# Patient Record
Sex: FEMALE | Race: BLACK OR AFRICAN AMERICAN | NOT HISPANIC OR LATINO | Employment: OTHER | URBAN - METROPOLITAN AREA
[De-identification: names, ages, dates, MRNs, and addresses within clinical notes are randomized per-mention and may not be internally consistent; named-entity substitution may affect disease eponyms.]

---

## 2021-05-26 ENCOUNTER — RECORDS - HEALTHEAST (OUTPATIENT)
Dept: ADMINISTRATIVE | Facility: CLINIC | Age: 76
End: 2021-05-26

## 2021-05-28 ENCOUNTER — RECORDS - HEALTHEAST (OUTPATIENT)
Dept: ADMINISTRATIVE | Facility: CLINIC | Age: 76
End: 2021-05-28

## 2021-07-25 ENCOUNTER — MEDICAL CORRESPONDENCE (OUTPATIENT)
Dept: HEALTH INFORMATION MANAGEMENT | Facility: CLINIC | Age: 76
End: 2021-07-25

## 2021-08-09 ENCOUNTER — LAB REQUISITION (OUTPATIENT)
Dept: LAB | Facility: CLINIC | Age: 76
End: 2021-08-09
Payer: COMMERCIAL

## 2021-08-09 DIAGNOSIS — E55.9 VITAMIN D DEFICIENCY, UNSPECIFIED: ICD-10-CM

## 2021-08-09 DIAGNOSIS — I10 ESSENTIAL (PRIMARY) HYPERTENSION: ICD-10-CM

## 2021-08-09 DIAGNOSIS — D64.9 ANEMIA, UNSPECIFIED: ICD-10-CM

## 2021-08-09 DIAGNOSIS — E03.9 HYPOTHYROIDISM, UNSPECIFIED: ICD-10-CM

## 2021-08-09 DIAGNOSIS — T81.30XS DISRUPTION OF WOUND, UNSPECIFIED, SEQUELA: ICD-10-CM

## 2021-08-09 LAB
ANION GAP SERPL CALCULATED.3IONS-SCNC: 9 MMOL/L (ref 5–18)
BUN SERPL-MCNC: 12 MG/DL (ref 8–28)
CALCIUM SERPL-MCNC: 9.2 MG/DL (ref 8.5–10.5)
CHLORIDE BLD-SCNC: 110 MMOL/L (ref 98–107)
CO2 SERPL-SCNC: 23 MMOL/L (ref 22–31)
CREAT SERPL-MCNC: 0.75 MG/DL (ref 0.6–1.1)
ERYTHROCYTE [DISTWIDTH] IN BLOOD BY AUTOMATED COUNT: 14.6 % (ref 10–15)
GFR SERPL CREATININE-BSD FRML MDRD: 77 ML/MIN/1.73M2
GLUCOSE BLD-MCNC: 83 MG/DL (ref 70–125)
HCT VFR BLD AUTO: 33.1 % (ref 35–47)
HGB BLD-MCNC: 10.1 G/DL (ref 11.7–15.7)
MCH RBC QN AUTO: 26 PG (ref 26.5–33)
MCHC RBC AUTO-ENTMCNC: 30.5 G/DL (ref 31.5–36.5)
MCV RBC AUTO: 85 FL (ref 78–100)
PLATELET # BLD AUTO: 392 10E3/UL (ref 150–450)
POTASSIUM BLD-SCNC: 4.5 MMOL/L (ref 3.5–5)
RBC # BLD AUTO: 3.89 10E6/UL (ref 3.8–5.2)
SODIUM SERPL-SCNC: 142 MMOL/L (ref 136–145)
TSH SERPL DL<=0.005 MIU/L-ACNC: 1.07 UIU/ML (ref 0.3–5)
WBC # BLD AUTO: 7 10E3/UL (ref 4–11)

## 2021-08-09 PROCEDURE — 84630 ASSAY OF ZINC: CPT | Mod: ORL | Performed by: FAMILY MEDICINE

## 2021-08-09 PROCEDURE — 36415 COLL VENOUS BLD VENIPUNCTURE: CPT | Mod: ORL | Performed by: FAMILY MEDICINE

## 2021-08-09 PROCEDURE — 84443 ASSAY THYROID STIM HORMONE: CPT | Mod: ORL | Performed by: FAMILY MEDICINE

## 2021-08-09 PROCEDURE — 80048 BASIC METABOLIC PNL TOTAL CA: CPT | Mod: ORL | Performed by: FAMILY MEDICINE

## 2021-08-09 PROCEDURE — 85027 COMPLETE CBC AUTOMATED: CPT | Mod: ORL | Performed by: FAMILY MEDICINE

## 2021-08-09 PROCEDURE — 82306 VITAMIN D 25 HYDROXY: CPT | Mod: ORL | Performed by: FAMILY MEDICINE

## 2021-08-10 LAB — DEPRECATED CALCIDIOL+CALCIFEROL SERPL-MC: 41 UG/L (ref 30–80)

## 2021-08-12 LAB — ZINC SERPL-MCNC: 83.2 UG/DL

## 2021-09-16 ENCOUNTER — LAB REQUISITION (OUTPATIENT)
Dept: LAB | Facility: CLINIC | Age: 76
End: 2021-09-16
Payer: COMMERCIAL

## 2021-09-16 DIAGNOSIS — I10 ESSENTIAL (PRIMARY) HYPERTENSION: ICD-10-CM

## 2021-09-16 DIAGNOSIS — E08.641 DIABETES MELLITUS DUE TO UNDERLYING CONDITION WITH HYPOGLYCEMIA WITH COMA (H): ICD-10-CM

## 2021-09-17 LAB
ANION GAP SERPL CALCULATED.3IONS-SCNC: 9 MMOL/L (ref 5–18)
BUN SERPL-MCNC: 12 MG/DL (ref 8–28)
CALCIUM SERPL-MCNC: 9.3 MG/DL (ref 8.5–10.5)
CHLORIDE BLD-SCNC: 112 MMOL/L (ref 98–107)
CO2 SERPL-SCNC: 23 MMOL/L (ref 22–31)
CREAT SERPL-MCNC: 0.69 MG/DL (ref 0.6–1.1)
GFR SERPL CREATININE-BSD FRML MDRD: 84 ML/MIN/1.73M2
GLUCOSE BLD-MCNC: 36 MG/DL (ref 70–125)
HBA1C MFR BLD: 5.7 %
POTASSIUM BLD-SCNC: 3.7 MMOL/L (ref 3.5–5)
SODIUM SERPL-SCNC: 144 MMOL/L (ref 136–145)

## 2021-09-17 PROCEDURE — 83036 HEMOGLOBIN GLYCOSYLATED A1C: CPT | Mod: ORL | Performed by: NURSE PRACTITIONER

## 2021-09-17 PROCEDURE — 80048 BASIC METABOLIC PNL TOTAL CA: CPT | Mod: ORL | Performed by: NURSE PRACTITIONER

## 2021-09-17 PROCEDURE — P9603 ONE-WAY ALLOW PRORATED MILES: HCPCS | Mod: ORL | Performed by: NURSE PRACTITIONER

## 2021-09-17 PROCEDURE — 36415 COLL VENOUS BLD VENIPUNCTURE: CPT | Mod: ORL | Performed by: NURSE PRACTITIONER

## 2021-10-18 ENCOUNTER — LAB REQUISITION (OUTPATIENT)
Dept: LAB | Facility: CLINIC | Age: 76
End: 2021-10-18
Payer: COMMERCIAL

## 2021-10-18 DIAGNOSIS — I10 ESSENTIAL (PRIMARY) HYPERTENSION: ICD-10-CM

## 2021-10-19 LAB
ANION GAP SERPL CALCULATED.3IONS-SCNC: 8 MMOL/L (ref 5–18)
BUN SERPL-MCNC: 16 MG/DL (ref 8–28)
CALCIUM SERPL-MCNC: 9.5 MG/DL (ref 8.5–10.5)
CHLORIDE BLD-SCNC: 112 MMOL/L (ref 98–107)
CO2 SERPL-SCNC: 23 MMOL/L (ref 22–31)
CREAT SERPL-MCNC: 0.74 MG/DL (ref 0.6–1.1)
GFR SERPL CREATININE-BSD FRML MDRD: 78 ML/MIN/1.73M2
GLUCOSE BLD-MCNC: 76 MG/DL (ref 70–125)
POTASSIUM BLD-SCNC: 4.1 MMOL/L (ref 3.5–5)
SODIUM SERPL-SCNC: 143 MMOL/L (ref 136–145)

## 2021-10-19 PROCEDURE — 36415 COLL VENOUS BLD VENIPUNCTURE: CPT | Mod: ORL | Performed by: NURSE PRACTITIONER

## 2021-10-19 PROCEDURE — 80048 BASIC METABOLIC PNL TOTAL CA: CPT | Mod: ORL | Performed by: NURSE PRACTITIONER

## 2021-12-02 ENCOUNTER — LAB REQUISITION (OUTPATIENT)
Dept: LAB | Facility: CLINIC | Age: 76
End: 2021-12-02
Payer: COMMERCIAL

## 2021-12-02 DIAGNOSIS — E11.9 TYPE 2 DIABETES MELLITUS WITHOUT COMPLICATIONS (H): ICD-10-CM

## 2021-12-02 DIAGNOSIS — E78.5 HYPERLIPIDEMIA, UNSPECIFIED: ICD-10-CM

## 2021-12-02 DIAGNOSIS — D64.9 ANEMIA, UNSPECIFIED: ICD-10-CM

## 2021-12-03 LAB
CHOLEST SERPL-MCNC: 112 MG/DL
ERYTHROCYTE [DISTWIDTH] IN BLOOD BY AUTOMATED COUNT: 15.1 % (ref 10–15)
FASTING STATUS PATIENT QL REPORTED: YES
HBA1C MFR BLD: 5.8 %
HCT VFR BLD AUTO: 36.6 % (ref 35–47)
HDLC SERPL-MCNC: 29 MG/DL
HGB BLD-MCNC: 10.9 G/DL (ref 11.7–15.7)
LDLC SERPL CALC-MCNC: 62 MG/DL
MCH RBC QN AUTO: 23.8 PG (ref 26.5–33)
MCHC RBC AUTO-ENTMCNC: 29.8 G/DL (ref 31.5–36.5)
MCV RBC AUTO: 80 FL (ref 78–100)
PLATELET # BLD AUTO: 268 10E3/UL (ref 150–450)
RBC # BLD AUTO: 4.58 10E6/UL (ref 3.8–5.2)
TRIGL SERPL-MCNC: 103 MG/DL
WBC # BLD AUTO: 3.7 10E3/UL (ref 4–11)

## 2021-12-03 PROCEDURE — 80061 LIPID PANEL: CPT | Mod: ORL | Performed by: NURSE PRACTITIONER

## 2021-12-03 PROCEDURE — 85027 COMPLETE CBC AUTOMATED: CPT | Mod: ORL | Performed by: NURSE PRACTITIONER

## 2021-12-03 PROCEDURE — 83036 HEMOGLOBIN GLYCOSYLATED A1C: CPT | Mod: ORL | Performed by: NURSE PRACTITIONER

## 2021-12-03 PROCEDURE — P9604 ONE-WAY ALLOW PRORATED TRIP: HCPCS | Mod: ORL | Performed by: NURSE PRACTITIONER

## 2021-12-03 PROCEDURE — 36415 COLL VENOUS BLD VENIPUNCTURE: CPT | Mod: ORL | Performed by: NURSE PRACTITIONER

## 2022-01-01 PROCEDURE — 96372 THER/PROPH/DIAG INJ SC/IM: CPT

## 2023-01-01 ENCOUNTER — APPOINTMENT (OUTPATIENT)
Dept: CT IMAGING | Facility: CLINIC | Age: 78
DRG: 593 | End: 2023-01-01
Attending: HOSPITALIST
Payer: COMMERCIAL

## 2023-01-01 ENCOUNTER — LAB REQUISITION (OUTPATIENT)
Dept: LAB | Facility: CLINIC | Age: 78
End: 2023-01-01
Payer: COMMERCIAL

## 2023-01-01 ENCOUNTER — APPOINTMENT (OUTPATIENT)
Dept: GENERAL RADIOLOGY | Facility: CLINIC | Age: 78
DRG: 593 | End: 2023-01-01
Attending: HOSPITALIST
Payer: COMMERCIAL

## 2023-01-01 ENCOUNTER — TELEPHONE (OUTPATIENT)
Dept: GERIATRICS | Facility: CLINIC | Age: 78
End: 2023-01-01
Payer: OTHER MISCELLANEOUS

## 2023-01-01 ENCOUNTER — NURSING HOME VISIT (OUTPATIENT)
Dept: GERIATRICS | Facility: CLINIC | Age: 78
End: 2023-01-01
Payer: MEDICARE

## 2023-01-01 ENCOUNTER — MEDICAL CORRESPONDENCE (OUTPATIENT)
Dept: HEALTH INFORMATION MANAGEMENT | Facility: CLINIC | Age: 78
End: 2023-01-01

## 2023-01-01 ENCOUNTER — HOSPITAL ENCOUNTER (INPATIENT)
Facility: CLINIC | Age: 78
LOS: 60 days | Discharge: NURSING, REHAB OR LONG-TERM CARE | DRG: 593 | End: 2023-03-08
Attending: EMERGENCY MEDICINE | Admitting: HOSPITALIST
Payer: COMMERCIAL

## 2023-01-01 ENCOUNTER — APPOINTMENT (OUTPATIENT)
Dept: ULTRASOUND IMAGING | Facility: CLINIC | Age: 78
DRG: 593 | End: 2023-01-01
Attending: PODIATRIST
Payer: COMMERCIAL

## 2023-01-01 ENCOUNTER — DOCUMENTATION ONLY (OUTPATIENT)
Dept: OTHER | Facility: CLINIC | Age: 78
End: 2023-01-01
Payer: OTHER MISCELLANEOUS

## 2023-01-01 VITALS
HEIGHT: 64 IN | OXYGEN SATURATION: 96 % | TEMPERATURE: 97.9 F | HEART RATE: 78 BPM | BODY MASS INDEX: 29.02 KG/M2 | WEIGHT: 170 LBS | SYSTOLIC BLOOD PRESSURE: 131 MMHG | DIASTOLIC BLOOD PRESSURE: 74 MMHG | RESPIRATION RATE: 18 BRPM

## 2023-01-01 VITALS
WEIGHT: 170 LBS | HEIGHT: 64 IN | OXYGEN SATURATION: 95 % | TEMPERATURE: 97.6 F | BODY MASS INDEX: 29.02 KG/M2 | RESPIRATION RATE: 18 BRPM | HEART RATE: 72 BPM | DIASTOLIC BLOOD PRESSURE: 72 MMHG | SYSTOLIC BLOOD PRESSURE: 126 MMHG

## 2023-01-01 VITALS
BODY MASS INDEX: 29.02 KG/M2 | WEIGHT: 170 LBS | SYSTOLIC BLOOD PRESSURE: 110 MMHG | RESPIRATION RATE: 18 BRPM | OXYGEN SATURATION: 98 % | HEIGHT: 64 IN | DIASTOLIC BLOOD PRESSURE: 47 MMHG | HEART RATE: 75 BPM | TEMPERATURE: 97.9 F

## 2023-01-01 VITALS
HEIGHT: 64 IN | TEMPERATURE: 97.3 F | WEIGHT: 170 LBS | BODY MASS INDEX: 29.02 KG/M2 | OXYGEN SATURATION: 98 % | HEART RATE: 84 BPM | DIASTOLIC BLOOD PRESSURE: 72 MMHG | SYSTOLIC BLOOD PRESSURE: 139 MMHG | RESPIRATION RATE: 18 BRPM

## 2023-01-01 VITALS
HEART RATE: 78 BPM | DIASTOLIC BLOOD PRESSURE: 74 MMHG | RESPIRATION RATE: 18 BRPM | SYSTOLIC BLOOD PRESSURE: 131 MMHG | HEIGHT: 64 IN | TEMPERATURE: 97.9 F | OXYGEN SATURATION: 96 % | BODY MASS INDEX: 29.02 KG/M2 | WEIGHT: 170 LBS

## 2023-01-01 VITALS
SYSTOLIC BLOOD PRESSURE: 123 MMHG | TEMPERATURE: 97.3 F | HEART RATE: 81 BPM | HEIGHT: 64 IN | RESPIRATION RATE: 18 BRPM | OXYGEN SATURATION: 96 % | BODY MASS INDEX: 29.02 KG/M2 | WEIGHT: 170 LBS | DIASTOLIC BLOOD PRESSURE: 58 MMHG

## 2023-01-01 DIAGNOSIS — Z51.5 HOSPICE CARE PATIENT: ICD-10-CM

## 2023-01-01 DIAGNOSIS — E11.69 TYPE 2 DIABETES MELLITUS WITH OTHER SPECIFIED COMPLICATION, WITH LONG-TERM CURRENT USE OF INSULIN (H): Primary | ICD-10-CM

## 2023-01-01 DIAGNOSIS — F03.90 DEMENTIA, UNSPECIFIED DEMENTIA SEVERITY, UNSPECIFIED DEMENTIA TYPE, UNSPECIFIED WHETHER BEHAVIORAL, PSYCHOTIC, OR MOOD DISTURBANCE OR ANXIETY (H): ICD-10-CM

## 2023-01-01 DIAGNOSIS — L89.154 DECUBITUS ULCER OF SACRAL REGION, STAGE 4 (H): Primary | ICD-10-CM

## 2023-01-01 DIAGNOSIS — I25.10 ATHEROSCLEROSIS OF NATIVE CORONARY ARTERY OF NATIVE HEART WITHOUT ANGINA PECTORIS: ICD-10-CM

## 2023-01-01 DIAGNOSIS — L89.159 PRESSURE INJURY OF SKIN OF SACRAL REGION, UNSPECIFIED INJURY STAGE: ICD-10-CM

## 2023-01-01 DIAGNOSIS — M62.838 SPASM OF MUSCLE: ICD-10-CM

## 2023-01-01 DIAGNOSIS — F41.8 DEPRESSION WITH ANXIETY: ICD-10-CM

## 2023-01-01 DIAGNOSIS — K21.9 GASTROESOPHAGEAL REFLUX DISEASE, UNSPECIFIED WHETHER ESOPHAGITIS PRESENT: ICD-10-CM

## 2023-01-01 DIAGNOSIS — F32.A ANXIETY AND DEPRESSION: ICD-10-CM

## 2023-01-01 DIAGNOSIS — I48.91 ATRIAL FIBRILLATION, UNSPECIFIED TYPE (H): ICD-10-CM

## 2023-01-01 DIAGNOSIS — L89.154 DECUBITUS ULCER OF SACRAL REGION, STAGE 4 (H): ICD-10-CM

## 2023-01-01 DIAGNOSIS — E78.5 HYPERLIPIDEMIA, UNSPECIFIED HYPERLIPIDEMIA TYPE: ICD-10-CM

## 2023-01-01 DIAGNOSIS — Z79.4 TYPE 2 DIABETES MELLITUS WITH OTHER SPECIFIED COMPLICATION, WITH LONG-TERM CURRENT USE OF INSULIN (H): Primary | ICD-10-CM

## 2023-01-01 DIAGNOSIS — Z97.8 FOLEY CATHETER IN PLACE: ICD-10-CM

## 2023-01-01 DIAGNOSIS — G89.29 OTHER CHRONIC PAIN: ICD-10-CM

## 2023-01-01 DIAGNOSIS — J44.9 CHRONIC OBSTRUCTIVE PULMONARY DISEASE, UNSPECIFIED COPD TYPE (H): ICD-10-CM

## 2023-01-01 DIAGNOSIS — I10 ESSENTIAL HYPERTENSION, BENIGN: ICD-10-CM

## 2023-01-01 DIAGNOSIS — E03.9 HYPOTHYROIDISM, UNSPECIFIED TYPE: ICD-10-CM

## 2023-01-01 DIAGNOSIS — I25.10 ASCVD (ARTERIOSCLEROTIC CARDIOVASCULAR DISEASE): ICD-10-CM

## 2023-01-01 DIAGNOSIS — Z79.4 TYPE 2 DIABETES MELLITUS WITH OTHER SPECIFIED COMPLICATION, WITH LONG-TERM CURRENT USE OF INSULIN (H): ICD-10-CM

## 2023-01-01 DIAGNOSIS — E66.01 MORBID OBESITY (H): ICD-10-CM

## 2023-01-01 DIAGNOSIS — N18.31 STAGE 3A CHRONIC KIDNEY DISEASE (H): ICD-10-CM

## 2023-01-01 DIAGNOSIS — R53.1 GENERALIZED WEAKNESS: ICD-10-CM

## 2023-01-01 DIAGNOSIS — G24.01 TARDIVE DYSKINESIA: ICD-10-CM

## 2023-01-01 DIAGNOSIS — R52 PAIN: Primary | ICD-10-CM

## 2023-01-01 DIAGNOSIS — R73.9 HYPERGLYCEMIA: ICD-10-CM

## 2023-01-01 DIAGNOSIS — F25.9 SCHIZOAFFECTIVE DISORDER, UNSPECIFIED TYPE (H): ICD-10-CM

## 2023-01-01 DIAGNOSIS — Z86.718 PERSONAL HISTORY OF DVT (DEEP VEIN THROMBOSIS): ICD-10-CM

## 2023-01-01 DIAGNOSIS — K59.00 CONSTIPATION, UNSPECIFIED CONSTIPATION TYPE: ICD-10-CM

## 2023-01-01 DIAGNOSIS — M19.90 ARTHRITIS: ICD-10-CM

## 2023-01-01 DIAGNOSIS — G40.909 SEIZURE DISORDER (H): ICD-10-CM

## 2023-01-01 DIAGNOSIS — E11.69 TYPE 2 DIABETES MELLITUS WITH OTHER SPECIFIED COMPLICATION, WITH LONG-TERM CURRENT USE OF INSULIN (H): ICD-10-CM

## 2023-01-01 DIAGNOSIS — I50.32 CHRONIC DIASTOLIC HEART FAILURE (H): ICD-10-CM

## 2023-01-01 DIAGNOSIS — R62.7 FAILURE TO THRIVE IN ADULT: ICD-10-CM

## 2023-01-01 DIAGNOSIS — G62.9 NEUROPATHY: ICD-10-CM

## 2023-01-01 DIAGNOSIS — E03.9 HYPOTHYROIDISM, ADULT: ICD-10-CM

## 2023-01-01 DIAGNOSIS — L89.154 STAGE IV PRESSURE ULCER OF SACRAL REGION (H): ICD-10-CM

## 2023-01-01 DIAGNOSIS — R32 URINARY INCONTINENCE, UNSPECIFIED TYPE: ICD-10-CM

## 2023-01-01 DIAGNOSIS — F41.9 ANXIETY AND DEPRESSION: ICD-10-CM

## 2023-01-01 DIAGNOSIS — E11.42 DIABETIC POLYNEUROPATHY ASSOCIATED WITH TYPE 2 DIABETES MELLITUS (H): ICD-10-CM

## 2023-01-01 DIAGNOSIS — I10 ESSENTIAL HYPERTENSION: ICD-10-CM

## 2023-01-01 DIAGNOSIS — N32.81 OAB (OVERACTIVE BLADDER): ICD-10-CM

## 2023-01-01 DIAGNOSIS — I50.32 CHRONIC HEART FAILURE WITH PRESERVED EJECTION FRACTION (HFPEF) (H): ICD-10-CM

## 2023-01-01 DIAGNOSIS — Z11.1 ENCOUNTER FOR SCREENING FOR RESPIRATORY TUBERCULOSIS: ICD-10-CM

## 2023-01-01 LAB
ALBUMIN SERPL BCG-MCNC: 3.1 G/DL (ref 3.5–5.2)
ALBUMIN SERPL-MCNC: 2.4 G/DL (ref 3.4–5)
ALBUMIN SERPL-MCNC: 2.5 G/DL (ref 3.4–5)
ALBUMIN UR-MCNC: 10 MG/DL
ALP SERPL-CCNC: 100 U/L (ref 40–150)
ALP SERPL-CCNC: 101 U/L (ref 40–150)
ALP SERPL-CCNC: 93 U/L (ref 35–104)
ALT SERPL W P-5'-P-CCNC: 20 U/L (ref 10–35)
ALT SERPL W P-5'-P-CCNC: 9 U/L (ref 0–50)
ALT SERPL W P-5'-P-CCNC: 9 U/L (ref 0–50)
ANION GAP SERPL CALCULATED.3IONS-SCNC: 10 MMOL/L (ref 3–14)
ANION GAP SERPL CALCULATED.3IONS-SCNC: 11 MMOL/L (ref 7–15)
ANION GAP SERPL CALCULATED.3IONS-SCNC: 12 MMOL/L (ref 7–15)
ANION GAP SERPL CALCULATED.3IONS-SCNC: 4 MMOL/L (ref 3–14)
ANION GAP SERPL CALCULATED.3IONS-SCNC: 5 MMOL/L (ref 3–14)
ANION GAP SERPL CALCULATED.3IONS-SCNC: 6 MMOL/L (ref 3–14)
ANION GAP SERPL CALCULATED.3IONS-SCNC: 8 MMOL/L (ref 3–14)
ANION GAP SERPL CALCULATED.3IONS-SCNC: 8 MMOL/L (ref 3–14)
ANION GAP SERPL CALCULATED.3IONS-SCNC: 8 MMOL/L (ref 7–15)
APPEARANCE UR: CLEAR
AST SERPL W P-5'-P-CCNC: 12 U/L (ref 0–45)
AST SERPL W P-5'-P-CCNC: 20 U/L (ref 10–35)
AST SERPL W P-5'-P-CCNC: 7 U/L (ref 0–45)
ATRIAL RATE - MUSE: 90 BPM
ATRIAL RATE - MUSE: 91 BPM
ATRIAL RATE - MUSE: 98 BPM
BACTERIA #/AREA URNS HPF: ABNORMAL /HPF
BACTERIA BLD CULT: NO GROWTH
BACTERIA BLD CULT: NO GROWTH
BASOPHILS # BLD AUTO: 0 10E3/UL (ref 0–0.2)
BASOPHILS # BLD AUTO: 0.1 10E3/UL (ref 0–0.2)
BASOPHILS NFR BLD AUTO: 0 %
BASOPHILS NFR BLD AUTO: 1 %
BILIRUB DIRECT SERPL-MCNC: <0.2 MG/DL (ref 0–0.3)
BILIRUB SERPL-MCNC: 0.2 MG/DL
BILIRUB SERPL-MCNC: 0.5 MG/DL (ref 0.2–1.3)
BILIRUB SERPL-MCNC: 0.7 MG/DL (ref 0.2–1.3)
BILIRUB UR QL STRIP: NEGATIVE
BUN SERPL-MCNC: 12 MG/DL (ref 7–30)
BUN SERPL-MCNC: 12 MG/DL (ref 7–30)
BUN SERPL-MCNC: 18 MG/DL (ref 7–30)
BUN SERPL-MCNC: 18 MG/DL (ref 7–30)
BUN SERPL-MCNC: 19 MG/DL (ref 7–30)
BUN SERPL-MCNC: 19.3 MG/DL (ref 8–23)
BUN SERPL-MCNC: 21 MG/DL (ref 7–30)
BUN SERPL-MCNC: 22.7 MG/DL (ref 8–23)
BUN SERPL-MCNC: 24.5 MG/DL (ref 8–23)
CALCIUM SERPL-MCNC: 8.2 MG/DL (ref 8.5–10.1)
CALCIUM SERPL-MCNC: 8.5 MG/DL (ref 8.5–10.1)
CALCIUM SERPL-MCNC: 8.9 MG/DL (ref 8.5–10.1)
CALCIUM SERPL-MCNC: 8.9 MG/DL (ref 8.5–10.1)
CALCIUM SERPL-MCNC: 9 MG/DL (ref 8.5–10.1)
CALCIUM SERPL-MCNC: 9 MG/DL (ref 8.8–10.2)
CALCIUM SERPL-MCNC: 9 MG/DL (ref 8.8–10.2)
CALCIUM SERPL-MCNC: 9.4 MG/DL (ref 8.5–10.1)
CALCIUM SERPL-MCNC: 9.4 MG/DL (ref 8.8–10.2)
CHLORIDE BLD-SCNC: 108 MMOL/L (ref 94–109)
CHLORIDE BLD-SCNC: 108 MMOL/L (ref 94–109)
CHLORIDE BLD-SCNC: 110 MMOL/L (ref 94–109)
CHLORIDE BLD-SCNC: 112 MMOL/L (ref 94–109)
CHLORIDE BLD-SCNC: 112 MMOL/L (ref 94–109)
CHLORIDE BLD-SCNC: 113 MMOL/L (ref 94–109)
CHLORIDE SERPL-SCNC: 107 MMOL/L (ref 98–107)
CHLORIDE SERPL-SCNC: 108 MMOL/L (ref 98–107)
CHLORIDE SERPL-SCNC: 110 MMOL/L (ref 98–107)
CO2 SERPL-SCNC: 22 MMOL/L (ref 20–32)
CO2 SERPL-SCNC: 22 MMOL/L (ref 20–32)
CO2 SERPL-SCNC: 23 MMOL/L (ref 20–32)
CO2 SERPL-SCNC: 24 MMOL/L (ref 20–32)
COLOR UR AUTO: YELLOW
CREAT SERPL-MCNC: 0.59 MG/DL (ref 0.51–0.95)
CREAT SERPL-MCNC: 0.61 MG/DL (ref 0.51–0.95)
CREAT SERPL-MCNC: 0.62 MG/DL (ref 0.51–0.95)
CREAT SERPL-MCNC: 0.63 MG/DL (ref 0.52–1.04)
CREAT SERPL-MCNC: 0.65 MG/DL (ref 0.51–0.95)
CREAT SERPL-MCNC: 0.65 MG/DL (ref 0.51–0.95)
CREAT SERPL-MCNC: 0.67 MG/DL (ref 0.52–1.04)
CREAT SERPL-MCNC: 0.69 MG/DL (ref 0.52–1.04)
CREAT SERPL-MCNC: 0.69 MG/DL (ref 0.52–1.04)
CREAT SERPL-MCNC: 0.7 MG/DL (ref 0.51–0.95)
CREAT SERPL-MCNC: 0.71 MG/DL (ref 0.51–0.95)
CREAT SERPL-MCNC: 0.71 MG/DL (ref 0.52–1.04)
CREAT SERPL-MCNC: 0.74 MG/DL (ref 0.51–0.95)
CREAT SERPL-MCNC: 0.81 MG/DL (ref 0.52–1.04)
CREAT SERPL-MCNC: 0.93 MG/DL (ref 0.52–1.04)
CRP SERPL-MCNC: 9.8 MG/L (ref 0–8)
CRP SERPL-MCNC: <3 MG/L
DEPRECATED HCO3 PLAS-SCNC: 22 MMOL/L (ref 22–29)
DEPRECATED HCO3 PLAS-SCNC: 22 MMOL/L (ref 22–29)
DEPRECATED HCO3 PLAS-SCNC: 23 MMOL/L (ref 22–29)
DIASTOLIC BLOOD PRESSURE - MUSE: NORMAL MMHG
EOSINOPHIL # BLD AUTO: 0 10E3/UL (ref 0–0.7)
EOSINOPHIL # BLD AUTO: 0.1 10E3/UL (ref 0–0.7)
EOSINOPHIL # BLD AUTO: 0.2 10E3/UL (ref 0–0.7)
EOSINOPHIL # BLD AUTO: 0.3 10E3/UL (ref 0–0.7)
EOSINOPHIL # BLD AUTO: 0.3 10E3/UL (ref 0–0.7)
EOSINOPHIL NFR BLD AUTO: 0 %
EOSINOPHIL NFR BLD AUTO: 1 %
EOSINOPHIL NFR BLD AUTO: 3 %
EOSINOPHIL NFR BLD AUTO: 4 %
EOSINOPHIL NFR BLD AUTO: 5 %
ERYTHROCYTE [DISTWIDTH] IN BLOOD BY AUTOMATED COUNT: 14.2 % (ref 10–15)
ERYTHROCYTE [DISTWIDTH] IN BLOOD BY AUTOMATED COUNT: 14.6 % (ref 10–15)
ERYTHROCYTE [DISTWIDTH] IN BLOOD BY AUTOMATED COUNT: 14.7 % (ref 10–15)
ERYTHROCYTE [DISTWIDTH] IN BLOOD BY AUTOMATED COUNT: 14.8 % (ref 10–15)
ERYTHROCYTE [DISTWIDTH] IN BLOOD BY AUTOMATED COUNT: 14.8 % (ref 10–15)
ERYTHROCYTE [DISTWIDTH] IN BLOOD BY AUTOMATED COUNT: 15 % (ref 10–15)
ERYTHROCYTE [DISTWIDTH] IN BLOOD BY AUTOMATED COUNT: 15.3 % (ref 10–15)
ERYTHROCYTE [DISTWIDTH] IN BLOOD BY AUTOMATED COUNT: 15.5 % (ref 10–15)
ERYTHROCYTE [DISTWIDTH] IN BLOOD BY AUTOMATED COUNT: 15.9 % (ref 10–15)
FERRITIN SERPL-MCNC: 63 NG/ML (ref 8–252)
GAMMA INTERFERON BACKGROUND BLD IA-ACNC: 0.05 IU/ML
GFR SERPL CREATININE-BSD FRML MDRD: 62 ML/MIN/1.73M2
GFR SERPL CREATININE-BSD FRML MDRD: 73 ML/MIN/1.73M2
GFR SERPL CREATININE-BSD FRML MDRD: 82 ML/MIN/1.73M2
GFR SERPL CREATININE-BSD FRML MDRD: 86 ML/MIN/1.73M2
GFR SERPL CREATININE-BSD FRML MDRD: 86 ML/MIN/1.73M2
GFR SERPL CREATININE-BSD FRML MDRD: 87 ML/MIN/1.73M2
GFR SERPL CREATININE-BSD FRML MDRD: 88 ML/MIN/1.73M2
GFR SERPL CREATININE-BSD FRML MDRD: 89 ML/MIN/1.73M2
GFR SERPL CREATININE-BSD FRML MDRD: 89 ML/MIN/1.73M2
GFR SERPL CREATININE-BSD FRML MDRD: 90 ML/MIN/1.73M2
GFR SERPL CREATININE-BSD FRML MDRD: >90 ML/MIN/1.73M2
GLUCOSE BLD-MCNC: 120 MG/DL (ref 70–99)
GLUCOSE BLD-MCNC: 153 MG/DL (ref 70–99)
GLUCOSE BLD-MCNC: 164 MG/DL (ref 70–99)
GLUCOSE BLD-MCNC: 178 MG/DL (ref 70–99)
GLUCOSE BLD-MCNC: 195 MG/DL (ref 70–99)
GLUCOSE BLD-MCNC: 243 MG/DL (ref 70–99)
GLUCOSE BLD-MCNC: 305 MG/DL (ref 70–99)
GLUCOSE BLDC GLUCOMTR-MCNC: 100 MG/DL (ref 70–99)
GLUCOSE BLDC GLUCOMTR-MCNC: 100 MG/DL (ref 70–99)
GLUCOSE BLDC GLUCOMTR-MCNC: 101 MG/DL (ref 70–99)
GLUCOSE BLDC GLUCOMTR-MCNC: 102 MG/DL (ref 70–99)
GLUCOSE BLDC GLUCOMTR-MCNC: 103 MG/DL (ref 70–99)
GLUCOSE BLDC GLUCOMTR-MCNC: 106 MG/DL (ref 70–99)
GLUCOSE BLDC GLUCOMTR-MCNC: 107 MG/DL (ref 70–99)
GLUCOSE BLDC GLUCOMTR-MCNC: 109 MG/DL (ref 70–99)
GLUCOSE BLDC GLUCOMTR-MCNC: 109 MG/DL (ref 70–99)
GLUCOSE BLDC GLUCOMTR-MCNC: 111 MG/DL (ref 70–99)
GLUCOSE BLDC GLUCOMTR-MCNC: 112 MG/DL (ref 70–99)
GLUCOSE BLDC GLUCOMTR-MCNC: 113 MG/DL (ref 70–99)
GLUCOSE BLDC GLUCOMTR-MCNC: 114 MG/DL (ref 70–99)
GLUCOSE BLDC GLUCOMTR-MCNC: 115 MG/DL (ref 70–99)
GLUCOSE BLDC GLUCOMTR-MCNC: 116 MG/DL (ref 70–99)
GLUCOSE BLDC GLUCOMTR-MCNC: 118 MG/DL (ref 70–99)
GLUCOSE BLDC GLUCOMTR-MCNC: 119 MG/DL (ref 70–99)
GLUCOSE BLDC GLUCOMTR-MCNC: 120 MG/DL (ref 70–99)
GLUCOSE BLDC GLUCOMTR-MCNC: 121 MG/DL (ref 70–99)
GLUCOSE BLDC GLUCOMTR-MCNC: 122 MG/DL (ref 70–99)
GLUCOSE BLDC GLUCOMTR-MCNC: 123 MG/DL (ref 70–99)
GLUCOSE BLDC GLUCOMTR-MCNC: 124 MG/DL (ref 70–99)
GLUCOSE BLDC GLUCOMTR-MCNC: 125 MG/DL (ref 70–99)
GLUCOSE BLDC GLUCOMTR-MCNC: 125 MG/DL (ref 70–99)
GLUCOSE BLDC GLUCOMTR-MCNC: 126 MG/DL (ref 70–99)
GLUCOSE BLDC GLUCOMTR-MCNC: 127 MG/DL (ref 70–99)
GLUCOSE BLDC GLUCOMTR-MCNC: 128 MG/DL (ref 70–99)
GLUCOSE BLDC GLUCOMTR-MCNC: 129 MG/DL (ref 70–99)
GLUCOSE BLDC GLUCOMTR-MCNC: 130 MG/DL (ref 70–99)
GLUCOSE BLDC GLUCOMTR-MCNC: 131 MG/DL (ref 70–99)
GLUCOSE BLDC GLUCOMTR-MCNC: 132 MG/DL (ref 70–99)
GLUCOSE BLDC GLUCOMTR-MCNC: 134 MG/DL (ref 70–99)
GLUCOSE BLDC GLUCOMTR-MCNC: 134 MG/DL (ref 70–99)
GLUCOSE BLDC GLUCOMTR-MCNC: 135 MG/DL (ref 70–99)
GLUCOSE BLDC GLUCOMTR-MCNC: 136 MG/DL (ref 70–99)
GLUCOSE BLDC GLUCOMTR-MCNC: 137 MG/DL (ref 70–99)
GLUCOSE BLDC GLUCOMTR-MCNC: 138 MG/DL (ref 70–99)
GLUCOSE BLDC GLUCOMTR-MCNC: 139 MG/DL (ref 70–99)
GLUCOSE BLDC GLUCOMTR-MCNC: 140 MG/DL (ref 70–99)
GLUCOSE BLDC GLUCOMTR-MCNC: 141 MG/DL (ref 70–99)
GLUCOSE BLDC GLUCOMTR-MCNC: 142 MG/DL (ref 70–99)
GLUCOSE BLDC GLUCOMTR-MCNC: 143 MG/DL (ref 70–99)
GLUCOSE BLDC GLUCOMTR-MCNC: 143 MG/DL (ref 70–99)
GLUCOSE BLDC GLUCOMTR-MCNC: 144 MG/DL (ref 70–99)
GLUCOSE BLDC GLUCOMTR-MCNC: 145 MG/DL (ref 70–99)
GLUCOSE BLDC GLUCOMTR-MCNC: 145 MG/DL (ref 70–99)
GLUCOSE BLDC GLUCOMTR-MCNC: 146 MG/DL (ref 70–99)
GLUCOSE BLDC GLUCOMTR-MCNC: 146 MG/DL (ref 70–99)
GLUCOSE BLDC GLUCOMTR-MCNC: 147 MG/DL (ref 70–99)
GLUCOSE BLDC GLUCOMTR-MCNC: 148 MG/DL (ref 70–99)
GLUCOSE BLDC GLUCOMTR-MCNC: 149 MG/DL (ref 70–99)
GLUCOSE BLDC GLUCOMTR-MCNC: 150 MG/DL (ref 70–99)
GLUCOSE BLDC GLUCOMTR-MCNC: 151 MG/DL (ref 70–99)
GLUCOSE BLDC GLUCOMTR-MCNC: 152 MG/DL (ref 70–99)
GLUCOSE BLDC GLUCOMTR-MCNC: 153 MG/DL (ref 70–99)
GLUCOSE BLDC GLUCOMTR-MCNC: 154 MG/DL (ref 70–99)
GLUCOSE BLDC GLUCOMTR-MCNC: 154 MG/DL (ref 70–99)
GLUCOSE BLDC GLUCOMTR-MCNC: 155 MG/DL (ref 70–99)
GLUCOSE BLDC GLUCOMTR-MCNC: 156 MG/DL (ref 70–99)
GLUCOSE BLDC GLUCOMTR-MCNC: 158 MG/DL (ref 70–99)
GLUCOSE BLDC GLUCOMTR-MCNC: 159 MG/DL (ref 70–99)
GLUCOSE BLDC GLUCOMTR-MCNC: 160 MG/DL (ref 70–99)
GLUCOSE BLDC GLUCOMTR-MCNC: 160 MG/DL (ref 70–99)
GLUCOSE BLDC GLUCOMTR-MCNC: 161 MG/DL (ref 70–99)
GLUCOSE BLDC GLUCOMTR-MCNC: 163 MG/DL (ref 70–99)
GLUCOSE BLDC GLUCOMTR-MCNC: 164 MG/DL (ref 70–99)
GLUCOSE BLDC GLUCOMTR-MCNC: 165 MG/DL (ref 70–99)
GLUCOSE BLDC GLUCOMTR-MCNC: 165 MG/DL (ref 70–99)
GLUCOSE BLDC GLUCOMTR-MCNC: 166 MG/DL (ref 70–99)
GLUCOSE BLDC GLUCOMTR-MCNC: 167 MG/DL (ref 70–99)
GLUCOSE BLDC GLUCOMTR-MCNC: 167 MG/DL (ref 70–99)
GLUCOSE BLDC GLUCOMTR-MCNC: 168 MG/DL (ref 70–99)
GLUCOSE BLDC GLUCOMTR-MCNC: 168 MG/DL (ref 70–99)
GLUCOSE BLDC GLUCOMTR-MCNC: 169 MG/DL (ref 70–99)
GLUCOSE BLDC GLUCOMTR-MCNC: 171 MG/DL (ref 70–99)
GLUCOSE BLDC GLUCOMTR-MCNC: 172 MG/DL (ref 70–99)
GLUCOSE BLDC GLUCOMTR-MCNC: 173 MG/DL (ref 70–99)
GLUCOSE BLDC GLUCOMTR-MCNC: 173 MG/DL (ref 70–99)
GLUCOSE BLDC GLUCOMTR-MCNC: 174 MG/DL (ref 70–99)
GLUCOSE BLDC GLUCOMTR-MCNC: 174 MG/DL (ref 70–99)
GLUCOSE BLDC GLUCOMTR-MCNC: 175 MG/DL (ref 70–99)
GLUCOSE BLDC GLUCOMTR-MCNC: 175 MG/DL (ref 70–99)
GLUCOSE BLDC GLUCOMTR-MCNC: 176 MG/DL (ref 70–99)
GLUCOSE BLDC GLUCOMTR-MCNC: 177 MG/DL (ref 70–99)
GLUCOSE BLDC GLUCOMTR-MCNC: 179 MG/DL (ref 70–99)
GLUCOSE BLDC GLUCOMTR-MCNC: 179 MG/DL (ref 70–99)
GLUCOSE BLDC GLUCOMTR-MCNC: 180 MG/DL (ref 70–99)
GLUCOSE BLDC GLUCOMTR-MCNC: 181 MG/DL (ref 70–99)
GLUCOSE BLDC GLUCOMTR-MCNC: 183 MG/DL (ref 70–99)
GLUCOSE BLDC GLUCOMTR-MCNC: 184 MG/DL (ref 70–99)
GLUCOSE BLDC GLUCOMTR-MCNC: 184 MG/DL (ref 70–99)
GLUCOSE BLDC GLUCOMTR-MCNC: 185 MG/DL (ref 70–99)
GLUCOSE BLDC GLUCOMTR-MCNC: 186 MG/DL (ref 70–99)
GLUCOSE BLDC GLUCOMTR-MCNC: 187 MG/DL (ref 70–99)
GLUCOSE BLDC GLUCOMTR-MCNC: 187 MG/DL (ref 70–99)
GLUCOSE BLDC GLUCOMTR-MCNC: 188 MG/DL (ref 70–99)
GLUCOSE BLDC GLUCOMTR-MCNC: 188 MG/DL (ref 70–99)
GLUCOSE BLDC GLUCOMTR-MCNC: 190 MG/DL (ref 70–99)
GLUCOSE BLDC GLUCOMTR-MCNC: 191 MG/DL (ref 70–99)
GLUCOSE BLDC GLUCOMTR-MCNC: 191 MG/DL (ref 70–99)
GLUCOSE BLDC GLUCOMTR-MCNC: 192 MG/DL (ref 70–99)
GLUCOSE BLDC GLUCOMTR-MCNC: 192 MG/DL (ref 70–99)
GLUCOSE BLDC GLUCOMTR-MCNC: 193 MG/DL (ref 70–99)
GLUCOSE BLDC GLUCOMTR-MCNC: 194 MG/DL (ref 70–99)
GLUCOSE BLDC GLUCOMTR-MCNC: 194 MG/DL (ref 70–99)
GLUCOSE BLDC GLUCOMTR-MCNC: 195 MG/DL (ref 70–99)
GLUCOSE BLDC GLUCOMTR-MCNC: 196 MG/DL (ref 70–99)
GLUCOSE BLDC GLUCOMTR-MCNC: 197 MG/DL (ref 70–99)
GLUCOSE BLDC GLUCOMTR-MCNC: 201 MG/DL (ref 70–99)
GLUCOSE BLDC GLUCOMTR-MCNC: 206 MG/DL (ref 70–99)
GLUCOSE BLDC GLUCOMTR-MCNC: 206 MG/DL (ref 70–99)
GLUCOSE BLDC GLUCOMTR-MCNC: 207 MG/DL (ref 70–99)
GLUCOSE BLDC GLUCOMTR-MCNC: 208 MG/DL (ref 70–99)
GLUCOSE BLDC GLUCOMTR-MCNC: 209 MG/DL (ref 70–99)
GLUCOSE BLDC GLUCOMTR-MCNC: 211 MG/DL (ref 70–99)
GLUCOSE BLDC GLUCOMTR-MCNC: 212 MG/DL (ref 70–99)
GLUCOSE BLDC GLUCOMTR-MCNC: 213 MG/DL (ref 70–99)
GLUCOSE BLDC GLUCOMTR-MCNC: 214 MG/DL (ref 70–99)
GLUCOSE BLDC GLUCOMTR-MCNC: 216 MG/DL (ref 70–99)
GLUCOSE BLDC GLUCOMTR-MCNC: 217 MG/DL (ref 70–99)
GLUCOSE BLDC GLUCOMTR-MCNC: 222 MG/DL (ref 70–99)
GLUCOSE BLDC GLUCOMTR-MCNC: 222 MG/DL (ref 70–99)
GLUCOSE BLDC GLUCOMTR-MCNC: 224 MG/DL (ref 70–99)
GLUCOSE BLDC GLUCOMTR-MCNC: 233 MG/DL (ref 70–99)
GLUCOSE BLDC GLUCOMTR-MCNC: 236 MG/DL (ref 70–99)
GLUCOSE BLDC GLUCOMTR-MCNC: 242 MG/DL (ref 70–99)
GLUCOSE BLDC GLUCOMTR-MCNC: 242 MG/DL (ref 70–99)
GLUCOSE BLDC GLUCOMTR-MCNC: 245 MG/DL (ref 70–99)
GLUCOSE BLDC GLUCOMTR-MCNC: 251 MG/DL (ref 70–99)
GLUCOSE BLDC GLUCOMTR-MCNC: 261 MG/DL (ref 70–99)
GLUCOSE BLDC GLUCOMTR-MCNC: 262 MG/DL (ref 70–99)
GLUCOSE BLDC GLUCOMTR-MCNC: 263 MG/DL (ref 70–99)
GLUCOSE BLDC GLUCOMTR-MCNC: 263 MG/DL (ref 70–99)
GLUCOSE BLDC GLUCOMTR-MCNC: 269 MG/DL (ref 70–99)
GLUCOSE BLDC GLUCOMTR-MCNC: 290 MG/DL (ref 70–99)
GLUCOSE BLDC GLUCOMTR-MCNC: 302 MG/DL (ref 70–99)
GLUCOSE BLDC GLUCOMTR-MCNC: 322 MG/DL (ref 70–99)
GLUCOSE BLDC GLUCOMTR-MCNC: 331 MG/DL (ref 70–99)
GLUCOSE BLDC GLUCOMTR-MCNC: 67 MG/DL (ref 70–99)
GLUCOSE BLDC GLUCOMTR-MCNC: 67 MG/DL (ref 70–99)
GLUCOSE BLDC GLUCOMTR-MCNC: 68 MG/DL (ref 70–99)
GLUCOSE BLDC GLUCOMTR-MCNC: 70 MG/DL (ref 70–99)
GLUCOSE BLDC GLUCOMTR-MCNC: 72 MG/DL (ref 70–99)
GLUCOSE BLDC GLUCOMTR-MCNC: 73 MG/DL (ref 70–99)
GLUCOSE BLDC GLUCOMTR-MCNC: 74 MG/DL (ref 70–99)
GLUCOSE BLDC GLUCOMTR-MCNC: 76 MG/DL (ref 70–99)
GLUCOSE BLDC GLUCOMTR-MCNC: 76 MG/DL (ref 70–99)
GLUCOSE BLDC GLUCOMTR-MCNC: 77 MG/DL (ref 70–99)
GLUCOSE BLDC GLUCOMTR-MCNC: 77 MG/DL (ref 70–99)
GLUCOSE BLDC GLUCOMTR-MCNC: 80 MG/DL (ref 70–99)
GLUCOSE BLDC GLUCOMTR-MCNC: 81 MG/DL (ref 70–99)
GLUCOSE BLDC GLUCOMTR-MCNC: 82 MG/DL (ref 70–99)
GLUCOSE BLDC GLUCOMTR-MCNC: 82 MG/DL (ref 70–99)
GLUCOSE BLDC GLUCOMTR-MCNC: 83 MG/DL (ref 70–99)
GLUCOSE BLDC GLUCOMTR-MCNC: 83 MG/DL (ref 70–99)
GLUCOSE BLDC GLUCOMTR-MCNC: 86 MG/DL (ref 70–99)
GLUCOSE BLDC GLUCOMTR-MCNC: 86 MG/DL (ref 70–99)
GLUCOSE BLDC GLUCOMTR-MCNC: 89 MG/DL (ref 70–99)
GLUCOSE BLDC GLUCOMTR-MCNC: 89 MG/DL (ref 70–99)
GLUCOSE BLDC GLUCOMTR-MCNC: 90 MG/DL (ref 70–99)
GLUCOSE BLDC GLUCOMTR-MCNC: 91 MG/DL (ref 70–99)
GLUCOSE BLDC GLUCOMTR-MCNC: 91 MG/DL (ref 70–99)
GLUCOSE BLDC GLUCOMTR-MCNC: 92 MG/DL (ref 70–99)
GLUCOSE BLDC GLUCOMTR-MCNC: 93 MG/DL (ref 70–99)
GLUCOSE BLDC GLUCOMTR-MCNC: 94 MG/DL (ref 70–99)
GLUCOSE BLDC GLUCOMTR-MCNC: 95 MG/DL (ref 70–99)
GLUCOSE BLDC GLUCOMTR-MCNC: 96 MG/DL (ref 70–99)
GLUCOSE BLDC GLUCOMTR-MCNC: 96 MG/DL (ref 70–99)
GLUCOSE BLDC GLUCOMTR-MCNC: 97 MG/DL (ref 70–99)
GLUCOSE BLDC GLUCOMTR-MCNC: 98 MG/DL (ref 70–99)
GLUCOSE BLDC GLUCOMTR-MCNC: 99 MG/DL (ref 70–99)
GLUCOSE SERPL-MCNC: 116 MG/DL (ref 70–99)
GLUCOSE SERPL-MCNC: 140 MG/DL (ref 70–99)
GLUCOSE SERPL-MCNC: 74 MG/DL (ref 70–99)
GLUCOSE UR STRIP-MCNC: 70 MG/DL
HBA1C MFR BLD: 10 % (ref 0–5.6)
HCO3 BLDV-SCNC: 22 MMOL/L (ref 21–28)
HCT VFR BLD AUTO: 29 % (ref 35–47)
HCT VFR BLD AUTO: 30.1 % (ref 35–47)
HCT VFR BLD AUTO: 33.2 % (ref 35–47)
HCT VFR BLD AUTO: 35.2 % (ref 35–47)
HCT VFR BLD AUTO: 35.3 % (ref 35–47)
HCT VFR BLD AUTO: 35.6 % (ref 35–47)
HCT VFR BLD AUTO: 36.9 % (ref 35–47)
HCT VFR BLD AUTO: 38.2 % (ref 35–47)
HCT VFR BLD AUTO: 38.6 % (ref 35–47)
HGB BLD-MCNC: 10.2 G/DL (ref 11.7–15.7)
HGB BLD-MCNC: 10.6 G/DL (ref 11.7–15.7)
HGB BLD-MCNC: 10.7 G/DL (ref 11.7–15.7)
HGB BLD-MCNC: 10.7 G/DL (ref 11.7–15.7)
HGB BLD-MCNC: 11.1 G/DL (ref 11.7–15.7)
HGB BLD-MCNC: 11.2 G/DL (ref 11.7–15.7)
HGB BLD-MCNC: 11.8 G/DL (ref 11.7–15.7)
HGB BLD-MCNC: 11.9 G/DL (ref 11.7–15.7)
HGB BLD-MCNC: 8.9 G/DL (ref 11.7–15.7)
HGB BLD-MCNC: 9.2 G/DL (ref 11.7–15.7)
HGB UR QL STRIP: ABNORMAL
HOLD SPECIMEN: NORMAL
HOLD SPECIMEN: NORMAL
IMM GRANULOCYTES # BLD: 0 10E3/UL
IMM GRANULOCYTES # BLD: 0.1 10E3/UL
IMM GRANULOCYTES NFR BLD: 0 %
IMM GRANULOCYTES NFR BLD: 1 %
INTERPRETATION ECG - MUSE: NORMAL
IRON SATN MFR SERPL: 32 % (ref 15–46)
IRON SERPL-MCNC: 41 UG/DL (ref 35–180)
KETONES UR STRIP-MCNC: NEGATIVE MG/DL
LACTATE BLD-SCNC: 1.8 MMOL/L
LACTATE SERPL-SCNC: 1.5 MMOL/L (ref 0.7–2)
LACTATE SERPL-SCNC: 2.3 MMOL/L (ref 0.7–2)
LEUKOCYTE ESTERASE UR QL STRIP: ABNORMAL
LYMPHOCYTES # BLD AUTO: 1 10E3/UL (ref 0.8–5.3)
LYMPHOCYTES # BLD AUTO: 1.1 10E3/UL (ref 0.8–5.3)
LYMPHOCYTES # BLD AUTO: 1.7 10E3/UL (ref 0.8–5.3)
LYMPHOCYTES NFR BLD AUTO: 10 %
LYMPHOCYTES NFR BLD AUTO: 16 %
LYMPHOCYTES NFR BLD AUTO: 25 %
LYMPHOCYTES NFR BLD AUTO: 28 %
LYMPHOCYTES NFR BLD AUTO: 28 %
M TB IFN-G BLD-IMP: NEGATIVE
M TB IFN-G CD4+ BCKGRND COR BLD-ACNC: 5.59 IU/ML
MCH RBC QN AUTO: 25.3 PG (ref 26.5–33)
MCH RBC QN AUTO: 25.4 PG (ref 26.5–33)
MCH RBC QN AUTO: 25.8 PG (ref 26.5–33)
MCH RBC QN AUTO: 25.9 PG (ref 26.5–33)
MCH RBC QN AUTO: 26.2 PG (ref 26.5–33)
MCH RBC QN AUTO: 26.2 PG (ref 26.5–33)
MCH RBC QN AUTO: 26.3 PG (ref 26.5–33)
MCH RBC QN AUTO: 26.4 PG (ref 26.5–33)
MCH RBC QN AUTO: 26.4 PG (ref 26.5–33)
MCHC RBC AUTO-ENTMCNC: 30.1 G/DL (ref 31.5–36.5)
MCHC RBC AUTO-ENTMCNC: 30.3 G/DL (ref 31.5–36.5)
MCHC RBC AUTO-ENTMCNC: 30.4 G/DL (ref 31.5–36.5)
MCHC RBC AUTO-ENTMCNC: 30.6 G/DL (ref 31.5–36.5)
MCHC RBC AUTO-ENTMCNC: 30.7 G/DL (ref 31.5–36.5)
MCHC RBC AUTO-ENTMCNC: 30.7 G/DL (ref 31.5–36.5)
MCHC RBC AUTO-ENTMCNC: 30.8 G/DL (ref 31.5–36.5)
MCHC RBC AUTO-ENTMCNC: 30.9 G/DL (ref 31.5–36.5)
MCHC RBC AUTO-ENTMCNC: 31.5 G/DL (ref 31.5–36.5)
MCV RBC AUTO: 82 FL (ref 78–100)
MCV RBC AUTO: 83 FL (ref 78–100)
MCV RBC AUTO: 84 FL (ref 78–100)
MCV RBC AUTO: 84 FL (ref 78–100)
MCV RBC AUTO: 85 FL (ref 78–100)
MCV RBC AUTO: 85 FL (ref 78–100)
MCV RBC AUTO: 86 FL (ref 78–100)
MCV RBC AUTO: 86 FL (ref 78–100)
MCV RBC AUTO: 87 FL (ref 78–100)
MITOGEN IGNF BCKGRD COR BLD-ACNC: 0.01 IU/ML
MITOGEN IGNF BCKGRD COR BLD-ACNC: 0.02 IU/ML
MONOCYTES # BLD AUTO: 0.1 10E3/UL (ref 0–1.3)
MONOCYTES # BLD AUTO: 0.4 10E3/UL (ref 0–1.3)
MONOCYTES # BLD AUTO: 0.5 10E3/UL (ref 0–1.3)
MONOCYTES # BLD AUTO: 0.5 10E3/UL (ref 0–1.3)
MONOCYTES # BLD AUTO: 0.6 10E3/UL (ref 0–1.3)
MONOCYTES NFR BLD AUTO: 1 %
MONOCYTES NFR BLD AUTO: 6 %
MONOCYTES NFR BLD AUTO: 7 %
MONOCYTES NFR BLD AUTO: 9 %
MONOCYTES NFR BLD AUTO: 9 %
MUCOUS THREADS #/AREA URNS LPF: PRESENT /LPF
NEUTROPHILS # BLD AUTO: 3.5 10E3/UL (ref 1.6–8.3)
NEUTROPHILS # BLD AUTO: 3.5 10E3/UL (ref 1.6–8.3)
NEUTROPHILS # BLD AUTO: 4.1 10E3/UL (ref 1.6–8.3)
NEUTROPHILS # BLD AUTO: 5.1 10E3/UL (ref 1.6–8.3)
NEUTROPHILS # BLD AUTO: 8.2 10E3/UL (ref 1.6–8.3)
NEUTROPHILS NFR BLD AUTO: 57 %
NEUTROPHILS NFR BLD AUTO: 58 %
NEUTROPHILS NFR BLD AUTO: 61 %
NEUTROPHILS NFR BLD AUTO: 76 %
NEUTROPHILS NFR BLD AUTO: 88 %
NITRATE UR QL: NEGATIVE
NRBC # BLD AUTO: 0 10E3/UL
NRBC BLD AUTO-RTO: 0 /100
P AXIS - MUSE: 59 DEGREES
P AXIS - MUSE: 68 DEGREES
P AXIS - MUSE: 86 DEGREES
PCO2 BLDV: 38 MM HG (ref 40–50)
PH BLDV: 7.36 [PH] (ref 7.32–7.43)
PH UR STRIP: 5.5 [PH] (ref 5–7)
PLATELET # BLD AUTO: 197 10E3/UL (ref 150–450)
PLATELET # BLD AUTO: 224 10E3/UL (ref 150–450)
PLATELET # BLD AUTO: 244 10E3/UL (ref 150–450)
PLATELET # BLD AUTO: 252 10E3/UL (ref 150–450)
PLATELET # BLD AUTO: 263 10E3/UL (ref 150–450)
PLATELET # BLD AUTO: 285 10E3/UL (ref 150–450)
PLATELET # BLD AUTO: 295 10E3/UL (ref 150–450)
PLATELET # BLD AUTO: 302 10E3/UL (ref 150–450)
PLATELET # BLD AUTO: 325 10E3/UL (ref 150–450)
PO2 BLDV: 26 MM HG (ref 25–47)
POTASSIUM BLD-SCNC: 3.3 MMOL/L (ref 3.4–5.3)
POTASSIUM BLD-SCNC: 3.6 MMOL/L (ref 3.4–5.3)
POTASSIUM BLD-SCNC: 3.6 MMOL/L (ref 3.4–5.3)
POTASSIUM BLD-SCNC: 3.7 MMOL/L (ref 3.4–5.3)
POTASSIUM BLD-SCNC: 3.8 MMOL/L (ref 3.4–5.3)
POTASSIUM BLD-SCNC: 3.9 MMOL/L (ref 3.4–5.3)
POTASSIUM BLD-SCNC: 4.1 MMOL/L (ref 3.4–5.3)
POTASSIUM BLD-SCNC: 4.5 MMOL/L (ref 3.4–5.3)
POTASSIUM BLD-SCNC: 4.5 MMOL/L (ref 3.4–5.3)
POTASSIUM SERPL-SCNC: 3.6 MMOL/L (ref 3.4–5.3)
POTASSIUM SERPL-SCNC: 3.8 MMOL/L (ref 3.4–5.3)
POTASSIUM SERPL-SCNC: 3.9 MMOL/L (ref 3.4–5.3)
POTASSIUM SERPL-SCNC: 4 MMOL/L (ref 3.4–5.3)
POTASSIUM SERPL-SCNC: 4.1 MMOL/L (ref 3.4–5.3)
POTASSIUM SERPL-SCNC: 4.2 MMOL/L (ref 3.4–5.3)
PR INTERVAL - MUSE: 142 MS
PR INTERVAL - MUSE: 152 MS
PR INTERVAL - MUSE: 154 MS
PROCALCITONIN SERPL-MCNC: 0.59 NG/ML
PROT SERPL-MCNC: 6 G/DL (ref 6.8–8.8)
PROT SERPL-MCNC: 6.2 G/DL (ref 6.8–8.8)
PROT SERPL-MCNC: 6.3 G/DL (ref 6.4–8.3)
QRS DURATION - MUSE: 78 MS
QRS DURATION - MUSE: 86 MS
QRS DURATION - MUSE: 86 MS
QT - MUSE: 350 MS
QT - MUSE: 360 MS
QT - MUSE: 372 MS
QTC - MUSE: 442 MS
QTC - MUSE: 446 MS
QTC - MUSE: 455 MS
QUANTIFERON MITOGEN: 5.64 IU/ML
QUANTIFERON NIL TUBE: 0.05 IU/ML
QUANTIFERON TB1 TUBE: 0.06 IU/ML
QUANTIFERON TB2 TUBE: 0.07
R AXIS - MUSE: 63 DEGREES
R AXIS - MUSE: 65 DEGREES
R AXIS - MUSE: 68 DEGREES
RBC # BLD AUTO: 3.38 10E6/UL (ref 3.8–5.2)
RBC # BLD AUTO: 3.49 10E6/UL (ref 3.8–5.2)
RBC # BLD AUTO: 3.89 10E6/UL (ref 3.8–5.2)
RBC # BLD AUTO: 4.06 10E6/UL (ref 3.8–5.2)
RBC # BLD AUTO: 4.14 10E6/UL (ref 3.8–5.2)
RBC # BLD AUTO: 4.28 10E6/UL (ref 3.8–5.2)
RBC # BLD AUTO: 4.38 10E6/UL (ref 3.8–5.2)
RBC # BLD AUTO: 4.59 10E6/UL (ref 3.8–5.2)
RBC # BLD AUTO: 4.65 10E6/UL (ref 3.8–5.2)
RBC URINE: 12 /HPF
SAO2 % BLDV: 45 % (ref 94–100)
SODIUM SERPL-SCNC: 139 MMOL/L (ref 133–144)
SODIUM SERPL-SCNC: 139 MMOL/L (ref 133–144)
SODIUM SERPL-SCNC: 140 MMOL/L (ref 133–144)
SODIUM SERPL-SCNC: 140 MMOL/L (ref 133–144)
SODIUM SERPL-SCNC: 140 MMOL/L (ref 136–145)
SODIUM SERPL-SCNC: 141 MMOL/L (ref 133–144)
SODIUM SERPL-SCNC: 141 MMOL/L (ref 136–145)
SODIUM SERPL-SCNC: 142 MMOL/L (ref 133–144)
SODIUM SERPL-SCNC: 142 MMOL/L (ref 136–145)
SP GR UR STRIP: 1.02 (ref 1–1.03)
SQUAMOUS EPITHELIAL: 2 /HPF
SYSTOLIC BLOOD PRESSURE - MUSE: NORMAL MMHG
T AXIS - MUSE: 103 DEGREES
T AXIS - MUSE: 66 DEGREES
T AXIS - MUSE: 99 DEGREES
TIBC SERPL-MCNC: 130 UG/DL (ref 240–430)
TROPONIN I SERPL HS-MCNC: 11 NG/L
TROPONIN I SERPL HS-MCNC: 13 NG/L
TROPONIN I SERPL HS-MCNC: 14 NG/L
TROPONIN T SERPL HS-MCNC: 94 NG/L
UROBILINOGEN UR STRIP-MCNC: NORMAL MG/DL
VENTRICULAR RATE- MUSE: 90 BPM
VENTRICULAR RATE- MUSE: 91 BPM
VENTRICULAR RATE- MUSE: 98 BPM
WBC # BLD AUTO: 4.8 10E3/UL (ref 4–11)
WBC # BLD AUTO: 5.9 10E3/UL (ref 4–11)
WBC # BLD AUTO: 6 10E3/UL (ref 4–11)
WBC # BLD AUTO: 6.1 10E3/UL (ref 4–11)
WBC # BLD AUTO: 6.6 10E3/UL (ref 4–11)
WBC # BLD AUTO: 6.7 10E3/UL (ref 4–11)
WBC # BLD AUTO: 6.7 10E3/UL (ref 4–11)
WBC # BLD AUTO: 6.8 10E3/UL (ref 4–11)
WBC # BLD AUTO: 9.3 10E3/UL (ref 4–11)
WBC URINE: 3 /HPF
YEAST #/AREA URNS HPF: ABNORMAL /HPF

## 2023-01-01 PROCEDURE — 250N000013 HC RX MED GY IP 250 OP 250 PS 637: Performed by: INTERNAL MEDICINE

## 2023-01-01 PROCEDURE — 250N000013 HC RX MED GY IP 250 OP 250 PS 637: Performed by: HOSPITALIST

## 2023-01-01 PROCEDURE — 99231 SBSQ HOSP IP/OBS SF/LOW 25: CPT | Performed by: INTERNAL MEDICINE

## 2023-01-01 PROCEDURE — G0463 HOSPITAL OUTPT CLINIC VISIT: HCPCS

## 2023-01-01 PROCEDURE — 84132 ASSAY OF SERUM POTASSIUM: CPT | Performed by: HOSPITALIST

## 2023-01-01 PROCEDURE — 36415 COLL VENOUS BLD VENIPUNCTURE: CPT | Performed by: HOSPITALIST

## 2023-01-01 PROCEDURE — 36415 COLL VENOUS BLD VENIPUNCTURE: CPT | Performed by: INTERNAL MEDICINE

## 2023-01-01 PROCEDURE — 250N000012 HC RX MED GY IP 250 OP 636 PS 637: Performed by: HOSPITALIST

## 2023-01-01 PROCEDURE — 120N000001 HC R&B MED SURG/OB

## 2023-01-01 PROCEDURE — 84132 ASSAY OF SERUM POTASSIUM: CPT | Performed by: INTERNAL MEDICINE

## 2023-01-01 PROCEDURE — 99232 SBSQ HOSP IP/OBS MODERATE 35: CPT | Performed by: HOSPITALIST

## 2023-01-01 PROCEDURE — 93005 ELECTROCARDIOGRAM TRACING: CPT

## 2023-01-01 PROCEDURE — 36415 COLL VENOUS BLD VENIPUNCTURE: CPT | Performed by: EMERGENCY MEDICINE

## 2023-01-01 PROCEDURE — 86140 C-REACTIVE PROTEIN: CPT | Performed by: HOSPITALIST

## 2023-01-01 PROCEDURE — 84484 ASSAY OF TROPONIN QUANT: CPT | Performed by: HOSPITALIST

## 2023-01-01 PROCEDURE — 93010 ELECTROCARDIOGRAM REPORT: CPT | Performed by: INTERNAL MEDICINE

## 2023-01-01 PROCEDURE — 84155 ASSAY OF PROTEIN SERUM: CPT | Performed by: HOSPITALIST

## 2023-01-01 PROCEDURE — 51701 INSERT BLADDER CATHETER: CPT

## 2023-01-01 PROCEDURE — 250N000011 HC RX IP 250 OP 636: Performed by: HOSPITALIST

## 2023-01-01 PROCEDURE — 99232 SBSQ HOSP IP/OBS MODERATE 35: CPT | Performed by: INTERNAL MEDICINE

## 2023-01-01 PROCEDURE — 80048 BASIC METABOLIC PNL TOTAL CA: CPT | Performed by: INTERNAL MEDICINE

## 2023-01-01 PROCEDURE — 258N000003 HC RX IP 258 OP 636: Performed by: HOSPITALIST

## 2023-01-01 PROCEDURE — 999N000040 HC STATISTIC CONSULT NO CHARGE VASC ACCESS

## 2023-01-01 PROCEDURE — 51798 US URINE CAPACITY MEASURE: CPT

## 2023-01-01 PROCEDURE — 99231 SBSQ HOSP IP/OBS SF/LOW 25: CPT | Performed by: HOSPITALIST

## 2023-01-01 PROCEDURE — 99222 1ST HOSP IP/OBS MODERATE 55: CPT | Performed by: PODIATRIST

## 2023-01-01 PROCEDURE — 96361 HYDRATE IV INFUSION ADD-ON: CPT

## 2023-01-01 PROCEDURE — 82962 GLUCOSE BLOOD TEST: CPT

## 2023-01-01 PROCEDURE — P9604 ONE-WAY ALLOW PRORATED TRIP: HCPCS | Mod: ORL | Performed by: NURSE PRACTITIONER

## 2023-01-01 PROCEDURE — 36415 COLL VENOUS BLD VENIPUNCTURE: CPT | Mod: ORL | Performed by: NURSE PRACTITIONER

## 2023-01-01 PROCEDURE — 250N000011 HC RX IP 250 OP 636: Performed by: INTERNAL MEDICINE

## 2023-01-01 PROCEDURE — 85025 COMPLETE CBC W/AUTO DIFF WBC: CPT | Performed by: HOSPITALIST

## 2023-01-01 PROCEDURE — 82565 ASSAY OF CREATININE: CPT | Performed by: HOSPITALIST

## 2023-01-01 PROCEDURE — 250N000012 HC RX MED GY IP 250 OP 636 PS 637: Performed by: INTERNAL MEDICINE

## 2023-01-01 PROCEDURE — 250N000011 HC RX IP 250 OP 636: Performed by: PHYSICIAN ASSISTANT

## 2023-01-01 PROCEDURE — 99309 SBSQ NF CARE MODERATE MDM 30: CPT | Mod: GV | Performed by: INTERNAL MEDICINE

## 2023-01-01 PROCEDURE — 99309 SBSQ NF CARE MODERATE MDM 30: CPT | Mod: GV | Performed by: NURSE PRACTITIONER

## 2023-01-01 PROCEDURE — 85025 COMPLETE CBC W/AUTO DIFF WBC: CPT | Performed by: EMERGENCY MEDICINE

## 2023-01-01 PROCEDURE — 80053 COMPREHEN METABOLIC PANEL: CPT | Performed by: INTERNAL MEDICINE

## 2023-01-01 PROCEDURE — 82947 ASSAY GLUCOSE BLOOD QUANT: CPT | Performed by: INTERNAL MEDICINE

## 2023-01-01 PROCEDURE — 99285 EMERGENCY DEPT VISIT HI MDM: CPT | Mod: 25

## 2023-01-01 PROCEDURE — 74174 CTA ABD&PLVS W/CONTRAST: CPT

## 2023-01-01 PROCEDURE — 250N000013 HC RX MED GY IP 250 OP 250 PS 637: Performed by: PHYSICIAN ASSISTANT

## 2023-01-01 PROCEDURE — 258N000003 HC RX IP 258 OP 636: Performed by: INTERNAL MEDICINE

## 2023-01-01 PROCEDURE — 36569 INSJ PICC 5 YR+ W/O IMAGING: CPT

## 2023-01-01 PROCEDURE — 96374 THER/PROPH/DIAG INJ IV PUSH: CPT

## 2023-01-01 PROCEDURE — 82565 ASSAY OF CREATININE: CPT | Performed by: INTERNAL MEDICINE

## 2023-01-01 PROCEDURE — 85025 COMPLETE CBC W/AUTO DIFF WBC: CPT | Performed by: INTERNAL MEDICINE

## 2023-01-01 PROCEDURE — 96372 THER/PROPH/DIAG INJ SC/IM: CPT

## 2023-01-01 PROCEDURE — 85027 COMPLETE CBC AUTOMATED: CPT | Performed by: INTERNAL MEDICINE

## 2023-01-01 PROCEDURE — 99239 HOSP IP/OBS DSCHRG MGMT >30: CPT | Performed by: HOSPITALIST

## 2023-01-01 PROCEDURE — 80053 COMPREHEN METABOLIC PANEL: CPT | Performed by: HOSPITALIST

## 2023-01-01 PROCEDURE — 84484 ASSAY OF TROPONIN QUANT: CPT | Performed by: INTERNAL MEDICINE

## 2023-01-01 PROCEDURE — 99418 PROLNG IP/OBS E/M EA 15 MIN: CPT | Mod: GV | Performed by: NURSE PRACTITIONER

## 2023-01-01 PROCEDURE — 83036 HEMOGLOBIN GLYCOSYLATED A1C: CPT | Performed by: HOSPITALIST

## 2023-01-01 PROCEDURE — 99291 CRITICAL CARE FIRST HOUR: CPT

## 2023-01-01 PROCEDURE — 82248 BILIRUBIN DIRECT: CPT | Performed by: HOSPITALIST

## 2023-01-01 PROCEDURE — 80048 BASIC METABOLIC PNL TOTAL CA: CPT | Performed by: EMERGENCY MEDICINE

## 2023-01-01 PROCEDURE — 272N000433 HC KIT CATH IV 18 OR 20G CM, POWERGLIDE W MAX BARRIER

## 2023-01-01 PROCEDURE — 99233 SBSQ HOSP IP/OBS HIGH 50: CPT | Performed by: INTERNAL MEDICINE

## 2023-01-01 PROCEDURE — 83550 IRON BINDING TEST: CPT | Performed by: HOSPITALIST

## 2023-01-01 PROCEDURE — 82803 BLOOD GASES ANY COMBINATION: CPT

## 2023-01-01 PROCEDURE — 73701 CT LOWER EXTREMITY W/DYE: CPT | Mod: RT

## 2023-01-01 PROCEDURE — 99233 SBSQ HOSP IP/OBS HIGH 50: CPT | Performed by: HOSPITALIST

## 2023-01-01 PROCEDURE — 99291 CRITICAL CARE FIRST HOUR: CPT | Performed by: NURSE PRACTITIONER

## 2023-01-01 PROCEDURE — 83605 ASSAY OF LACTIC ACID: CPT | Performed by: INTERNAL MEDICINE

## 2023-01-01 PROCEDURE — 80048 BASIC METABOLIC PNL TOTAL CA: CPT | Performed by: HOSPITALIST

## 2023-01-01 PROCEDURE — 250N000009 HC RX 250: Performed by: HOSPITALIST

## 2023-01-01 PROCEDURE — 99306 1ST NF CARE HIGH MDM 50: CPT | Mod: GV | Performed by: INTERNAL MEDICINE

## 2023-01-01 PROCEDURE — 99223 1ST HOSP IP/OBS HIGH 75: CPT | Mod: AI | Performed by: HOSPITALIST

## 2023-01-01 PROCEDURE — 73560 X-RAY EXAM OF KNEE 1 OR 2: CPT | Mod: LT

## 2023-01-01 PROCEDURE — 93922 UPR/L XTREMITY ART 2 LEVELS: CPT

## 2023-01-01 PROCEDURE — 86481 TB AG RESPONSE T-CELL SUSP: CPT | Mod: ORL | Performed by: NURSE PRACTITIONER

## 2023-01-01 PROCEDURE — G0378 HOSPITAL OBSERVATION PER HR: HCPCS

## 2023-01-01 PROCEDURE — 73650 X-RAY EXAM OF HEEL: CPT | Mod: LT

## 2023-01-01 PROCEDURE — 81001 URINALYSIS AUTO W/SCOPE: CPT | Performed by: HOSPITALIST

## 2023-01-01 PROCEDURE — 99310 SBSQ NF CARE HIGH MDM 45: CPT | Mod: GV | Performed by: NURSE PRACTITIONER

## 2023-01-01 PROCEDURE — 84145 PROCALCITONIN (PCT): CPT | Performed by: HOSPITALIST

## 2023-01-01 PROCEDURE — 71046 X-RAY EXAM CHEST 2 VIEWS: CPT

## 2023-01-01 PROCEDURE — 96372 THER/PROPH/DIAG INJ SC/IM: CPT | Performed by: HOSPITALIST

## 2023-01-01 PROCEDURE — 82728 ASSAY OF FERRITIN: CPT | Performed by: HOSPITALIST

## 2023-01-01 PROCEDURE — 85018 HEMOGLOBIN: CPT | Performed by: INTERNAL MEDICINE

## 2023-01-01 PROCEDURE — 82040 ASSAY OF SERUM ALBUMIN: CPT | Performed by: INTERNAL MEDICINE

## 2023-01-01 PROCEDURE — 87040 BLOOD CULTURE FOR BACTERIA: CPT | Performed by: EMERGENCY MEDICINE

## 2023-01-01 RX ORDER — PIPERACILLIN SODIUM, TAZOBACTAM SODIUM 3; .375 G/15ML; G/15ML
3.38 INJECTION, POWDER, LYOPHILIZED, FOR SOLUTION INTRAVENOUS EVERY 6 HOURS
Status: DISCONTINUED | OUTPATIENT
Start: 2023-01-01 | End: 2023-01-01

## 2023-01-01 RX ORDER — TAMSULOSIN HYDROCHLORIDE 0.4 MG/1
0.4 CAPSULE ORAL AT BEDTIME
Status: DISCONTINUED | OUTPATIENT
Start: 2023-01-01 | End: 2023-01-01 | Stop reason: HOSPADM

## 2023-01-01 RX ORDER — SENNOSIDES A AND B 8.6 MG/1
1 TABLET, FILM COATED ORAL EVERY OTHER DAY
Status: DISCONTINUED | OUTPATIENT
Start: 2023-01-01 | End: 2023-01-01

## 2023-01-01 RX ORDER — ACETAMINOPHEN 325 MG/1
975 TABLET ORAL 3 TIMES DAILY
Status: DISCONTINUED | OUTPATIENT
Start: 2023-01-01 | End: 2023-01-01

## 2023-01-01 RX ORDER — LISINOPRIL 40 MG/1
20 TABLET ORAL DAILY
Qty: 30 TABLET | Refills: 0 | Status: SHIPPED | OUTPATIENT
Start: 2023-01-01

## 2023-01-01 RX ORDER — PROCHLORPERAZINE MALEATE 10 MG
10 TABLET ORAL EVERY 6 HOURS PRN
COMMUNITY
End: 2023-01-01

## 2023-01-01 RX ORDER — OXYCODONE HYDROCHLORIDE 5 MG/1
5 TABLET ORAL EVERY 6 HOURS PRN
Qty: 16 TABLET | Refills: 0 | Status: SHIPPED | OUTPATIENT
Start: 2023-01-01 | End: 2023-01-01

## 2023-01-01 RX ORDER — POTASSIUM CHLORIDE 1.5 G/1.58G
20 POWDER, FOR SOLUTION ORAL ONCE
Status: COMPLETED | OUTPATIENT
Start: 2023-01-01 | End: 2023-01-01

## 2023-01-01 RX ORDER — LISINOPRIL 10 MG/1
10 TABLET ORAL DAILY
Status: DISCONTINUED | OUTPATIENT
Start: 2023-01-01 | End: 2023-01-01

## 2023-01-01 RX ORDER — POTASSIUM CHLORIDE 1500 MG/1
20 TABLET, EXTENDED RELEASE ORAL ONCE
Status: COMPLETED | OUTPATIENT
Start: 2023-01-01 | End: 2023-01-01

## 2023-01-01 RX ORDER — AMOXICILLIN 250 MG
1 CAPSULE ORAL 2 TIMES DAILY PRN
Status: DISCONTINUED | OUTPATIENT
Start: 2023-01-01 | End: 2023-01-01 | Stop reason: HOSPADM

## 2023-01-01 RX ORDER — NICOTINE POLACRILEX 4 MG
15-30 LOZENGE BUCCAL
Status: DISCONTINUED | OUTPATIENT
Start: 2023-01-01 | End: 2023-01-01 | Stop reason: HOSPADM

## 2023-01-01 RX ORDER — DIPHENHYDRAMINE HCL 25 MG
25 CAPSULE ORAL ONCE
Status: COMPLETED | OUTPATIENT
Start: 2023-01-01 | End: 2023-01-01

## 2023-01-01 RX ORDER — VENLAFAXINE HYDROCHLORIDE 150 MG/1
150 CAPSULE, EXTENDED RELEASE ORAL DAILY
Status: DISCONTINUED | OUTPATIENT
Start: 2023-01-01 | End: 2023-01-01 | Stop reason: HOSPADM

## 2023-01-01 RX ORDER — GABAPENTIN 300 MG/1
600 CAPSULE ORAL 3 TIMES DAILY
Status: DISCONTINUED | OUTPATIENT
Start: 2023-01-01 | End: 2023-01-01 | Stop reason: HOSPADM

## 2023-01-01 RX ORDER — NALOXONE HYDROCHLORIDE 0.4 MG/ML
0.4 INJECTION, SOLUTION INTRAMUSCULAR; INTRAVENOUS; SUBCUTANEOUS
Status: DISCONTINUED | OUTPATIENT
Start: 2023-01-01 | End: 2023-01-01 | Stop reason: HOSPADM

## 2023-01-01 RX ORDER — TOPIRAMATE 100 MG/1
100 TABLET, FILM COATED ORAL DAILY
Status: DISCONTINUED | OUTPATIENT
Start: 2023-01-01 | End: 2023-01-01 | Stop reason: HOSPADM

## 2023-01-01 RX ORDER — TIZANIDINE 2 MG/1
1 TABLET ORAL 2 TIMES DAILY PRN
Status: DISCONTINUED | OUTPATIENT
Start: 2023-01-01 | End: 2023-01-01 | Stop reason: HOSPADM

## 2023-01-01 RX ORDER — OXYCODONE HYDROCHLORIDE 5 MG/1
5 TABLET ORAL 2 TIMES DAILY PRN
Status: DISCONTINUED | OUTPATIENT
Start: 2023-01-01 | End: 2023-01-01

## 2023-01-01 RX ORDER — OXYCODONE HYDROCHLORIDE 5 MG/1
5 TABLET ORAL EVERY 4 HOURS PRN
Status: DISCONTINUED | OUTPATIENT
Start: 2023-01-01 | End: 2023-01-01

## 2023-01-01 RX ORDER — OXYCODONE HYDROCHLORIDE 5 MG/1
5 TABLET ORAL EVERY 6 HOURS PRN
Status: DISCONTINUED | OUTPATIENT
Start: 2023-01-01 | End: 2023-01-01

## 2023-01-01 RX ORDER — ACETAMINOPHEN 325 MG/1
650 TABLET ORAL EVERY 6 HOURS PRN
Status: DISCONTINUED | OUTPATIENT
Start: 2023-01-01 | End: 2023-01-01 | Stop reason: HOSPADM

## 2023-01-01 RX ORDER — ALBUTEROL SULFATE 90 UG/1
1-2 AEROSOL, METERED RESPIRATORY (INHALATION) EVERY 6 HOURS PRN
COMMUNITY

## 2023-01-01 RX ORDER — LISINOPRIL 10 MG/1
40 TABLET ORAL DAILY
Status: DISCONTINUED | OUTPATIENT
Start: 2023-01-01 | End: 2023-01-01

## 2023-01-01 RX ORDER — ALBUTEROL SULFATE 90 UG/1
1-2 AEROSOL, METERED RESPIRATORY (INHALATION) EVERY 6 HOURS PRN
Status: DISCONTINUED | OUTPATIENT
Start: 2023-01-01 | End: 2023-01-01 | Stop reason: HOSPADM

## 2023-01-01 RX ORDER — OXYCODONE HYDROCHLORIDE 5 MG/1
5 TABLET ORAL EVERY 8 HOURS PRN
Status: DISCONTINUED | OUTPATIENT
Start: 2023-01-01 | End: 2023-01-01

## 2023-01-01 RX ORDER — MULTIVITAMIN,THERAPEUTIC
1 TABLET ORAL DAILY
Status: DISCONTINUED | OUTPATIENT
Start: 2023-01-01 | End: 2023-01-01 | Stop reason: HOSPADM

## 2023-01-01 RX ORDER — ONDANSETRON 2 MG/ML
4 INJECTION INTRAMUSCULAR; INTRAVENOUS EVERY 6 HOURS PRN
Status: DISCONTINUED | OUTPATIENT
Start: 2023-01-01 | End: 2023-01-01 | Stop reason: HOSPADM

## 2023-01-01 RX ORDER — INSULIN ASPART 100 [IU]/ML
14-16 INJECTION, SOLUTION INTRAVENOUS; SUBCUTANEOUS
Status: ON HOLD | COMMUNITY
End: 2023-01-01

## 2023-01-01 RX ORDER — GABAPENTIN 300 MG/1
2 CAPSULE ORAL 3 TIMES DAILY
COMMUNITY
Start: 2023-01-01

## 2023-01-01 RX ORDER — TIZANIDINE 2 MG/1
2 TABLET ORAL 2 TIMES DAILY PRN
Status: DISCONTINUED | OUTPATIENT
Start: 2023-01-01 | End: 2023-01-01

## 2023-01-01 RX ORDER — ATORVASTATIN CALCIUM 40 MG/1
40 TABLET, FILM COATED ORAL DAILY
Status: DISCONTINUED | OUTPATIENT
Start: 2023-01-01 | End: 2023-01-01 | Stop reason: HOSPADM

## 2023-01-01 RX ORDER — LISINOPRIL 10 MG/1
10 TABLET ORAL ONCE
Status: COMPLETED | OUTPATIENT
Start: 2023-01-01 | End: 2023-01-01

## 2023-01-01 RX ORDER — TOPIRAMATE 25 MG/1
100 TABLET, FILM COATED ORAL ONCE
Status: DISCONTINUED | OUTPATIENT
Start: 2023-01-01 | End: 2023-01-01

## 2023-01-01 RX ORDER — LIDOCAINE HYDROCHLORIDE 20 MG/ML
5 SOLUTION OROPHARYNGEAL
Status: DISCONTINUED | OUTPATIENT
Start: 2023-01-01 | End: 2023-01-01 | Stop reason: HOSPADM

## 2023-01-01 RX ORDER — METFORMIN HCL 500 MG
500 TABLET, EXTENDED RELEASE 24 HR ORAL
Status: DISCONTINUED | OUTPATIENT
Start: 2023-01-01 | End: 2023-01-01 | Stop reason: HOSPADM

## 2023-01-01 RX ORDER — DEXTROSE MONOHYDRATE 25 G/50ML
25-50 INJECTION, SOLUTION INTRAVENOUS
Status: DISCONTINUED | OUTPATIENT
Start: 2023-01-01 | End: 2023-01-01 | Stop reason: HOSPADM

## 2023-01-01 RX ORDER — MIRABEGRON 25 MG/1
25 TABLET, FILM COATED, EXTENDED RELEASE ORAL AT BEDTIME
COMMUNITY
Start: 2022-01-01 | End: 2023-01-01

## 2023-01-01 RX ORDER — MORPHINE SULFATE 30 MG/1
TABLET ORAL
Refills: 0 | COMMUNITY
Start: 2023-01-01

## 2023-01-01 RX ORDER — NALOXONE HYDROCHLORIDE 0.4 MG/ML
0.2 INJECTION, SOLUTION INTRAMUSCULAR; INTRAVENOUS; SUBCUTANEOUS
Status: DISCONTINUED | OUTPATIENT
Start: 2023-01-01 | End: 2023-01-01 | Stop reason: HOSPADM

## 2023-01-01 RX ORDER — VENLAFAXINE HYDROCHLORIDE 150 MG/1
150 CAPSULE, EXTENDED RELEASE ORAL DAILY
COMMUNITY

## 2023-01-01 RX ORDER — ONDANSETRON 4 MG/1
4 TABLET, ORALLY DISINTEGRATING ORAL EVERY 6 HOURS PRN
Status: DISCONTINUED | OUTPATIENT
Start: 2023-01-01 | End: 2023-01-01 | Stop reason: HOSPADM

## 2023-01-01 RX ORDER — KETOROLAC TROMETHAMINE 15 MG/ML
15 INJECTION, SOLUTION INTRAMUSCULAR; INTRAVENOUS ONCE
Status: COMPLETED | OUTPATIENT
Start: 2023-01-01 | End: 2023-01-01

## 2023-01-01 RX ORDER — TIZANIDINE 2 MG/1
1 TABLET ORAL 2 TIMES DAILY PRN
Qty: 30 TABLET | Refills: 0 | Status: SHIPPED | OUTPATIENT
Start: 2023-01-01

## 2023-01-01 RX ORDER — LISINOPRIL 20 MG/1
20 TABLET ORAL DAILY
Status: DISCONTINUED | OUTPATIENT
Start: 2023-01-01 | End: 2023-01-01 | Stop reason: HOSPADM

## 2023-01-01 RX ORDER — POTASSIUM CHLORIDE 1.5 G/1.58G
40 POWDER, FOR SOLUTION ORAL ONCE
Status: COMPLETED | OUTPATIENT
Start: 2023-01-01 | End: 2023-01-01

## 2023-01-01 RX ORDER — SALIVA STIMULANT COMB. NO.3
2 SPRAY, NON-AEROSOL (ML) MUCOUS MEMBRANE 4 TIMES DAILY
Status: DISCONTINUED | OUTPATIENT
Start: 2023-01-01 | End: 2023-01-01

## 2023-01-01 RX ORDER — TIZANIDINE 2 MG/1
2 TABLET ORAL 2 TIMES DAILY PRN
Status: ON HOLD | COMMUNITY
End: 2023-01-01

## 2023-01-01 RX ORDER — MORPHINE SULFATE 30 MG/1
5 TABLET ORAL EVERY 6 HOURS
COMMUNITY
End: 2023-01-01

## 2023-01-01 RX ORDER — AMOXICILLIN 250 MG
2 CAPSULE ORAL 2 TIMES DAILY PRN
Status: DISCONTINUED | OUTPATIENT
Start: 2023-01-01 | End: 2023-01-01 | Stop reason: HOSPADM

## 2023-01-01 RX ORDER — LANOLIN ALCOHOL/MO/W.PET/CERES
3 CREAM (GRAM) TOPICAL
Status: DISCONTINUED | OUTPATIENT
Start: 2023-01-01 | End: 2023-01-01 | Stop reason: HOSPADM

## 2023-01-01 RX ORDER — IOPAMIDOL 755 MG/ML
95 INJECTION, SOLUTION INTRAVASCULAR ONCE
Status: COMPLETED | OUTPATIENT
Start: 2023-01-01 | End: 2023-01-01

## 2023-01-01 RX ORDER — PROCHLORPERAZINE MALEATE 5 MG
5 TABLET ORAL EVERY 6 HOURS PRN
Status: DISCONTINUED | OUTPATIENT
Start: 2023-01-01 | End: 2023-01-01 | Stop reason: HOSPADM

## 2023-01-01 RX ORDER — ACETAMINOPHEN 325 MG/1
650 TABLET ORAL EVERY 6 HOURS PRN
COMMUNITY
Start: 2023-01-01

## 2023-01-01 RX ORDER — METHYLPREDNISOLONE 16 MG/1
32 TABLET ORAL
Status: COMPLETED | OUTPATIENT
Start: 2023-01-01 | End: 2023-01-01

## 2023-01-01 RX ORDER — MORPHINE SULFATE 30 MG/1
TABLET ORAL
Refills: 0 | COMMUNITY
Start: 2023-01-01 | End: 2023-01-01

## 2023-01-01 RX ORDER — ATORVASTATIN CALCIUM 40 MG/1
40 TABLET, FILM COATED ORAL AT BEDTIME
COMMUNITY
Start: 2022-01-01

## 2023-01-01 RX ORDER — LORAZEPAM 0.5 MG/1
0.5 TABLET ORAL EVERY 4 HOURS PRN
COMMUNITY
End: 2023-01-01

## 2023-01-01 RX ORDER — LEVOTHYROXINE SODIUM 175 UG/1
175 TABLET ORAL DAILY
COMMUNITY
Start: 2023-01-01

## 2023-01-01 RX ORDER — CARBOXYMETHYLCELLULOSE SODIUM 5 MG/ML
1 SOLUTION/ DROPS OPHTHALMIC 4 TIMES DAILY
Status: DISCONTINUED | OUTPATIENT
Start: 2023-01-01 | End: 2023-01-01 | Stop reason: HOSPADM

## 2023-01-01 RX ORDER — LIDOCAINE 40 MG/G
CREAM TOPICAL
Status: DISCONTINUED | OUTPATIENT
Start: 2023-01-01 | End: 2023-01-01 | Stop reason: HOSPADM

## 2023-01-01 RX ORDER — METFORMIN HCL 500 MG
500 TABLET, EXTENDED RELEASE 24 HR ORAL
Qty: 30 TABLET | Refills: 0 | Status: SHIPPED | OUTPATIENT
Start: 2023-01-01

## 2023-01-01 RX ORDER — TOPIRAMATE 100 MG/1
100 TABLET, FILM COATED ORAL DAILY
COMMUNITY

## 2023-01-01 RX ORDER — TAMSULOSIN HYDROCHLORIDE 0.4 MG/1
0.4 CAPSULE ORAL AT BEDTIME
COMMUNITY
End: 2023-01-01

## 2023-01-01 RX ORDER — OXYCODONE HYDROCHLORIDE 5 MG/1
5 TABLET ORAL EVERY 4 HOURS PRN
Qty: 16 TABLET | Refills: 0 | Status: SHIPPED | OUTPATIENT
Start: 2023-01-01 | End: 2023-01-01

## 2023-01-01 RX ORDER — PROCHLORPERAZINE 25 MG
12.5 SUPPOSITORY, RECTAL RECTAL EVERY 12 HOURS PRN
Status: DISCONTINUED | OUTPATIENT
Start: 2023-01-01 | End: 2023-01-01 | Stop reason: HOSPADM

## 2023-01-01 RX ORDER — ACETAMINOPHEN 500 MG
500 TABLET ORAL 3 TIMES DAILY
Status: ON HOLD | COMMUNITY
End: 2023-01-01

## 2023-01-01 RX ORDER — LISINOPRIL 40 MG/1
40 TABLET ORAL DAILY
Status: ON HOLD | COMMUNITY
Start: 2022-01-01 | End: 2023-01-01

## 2023-01-01 RX ORDER — ACETAMINOPHEN 325 MG/1
975 TABLET ORAL EVERY 8 HOURS PRN
Status: DISCONTINUED | OUTPATIENT
Start: 2023-01-01 | End: 2023-01-01

## 2023-01-01 RX ORDER — SENNOSIDES 8.6 MG
8.6 TABLET ORAL EVERY OTHER DAY
Status: DISCONTINUED | OUTPATIENT
Start: 2023-01-01 | End: 2023-01-01 | Stop reason: HOSPADM

## 2023-01-01 RX ORDER — CARBOXYMETHYLCELLULOSE SODIUM 5 MG/ML
1 SOLUTION/ DROPS OPHTHALMIC 4 TIMES DAILY PRN
COMMUNITY
Start: 2021-11-17

## 2023-01-01 RX ORDER — MIRABEGRON 25 MG/1
25 TABLET, FILM COATED, EXTENDED RELEASE ORAL AT BEDTIME
Status: DISCONTINUED | OUTPATIENT
Start: 2023-01-01 | End: 2023-01-01 | Stop reason: HOSPADM

## 2023-01-01 RX ORDER — SENNOSIDES 8.6 MG
8.6 TABLET ORAL EVERY OTHER DAY
Status: DISCONTINUED | OUTPATIENT
Start: 2023-01-01 | End: 2023-01-01

## 2023-01-01 RX ORDER — SENNOSIDES A AND B 8.6 MG/1
1 TABLET, FILM COATED ORAL DAILY
COMMUNITY

## 2023-01-01 RX ORDER — NITROGLYCERIN 0.4 MG/1
0.4 TABLET SUBLINGUAL EVERY 5 MIN PRN
Status: DISCONTINUED | OUTPATIENT
Start: 2023-01-01 | End: 2023-01-01 | Stop reason: HOSPADM

## 2023-01-01 RX ADMIN — LISINOPRIL 20 MG: 20 TABLET ORAL at 09:24

## 2023-01-01 RX ADMIN — OXYCODONE HYDROCHLORIDE 5 MG: 5 TABLET ORAL at 06:33

## 2023-01-01 RX ADMIN — LEVOTHYROXINE SODIUM 175 MCG: 175 TABLET ORAL at 05:37

## 2023-01-01 RX ADMIN — ATORVASTATIN CALCIUM 40 MG: 40 TABLET, FILM COATED ORAL at 09:21

## 2023-01-01 RX ADMIN — GABAPENTIN 600 MG: 300 CAPSULE ORAL at 20:12

## 2023-01-01 RX ADMIN — IOPAMIDOL 95 ML: 755 INJECTION, SOLUTION INTRAVENOUS at 03:42

## 2023-01-01 RX ADMIN — RIVAROXABAN 20 MG: 10 TABLET, FILM COATED ORAL at 08:08

## 2023-01-01 RX ADMIN — Medication 1 DROP: at 08:10

## 2023-01-01 RX ADMIN — GABAPENTIN 600 MG: 300 CAPSULE ORAL at 08:45

## 2023-01-01 RX ADMIN — INSULIN ASPART 1 UNITS: 100 INJECTION, SOLUTION INTRAVENOUS; SUBCUTANEOUS at 13:00

## 2023-01-01 RX ADMIN — INSULIN GLARGINE 15 UNITS: 100 INJECTION, SOLUTION SUBCUTANEOUS at 22:34

## 2023-01-01 RX ADMIN — GABAPENTIN 600 MG: 300 CAPSULE ORAL at 20:32

## 2023-01-01 RX ADMIN — OXYCODONE HYDROCHLORIDE 5 MG: 5 TABLET ORAL at 00:44

## 2023-01-01 RX ADMIN — LISINOPRIL 20 MG: 20 TABLET ORAL at 08:29

## 2023-01-01 RX ADMIN — INSULIN GLARGINE 15 UNITS: 100 INJECTION, SOLUTION SUBCUTANEOUS at 21:59

## 2023-01-01 RX ADMIN — OXYCODONE HYDROCHLORIDE 5 MG: 5 TABLET ORAL at 18:19

## 2023-01-01 RX ADMIN — ATORVASTATIN CALCIUM 40 MG: 40 TABLET, FILM COATED ORAL at 08:25

## 2023-01-01 RX ADMIN — VENLAFAXINE HYDROCHLORIDE 150 MG: 150 CAPSULE, EXTENDED RELEASE ORAL at 09:14

## 2023-01-01 RX ADMIN — VENLAFAXINE HYDROCHLORIDE 150 MG: 150 CAPSULE, EXTENDED RELEASE ORAL at 09:43

## 2023-01-01 RX ADMIN — SITAGLIPTIN 100 MG: 50 TABLET, FILM COATED ORAL at 09:36

## 2023-01-01 RX ADMIN — Medication 1 DROP: at 08:25

## 2023-01-01 RX ADMIN — MIRABEGRON 25 MG: 25 TABLET, FILM COATED, EXTENDED RELEASE ORAL at 21:43

## 2023-01-01 RX ADMIN — LEVOTHYROXINE SODIUM 175 MCG: 175 TABLET ORAL at 05:29

## 2023-01-01 RX ADMIN — ATORVASTATIN CALCIUM 40 MG: 40 TABLET, FILM COATED ORAL at 08:01

## 2023-01-01 RX ADMIN — VENLAFAXINE HYDROCHLORIDE 150 MG: 150 CAPSULE, EXTENDED RELEASE ORAL at 08:25

## 2023-01-01 RX ADMIN — INSULIN GLARGINE 15 UNITS: 100 INJECTION, SOLUTION SUBCUTANEOUS at 22:15

## 2023-01-01 RX ADMIN — OXYCODONE HYDROCHLORIDE 2.5 MG: 5 TABLET ORAL at 21:01

## 2023-01-01 RX ADMIN — OXYCODONE HYDROCHLORIDE 5 MG: 5 TABLET ORAL at 09:09

## 2023-01-01 RX ADMIN — INSULIN ASPART 2 UNITS: 100 INJECTION, SOLUTION INTRAVENOUS; SUBCUTANEOUS at 13:28

## 2023-01-01 RX ADMIN — TOPIRAMATE 100 MG: 100 TABLET ORAL at 08:05

## 2023-01-01 RX ADMIN — VENLAFAXINE HYDROCHLORIDE 150 MG: 150 CAPSULE, EXTENDED RELEASE ORAL at 07:57

## 2023-01-01 RX ADMIN — METFORMIN ER 500 MG 500 MG: 500 TABLET ORAL at 16:34

## 2023-01-01 RX ADMIN — TOPIRAMATE 100 MG: 100 TABLET ORAL at 09:22

## 2023-01-01 RX ADMIN — OXYCODONE HYDROCHLORIDE 5 MG: 5 TABLET ORAL at 10:36

## 2023-01-01 RX ADMIN — MIRABEGRON 25 MG: 25 TABLET, FILM COATED, EXTENDED RELEASE ORAL at 21:08

## 2023-01-01 RX ADMIN — TOPIRAMATE 100 MG: 100 TABLET ORAL at 10:40

## 2023-01-01 RX ADMIN — Medication 2 SPRAY: at 08:15

## 2023-01-01 RX ADMIN — INSULIN GLARGINE 15 UNITS: 100 INJECTION, SOLUTION SUBCUTANEOUS at 22:17

## 2023-01-01 RX ADMIN — Medication 1 DROP: at 10:05

## 2023-01-01 RX ADMIN — GABAPENTIN 600 MG: 300 CAPSULE ORAL at 20:19

## 2023-01-01 RX ADMIN — GABAPENTIN 600 MG: 300 CAPSULE ORAL at 16:34

## 2023-01-01 RX ADMIN — TOPIRAMATE 100 MG: 100 TABLET ORAL at 10:34

## 2023-01-01 RX ADMIN — LISINOPRIL 20 MG: 20 TABLET ORAL at 08:23

## 2023-01-01 RX ADMIN — METFORMIN ER 500 MG 500 MG: 500 TABLET ORAL at 16:20

## 2023-01-01 RX ADMIN — ATORVASTATIN CALCIUM 40 MG: 40 TABLET, FILM COATED ORAL at 10:13

## 2023-01-01 RX ADMIN — INSULIN ASPART 2 UNITS: 100 INJECTION, SOLUTION INTRAVENOUS; SUBCUTANEOUS at 12:46

## 2023-01-01 RX ADMIN — GABAPENTIN 600 MG: 300 CAPSULE ORAL at 16:55

## 2023-01-01 RX ADMIN — LISINOPRIL 20 MG: 20 TABLET ORAL at 10:34

## 2023-01-01 RX ADMIN — INSULIN ASPART 2 UNITS: 100 INJECTION, SOLUTION INTRAVENOUS; SUBCUTANEOUS at 09:52

## 2023-01-01 RX ADMIN — Medication 1 DROP: at 19:22

## 2023-01-01 RX ADMIN — LISINOPRIL 20 MG: 20 TABLET ORAL at 08:21

## 2023-01-01 RX ADMIN — SENNOSIDES 8.6 MG: 8.6 TABLET ORAL at 09:43

## 2023-01-01 RX ADMIN — ATORVASTATIN CALCIUM 40 MG: 40 TABLET, FILM COATED ORAL at 08:22

## 2023-01-01 RX ADMIN — SITAGLIPTIN 100 MG: 50 TABLET, FILM COATED ORAL at 08:31

## 2023-01-01 RX ADMIN — TAMSULOSIN HYDROCHLORIDE 0.4 MG: 0.4 CAPSULE ORAL at 21:30

## 2023-01-01 RX ADMIN — INSULIN ASPART 1 UNITS: 100 INJECTION, SOLUTION INTRAVENOUS; SUBCUTANEOUS at 07:57

## 2023-01-01 RX ADMIN — LEVOTHYROXINE SODIUM 175 MCG: 175 TABLET ORAL at 06:44

## 2023-01-01 RX ADMIN — RIVAROXABAN 20 MG: 10 TABLET, FILM COATED ORAL at 08:54

## 2023-01-01 RX ADMIN — TOPIRAMATE 100 MG: 100 TABLET ORAL at 09:28

## 2023-01-01 RX ADMIN — Medication 1 DROP: at 12:35

## 2023-01-01 RX ADMIN — INSULIN ASPART 1 UNITS: 100 INJECTION, SOLUTION INTRAVENOUS; SUBCUTANEOUS at 18:18

## 2023-01-01 RX ADMIN — GABAPENTIN 600 MG: 300 CAPSULE ORAL at 21:59

## 2023-01-01 RX ADMIN — SENNOSIDES AND DOCUSATE SODIUM 2 TABLET: 50; 8.6 TABLET ORAL at 08:21

## 2023-01-01 RX ADMIN — GABAPENTIN 600 MG: 300 CAPSULE ORAL at 15:05

## 2023-01-01 RX ADMIN — INSULIN ASPART 10 UNITS: 100 INJECTION, SOLUTION INTRAVENOUS; SUBCUTANEOUS at 09:35

## 2023-01-01 RX ADMIN — LEVOTHYROXINE SODIUM 175 MCG: 175 TABLET ORAL at 06:54

## 2023-01-01 RX ADMIN — TOPIRAMATE 100 MG: 100 TABLET ORAL at 08:17

## 2023-01-01 RX ADMIN — OXYCODONE HYDROCHLORIDE 5 MG: 5 TABLET ORAL at 19:46

## 2023-01-01 RX ADMIN — OXYCODONE HYDROCHLORIDE 5 MG: 5 TABLET ORAL at 08:33

## 2023-01-01 RX ADMIN — Medication 2 SPRAY: at 09:31

## 2023-01-01 RX ADMIN — TAMSULOSIN HYDROCHLORIDE 0.4 MG: 0.4 CAPSULE ORAL at 20:35

## 2023-01-01 RX ADMIN — LEVOTHYROXINE SODIUM 175 MCG: 175 TABLET ORAL at 06:30

## 2023-01-01 RX ADMIN — OXYCODONE HYDROCHLORIDE 5 MG: 5 TABLET ORAL at 20:52

## 2023-01-01 RX ADMIN — THERA TABS 1 TABLET: TAB at 09:21

## 2023-01-01 RX ADMIN — Medication 1 DROP: at 19:51

## 2023-01-01 RX ADMIN — INSULIN ASPART 1 UNITS: 100 INJECTION, SOLUTION INTRAVENOUS; SUBCUTANEOUS at 07:41

## 2023-01-01 RX ADMIN — OXYCODONE HYDROCHLORIDE 5 MG: 5 TABLET ORAL at 15:15

## 2023-01-01 RX ADMIN — TOPIRAMATE 100 MG: 100 TABLET ORAL at 08:30

## 2023-01-01 RX ADMIN — LEVOTHYROXINE SODIUM 175 MCG: 175 TABLET ORAL at 06:08

## 2023-01-01 RX ADMIN — Medication 1 DROP: at 17:18

## 2023-01-01 RX ADMIN — GABAPENTIN 600 MG: 300 CAPSULE ORAL at 16:19

## 2023-01-01 RX ADMIN — Medication 1 DROP: at 08:20

## 2023-01-01 RX ADMIN — GABAPENTIN 600 MG: 300 CAPSULE ORAL at 14:15

## 2023-01-01 RX ADMIN — TAMSULOSIN HYDROCHLORIDE 0.4 MG: 0.4 CAPSULE ORAL at 20:42

## 2023-01-01 RX ADMIN — ACETAMINOPHEN 975 MG: 325 TABLET, FILM COATED ORAL at 00:24

## 2023-01-01 RX ADMIN — RIVAROXABAN 20 MG: 10 TABLET, FILM COATED ORAL at 08:45

## 2023-01-01 RX ADMIN — Medication 1 DROP: at 12:10

## 2023-01-01 RX ADMIN — INSULIN ASPART 1 UNITS: 100 INJECTION, SOLUTION INTRAVENOUS; SUBCUTANEOUS at 16:57

## 2023-01-01 RX ADMIN — GABAPENTIN 600 MG: 300 CAPSULE ORAL at 16:20

## 2023-01-01 RX ADMIN — TOPIRAMATE 100 MG: 100 TABLET ORAL at 09:08

## 2023-01-01 RX ADMIN — LEVOTHYROXINE SODIUM 175 MCG: 175 TABLET ORAL at 06:57

## 2023-01-01 RX ADMIN — TOPIRAMATE 100 MG: 100 TABLET ORAL at 17:46

## 2023-01-01 RX ADMIN — LISINOPRIL 20 MG: 20 TABLET ORAL at 08:19

## 2023-01-01 RX ADMIN — OXYCODONE HYDROCHLORIDE 5 MG: 5 TABLET ORAL at 00:04

## 2023-01-01 RX ADMIN — METFORMIN ER 500 MG 500 MG: 500 TABLET ORAL at 17:21

## 2023-01-01 RX ADMIN — INSULIN ASPART 1 UNITS: 100 INJECTION, SOLUTION INTRAVENOUS; SUBCUTANEOUS at 16:32

## 2023-01-01 RX ADMIN — GABAPENTIN 600 MG: 300 CAPSULE ORAL at 19:49

## 2023-01-01 RX ADMIN — GABAPENTIN 600 MG: 300 CAPSULE ORAL at 09:02

## 2023-01-01 RX ADMIN — INSULIN ASPART 1 UNITS: 100 INJECTION, SOLUTION INTRAVENOUS; SUBCUTANEOUS at 13:40

## 2023-01-01 RX ADMIN — Medication 2 SPRAY: at 16:39

## 2023-01-01 RX ADMIN — Medication 1 DROP: at 08:58

## 2023-01-01 RX ADMIN — SITAGLIPTIN 100 MG: 50 TABLET, FILM COATED ORAL at 08:07

## 2023-01-01 RX ADMIN — Medication 1 DROP: at 08:45

## 2023-01-01 RX ADMIN — INSULIN GLARGINE 15 UNITS: 100 INJECTION, SOLUTION SUBCUTANEOUS at 23:00

## 2023-01-01 RX ADMIN — METFORMIN ER 500 MG 500 MG: 500 TABLET ORAL at 17:37

## 2023-01-01 RX ADMIN — INSULIN ASPART 2 UNITS: 100 INJECTION, SOLUTION INTRAVENOUS; SUBCUTANEOUS at 17:40

## 2023-01-01 RX ADMIN — INSULIN GLARGINE 28 UNITS: 100 INJECTION, SOLUTION SUBCUTANEOUS at 22:08

## 2023-01-01 RX ADMIN — RIVAROXABAN 20 MG: 10 TABLET, FILM COATED ORAL at 09:21

## 2023-01-01 RX ADMIN — Medication 1 DROP: at 13:34

## 2023-01-01 RX ADMIN — VENLAFAXINE HYDROCHLORIDE 150 MG: 150 CAPSULE, EXTENDED RELEASE ORAL at 09:31

## 2023-01-01 RX ADMIN — PIPERACILLIN AND TAZOBACTAM 3.38 G: 3; .375 INJECTION, POWDER, FOR SOLUTION INTRAVENOUS at 18:30

## 2023-01-01 RX ADMIN — LISINOPRIL 20 MG: 20 TABLET ORAL at 09:26

## 2023-01-01 RX ADMIN — GABAPENTIN 600 MG: 300 CAPSULE ORAL at 14:50

## 2023-01-01 RX ADMIN — INSULIN ASPART 2 UNITS: 100 INJECTION, SOLUTION INTRAVENOUS; SUBCUTANEOUS at 09:24

## 2023-01-01 RX ADMIN — INSULIN ASPART 1 UNITS: 100 INJECTION, SOLUTION INTRAVENOUS; SUBCUTANEOUS at 13:03

## 2023-01-01 RX ADMIN — INSULIN ASPART 2 UNITS: 100 INJECTION, SOLUTION INTRAVENOUS; SUBCUTANEOUS at 11:59

## 2023-01-01 RX ADMIN — GABAPENTIN 600 MG: 300 CAPSULE ORAL at 17:08

## 2023-01-01 RX ADMIN — INSULIN ASPART 2 UNITS: 100 INJECTION, SOLUTION INTRAVENOUS; SUBCUTANEOUS at 09:09

## 2023-01-01 RX ADMIN — OXYCODONE HYDROCHLORIDE 5 MG: 5 TABLET ORAL at 20:31

## 2023-01-01 RX ADMIN — Medication 1 DROP: at 19:46

## 2023-01-01 RX ADMIN — OXYCODONE HYDROCHLORIDE 5 MG: 5 TABLET ORAL at 22:22

## 2023-01-01 RX ADMIN — GABAPENTIN 600 MG: 300 CAPSULE ORAL at 13:15

## 2023-01-01 RX ADMIN — ATORVASTATIN CALCIUM 40 MG: 40 TABLET, FILM COATED ORAL at 08:37

## 2023-01-01 RX ADMIN — INSULIN GLARGINE 15 UNITS: 100 INJECTION, SOLUTION SUBCUTANEOUS at 22:25

## 2023-01-01 RX ADMIN — INSULIN ASPART 1 UNITS: 100 INJECTION, SOLUTION INTRAVENOUS; SUBCUTANEOUS at 18:00

## 2023-01-01 RX ADMIN — Medication 1 DROP: at 09:02

## 2023-01-01 RX ADMIN — Medication 1 DROP: at 20:49

## 2023-01-01 RX ADMIN — POTASSIUM CHLORIDE 20 MEQ: 1500 TABLET, EXTENDED RELEASE ORAL at 15:40

## 2023-01-01 RX ADMIN — INSULIN ASPART 10 UNITS: 100 INJECTION, SOLUTION INTRAVENOUS; SUBCUTANEOUS at 10:10

## 2023-01-01 RX ADMIN — OXYCODONE HYDROCHLORIDE 5 MG: 5 TABLET ORAL at 18:12

## 2023-01-01 RX ADMIN — ATORVASTATIN CALCIUM 40 MG: 40 TABLET, FILM COATED ORAL at 09:26

## 2023-01-01 RX ADMIN — TOPIRAMATE 100 MG: 100 TABLET ORAL at 08:45

## 2023-01-01 RX ADMIN — MIRABEGRON 25 MG: 25 TABLET, FILM COATED, EXTENDED RELEASE ORAL at 21:44

## 2023-01-01 RX ADMIN — Medication 1 DROP: at 08:31

## 2023-01-01 RX ADMIN — PIPERACILLIN AND TAZOBACTAM 3.38 G: 3; .375 INJECTION, POWDER, FOR SOLUTION INTRAVENOUS at 12:19

## 2023-01-01 RX ADMIN — OXYCODONE HYDROCHLORIDE 5 MG: 5 TABLET ORAL at 14:55

## 2023-01-01 RX ADMIN — TAMSULOSIN HYDROCHLORIDE 0.4 MG: 0.4 CAPSULE ORAL at 21:58

## 2023-01-01 RX ADMIN — Medication 1 DROP: at 16:09

## 2023-01-01 RX ADMIN — TAMSULOSIN HYDROCHLORIDE 0.4 MG: 0.4 CAPSULE ORAL at 22:01

## 2023-01-01 RX ADMIN — TAMSULOSIN HYDROCHLORIDE 0.4 MG: 0.4 CAPSULE ORAL at 22:16

## 2023-01-01 RX ADMIN — GABAPENTIN 600 MG: 300 CAPSULE ORAL at 13:34

## 2023-01-01 RX ADMIN — INSULIN ASPART 1 UNITS: 100 INJECTION, SOLUTION INTRAVENOUS; SUBCUTANEOUS at 17:17

## 2023-01-01 RX ADMIN — GABAPENTIN 600 MG: 300 CAPSULE ORAL at 09:36

## 2023-01-01 RX ADMIN — Medication 1 DROP: at 09:08

## 2023-01-01 RX ADMIN — TOPIRAMATE 100 MG: 100 TABLET ORAL at 09:11

## 2023-01-01 RX ADMIN — SITAGLIPTIN 100 MG: 50 TABLET, FILM COATED ORAL at 08:17

## 2023-01-01 RX ADMIN — Medication 2 SPRAY: at 09:07

## 2023-01-01 RX ADMIN — OXYCODONE HYDROCHLORIDE 5 MG: 5 TABLET ORAL at 12:57

## 2023-01-01 RX ADMIN — RIVAROXABAN 20 MG: 10 TABLET, FILM COATED ORAL at 08:23

## 2023-01-01 RX ADMIN — OXYCODONE HYDROCHLORIDE 5 MG: 5 TABLET ORAL at 12:25

## 2023-01-01 RX ADMIN — INSULIN ASPART 10 UNITS: 100 INJECTION, SOLUTION INTRAVENOUS; SUBCUTANEOUS at 09:52

## 2023-01-01 RX ADMIN — TAMSULOSIN HYDROCHLORIDE 0.4 MG: 0.4 CAPSULE ORAL at 21:21

## 2023-01-01 RX ADMIN — OXYCODONE HYDROCHLORIDE 5 MG: 5 TABLET ORAL at 10:14

## 2023-01-01 RX ADMIN — TAMSULOSIN HYDROCHLORIDE 0.4 MG: 0.4 CAPSULE ORAL at 21:59

## 2023-01-01 RX ADMIN — INSULIN GLARGINE 15 UNITS: 100 INJECTION, SOLUTION SUBCUTANEOUS at 22:24

## 2023-01-01 RX ADMIN — Medication 1 DROP: at 17:37

## 2023-01-01 RX ADMIN — THERA TABS 1 TABLET: TAB at 08:32

## 2023-01-01 RX ADMIN — LISINOPRIL 20 MG: 20 TABLET ORAL at 08:02

## 2023-01-01 RX ADMIN — Medication 1 DROP: at 13:49

## 2023-01-01 RX ADMIN — ATORVASTATIN CALCIUM 40 MG: 40 TABLET, FILM COATED ORAL at 09:28

## 2023-01-01 RX ADMIN — ATORVASTATIN CALCIUM 40 MG: 40 TABLET, FILM COATED ORAL at 10:40

## 2023-01-01 RX ADMIN — ATORVASTATIN CALCIUM 40 MG: 40 TABLET, FILM COATED ORAL at 09:36

## 2023-01-01 RX ADMIN — INSULIN ASPART 2 UNITS: 100 INJECTION, SOLUTION INTRAVENOUS; SUBCUTANEOUS at 08:57

## 2023-01-01 RX ADMIN — Medication 1 DROP: at 20:55

## 2023-01-01 RX ADMIN — MIRABEGRON 25 MG: 25 TABLET, FILM COATED, EXTENDED RELEASE ORAL at 22:03

## 2023-01-01 RX ADMIN — GABAPENTIN 600 MG: 300 CAPSULE ORAL at 16:09

## 2023-01-01 RX ADMIN — Medication 1 DROP: at 13:37

## 2023-01-01 RX ADMIN — GABAPENTIN 600 MG: 300 CAPSULE ORAL at 18:21

## 2023-01-01 RX ADMIN — TAMSULOSIN HYDROCHLORIDE 0.4 MG: 0.4 CAPSULE ORAL at 20:30

## 2023-01-01 RX ADMIN — GABAPENTIN 600 MG: 300 CAPSULE ORAL at 08:21

## 2023-01-01 RX ADMIN — Medication 1 DROP: at 17:40

## 2023-01-01 RX ADMIN — PIPERACILLIN AND TAZOBACTAM 3.38 G: 3; .375 INJECTION, POWDER, FOR SOLUTION INTRAVENOUS at 00:12

## 2023-01-01 RX ADMIN — Medication 2 SPRAY: at 13:02

## 2023-01-01 RX ADMIN — Medication 1 DROP: at 21:02

## 2023-01-01 RX ADMIN — MIRABEGRON 25 MG: 25 TABLET, FILM COATED, EXTENDED RELEASE ORAL at 21:21

## 2023-01-01 RX ADMIN — KETOROLAC TROMETHAMINE 15 MG: 15 INJECTION, SOLUTION INTRAMUSCULAR; INTRAVENOUS at 22:21

## 2023-01-01 RX ADMIN — VENLAFAXINE HYDROCHLORIDE 150 MG: 150 CAPSULE, EXTENDED RELEASE ORAL at 09:22

## 2023-01-01 RX ADMIN — OXYCODONE HYDROCHLORIDE 2.5 MG: 5 TABLET ORAL at 13:11

## 2023-01-01 RX ADMIN — RIVAROXABAN 20 MG: 10 TABLET, FILM COATED ORAL at 10:07

## 2023-01-01 RX ADMIN — INSULIN ASPART 2 UNITS: 100 INJECTION, SOLUTION INTRAVENOUS; SUBCUTANEOUS at 08:21

## 2023-01-01 RX ADMIN — Medication 1 DROP: at 21:56

## 2023-01-01 RX ADMIN — VENLAFAXINE HYDROCHLORIDE 150 MG: 150 CAPSULE, EXTENDED RELEASE ORAL at 08:39

## 2023-01-01 RX ADMIN — Medication 1 DROP: at 21:31

## 2023-01-01 RX ADMIN — TAMSULOSIN HYDROCHLORIDE 0.4 MG: 0.4 CAPSULE ORAL at 20:19

## 2023-01-01 RX ADMIN — INSULIN GLARGINE 15 UNITS: 100 INJECTION, SOLUTION SUBCUTANEOUS at 22:41

## 2023-01-01 RX ADMIN — LISINOPRIL 20 MG: 20 TABLET ORAL at 08:10

## 2023-01-01 RX ADMIN — INSULIN ASPART 2 UNITS: 100 INJECTION, SOLUTION INTRAVENOUS; SUBCUTANEOUS at 17:11

## 2023-01-01 RX ADMIN — SITAGLIPTIN 100 MG: 50 TABLET, FILM COATED ORAL at 08:32

## 2023-01-01 RX ADMIN — THERA TABS 1 TABLET: TAB at 10:12

## 2023-01-01 RX ADMIN — GABAPENTIN 600 MG: 300 CAPSULE ORAL at 19:05

## 2023-01-01 RX ADMIN — METHYLPREDNISOLONE 32 MG: 16 TABLET ORAL at 01:31

## 2023-01-01 RX ADMIN — THERA TABS 1 TABLET: TAB at 09:26

## 2023-01-01 RX ADMIN — LEVOTHYROXINE SODIUM 175 MCG: 175 TABLET ORAL at 08:05

## 2023-01-01 RX ADMIN — GABAPENTIN 600 MG: 300 CAPSULE ORAL at 16:00

## 2023-01-01 RX ADMIN — TAMSULOSIN HYDROCHLORIDE 0.4 MG: 0.4 CAPSULE ORAL at 22:21

## 2023-01-01 RX ADMIN — Medication 1 DROP: at 20:35

## 2023-01-01 RX ADMIN — LISINOPRIL 20 MG: 20 TABLET ORAL at 08:30

## 2023-01-01 RX ADMIN — GABAPENTIN 600 MG: 300 CAPSULE ORAL at 07:57

## 2023-01-01 RX ADMIN — OXYCODONE HYDROCHLORIDE 5 MG: 5 TABLET ORAL at 14:20

## 2023-01-01 RX ADMIN — LISINOPRIL 20 MG: 20 TABLET ORAL at 10:14

## 2023-01-01 RX ADMIN — SODIUM CHLORIDE 72 ML: 900 INJECTION INTRAVENOUS at 03:42

## 2023-01-01 RX ADMIN — OXYCODONE HYDROCHLORIDE 5 MG: 5 TABLET ORAL at 20:26

## 2023-01-01 RX ADMIN — VENLAFAXINE HYDROCHLORIDE 150 MG: 150 CAPSULE, EXTENDED RELEASE ORAL at 09:57

## 2023-01-01 RX ADMIN — THERA TABS 1 TABLET: TAB at 09:12

## 2023-01-01 RX ADMIN — Medication 1 DROP: at 20:27

## 2023-01-01 RX ADMIN — INSULIN GLARGINE 15 UNITS: 100 INJECTION, SOLUTION SUBCUTANEOUS at 23:07

## 2023-01-01 RX ADMIN — TOPIRAMATE 100 MG: 100 TABLET ORAL at 07:57

## 2023-01-01 RX ADMIN — INSULIN GLARGINE 15 UNITS: 100 INJECTION, SOLUTION SUBCUTANEOUS at 21:42

## 2023-01-01 RX ADMIN — RIVAROXABAN 20 MG: 10 TABLET, FILM COATED ORAL at 07:42

## 2023-01-01 RX ADMIN — Medication 1 DROP: at 14:49

## 2023-01-01 RX ADMIN — GABAPENTIN 600 MG: 300 CAPSULE ORAL at 09:21

## 2023-01-01 RX ADMIN — OXYCODONE HYDROCHLORIDE 5 MG: 5 TABLET ORAL at 14:48

## 2023-01-01 RX ADMIN — Medication 1 DROP: at 18:12

## 2023-01-01 RX ADMIN — Medication 2 SPRAY: at 11:31

## 2023-01-01 RX ADMIN — Medication 1 DROP: at 13:20

## 2023-01-01 RX ADMIN — VENLAFAXINE HYDROCHLORIDE 150 MG: 150 CAPSULE, EXTENDED RELEASE ORAL at 09:06

## 2023-01-01 RX ADMIN — OXYCODONE HYDROCHLORIDE 2.5 MG: 5 TABLET ORAL at 09:10

## 2023-01-01 RX ADMIN — MIRABEGRON 25 MG: 25 TABLET, FILM COATED, EXTENDED RELEASE ORAL at 21:32

## 2023-01-01 RX ADMIN — TOPIRAMATE 100 MG: 100 TABLET ORAL at 10:13

## 2023-01-01 RX ADMIN — GABAPENTIN 600 MG: 300 CAPSULE ORAL at 08:00

## 2023-01-01 RX ADMIN — VENLAFAXINE HYDROCHLORIDE 150 MG: 150 CAPSULE, EXTENDED RELEASE ORAL at 09:21

## 2023-01-01 RX ADMIN — PIPERACILLIN AND TAZOBACTAM 3.38 G: 3; .375 INJECTION, POWDER, FOR SOLUTION INTRAVENOUS at 06:22

## 2023-01-01 RX ADMIN — OXYCODONE HYDROCHLORIDE 5 MG: 5 TABLET ORAL at 14:21

## 2023-01-01 RX ADMIN — INSULIN ASPART 1 UNITS: 100 INJECTION, SOLUTION INTRAVENOUS; SUBCUTANEOUS at 17:06

## 2023-01-01 RX ADMIN — Medication 1 DROP: at 13:02

## 2023-01-01 RX ADMIN — Medication 1 DROP: at 12:38

## 2023-01-01 RX ADMIN — POTASSIUM CHLORIDE 20 MEQ: 1500 TABLET, EXTENDED RELEASE ORAL at 07:57

## 2023-01-01 RX ADMIN — GABAPENTIN 600 MG: 300 CAPSULE ORAL at 20:38

## 2023-01-01 RX ADMIN — Medication 1 DROP: at 20:18

## 2023-01-01 RX ADMIN — TAMSULOSIN HYDROCHLORIDE 0.4 MG: 0.4 CAPSULE ORAL at 22:04

## 2023-01-01 RX ADMIN — THERA TABS 1 TABLET: TAB at 08:08

## 2023-01-01 RX ADMIN — Medication 1 DROP: at 19:16

## 2023-01-01 RX ADMIN — THERA TABS 1 TABLET: TAB at 08:24

## 2023-01-01 RX ADMIN — MIRABEGRON 25 MG: 25 TABLET, FILM COATED, EXTENDED RELEASE ORAL at 20:35

## 2023-01-01 RX ADMIN — INSULIN ASPART 2 UNITS: 100 INJECTION, SOLUTION INTRAVENOUS; SUBCUTANEOUS at 17:46

## 2023-01-01 RX ADMIN — MIRABEGRON 25 MG: 25 TABLET, FILM COATED, EXTENDED RELEASE ORAL at 21:37

## 2023-01-01 RX ADMIN — METHYLPREDNISOLONE 32 MG: 16 TABLET ORAL at 15:48

## 2023-01-01 RX ADMIN — GABAPENTIN 600 MG: 300 CAPSULE ORAL at 22:15

## 2023-01-01 RX ADMIN — INSULIN ASPART 3 UNITS: 100 INJECTION, SOLUTION INTRAVENOUS; SUBCUTANEOUS at 17:23

## 2023-01-01 RX ADMIN — INSULIN ASPART 10 UNITS: 100 INJECTION, SOLUTION INTRAVENOUS; SUBCUTANEOUS at 09:43

## 2023-01-01 RX ADMIN — TOPIRAMATE 100 MG: 100 TABLET ORAL at 08:54

## 2023-01-01 RX ADMIN — MIRABEGRON 25 MG: 25 TABLET, FILM COATED, EXTENDED RELEASE ORAL at 22:21

## 2023-01-01 RX ADMIN — Medication 1 DROP: at 13:04

## 2023-01-01 RX ADMIN — TOPIRAMATE 100 MG: 100 TABLET ORAL at 07:58

## 2023-01-01 RX ADMIN — MIRABEGRON 25 MG: 25 TABLET, FILM COATED, EXTENDED RELEASE ORAL at 22:01

## 2023-01-01 RX ADMIN — GABAPENTIN 600 MG: 300 CAPSULE ORAL at 09:07

## 2023-01-01 RX ADMIN — MIRABEGRON 25 MG: 25 TABLET, FILM COATED, EXTENDED RELEASE ORAL at 21:59

## 2023-01-01 RX ADMIN — TAMSULOSIN HYDROCHLORIDE 0.4 MG: 0.4 CAPSULE ORAL at 21:08

## 2023-01-01 RX ADMIN — Medication 1 DROP: at 18:17

## 2023-01-01 RX ADMIN — Medication 1 DROP: at 14:17

## 2023-01-01 RX ADMIN — ATORVASTATIN CALCIUM 40 MG: 40 TABLET, FILM COATED ORAL at 09:22

## 2023-01-01 RX ADMIN — Medication 1 DROP: at 16:51

## 2023-01-01 RX ADMIN — INSULIN ASPART 1 UNITS: 100 INJECTION, SOLUTION INTRAVENOUS; SUBCUTANEOUS at 13:17

## 2023-01-01 RX ADMIN — INSULIN GLARGINE 15 UNITS: 100 INJECTION, SOLUTION SUBCUTANEOUS at 22:48

## 2023-01-01 RX ADMIN — Medication 1 DROP: at 16:55

## 2023-01-01 RX ADMIN — RIVAROXABAN 20 MG: 10 TABLET, FILM COATED ORAL at 09:10

## 2023-01-01 RX ADMIN — Medication 1 DROP: at 13:01

## 2023-01-01 RX ADMIN — TAMSULOSIN HYDROCHLORIDE 0.4 MG: 0.4 CAPSULE ORAL at 21:33

## 2023-01-01 RX ADMIN — GABAPENTIN 600 MG: 300 CAPSULE ORAL at 20:47

## 2023-01-01 RX ADMIN — INSULIN ASPART 1 UNITS: 100 INJECTION, SOLUTION INTRAVENOUS; SUBCUTANEOUS at 17:38

## 2023-01-01 RX ADMIN — INSULIN ASPART 2 UNITS: 100 INJECTION, SOLUTION INTRAVENOUS; SUBCUTANEOUS at 08:01

## 2023-01-01 RX ADMIN — LEVOTHYROXINE SODIUM 175 MCG: 175 TABLET ORAL at 06:17

## 2023-01-01 RX ADMIN — Medication 1 DROP: at 20:36

## 2023-01-01 RX ADMIN — Medication 1 DROP: at 19:49

## 2023-01-01 RX ADMIN — GABAPENTIN 600 MG: 300 CAPSULE ORAL at 09:11

## 2023-01-01 RX ADMIN — Medication 1 DROP: at 10:34

## 2023-01-01 RX ADMIN — GABAPENTIN 600 MG: 300 CAPSULE ORAL at 08:54

## 2023-01-01 RX ADMIN — INSULIN ASPART 2 UNITS: 100 INJECTION, SOLUTION INTRAVENOUS; SUBCUTANEOUS at 17:24

## 2023-01-01 RX ADMIN — RIVAROXABAN 20 MG: 10 TABLET, FILM COATED ORAL at 08:10

## 2023-01-01 RX ADMIN — GABAPENTIN 600 MG: 300 CAPSULE ORAL at 08:19

## 2023-01-01 RX ADMIN — GABAPENTIN 600 MG: 300 CAPSULE ORAL at 19:40

## 2023-01-01 RX ADMIN — GABAPENTIN 600 MG: 300 CAPSULE ORAL at 16:51

## 2023-01-01 RX ADMIN — TOPIRAMATE 100 MG: 100 TABLET ORAL at 08:38

## 2023-01-01 RX ADMIN — MIRABEGRON 25 MG: 25 TABLET, FILM COATED, EXTENDED RELEASE ORAL at 21:31

## 2023-01-01 RX ADMIN — MIRABEGRON 25 MG: 25 TABLET, FILM COATED, EXTENDED RELEASE ORAL at 20:20

## 2023-01-01 RX ADMIN — ATORVASTATIN CALCIUM 40 MG: 40 TABLET, FILM COATED ORAL at 09:43

## 2023-01-01 RX ADMIN — GABAPENTIN 600 MG: 300 CAPSULE ORAL at 17:09

## 2023-01-01 RX ADMIN — GABAPENTIN 600 MG: 300 CAPSULE ORAL at 20:14

## 2023-01-01 RX ADMIN — RIVAROXABAN 20 MG: 10 TABLET, FILM COATED ORAL at 09:06

## 2023-01-01 RX ADMIN — LISINOPRIL 20 MG: 20 TABLET ORAL at 09:35

## 2023-01-01 RX ADMIN — SENNOSIDES 8.6 MG: 8.6 TABLET ORAL at 10:07

## 2023-01-01 RX ADMIN — OXYCODONE HYDROCHLORIDE 5 MG: 5 TABLET ORAL at 11:21

## 2023-01-01 RX ADMIN — RIVAROXABAN 20 MG: 10 TABLET, FILM COATED ORAL at 08:30

## 2023-01-01 RX ADMIN — RIVAROXABAN 20 MG: 10 TABLET, FILM COATED ORAL at 08:05

## 2023-01-01 RX ADMIN — MIRABEGRON 25 MG: 25 TABLET, FILM COATED, EXTENDED RELEASE ORAL at 22:04

## 2023-01-01 RX ADMIN — LEVOTHYROXINE SODIUM 175 MCG: 175 TABLET ORAL at 09:25

## 2023-01-01 RX ADMIN — GABAPENTIN 600 MG: 300 CAPSULE ORAL at 14:45

## 2023-01-01 RX ADMIN — VENLAFAXINE HYDROCHLORIDE 150 MG: 150 CAPSULE, EXTENDED RELEASE ORAL at 08:22

## 2023-01-01 RX ADMIN — LISINOPRIL 20 MG: 20 TABLET ORAL at 10:40

## 2023-01-01 RX ADMIN — GABAPENTIN 600 MG: 300 CAPSULE ORAL at 08:40

## 2023-01-01 RX ADMIN — GABAPENTIN 600 MG: 300 CAPSULE ORAL at 15:00

## 2023-01-01 RX ADMIN — LEVOTHYROXINE SODIUM 175 MCG: 175 TABLET ORAL at 06:37

## 2023-01-01 RX ADMIN — RIVAROXABAN 20 MG: 10 TABLET, FILM COATED ORAL at 09:36

## 2023-01-01 RX ADMIN — INSULIN ASPART 10 UNITS: 100 INJECTION, SOLUTION INTRAVENOUS; SUBCUTANEOUS at 09:06

## 2023-01-01 RX ADMIN — TOPIRAMATE 100 MG: 100 TABLET ORAL at 09:36

## 2023-01-01 RX ADMIN — INSULIN GLARGINE 15 UNITS: 100 INJECTION, SOLUTION SUBCUTANEOUS at 22:11

## 2023-01-01 RX ADMIN — OXYCODONE HYDROCHLORIDE 5 MG: 5 TABLET ORAL at 05:50

## 2023-01-01 RX ADMIN — OXYCODONE HYDROCHLORIDE 5 MG: 5 TABLET ORAL at 00:45

## 2023-01-01 RX ADMIN — Medication 1 DROP: at 12:05

## 2023-01-01 RX ADMIN — TIZANIDINE 2 MG: 2 TABLET ORAL at 08:54

## 2023-01-01 RX ADMIN — SITAGLIPTIN 100 MG: 50 TABLET, FILM COATED ORAL at 08:24

## 2023-01-01 RX ADMIN — VENLAFAXINE HYDROCHLORIDE 150 MG: 150 CAPSULE, EXTENDED RELEASE ORAL at 10:02

## 2023-01-01 RX ADMIN — VENLAFAXINE HYDROCHLORIDE 150 MG: 150 CAPSULE, EXTENDED RELEASE ORAL at 08:24

## 2023-01-01 RX ADMIN — Medication 2 SPRAY: at 12:13

## 2023-01-01 RX ADMIN — Medication 1 DROP: at 13:30

## 2023-01-01 RX ADMIN — VENLAFAXINE HYDROCHLORIDE 150 MG: 150 CAPSULE, EXTENDED RELEASE ORAL at 09:28

## 2023-01-01 RX ADMIN — OXYCODONE HYDROCHLORIDE 5 MG: 5 TABLET ORAL at 15:29

## 2023-01-01 RX ADMIN — LISINOPRIL 20 MG: 20 TABLET ORAL at 08:42

## 2023-01-01 RX ADMIN — INSULIN ASPART 10 UNITS: 100 INJECTION, SOLUTION INTRAVENOUS; SUBCUTANEOUS at 08:23

## 2023-01-01 RX ADMIN — MIRABEGRON 25 MG: 25 TABLET, FILM COATED, EXTENDED RELEASE ORAL at 20:30

## 2023-01-01 RX ADMIN — INSULIN ASPART 2 UNITS: 100 INJECTION, SOLUTION INTRAVENOUS; SUBCUTANEOUS at 17:34

## 2023-01-01 RX ADMIN — VENLAFAXINE HYDROCHLORIDE 150 MG: 150 CAPSULE, EXTENDED RELEASE ORAL at 08:38

## 2023-01-01 RX ADMIN — INSULIN ASPART 1 UNITS: 100 INJECTION, SOLUTION INTRAVENOUS; SUBCUTANEOUS at 12:34

## 2023-01-01 RX ADMIN — SENNOSIDES AND DOCUSATE SODIUM 2 TABLET: 50; 8.6 TABLET ORAL at 09:21

## 2023-01-01 RX ADMIN — Medication 1 DROP: at 22:01

## 2023-01-01 RX ADMIN — GABAPENTIN 600 MG: 300 CAPSULE ORAL at 17:19

## 2023-01-01 RX ADMIN — VENLAFAXINE HYDROCHLORIDE 150 MG: 150 CAPSULE, EXTENDED RELEASE ORAL at 09:08

## 2023-01-01 RX ADMIN — MIRABEGRON 25 MG: 25 TABLET, FILM COATED, EXTENDED RELEASE ORAL at 22:13

## 2023-01-01 RX ADMIN — Medication 2 SPRAY: at 12:10

## 2023-01-01 RX ADMIN — INSULIN ASPART 1 UNITS: 100 INJECTION, SOLUTION INTRAVENOUS; SUBCUTANEOUS at 08:14

## 2023-01-01 RX ADMIN — OXYCODONE HYDROCHLORIDE 5 MG: 5 TABLET ORAL at 15:53

## 2023-01-01 RX ADMIN — RIVAROXABAN 20 MG: 10 TABLET, FILM COATED ORAL at 08:22

## 2023-01-01 RX ADMIN — OXYCODONE HYDROCHLORIDE 2.5 MG: 5 TABLET ORAL at 16:01

## 2023-01-01 RX ADMIN — Medication 1 DROP: at 20:12

## 2023-01-01 RX ADMIN — OXYCODONE HYDROCHLORIDE 5 MG: 5 TABLET ORAL at 10:18

## 2023-01-01 RX ADMIN — Medication 1 DROP: at 07:57

## 2023-01-01 RX ADMIN — RIVAROXABAN 20 MG: 10 TABLET, FILM COATED ORAL at 10:39

## 2023-01-01 RX ADMIN — LISINOPRIL 20 MG: 20 TABLET ORAL at 09:22

## 2023-01-01 RX ADMIN — LEVOTHYROXINE SODIUM 175 MCG: 175 TABLET ORAL at 05:46

## 2023-01-01 RX ADMIN — OXYCODONE HYDROCHLORIDE 5 MG: 5 TABLET ORAL at 12:04

## 2023-01-01 RX ADMIN — MIRABEGRON 25 MG: 25 TABLET, FILM COATED, EXTENDED RELEASE ORAL at 22:14

## 2023-01-01 RX ADMIN — GABAPENTIN 600 MG: 300 CAPSULE ORAL at 20:00

## 2023-01-01 RX ADMIN — Medication 1 DROP: at 21:37

## 2023-01-01 RX ADMIN — OXYCODONE HYDROCHLORIDE 5 MG: 5 TABLET ORAL at 08:10

## 2023-01-01 RX ADMIN — GABAPENTIN 600 MG: 300 CAPSULE ORAL at 22:02

## 2023-01-01 RX ADMIN — Medication 2 SPRAY: at 13:30

## 2023-01-01 RX ADMIN — LEVOTHYROXINE SODIUM 175 MCG: 175 TABLET ORAL at 06:34

## 2023-01-01 RX ADMIN — LISINOPRIL 20 MG: 20 TABLET ORAL at 09:43

## 2023-01-01 RX ADMIN — GABAPENTIN 600 MG: 300 CAPSULE ORAL at 21:33

## 2023-01-01 RX ADMIN — TAMSULOSIN HYDROCHLORIDE 0.4 MG: 0.4 CAPSULE ORAL at 20:32

## 2023-01-01 RX ADMIN — INSULIN ASPART 2 UNITS: 100 INJECTION, SOLUTION INTRAVENOUS; SUBCUTANEOUS at 12:49

## 2023-01-01 RX ADMIN — TOPIRAMATE 100 MG: 100 TABLET ORAL at 08:18

## 2023-01-01 RX ADMIN — Medication 1 DROP: at 17:08

## 2023-01-01 RX ADMIN — OXYCODONE HYDROCHLORIDE 5 MG: 5 TABLET ORAL at 02:22

## 2023-01-01 RX ADMIN — LEVOTHYROXINE SODIUM 175 MCG: 175 TABLET ORAL at 06:43

## 2023-01-01 RX ADMIN — THERA TABS 1 TABLET: TAB at 09:43

## 2023-01-01 RX ADMIN — Medication 1 DROP: at 20:42

## 2023-01-01 RX ADMIN — METFORMIN ER 500 MG 500 MG: 500 TABLET ORAL at 18:46

## 2023-01-01 RX ADMIN — GABAPENTIN 600 MG: 300 CAPSULE ORAL at 15:38

## 2023-01-01 RX ADMIN — Medication 1 DROP: at 13:14

## 2023-01-01 RX ADMIN — SENNOSIDES 8.6 MG: 8.6 TABLET ORAL at 08:37

## 2023-01-01 RX ADMIN — Medication 1 DROP: at 09:43

## 2023-01-01 RX ADMIN — VENLAFAXINE HYDROCHLORIDE 150 MG: 150 CAPSULE, EXTENDED RELEASE ORAL at 08:18

## 2023-01-01 RX ADMIN — Medication 1 DROP: at 08:05

## 2023-01-01 RX ADMIN — VENLAFAXINE HYDROCHLORIDE 150 MG: 150 CAPSULE, EXTENDED RELEASE ORAL at 08:44

## 2023-01-01 RX ADMIN — Medication 1 DROP: at 09:15

## 2023-01-01 RX ADMIN — LEVOTHYROXINE SODIUM 175 MCG: 175 TABLET ORAL at 06:27

## 2023-01-01 RX ADMIN — GABAPENTIN 600 MG: 300 CAPSULE ORAL at 08:17

## 2023-01-01 RX ADMIN — Medication 1 DROP: at 20:32

## 2023-01-01 RX ADMIN — OXYCODONE HYDROCHLORIDE 5 MG: 5 TABLET ORAL at 06:41

## 2023-01-01 RX ADMIN — Medication 1 DROP: at 13:43

## 2023-01-01 RX ADMIN — OXYCODONE HYDROCHLORIDE 5 MG: 5 TABLET ORAL at 10:47

## 2023-01-01 RX ADMIN — GABAPENTIN 600 MG: 300 CAPSULE ORAL at 09:05

## 2023-01-01 RX ADMIN — GABAPENTIN 600 MG: 300 CAPSULE ORAL at 14:17

## 2023-01-01 RX ADMIN — ATORVASTATIN CALCIUM 40 MG: 40 TABLET, FILM COATED ORAL at 08:40

## 2023-01-01 RX ADMIN — INSULIN ASPART 2 UNITS: 100 INJECTION, SOLUTION INTRAVENOUS; SUBCUTANEOUS at 14:07

## 2023-01-01 RX ADMIN — OXYCODONE HYDROCHLORIDE 5 MG: 5 TABLET ORAL at 09:06

## 2023-01-01 RX ADMIN — THERA TABS 1 TABLET: TAB at 08:22

## 2023-01-01 RX ADMIN — RIVAROXABAN 20 MG: 10 TABLET, FILM COATED ORAL at 09:35

## 2023-01-01 RX ADMIN — MIRABEGRON 25 MG: 25 TABLET, FILM COATED, EXTENDED RELEASE ORAL at 21:26

## 2023-01-01 RX ADMIN — RIVAROXABAN 20 MG: 10 TABLET, FILM COATED ORAL at 09:28

## 2023-01-01 RX ADMIN — SENNOSIDES 8.6 MG: 8.6 TABLET ORAL at 09:58

## 2023-01-01 RX ADMIN — LEVOTHYROXINE SODIUM 175 MCG: 175 TABLET ORAL at 08:45

## 2023-01-01 RX ADMIN — SENNOSIDES 8.6 MG: 8.6 TABLET ORAL at 08:02

## 2023-01-01 RX ADMIN — TOPIRAMATE 100 MG: 100 TABLET ORAL at 09:26

## 2023-01-01 RX ADMIN — GABAPENTIN 600 MG: 300 CAPSULE ORAL at 08:39

## 2023-01-01 RX ADMIN — INSULIN GLARGINE 15 UNITS: 100 INJECTION, SOLUTION SUBCUTANEOUS at 22:44

## 2023-01-01 RX ADMIN — MIRABEGRON 25 MG: 25 TABLET, FILM COATED, EXTENDED RELEASE ORAL at 22:15

## 2023-01-01 RX ADMIN — TOPIRAMATE 100 MG: 100 TABLET ORAL at 09:25

## 2023-01-01 RX ADMIN — Medication 1 DROP: at 17:57

## 2023-01-01 RX ADMIN — MIRABEGRON 25 MG: 25 TABLET, FILM COATED, EXTENDED RELEASE ORAL at 22:16

## 2023-01-01 RX ADMIN — OXYCODONE HYDROCHLORIDE 5 MG: 5 TABLET ORAL at 12:54

## 2023-01-01 RX ADMIN — TAMSULOSIN HYDROCHLORIDE 0.4 MG: 0.4 CAPSULE ORAL at 20:38

## 2023-01-01 RX ADMIN — RIVAROXABAN 20 MG: 10 TABLET, FILM COATED ORAL at 08:02

## 2023-01-01 RX ADMIN — Medication 2 SPRAY: at 07:59

## 2023-01-01 RX ADMIN — LISINOPRIL 20 MG: 20 TABLET ORAL at 09:21

## 2023-01-01 RX ADMIN — GABAPENTIN 600 MG: 300 CAPSULE ORAL at 21:31

## 2023-01-01 RX ADMIN — INSULIN ASPART 10 UNITS: 100 INJECTION, SOLUTION INTRAVENOUS; SUBCUTANEOUS at 08:21

## 2023-01-01 RX ADMIN — RIVAROXABAN 20 MG: 10 TABLET, FILM COATED ORAL at 08:42

## 2023-01-01 RX ADMIN — Medication 2 SPRAY: at 09:03

## 2023-01-01 RX ADMIN — LEVOTHYROXINE SODIUM 175 MCG: 175 TABLET ORAL at 06:28

## 2023-01-01 RX ADMIN — ATORVASTATIN CALCIUM 40 MG: 40 TABLET, FILM COATED ORAL at 08:19

## 2023-01-01 RX ADMIN — SITAGLIPTIN 100 MG: 50 TABLET, FILM COATED ORAL at 10:37

## 2023-01-01 RX ADMIN — GABAPENTIN 600 MG: 300 CAPSULE ORAL at 20:36

## 2023-01-01 RX ADMIN — VENLAFAXINE HYDROCHLORIDE 150 MG: 150 CAPSULE, EXTENDED RELEASE ORAL at 08:06

## 2023-01-01 RX ADMIN — Medication 1 DROP: at 12:13

## 2023-01-01 RX ADMIN — SENNOSIDES 8.6 MG: 8.6 TABLET ORAL at 08:28

## 2023-01-01 RX ADMIN — MIRABEGRON 25 MG: 25 TABLET, FILM COATED, EXTENDED RELEASE ORAL at 20:14

## 2023-01-01 RX ADMIN — VENLAFAXINE HYDROCHLORIDE 150 MG: 150 CAPSULE, EXTENDED RELEASE ORAL at 09:50

## 2023-01-01 RX ADMIN — Medication 1 DROP: at 22:15

## 2023-01-01 RX ADMIN — GABAPENTIN 600 MG: 300 CAPSULE ORAL at 16:35

## 2023-01-01 RX ADMIN — LEVOTHYROXINE SODIUM 175 MCG: 175 TABLET ORAL at 06:19

## 2023-01-01 RX ADMIN — LEVOTHYROXINE SODIUM 175 MCG: 175 TABLET ORAL at 05:41

## 2023-01-01 RX ADMIN — SITAGLIPTIN 100 MG: 50 TABLET, FILM COATED ORAL at 09:35

## 2023-01-01 RX ADMIN — THERA TABS 1 TABLET: TAB at 10:37

## 2023-01-01 RX ADMIN — SENNOSIDES 8.6 MG: 8.6 TABLET ORAL at 09:35

## 2023-01-01 RX ADMIN — GABAPENTIN 600 MG: 300 CAPSULE ORAL at 21:44

## 2023-01-01 RX ADMIN — LISINOPRIL 20 MG: 20 TABLET ORAL at 08:58

## 2023-01-01 RX ADMIN — INSULIN ASPART 2 UNITS: 100 INJECTION, SOLUTION INTRAVENOUS; SUBCUTANEOUS at 17:30

## 2023-01-01 RX ADMIN — GABAPENTIN 600 MG: 300 CAPSULE ORAL at 09:26

## 2023-01-01 RX ADMIN — LISINOPRIL 20 MG: 20 TABLET ORAL at 09:28

## 2023-01-01 RX ADMIN — RIVAROXABAN 20 MG: 10 TABLET, FILM COATED ORAL at 08:38

## 2023-01-01 RX ADMIN — GABAPENTIN 600 MG: 300 CAPSULE ORAL at 20:21

## 2023-01-01 RX ADMIN — Medication 1 DROP: at 21:59

## 2023-01-01 RX ADMIN — MIRABEGRON 25 MG: 25 TABLET, FILM COATED, EXTENDED RELEASE ORAL at 21:58

## 2023-01-01 RX ADMIN — OXYCODONE HYDROCHLORIDE 5 MG: 5 TABLET ORAL at 18:15

## 2023-01-01 RX ADMIN — INSULIN ASPART 2 UNITS: 100 INJECTION, SOLUTION INTRAVENOUS; SUBCUTANEOUS at 17:44

## 2023-01-01 RX ADMIN — PIPERACILLIN AND TAZOBACTAM 3.38 G: 3; .375 INJECTION, POWDER, FOR SOLUTION INTRAVENOUS at 05:48

## 2023-01-01 RX ADMIN — INSULIN ASPART 2 UNITS: 100 INJECTION, SOLUTION INTRAVENOUS; SUBCUTANEOUS at 17:38

## 2023-01-01 RX ADMIN — INSULIN ASPART 10 UNITS: 100 INJECTION, SOLUTION INTRAVENOUS; SUBCUTANEOUS at 08:51

## 2023-01-01 RX ADMIN — Medication 1 DROP: at 17:15

## 2023-01-01 RX ADMIN — OXYCODONE HYDROCHLORIDE 2.5 MG: 5 TABLET ORAL at 15:51

## 2023-01-01 RX ADMIN — Medication 2 SPRAY: at 17:15

## 2023-01-01 RX ADMIN — ATORVASTATIN CALCIUM 40 MG: 40 TABLET, FILM COATED ORAL at 08:28

## 2023-01-01 RX ADMIN — TAMSULOSIN HYDROCHLORIDE 0.4 MG: 0.4 CAPSULE ORAL at 22:09

## 2023-01-01 RX ADMIN — Medication 1 DROP: at 14:20

## 2023-01-01 RX ADMIN — GABAPENTIN 600 MG: 300 CAPSULE ORAL at 16:15

## 2023-01-01 RX ADMIN — INSULIN ASPART 1 UNITS: 100 INJECTION, SOLUTION INTRAVENOUS; SUBCUTANEOUS at 13:08

## 2023-01-01 RX ADMIN — GABAPENTIN 600 MG: 300 CAPSULE ORAL at 09:43

## 2023-01-01 RX ADMIN — ATORVASTATIN CALCIUM 40 MG: 40 TABLET, FILM COATED ORAL at 09:08

## 2023-01-01 RX ADMIN — Medication 1 DROP: at 08:28

## 2023-01-01 RX ADMIN — GABAPENTIN 600 MG: 300 CAPSULE ORAL at 09:44

## 2023-01-01 RX ADMIN — RIVAROXABAN 20 MG: 10 TABLET, FILM COATED ORAL at 10:12

## 2023-01-01 RX ADMIN — Medication 2 SPRAY: at 20:21

## 2023-01-01 RX ADMIN — LEVOTHYROXINE SODIUM 175 MCG: 175 TABLET ORAL at 05:49

## 2023-01-01 RX ADMIN — Medication 1 DROP: at 08:41

## 2023-01-01 RX ADMIN — GABAPENTIN 600 MG: 300 CAPSULE ORAL at 08:05

## 2023-01-01 RX ADMIN — SITAGLIPTIN 100 MG: 50 TABLET, FILM COATED ORAL at 08:28

## 2023-01-01 RX ADMIN — ATORVASTATIN CALCIUM 40 MG: 40 TABLET, FILM COATED ORAL at 07:41

## 2023-01-01 RX ADMIN — LEVOTHYROXINE SODIUM 175 MCG: 175 TABLET ORAL at 06:10

## 2023-01-01 RX ADMIN — OXYCODONE HYDROCHLORIDE 5 MG: 5 TABLET ORAL at 09:15

## 2023-01-01 RX ADMIN — SENNOSIDES 8.6 MG: 8.6 TABLET ORAL at 08:39

## 2023-01-01 RX ADMIN — VENLAFAXINE HYDROCHLORIDE 150 MG: 150 CAPSULE, EXTENDED RELEASE ORAL at 10:13

## 2023-01-01 RX ADMIN — ATORVASTATIN CALCIUM 40 MG: 40 TABLET, FILM COATED ORAL at 08:32

## 2023-01-01 RX ADMIN — GABAPENTIN 600 MG: 300 CAPSULE ORAL at 08:37

## 2023-01-01 RX ADMIN — GABAPENTIN 600 MG: 300 CAPSULE ORAL at 09:22

## 2023-01-01 RX ADMIN — SENNOSIDES 8.6 MG: 8.6 TABLET ORAL at 09:22

## 2023-01-01 RX ADMIN — LEVOTHYROXINE SODIUM 175 MCG: 175 TABLET ORAL at 05:32

## 2023-01-01 RX ADMIN — OXYCODONE HYDROCHLORIDE 5 MG: 5 TABLET ORAL at 13:53

## 2023-01-01 RX ADMIN — GABAPENTIN 600 MG: 300 CAPSULE ORAL at 20:23

## 2023-01-01 RX ADMIN — METFORMIN ER 500 MG 500 MG: 500 TABLET ORAL at 17:59

## 2023-01-01 RX ADMIN — ATORVASTATIN CALCIUM 40 MG: 40 TABLET, FILM COATED ORAL at 10:12

## 2023-01-01 RX ADMIN — GABAPENTIN 600 MG: 300 CAPSULE ORAL at 08:06

## 2023-01-01 RX ADMIN — RIVAROXABAN 20 MG: 10 TABLET, FILM COATED ORAL at 08:17

## 2023-01-01 RX ADMIN — TOPIRAMATE 100 MG: 100 TABLET ORAL at 08:06

## 2023-01-01 RX ADMIN — OXYCODONE HYDROCHLORIDE 5 MG: 5 TABLET ORAL at 22:21

## 2023-01-01 RX ADMIN — Medication 1 DROP: at 16:29

## 2023-01-01 RX ADMIN — TAMSULOSIN HYDROCHLORIDE 0.4 MG: 0.4 CAPSULE ORAL at 21:22

## 2023-01-01 RX ADMIN — VENLAFAXINE HYDROCHLORIDE 150 MG: 150 CAPSULE, EXTENDED RELEASE ORAL at 10:34

## 2023-01-01 RX ADMIN — LISINOPRIL 20 MG: 20 TABLET ORAL at 08:37

## 2023-01-01 RX ADMIN — LISINOPRIL 20 MG: 20 TABLET ORAL at 07:58

## 2023-01-01 RX ADMIN — VENLAFAXINE HYDROCHLORIDE 150 MG: 150 CAPSULE, EXTENDED RELEASE ORAL at 08:32

## 2023-01-01 RX ADMIN — SENNOSIDES 8.6 MG: 8.6 TABLET ORAL at 08:17

## 2023-01-01 RX ADMIN — TOPIRAMATE 100 MG: 100 TABLET ORAL at 10:37

## 2023-01-01 RX ADMIN — ATORVASTATIN CALCIUM 40 MG: 40 TABLET, FILM COATED ORAL at 08:42

## 2023-01-01 RX ADMIN — INSULIN ASPART 2 UNITS: 100 INJECTION, SOLUTION INTRAVENOUS; SUBCUTANEOUS at 18:50

## 2023-01-01 RX ADMIN — Medication 2 SPRAY: at 08:13

## 2023-01-01 RX ADMIN — INSULIN GLARGINE 15 UNITS: 100 INJECTION, SOLUTION SUBCUTANEOUS at 22:32

## 2023-01-01 RX ADMIN — ATORVASTATIN CALCIUM 40 MG: 40 TABLET, FILM COATED ORAL at 08:17

## 2023-01-01 RX ADMIN — RIVAROXABAN 20 MG: 10 TABLET, FILM COATED ORAL at 09:07

## 2023-01-01 RX ADMIN — Medication 1 DROP: at 10:37

## 2023-01-01 RX ADMIN — THERA TABS 1 TABLET: TAB at 10:02

## 2023-01-01 RX ADMIN — Medication 1 DROP: at 13:47

## 2023-01-01 RX ADMIN — LEVOTHYROXINE SODIUM 175 MCG: 175 TABLET ORAL at 06:59

## 2023-01-01 RX ADMIN — GABAPENTIN 600 MG: 300 CAPSULE ORAL at 20:30

## 2023-01-01 RX ADMIN — RIVAROXABAN 20 MG: 10 TABLET, FILM COATED ORAL at 10:34

## 2023-01-01 RX ADMIN — TAMSULOSIN HYDROCHLORIDE 0.4 MG: 0.4 CAPSULE ORAL at 22:03

## 2023-01-01 RX ADMIN — Medication 1 DROP: at 21:08

## 2023-01-01 RX ADMIN — Medication 1 DROP: at 12:54

## 2023-01-01 RX ADMIN — VENLAFAXINE HYDROCHLORIDE 150 MG: 150 CAPSULE, EXTENDED RELEASE ORAL at 08:31

## 2023-01-01 RX ADMIN — SENNOSIDES 8.6 MG: 8.6 TABLET ORAL at 09:51

## 2023-01-01 RX ADMIN — ATORVASTATIN CALCIUM 40 MG: 40 TABLET, FILM COATED ORAL at 08:54

## 2023-01-01 RX ADMIN — ATORVASTATIN CALCIUM 40 MG: 40 TABLET, FILM COATED ORAL at 10:02

## 2023-01-01 RX ADMIN — INSULIN ASPART 1 UNITS: 100 INJECTION, SOLUTION INTRAVENOUS; SUBCUTANEOUS at 17:32

## 2023-01-01 RX ADMIN — Medication 2 SPRAY: at 17:58

## 2023-01-01 RX ADMIN — THERA TABS 1 TABLET: TAB at 08:11

## 2023-01-01 RX ADMIN — LISINOPRIL 20 MG: 20 TABLET ORAL at 08:28

## 2023-01-01 RX ADMIN — Medication 1 DROP: at 08:17

## 2023-01-01 RX ADMIN — Medication 1 DROP: at 16:30

## 2023-01-01 RX ADMIN — INSULIN GLARGINE 15 UNITS: 100 INJECTION, SOLUTION SUBCUTANEOUS at 22:02

## 2023-01-01 RX ADMIN — OXYCODONE HYDROCHLORIDE 5 MG: 5 TABLET ORAL at 09:55

## 2023-01-01 RX ADMIN — Medication 1 DROP: at 18:05

## 2023-01-01 RX ADMIN — GABAPENTIN 600 MG: 300 CAPSULE ORAL at 20:10

## 2023-01-01 RX ADMIN — Medication 1 DROP: at 09:25

## 2023-01-01 RX ADMIN — RIVAROXABAN 20 MG: 10 TABLET, FILM COATED ORAL at 08:58

## 2023-01-01 RX ADMIN — GABAPENTIN 600 MG: 300 CAPSULE ORAL at 08:24

## 2023-01-01 RX ADMIN — RIVAROXABAN 20 MG: 10 TABLET, FILM COATED ORAL at 08:19

## 2023-01-01 RX ADMIN — VENLAFAXINE HYDROCHLORIDE 150 MG: 150 CAPSULE, EXTENDED RELEASE ORAL at 09:36

## 2023-01-01 RX ADMIN — INSULIN ASPART 2 UNITS: 100 INJECTION, SOLUTION INTRAVENOUS; SUBCUTANEOUS at 12:47

## 2023-01-01 RX ADMIN — GABAPENTIN 600 MG: 300 CAPSULE ORAL at 17:45

## 2023-01-01 RX ADMIN — OXYCODONE HYDROCHLORIDE 5 MG: 5 TABLET ORAL at 13:08

## 2023-01-01 RX ADMIN — GABAPENTIN 600 MG: 300 CAPSULE ORAL at 20:37

## 2023-01-01 RX ADMIN — INSULIN ASPART 2 UNITS: 100 INJECTION, SOLUTION INTRAVENOUS; SUBCUTANEOUS at 17:47

## 2023-01-01 RX ADMIN — METFORMIN ER 500 MG 500 MG: 500 TABLET ORAL at 17:55

## 2023-01-01 RX ADMIN — Medication 1 DROP: at 12:47

## 2023-01-01 RX ADMIN — GABAPENTIN 600 MG: 300 CAPSULE ORAL at 20:31

## 2023-01-01 RX ADMIN — OXYCODONE HYDROCHLORIDE 2.5 MG: 5 TABLET ORAL at 03:14

## 2023-01-01 RX ADMIN — OXYCODONE HYDROCHLORIDE 5 MG: 5 TABLET ORAL at 19:25

## 2023-01-01 RX ADMIN — Medication 1 DROP: at 09:07

## 2023-01-01 RX ADMIN — Medication 2 SPRAY: at 08:46

## 2023-01-01 RX ADMIN — Medication 1 DROP: at 08:40

## 2023-01-01 RX ADMIN — OXYCODONE HYDROCHLORIDE 5 MG: 5 TABLET ORAL at 21:56

## 2023-01-01 RX ADMIN — OXYCODONE HYDROCHLORIDE 5 MG: 5 TABLET ORAL at 05:56

## 2023-01-01 RX ADMIN — VENLAFAXINE HYDROCHLORIDE 150 MG: 150 CAPSULE, EXTENDED RELEASE ORAL at 08:58

## 2023-01-01 RX ADMIN — MIRABEGRON 25 MG: 25 TABLET, FILM COATED, EXTENDED RELEASE ORAL at 21:52

## 2023-01-01 RX ADMIN — TAMSULOSIN HYDROCHLORIDE 0.4 MG: 0.4 CAPSULE ORAL at 22:15

## 2023-01-01 RX ADMIN — VENLAFAXINE HYDROCHLORIDE 150 MG: 150 CAPSULE, EXTENDED RELEASE ORAL at 08:21

## 2023-01-01 RX ADMIN — Medication 1 DROP: at 21:20

## 2023-01-01 RX ADMIN — Medication 1 DROP: at 21:07

## 2023-01-01 RX ADMIN — Medication 1 DROP: at 20:31

## 2023-01-01 RX ADMIN — SITAGLIPTIN 100 MG: 50 TABLET, FILM COATED ORAL at 09:51

## 2023-01-01 RX ADMIN — Medication 1 DROP: at 08:55

## 2023-01-01 RX ADMIN — INSULIN ASPART 2 UNITS: 100 INJECTION, SOLUTION INTRAVENOUS; SUBCUTANEOUS at 13:50

## 2023-01-01 RX ADMIN — ATORVASTATIN CALCIUM 40 MG: 40 TABLET, FILM COATED ORAL at 08:18

## 2023-01-01 RX ADMIN — Medication 2 SPRAY: at 08:47

## 2023-01-01 RX ADMIN — OXYCODONE HYDROCHLORIDE 5 MG: 5 TABLET ORAL at 00:54

## 2023-01-01 RX ADMIN — INSULIN ASPART 2 UNITS: 100 INJECTION, SOLUTION INTRAVENOUS; SUBCUTANEOUS at 08:32

## 2023-01-01 RX ADMIN — INSULIN ASPART 10 UNITS: 100 INJECTION, SOLUTION INTRAVENOUS; SUBCUTANEOUS at 08:02

## 2023-01-01 RX ADMIN — GABAPENTIN 600 MG: 300 CAPSULE ORAL at 20:35

## 2023-01-01 RX ADMIN — VENLAFAXINE HYDROCHLORIDE 150 MG: 150 CAPSULE, EXTENDED RELEASE ORAL at 08:45

## 2023-01-01 RX ADMIN — TOPIRAMATE 100 MG: 100 TABLET ORAL at 08:19

## 2023-01-01 RX ADMIN — Medication 1 DROP: at 15:43

## 2023-01-01 RX ADMIN — GABAPENTIN 600 MG: 300 CAPSULE ORAL at 20:46

## 2023-01-01 RX ADMIN — VENLAFAXINE HYDROCHLORIDE 150 MG: 150 CAPSULE, EXTENDED RELEASE ORAL at 08:01

## 2023-01-01 RX ADMIN — OXYCODONE HYDROCHLORIDE 5 MG: 5 TABLET ORAL at 08:35

## 2023-01-01 RX ADMIN — TOPIRAMATE 100 MG: 100 TABLET ORAL at 09:43

## 2023-01-01 RX ADMIN — INSULIN ASPART 2 UNITS: 100 INJECTION, SOLUTION INTRAVENOUS; SUBCUTANEOUS at 17:19

## 2023-01-01 RX ADMIN — SITAGLIPTIN 100 MG: 50 TABLET, FILM COATED ORAL at 08:58

## 2023-01-01 RX ADMIN — Medication 2 SPRAY: at 10:05

## 2023-01-01 RX ADMIN — INSULIN ASPART 2 UNITS: 100 INJECTION, SOLUTION INTRAVENOUS; SUBCUTANEOUS at 08:55

## 2023-01-01 RX ADMIN — TOPIRAMATE 100 MG: 100 TABLET ORAL at 09:51

## 2023-01-01 RX ADMIN — GABAPENTIN 600 MG: 300 CAPSULE ORAL at 15:46

## 2023-01-01 RX ADMIN — TOPIRAMATE 100 MG: 100 TABLET ORAL at 08:32

## 2023-01-01 RX ADMIN — VENLAFAXINE HYDROCHLORIDE 150 MG: 150 CAPSULE, EXTENDED RELEASE ORAL at 09:11

## 2023-01-01 RX ADMIN — INSULIN ASPART 2 UNITS: 100 INJECTION, SOLUTION INTRAVENOUS; SUBCUTANEOUS at 09:14

## 2023-01-01 RX ADMIN — SODIUM CHLORIDE 1000 ML: 9 INJECTION, SOLUTION INTRAVENOUS at 14:32

## 2023-01-01 RX ADMIN — THERA TABS 1 TABLET: TAB at 08:58

## 2023-01-01 RX ADMIN — OXYCODONE HYDROCHLORIDE 5 MG: 5 TABLET ORAL at 12:12

## 2023-01-01 RX ADMIN — ONDANSETRON 4 MG: 2 INJECTION INTRAMUSCULAR; INTRAVENOUS at 14:21

## 2023-01-01 RX ADMIN — GABAPENTIN 600 MG: 300 CAPSULE ORAL at 14:49

## 2023-01-01 RX ADMIN — INSULIN ASPART 2 UNITS: 100 INJECTION, SOLUTION INTRAVENOUS; SUBCUTANEOUS at 07:42

## 2023-01-01 RX ADMIN — OXYCODONE HYDROCHLORIDE 5 MG: 5 TABLET ORAL at 00:36

## 2023-01-01 RX ADMIN — GABAPENTIN 600 MG: 300 CAPSULE ORAL at 15:58

## 2023-01-01 RX ADMIN — GABAPENTIN 600 MG: 300 CAPSULE ORAL at 20:04

## 2023-01-01 RX ADMIN — OXYCODONE HYDROCHLORIDE 5 MG: 5 TABLET ORAL at 06:42

## 2023-01-01 RX ADMIN — LEVOTHYROXINE SODIUM 175 MCG: 175 TABLET ORAL at 09:14

## 2023-01-01 RX ADMIN — RIVAROXABAN 20 MG: 10 TABLET, FILM COATED ORAL at 09:43

## 2023-01-01 RX ADMIN — LISINOPRIL 20 MG: 20 TABLET ORAL at 08:11

## 2023-01-01 RX ADMIN — Medication 2 SPRAY: at 12:31

## 2023-01-01 RX ADMIN — THERA TABS 1 TABLET: TAB at 09:22

## 2023-01-01 RX ADMIN — ATORVASTATIN CALCIUM 40 MG: 40 TABLET, FILM COATED ORAL at 10:07

## 2023-01-01 RX ADMIN — THERA TABS 1 TABLET: TAB at 09:36

## 2023-01-01 RX ADMIN — INSULIN ASPART 10 UNITS: 100 INJECTION, SOLUTION INTRAVENOUS; SUBCUTANEOUS at 08:59

## 2023-01-01 RX ADMIN — GABAPENTIN 600 MG: 300 CAPSULE ORAL at 09:30

## 2023-01-01 RX ADMIN — GABAPENTIN 600 MG: 300 CAPSULE ORAL at 07:58

## 2023-01-01 RX ADMIN — Medication 1 DROP: at 16:36

## 2023-01-01 RX ADMIN — INSULIN GLARGINE 28 UNITS: 100 INJECTION, SOLUTION SUBCUTANEOUS at 22:03

## 2023-01-01 RX ADMIN — RIVAROXABAN 20 MG: 10 TABLET, FILM COATED ORAL at 08:31

## 2023-01-01 RX ADMIN — GABAPENTIN 600 MG: 300 CAPSULE ORAL at 08:25

## 2023-01-01 RX ADMIN — RIVAROXABAN 20 MG: 10 TABLET, FILM COATED ORAL at 08:37

## 2023-01-01 RX ADMIN — Medication 1 DROP: at 22:03

## 2023-01-01 RX ADMIN — MIRABEGRON 25 MG: 25 TABLET, FILM COATED, EXTENDED RELEASE ORAL at 20:42

## 2023-01-01 RX ADMIN — OXYCODONE HYDROCHLORIDE 5 MG: 5 TABLET ORAL at 04:59

## 2023-01-01 RX ADMIN — GABAPENTIN 600 MG: 300 CAPSULE ORAL at 15:28

## 2023-01-01 RX ADMIN — INSULIN ASPART 4 UNITS: 100 INJECTION, SOLUTION INTRAVENOUS; SUBCUTANEOUS at 18:36

## 2023-01-01 RX ADMIN — GABAPENTIN 600 MG: 300 CAPSULE ORAL at 09:58

## 2023-01-01 RX ADMIN — Medication 1 DROP: at 13:16

## 2023-01-01 RX ADMIN — GABAPENTIN 600 MG: 300 CAPSULE ORAL at 18:03

## 2023-01-01 RX ADMIN — SENNOSIDES 8.6 MG: 8.6 TABLET ORAL at 10:13

## 2023-01-01 RX ADMIN — SITAGLIPTIN 100 MG: 50 TABLET, FILM COATED ORAL at 10:02

## 2023-01-01 RX ADMIN — THERA TABS 1 TABLET: TAB at 09:14

## 2023-01-01 RX ADMIN — MIRABEGRON 25 MG: 25 TABLET, FILM COATED, EXTENDED RELEASE ORAL at 20:55

## 2023-01-01 RX ADMIN — OXYCODONE HYDROCHLORIDE 2.5 MG: 5 TABLET ORAL at 08:25

## 2023-01-01 RX ADMIN — GABAPENTIN 600 MG: 300 CAPSULE ORAL at 13:36

## 2023-01-01 RX ADMIN — Medication 1 DROP: at 17:28

## 2023-01-01 RX ADMIN — TOPIRAMATE 100 MG: 100 TABLET ORAL at 08:25

## 2023-01-01 RX ADMIN — MIRABEGRON 25 MG: 25 TABLET, FILM COATED, EXTENDED RELEASE ORAL at 20:12

## 2023-01-01 RX ADMIN — GABAPENTIN 600 MG: 300 CAPSULE ORAL at 17:37

## 2023-01-01 RX ADMIN — VENLAFAXINE HYDROCHLORIDE 150 MG: 150 CAPSULE, EXTENDED RELEASE ORAL at 09:24

## 2023-01-01 RX ADMIN — THERA TABS 1 TABLET: TAB at 08:54

## 2023-01-01 RX ADMIN — MIRABEGRON 25 MG: 25 TABLET, FILM COATED, EXTENDED RELEASE ORAL at 20:23

## 2023-01-01 RX ADMIN — ATORVASTATIN CALCIUM 40 MG: 40 TABLET, FILM COATED ORAL at 09:07

## 2023-01-01 RX ADMIN — LEVOTHYROXINE SODIUM 175 MCG: 175 TABLET ORAL at 06:40

## 2023-01-01 RX ADMIN — ATORVASTATIN CALCIUM 40 MG: 40 TABLET, FILM COATED ORAL at 09:30

## 2023-01-01 RX ADMIN — LISINOPRIL 20 MG: 20 TABLET ORAL at 07:57

## 2023-01-01 RX ADMIN — SITAGLIPTIN 100 MG: 50 TABLET, FILM COATED ORAL at 10:12

## 2023-01-01 RX ADMIN — GABAPENTIN 600 MG: 300 CAPSULE ORAL at 20:20

## 2023-01-01 RX ADMIN — GABAPENTIN 600 MG: 300 CAPSULE ORAL at 17:04

## 2023-01-01 RX ADMIN — INSULIN ASPART 2 UNITS: 100 INJECTION, SOLUTION INTRAVENOUS; SUBCUTANEOUS at 12:15

## 2023-01-01 RX ADMIN — VENLAFAXINE HYDROCHLORIDE 150 MG: 150 CAPSULE, EXTENDED RELEASE ORAL at 09:02

## 2023-01-01 RX ADMIN — RIVAROXABAN 20 MG: 10 TABLET, FILM COATED ORAL at 09:30

## 2023-01-01 RX ADMIN — Medication 2 SPRAY: at 10:49

## 2023-01-01 RX ADMIN — SENNOSIDES 8.6 MG: 8.6 TABLET ORAL at 08:54

## 2023-01-01 RX ADMIN — OXYCODONE HYDROCHLORIDE 2.5 MG: 5 TABLET ORAL at 20:30

## 2023-01-01 RX ADMIN — MIRABEGRON 25 MG: 25 TABLET, FILM COATED, EXTENDED RELEASE ORAL at 22:00

## 2023-01-01 RX ADMIN — Medication 1 DROP: at 09:50

## 2023-01-01 RX ADMIN — INSULIN ASPART 1 UNITS: 100 INJECTION, SOLUTION INTRAVENOUS; SUBCUTANEOUS at 11:34

## 2023-01-01 RX ADMIN — Medication 1 DROP: at 12:59

## 2023-01-01 RX ADMIN — TOPIRAMATE 100 MG: 100 TABLET ORAL at 09:35

## 2023-01-01 RX ADMIN — Medication 2 SPRAY: at 08:58

## 2023-01-01 RX ADMIN — INSULIN ASPART 2 UNITS: 100 INJECTION, SOLUTION INTRAVENOUS; SUBCUTANEOUS at 17:21

## 2023-01-01 RX ADMIN — INSULIN ASPART 10 UNITS: 100 INJECTION, SOLUTION INTRAVENOUS; SUBCUTANEOUS at 09:30

## 2023-01-01 RX ADMIN — GABAPENTIN 600 MG: 300 CAPSULE ORAL at 21:30

## 2023-01-01 RX ADMIN — GABAPENTIN 600 MG: 300 CAPSULE ORAL at 07:42

## 2023-01-01 RX ADMIN — GABAPENTIN 600 MG: 300 CAPSULE ORAL at 10:34

## 2023-01-01 RX ADMIN — POTASSIUM CHLORIDE 40 MEQ: 1.5 POWDER, FOR SOLUTION ORAL at 13:34

## 2023-01-01 RX ADMIN — LEVOTHYROXINE SODIUM 175 MCG: 175 TABLET ORAL at 06:09

## 2023-01-01 RX ADMIN — INSULIN ASPART 1 UNITS: 100 INJECTION, SOLUTION INTRAVENOUS; SUBCUTANEOUS at 17:09

## 2023-01-01 RX ADMIN — TIZANIDINE 2 MG: 2 TABLET ORAL at 06:22

## 2023-01-01 RX ADMIN — INSULIN GLARGINE 15 UNITS: 100 INJECTION, SOLUTION SUBCUTANEOUS at 22:22

## 2023-01-01 RX ADMIN — LEVOTHYROXINE SODIUM 175 MCG: 175 TABLET ORAL at 06:14

## 2023-01-01 RX ADMIN — LEVOTHYROXINE SODIUM 175 MCG: 175 TABLET ORAL at 05:56

## 2023-01-01 RX ADMIN — INSULIN ASPART 2 UNITS: 100 INJECTION, SOLUTION INTRAVENOUS; SUBCUTANEOUS at 08:36

## 2023-01-01 RX ADMIN — SODIUM CHLORIDE 1000 ML: 9 INJECTION, SOLUTION INTRAVENOUS at 11:53

## 2023-01-01 RX ADMIN — GABAPENTIN 600 MG: 300 CAPSULE ORAL at 19:45

## 2023-01-01 RX ADMIN — GABAPENTIN 600 MG: 300 CAPSULE ORAL at 19:53

## 2023-01-01 RX ADMIN — TOPIRAMATE 100 MG: 100 TABLET ORAL at 08:10

## 2023-01-01 RX ADMIN — SITAGLIPTIN 100 MG: 50 TABLET, FILM COATED ORAL at 08:54

## 2023-01-01 RX ADMIN — GABAPENTIN 600 MG: 300 CAPSULE ORAL at 10:02

## 2023-01-01 RX ADMIN — TAMSULOSIN HYDROCHLORIDE 0.4 MG: 0.4 CAPSULE ORAL at 20:55

## 2023-01-01 RX ADMIN — Medication 1 DROP: at 08:38

## 2023-01-01 RX ADMIN — VENLAFAXINE HYDROCHLORIDE 150 MG: 150 CAPSULE, EXTENDED RELEASE ORAL at 09:26

## 2023-01-01 RX ADMIN — INSULIN ASPART 1 UNITS: 100 INJECTION, SOLUTION INTRAVENOUS; SUBCUTANEOUS at 12:44

## 2023-01-01 RX ADMIN — GABAPENTIN 600 MG: 300 CAPSULE ORAL at 21:02

## 2023-01-01 RX ADMIN — ATORVASTATIN CALCIUM 40 MG: 40 TABLET, FILM COATED ORAL at 08:10

## 2023-01-01 RX ADMIN — INSULIN ASPART 2 UNITS: 100 INJECTION, SOLUTION INTRAVENOUS; SUBCUTANEOUS at 18:47

## 2023-01-01 RX ADMIN — ACETAMINOPHEN 650 MG: 325 TABLET ORAL at 09:21

## 2023-01-01 RX ADMIN — VENLAFAXINE HYDROCHLORIDE 150 MG: 150 CAPSULE, EXTENDED RELEASE ORAL at 10:12

## 2023-01-01 RX ADMIN — TOPIRAMATE 100 MG: 100 TABLET ORAL at 08:00

## 2023-01-01 RX ADMIN — Medication 1 DROP: at 13:18

## 2023-01-01 RX ADMIN — OXYCODONE HYDROCHLORIDE 5 MG: 5 TABLET ORAL at 15:58

## 2023-01-01 RX ADMIN — SITAGLIPTIN 100 MG: 50 TABLET, FILM COATED ORAL at 08:37

## 2023-01-01 RX ADMIN — INSULIN ASPART 1 UNITS: 100 INJECTION, SOLUTION INTRAVENOUS; SUBCUTANEOUS at 17:53

## 2023-01-01 RX ADMIN — OXYCODONE HYDROCHLORIDE 5 MG: 5 TABLET ORAL at 02:41

## 2023-01-01 RX ADMIN — OXYCODONE HYDROCHLORIDE 5 MG: 5 TABLET ORAL at 15:32

## 2023-01-01 RX ADMIN — INSULIN ASPART 2 UNITS: 100 INJECTION, SOLUTION INTRAVENOUS; SUBCUTANEOUS at 17:09

## 2023-01-01 RX ADMIN — ATORVASTATIN CALCIUM 40 MG: 40 TABLET, FILM COATED ORAL at 08:06

## 2023-01-01 RX ADMIN — RIVAROXABAN 20 MG: 10 TABLET, FILM COATED ORAL at 08:27

## 2023-01-01 RX ADMIN — VENLAFAXINE HYDROCHLORIDE 150 MG: 150 CAPSULE, EXTENDED RELEASE ORAL at 07:58

## 2023-01-01 RX ADMIN — GABAPENTIN 600 MG: 300 CAPSULE ORAL at 17:26

## 2023-01-01 RX ADMIN — OXYCODONE HYDROCHLORIDE 5 MG: 5 TABLET ORAL at 18:26

## 2023-01-01 RX ADMIN — NITROGLYCERIN 0.4 MG: 0.4 TABLET SUBLINGUAL at 14:13

## 2023-01-01 RX ADMIN — SITAGLIPTIN 100 MG: 50 TABLET, FILM COATED ORAL at 10:34

## 2023-01-01 RX ADMIN — PIPERACILLIN AND TAZOBACTAM 3.38 G: 3; .375 INJECTION, POWDER, FOR SOLUTION INTRAVENOUS at 23:32

## 2023-01-01 RX ADMIN — ATORVASTATIN CALCIUM 40 MG: 40 TABLET, FILM COATED ORAL at 09:57

## 2023-01-01 RX ADMIN — Medication 1 DROP: at 20:08

## 2023-01-01 RX ADMIN — VANCOMYCIN HYDROCHLORIDE 1500 MG: 10 INJECTION, POWDER, LYOPHILIZED, FOR SOLUTION INTRAVENOUS at 12:54

## 2023-01-01 RX ADMIN — RIVAROXABAN 20 MG: 10 TABLET, FILM COATED ORAL at 09:26

## 2023-01-01 RX ADMIN — LISINOPRIL 20 MG: 20 TABLET ORAL at 08:26

## 2023-01-01 RX ADMIN — TAMSULOSIN HYDROCHLORIDE 0.4 MG: 0.4 CAPSULE ORAL at 20:31

## 2023-01-01 RX ADMIN — Medication 1 DROP: at 12:25

## 2023-01-01 RX ADMIN — GABAPENTIN 600 MG: 300 CAPSULE ORAL at 08:44

## 2023-01-01 RX ADMIN — ATORVASTATIN CALCIUM 40 MG: 40 TABLET, FILM COATED ORAL at 08:30

## 2023-01-01 RX ADMIN — ATORVASTATIN CALCIUM 40 MG: 40 TABLET, FILM COATED ORAL at 08:45

## 2023-01-01 RX ADMIN — GABAPENTIN 600 MG: 300 CAPSULE ORAL at 15:29

## 2023-01-01 RX ADMIN — Medication 1 DROP: at 12:36

## 2023-01-01 RX ADMIN — TAMSULOSIN HYDROCHLORIDE 0.4 MG: 0.4 CAPSULE ORAL at 21:57

## 2023-01-01 RX ADMIN — INSULIN GLARGINE 30 UNITS: 100 INJECTION, SOLUTION SUBCUTANEOUS at 22:13

## 2023-01-01 RX ADMIN — Medication 1 DROP: at 16:19

## 2023-01-01 RX ADMIN — VENLAFAXINE HYDROCHLORIDE 150 MG: 150 CAPSULE, EXTENDED RELEASE ORAL at 08:19

## 2023-01-01 RX ADMIN — INSULIN ASPART 1 UNITS: 100 INJECTION, SOLUTION INTRAVENOUS; SUBCUTANEOUS at 11:58

## 2023-01-01 RX ADMIN — THERA TABS 1 TABLET: TAB at 08:26

## 2023-01-01 RX ADMIN — Medication 2 SPRAY: at 19:22

## 2023-01-01 RX ADMIN — MIRABEGRON 25 MG: 25 TABLET, FILM COATED, EXTENDED RELEASE ORAL at 22:09

## 2023-01-01 RX ADMIN — INSULIN ASPART 1 UNITS: 100 INJECTION, SOLUTION INTRAVENOUS; SUBCUTANEOUS at 13:46

## 2023-01-01 RX ADMIN — Medication 1 DROP: at 20:10

## 2023-01-01 RX ADMIN — INSULIN ASPART 2 UNITS: 100 INJECTION, SOLUTION INTRAVENOUS; SUBCUTANEOUS at 10:08

## 2023-01-01 RX ADMIN — RIVAROXABAN 20 MG: 10 TABLET, FILM COATED ORAL at 09:08

## 2023-01-01 RX ADMIN — LISINOPRIL 20 MG: 20 TABLET ORAL at 08:07

## 2023-01-01 RX ADMIN — Medication 1 DROP: at 21:44

## 2023-01-01 RX ADMIN — PIPERACILLIN AND TAZOBACTAM 3.38 G: 3; .375 INJECTION, POWDER, FOR SOLUTION INTRAVENOUS at 23:46

## 2023-01-01 RX ADMIN — GABAPENTIN 600 MG: 300 CAPSULE ORAL at 15:15

## 2023-01-01 RX ADMIN — LEVOTHYROXINE SODIUM 175 MCG: 175 TABLET ORAL at 06:41

## 2023-01-01 RX ADMIN — TOPIRAMATE 100 MG: 100 TABLET ORAL at 08:21

## 2023-01-01 RX ADMIN — OXYCODONE HYDROCHLORIDE 5 MG: 5 TABLET ORAL at 07:41

## 2023-01-01 RX ADMIN — INSULIN ASPART 2 UNITS: 100 INJECTION, SOLUTION INTRAVENOUS; SUBCUTANEOUS at 17:35

## 2023-01-01 RX ADMIN — GABAPENTIN 600 MG: 300 CAPSULE ORAL at 21:08

## 2023-01-01 RX ADMIN — OXYCODONE HYDROCHLORIDE 5 MG: 5 TABLET ORAL at 14:45

## 2023-01-01 RX ADMIN — METFORMIN ER 500 MG 500 MG: 500 TABLET ORAL at 19:05

## 2023-01-01 RX ADMIN — OXYCODONE HYDROCHLORIDE 2.5 MG: 5 TABLET ORAL at 06:29

## 2023-01-01 RX ADMIN — OXYCODONE HYDROCHLORIDE 5 MG: 5 TABLET ORAL at 20:32

## 2023-01-01 RX ADMIN — INSULIN ASPART 2 UNITS: 100 INJECTION, SOLUTION INTRAVENOUS; SUBCUTANEOUS at 17:56

## 2023-01-01 RX ADMIN — TOPIRAMATE 100 MG: 100 TABLET ORAL at 09:02

## 2023-01-01 RX ADMIN — INSULIN ASPART 2 UNITS: 100 INJECTION, SOLUTION INTRAVENOUS; SUBCUTANEOUS at 18:10

## 2023-01-01 RX ADMIN — RIVAROXABAN 20 MG: 10 TABLET, FILM COATED ORAL at 07:57

## 2023-01-01 RX ADMIN — INSULIN ASPART 1 UNITS: 100 INJECTION, SOLUTION INTRAVENOUS; SUBCUTANEOUS at 13:14

## 2023-01-01 RX ADMIN — INSULIN ASPART 1 UNITS: 100 INJECTION, SOLUTION INTRAVENOUS; SUBCUTANEOUS at 17:10

## 2023-01-01 RX ADMIN — GABAPENTIN 600 MG: 300 CAPSULE ORAL at 14:06

## 2023-01-01 RX ADMIN — Medication 1 DROP: at 18:21

## 2023-01-01 RX ADMIN — SITAGLIPTIN 100 MG: 50 TABLET, FILM COATED ORAL at 08:41

## 2023-01-01 RX ADMIN — PIPERACILLIN AND TAZOBACTAM 3.38 G: 3; .375 INJECTION, POWDER, FOR SOLUTION INTRAVENOUS at 11:22

## 2023-01-01 RX ADMIN — LISINOPRIL 20 MG: 20 TABLET ORAL at 10:07

## 2023-01-01 RX ADMIN — MIRABEGRON 25 MG: 25 TABLET, FILM COATED, EXTENDED RELEASE ORAL at 20:31

## 2023-01-01 RX ADMIN — INSULIN ASPART 2 UNITS: 100 INJECTION, SOLUTION INTRAVENOUS; SUBCUTANEOUS at 13:07

## 2023-01-01 RX ADMIN — GABAPENTIN 600 MG: 300 CAPSULE ORAL at 08:42

## 2023-01-01 RX ADMIN — GABAPENTIN 600 MG: 300 CAPSULE ORAL at 09:50

## 2023-01-01 RX ADMIN — INSULIN GLARGINE 15 UNITS: 100 INJECTION, SOLUTION SUBCUTANEOUS at 22:06

## 2023-01-01 RX ADMIN — OXYCODONE HYDROCHLORIDE 5 MG: 5 TABLET ORAL at 04:11

## 2023-01-01 RX ADMIN — Medication 1 DROP: at 20:03

## 2023-01-01 RX ADMIN — SITAGLIPTIN 100 MG: 50 TABLET, FILM COATED ORAL at 10:39

## 2023-01-01 RX ADMIN — TOPIRAMATE 100 MG: 100 TABLET ORAL at 08:37

## 2023-01-01 RX ADMIN — LEVOTHYROXINE SODIUM 175 MCG: 175 TABLET ORAL at 06:39

## 2023-01-01 RX ADMIN — Medication 1 DROP: at 17:29

## 2023-01-01 RX ADMIN — SITAGLIPTIN 100 MG: 50 TABLET, FILM COATED ORAL at 09:26

## 2023-01-01 RX ADMIN — INSULIN ASPART 10 UNITS: 100 INJECTION, SOLUTION INTRAVENOUS; SUBCUTANEOUS at 09:42

## 2023-01-01 RX ADMIN — OXYCODONE HYDROCHLORIDE 5 MG: 5 TABLET ORAL at 20:49

## 2023-01-01 RX ADMIN — THERA TABS 1 TABLET: TAB at 10:35

## 2023-01-01 RX ADMIN — OXYCODONE HYDROCHLORIDE 5 MG: 5 TABLET ORAL at 22:00

## 2023-01-01 RX ADMIN — Medication 1 DROP: at 11:27

## 2023-01-01 RX ADMIN — RIVAROXABAN 20 MG: 10 TABLET, FILM COATED ORAL at 10:02

## 2023-01-01 RX ADMIN — LISINOPRIL 20 MG: 20 TABLET ORAL at 08:39

## 2023-01-01 RX ADMIN — VENLAFAXINE HYDROCHLORIDE 150 MG: 150 CAPSULE, EXTENDED RELEASE ORAL at 08:00

## 2023-01-01 RX ADMIN — INSULIN ASPART 2 UNITS: 100 INJECTION, SOLUTION INTRAVENOUS; SUBCUTANEOUS at 08:45

## 2023-01-01 RX ADMIN — PIPERACILLIN AND TAZOBACTAM 3.38 G: 3; .375 INJECTION, POWDER, FOR SOLUTION INTRAVENOUS at 12:57

## 2023-01-01 RX ADMIN — GABAPENTIN 600 MG: 300 CAPSULE ORAL at 08:10

## 2023-01-01 RX ADMIN — LEVOTHYROXINE SODIUM 175 MCG: 175 TABLET ORAL at 06:15

## 2023-01-01 RX ADMIN — VENLAFAXINE HYDROCHLORIDE 150 MG: 150 CAPSULE, EXTENDED RELEASE ORAL at 08:42

## 2023-01-01 RX ADMIN — METFORMIN ER 500 MG 500 MG: 500 TABLET ORAL at 17:19

## 2023-01-01 RX ADMIN — LISINOPRIL 20 MG: 20 TABLET ORAL at 08:40

## 2023-01-01 RX ADMIN — OXYCODONE HYDROCHLORIDE 2.5 MG: 5 TABLET ORAL at 01:15

## 2023-01-01 RX ADMIN — Medication 1 DROP: at 21:33

## 2023-01-01 RX ADMIN — SENNOSIDES 8.6 MG: 8.6 TABLET ORAL at 08:32

## 2023-01-01 RX ADMIN — INSULIN ASPART 10 UNITS: 100 INJECTION, SOLUTION INTRAVENOUS; SUBCUTANEOUS at 07:59

## 2023-01-01 RX ADMIN — SENNOSIDES 8.6 MG: 8.6 TABLET ORAL at 09:27

## 2023-01-01 RX ADMIN — INSULIN ASPART 10 UNITS: 100 INJECTION, SOLUTION INTRAVENOUS; SUBCUTANEOUS at 09:12

## 2023-01-01 RX ADMIN — INSULIN GLARGINE 15 UNITS: 100 INJECTION, SOLUTION SUBCUTANEOUS at 21:24

## 2023-01-01 RX ADMIN — MIRABEGRON 25 MG: 25 TABLET, FILM COATED, EXTENDED RELEASE ORAL at 21:02

## 2023-01-01 RX ADMIN — TAMSULOSIN HYDROCHLORIDE 0.4 MG: 0.4 CAPSULE ORAL at 21:43

## 2023-01-01 RX ADMIN — Medication 1 DROP: at 19:53

## 2023-01-01 RX ADMIN — INSULIN ASPART 2 UNITS: 100 INJECTION, SOLUTION INTRAVENOUS; SUBCUTANEOUS at 17:14

## 2023-01-01 RX ADMIN — GABAPENTIN 600 MG: 300 CAPSULE ORAL at 16:53

## 2023-01-01 RX ADMIN — TAMSULOSIN HYDROCHLORIDE 0.4 MG: 0.4 CAPSULE ORAL at 20:14

## 2023-01-01 RX ADMIN — METFORMIN ER 500 MG 500 MG: 500 TABLET ORAL at 17:53

## 2023-01-01 RX ADMIN — RIVAROXABAN 20 MG: 10 TABLET, FILM COATED ORAL at 09:50

## 2023-01-01 RX ADMIN — GABAPENTIN 600 MG: 300 CAPSULE ORAL at 21:16

## 2023-01-01 RX ADMIN — TOPIRAMATE 100 MG: 100 TABLET ORAL at 09:15

## 2023-01-01 RX ADMIN — ATORVASTATIN CALCIUM 40 MG: 40 TABLET, FILM COATED ORAL at 08:21

## 2023-01-01 RX ADMIN — Medication 1 DROP: at 15:53

## 2023-01-01 RX ADMIN — Medication 1 DROP: at 08:37

## 2023-01-01 RX ADMIN — OXYCODONE HYDROCHLORIDE 2.5 MG: 5 TABLET ORAL at 09:47

## 2023-01-01 RX ADMIN — LEVOTHYROXINE SODIUM 175 MCG: 175 TABLET ORAL at 05:44

## 2023-01-01 RX ADMIN — TOPIRAMATE 100 MG: 100 TABLET ORAL at 10:02

## 2023-01-01 RX ADMIN — Medication 1 DROP: at 09:06

## 2023-01-01 RX ADMIN — OXYCODONE HYDROCHLORIDE 2.5 MG: 5 TABLET ORAL at 22:06

## 2023-01-01 RX ADMIN — INSULIN ASPART 1 UNITS: 100 INJECTION, SOLUTION INTRAVENOUS; SUBCUTANEOUS at 09:24

## 2023-01-01 RX ADMIN — OXYCODONE HYDROCHLORIDE 5 MG: 5 TABLET ORAL at 23:28

## 2023-01-01 RX ADMIN — INSULIN GLARGINE 15 UNITS: 100 INJECTION, SOLUTION SUBCUTANEOUS at 21:30

## 2023-01-01 RX ADMIN — INSULIN ASPART 1 UNITS: 100 INJECTION, SOLUTION INTRAVENOUS; SUBCUTANEOUS at 16:56

## 2023-01-01 RX ADMIN — GABAPENTIN 600 MG: 300 CAPSULE ORAL at 20:42

## 2023-01-01 RX ADMIN — LISINOPRIL 20 MG: 20 TABLET ORAL at 08:54

## 2023-01-01 RX ADMIN — LEVOTHYROXINE SODIUM 175 MCG: 175 TABLET ORAL at 06:05

## 2023-01-01 RX ADMIN — INSULIN ASPART 2 UNITS: 100 INJECTION, SOLUTION INTRAVENOUS; SUBCUTANEOUS at 12:09

## 2023-01-01 RX ADMIN — OXYCODONE HYDROCHLORIDE 5 MG: 5 TABLET ORAL at 09:43

## 2023-01-01 RX ADMIN — INSULIN ASPART 1 UNITS: 100 INJECTION, SOLUTION INTRAVENOUS; SUBCUTANEOUS at 18:04

## 2023-01-01 RX ADMIN — INSULIN ASPART 1 UNITS: 100 INJECTION, SOLUTION INTRAVENOUS; SUBCUTANEOUS at 12:47

## 2023-01-01 RX ADMIN — GABAPENTIN 600 MG: 300 CAPSULE ORAL at 08:30

## 2023-01-01 RX ADMIN — GABAPENTIN 600 MG: 300 CAPSULE ORAL at 14:40

## 2023-01-01 RX ADMIN — GABAPENTIN 600 MG: 300 CAPSULE ORAL at 08:07

## 2023-01-01 RX ADMIN — Medication 1 DROP: at 21:30

## 2023-01-01 RX ADMIN — OXYCODONE HYDROCHLORIDE 2.5 MG: 5 TABLET ORAL at 20:14

## 2023-01-01 RX ADMIN — PIPERACILLIN AND TAZOBACTAM 3.38 G: 3; .375 INJECTION, POWDER, FOR SOLUTION INTRAVENOUS at 06:25

## 2023-01-01 RX ADMIN — ATORVASTATIN CALCIUM 40 MG: 40 TABLET, FILM COATED ORAL at 08:31

## 2023-01-01 RX ADMIN — Medication 1 DROP: at 15:47

## 2023-01-01 RX ADMIN — OXYCODONE HYDROCHLORIDE 5 MG: 5 TABLET ORAL at 06:54

## 2023-01-01 RX ADMIN — SITAGLIPTIN 100 MG: 50 TABLET, FILM COATED ORAL at 09:30

## 2023-01-01 RX ADMIN — OXYCODONE HYDROCHLORIDE 5 MG: 5 TABLET ORAL at 13:20

## 2023-01-01 RX ADMIN — Medication 1 DROP: at 08:21

## 2023-01-01 RX ADMIN — OXYCODONE HYDROCHLORIDE 2.5 MG: 5 TABLET ORAL at 03:07

## 2023-01-01 RX ADMIN — INSULIN GLARGINE 15 UNITS: 100 INJECTION, SOLUTION SUBCUTANEOUS at 21:23

## 2023-01-01 RX ADMIN — INSULIN ASPART 10 UNITS: 100 INJECTION, SOLUTION INTRAVENOUS; SUBCUTANEOUS at 08:25

## 2023-01-01 RX ADMIN — Medication 2 SPRAY: at 20:49

## 2023-01-01 RX ADMIN — OXYCODONE HYDROCHLORIDE 5 MG: 5 TABLET ORAL at 11:13

## 2023-01-01 RX ADMIN — VENLAFAXINE HYDROCHLORIDE 150 MG: 150 CAPSULE, EXTENDED RELEASE ORAL at 08:37

## 2023-01-01 RX ADMIN — SITAGLIPTIN 100 MG: 50 TABLET, FILM COATED ORAL at 08:10

## 2023-01-01 RX ADMIN — SITAGLIPTIN 100 MG: 50 TABLET, FILM COATED ORAL at 08:45

## 2023-01-01 RX ADMIN — OXYCODONE HYDROCHLORIDE 5 MG: 5 TABLET ORAL at 20:46

## 2023-01-01 RX ADMIN — Medication 1 DROP: at 17:45

## 2023-01-01 RX ADMIN — LISINOPRIL 20 MG: 20 TABLET ORAL at 09:15

## 2023-01-01 RX ADMIN — VENLAFAXINE HYDROCHLORIDE 150 MG: 150 CAPSULE, EXTENDED RELEASE ORAL at 08:10

## 2023-01-01 RX ADMIN — THERA TABS 1 TABLET: TAB at 08:00

## 2023-01-01 RX ADMIN — ATORVASTATIN CALCIUM 40 MG: 40 TABLET, FILM COATED ORAL at 09:02

## 2023-01-01 RX ADMIN — MIRABEGRON 25 MG: 25 TABLET, FILM COATED, EXTENDED RELEASE ORAL at 22:07

## 2023-01-01 RX ADMIN — GABAPENTIN 600 MG: 300 CAPSULE ORAL at 20:17

## 2023-01-01 RX ADMIN — Medication 2 SPRAY: at 17:37

## 2023-01-01 RX ADMIN — THERA TABS 1 TABLET: TAB at 08:21

## 2023-01-01 RX ADMIN — Medication 1 DROP: at 08:00

## 2023-01-01 RX ADMIN — GABAPENTIN 600 MG: 300 CAPSULE ORAL at 17:55

## 2023-01-01 RX ADMIN — INSULIN ASPART 2 UNITS: 100 INJECTION, SOLUTION INTRAVENOUS; SUBCUTANEOUS at 18:00

## 2023-01-01 RX ADMIN — TOPIRAMATE 100 MG: 100 TABLET ORAL at 08:28

## 2023-01-01 RX ADMIN — TAMSULOSIN HYDROCHLORIDE 0.4 MG: 0.4 CAPSULE ORAL at 20:08

## 2023-01-01 RX ADMIN — LEVOTHYROXINE SODIUM 175 MCG: 175 TABLET ORAL at 06:24

## 2023-01-01 RX ADMIN — ATORVASTATIN CALCIUM 40 MG: 40 TABLET, FILM COATED ORAL at 09:10

## 2023-01-01 RX ADMIN — INSULIN ASPART 2 UNITS: 100 INJECTION, SOLUTION INTRAVENOUS; SUBCUTANEOUS at 17:53

## 2023-01-01 RX ADMIN — LISINOPRIL 10 MG: 10 TABLET ORAL at 16:09

## 2023-01-01 RX ADMIN — INSULIN ASPART 3 UNITS: 100 INJECTION, SOLUTION INTRAVENOUS; SUBCUTANEOUS at 18:21

## 2023-01-01 RX ADMIN — INSULIN GLARGINE 15 UNITS: 100 INJECTION, SOLUTION SUBCUTANEOUS at 23:06

## 2023-01-01 RX ADMIN — GABAPENTIN 600 MG: 300 CAPSULE ORAL at 09:23

## 2023-01-01 RX ADMIN — INSULIN ASPART 2 UNITS: 100 INJECTION, SOLUTION INTRAVENOUS; SUBCUTANEOUS at 12:52

## 2023-01-01 RX ADMIN — TAMSULOSIN HYDROCHLORIDE 0.4 MG: 0.4 CAPSULE ORAL at 20:47

## 2023-01-01 RX ADMIN — DIPHENHYDRAMINE HYDROCHLORIDE 25 MG: 25 CAPSULE ORAL at 02:32

## 2023-01-01 RX ADMIN — OXYCODONE HYDROCHLORIDE 5 MG: 5 TABLET ORAL at 12:48

## 2023-01-01 RX ADMIN — SENNOSIDES 8.6 MG: 8.6 TABLET ORAL at 09:28

## 2023-01-01 RX ADMIN — INSULIN ASPART 1 UNITS: 100 INJECTION, SOLUTION INTRAVENOUS; SUBCUTANEOUS at 17:01

## 2023-01-01 RX ADMIN — Medication 1 DROP: at 13:41

## 2023-01-01 RX ADMIN — TAMSULOSIN HYDROCHLORIDE 0.4 MG: 0.4 CAPSULE ORAL at 21:52

## 2023-01-01 RX ADMIN — TAMSULOSIN HYDROCHLORIDE 0.4 MG: 0.4 CAPSULE ORAL at 21:26

## 2023-01-01 RX ADMIN — GABAPENTIN 600 MG: 300 CAPSULE ORAL at 21:21

## 2023-01-01 RX ADMIN — PIPERACILLIN AND TAZOBACTAM 3.38 G: 3; .375 INJECTION, POWDER, FOR SOLUTION INTRAVENOUS at 18:08

## 2023-01-01 RX ADMIN — SITAGLIPTIN 100 MG: 50 TABLET, FILM COATED ORAL at 09:42

## 2023-01-01 RX ADMIN — INSULIN ASPART 1 UNITS: 100 INJECTION, SOLUTION INTRAVENOUS; SUBCUTANEOUS at 13:37

## 2023-01-01 RX ADMIN — OXYCODONE HYDROCHLORIDE 5 MG: 5 TABLET ORAL at 13:15

## 2023-01-01 RX ADMIN — GABAPENTIN 600 MG: 300 CAPSULE ORAL at 10:13

## 2023-01-01 RX ADMIN — INSULIN ASPART 2 UNITS: 100 INJECTION, SOLUTION INTRAVENOUS; SUBCUTANEOUS at 17:37

## 2023-01-01 RX ADMIN — PIPERACILLIN AND TAZOBACTAM 3.38 G: 3; .375 INJECTION, POWDER, FOR SOLUTION INTRAVENOUS at 05:55

## 2023-01-01 RX ADMIN — PIPERACILLIN AND TAZOBACTAM 3.38 G: 3; .375 INJECTION, POWDER, FOR SOLUTION INTRAVENOUS at 12:35

## 2023-01-01 RX ADMIN — Medication 1 DROP: at 09:21

## 2023-01-01 RX ADMIN — INSULIN ASPART 2 UNITS: 100 INJECTION, SOLUTION INTRAVENOUS; SUBCUTANEOUS at 09:22

## 2023-01-01 RX ADMIN — VANCOMYCIN HYDROCHLORIDE 2000 MG: 10 INJECTION, POWDER, LYOPHILIZED, FOR SOLUTION INTRAVENOUS at 12:58

## 2023-01-01 RX ADMIN — OXYCODONE HYDROCHLORIDE 2.5 MG: 5 TABLET ORAL at 08:53

## 2023-01-01 RX ADMIN — Medication 1 DROP: at 09:30

## 2023-01-01 RX ADMIN — RIVAROXABAN 20 MG: 10 TABLET, FILM COATED ORAL at 08:21

## 2023-01-01 RX ADMIN — Medication 1 DROP: at 10:01

## 2023-01-01 RX ADMIN — INSULIN GLARGINE 30 UNITS: 100 INJECTION, SOLUTION SUBCUTANEOUS at 22:17

## 2023-01-01 RX ADMIN — LISINOPRIL 20 MG: 20 TABLET ORAL at 08:18

## 2023-01-01 RX ADMIN — Medication 1 DROP: at 16:34

## 2023-01-01 RX ADMIN — Medication 1 DROP: at 20:20

## 2023-01-01 RX ADMIN — LISINOPRIL 20 MG: 20 TABLET ORAL at 09:30

## 2023-01-01 RX ADMIN — Medication 1 DROP: at 21:51

## 2023-01-01 RX ADMIN — OXYCODONE HYDROCHLORIDE 5 MG: 5 TABLET ORAL at 04:09

## 2023-01-01 RX ADMIN — RIVAROXABAN 20 MG: 10 TABLET, FILM COATED ORAL at 09:58

## 2023-01-01 RX ADMIN — GABAPENTIN 600 MG: 300 CAPSULE ORAL at 08:28

## 2023-01-01 RX ADMIN — TAMSULOSIN HYDROCHLORIDE 0.4 MG: 0.4 CAPSULE ORAL at 21:07

## 2023-01-01 RX ADMIN — Medication 1 DROP: at 21:43

## 2023-01-01 RX ADMIN — INSULIN GLARGINE 15 UNITS: 100 INJECTION, SOLUTION SUBCUTANEOUS at 21:50

## 2023-01-01 RX ADMIN — VENLAFAXINE HYDROCHLORIDE 150 MG: 150 CAPSULE, EXTENDED RELEASE ORAL at 08:41

## 2023-01-01 RX ADMIN — TOPIRAMATE 100 MG: 100 TABLET ORAL at 08:23

## 2023-01-01 RX ADMIN — LEVOTHYROXINE SODIUM 175 MCG: 175 TABLET ORAL at 05:35

## 2023-01-01 RX ADMIN — THERA TABS 1 TABLET: TAB at 08:37

## 2023-01-01 RX ADMIN — TAMSULOSIN HYDROCHLORIDE 0.4 MG: 0.4 CAPSULE ORAL at 21:56

## 2023-01-01 RX ADMIN — INSULIN ASPART 10 UNITS: 100 INJECTION, SOLUTION INTRAVENOUS; SUBCUTANEOUS at 09:53

## 2023-01-01 RX ADMIN — Medication 1 DROP: at 15:05

## 2023-01-01 RX ADMIN — Medication 1 DROP: at 14:18

## 2023-01-01 RX ADMIN — TAMSULOSIN HYDROCHLORIDE 0.4 MG: 0.4 CAPSULE ORAL at 22:36

## 2023-01-01 RX ADMIN — Medication 2 SPRAY: at 15:33

## 2023-01-01 RX ADMIN — TAMSULOSIN HYDROCHLORIDE 0.4 MG: 0.4 CAPSULE ORAL at 22:07

## 2023-01-01 RX ADMIN — GABAPENTIN 600 MG: 300 CAPSULE ORAL at 21:32

## 2023-01-01 RX ADMIN — SITAGLIPTIN 100 MG: 50 TABLET, FILM COATED ORAL at 08:00

## 2023-01-01 RX ADMIN — ONDANSETRON 4 MG: 4 TABLET, ORALLY DISINTEGRATING ORAL at 20:14

## 2023-01-01 RX ADMIN — Medication 1 DROP: at 11:13

## 2023-01-01 RX ADMIN — OXYCODONE HYDROCHLORIDE 5 MG: 5 TABLET ORAL at 21:44

## 2023-01-01 RX ADMIN — RIVAROXABAN 20 MG: 10 TABLET, FILM COATED ORAL at 07:58

## 2023-01-01 RX ADMIN — METFORMIN ER 500 MG 500 MG: 500 TABLET ORAL at 17:24

## 2023-01-01 RX ADMIN — INSULIN ASPART 2 UNITS: 100 INJECTION, SOLUTION INTRAVENOUS; SUBCUTANEOUS at 08:04

## 2023-01-01 RX ADMIN — OXYCODONE HYDROCHLORIDE 5 MG: 5 TABLET ORAL at 22:53

## 2023-01-01 RX ADMIN — INSULIN ASPART 1 UNITS: 100 INJECTION, SOLUTION INTRAVENOUS; SUBCUTANEOUS at 10:14

## 2023-01-01 RX ADMIN — SITAGLIPTIN 100 MG: 50 TABLET, FILM COATED ORAL at 08:06

## 2023-01-01 RX ADMIN — Medication 1 DROP: at 22:16

## 2023-01-01 RX ADMIN — Medication 1 DROP: at 15:33

## 2023-01-01 RX ADMIN — OXYCODONE HYDROCHLORIDE 5 MG: 5 TABLET ORAL at 17:02

## 2023-01-01 RX ADMIN — GABAPENTIN 600 MG: 300 CAPSULE ORAL at 16:01

## 2023-01-01 RX ADMIN — PIPERACILLIN AND TAZOBACTAM 3.38 G: 3; .375 INJECTION, POWDER, FOR SOLUTION INTRAVENOUS at 19:24

## 2023-01-01 RX ADMIN — GABAPENTIN 600 MG: 300 CAPSULE ORAL at 19:22

## 2023-01-01 RX ADMIN — OXYCODONE HYDROCHLORIDE 2.5 MG: 5 TABLET ORAL at 12:48

## 2023-01-01 RX ADMIN — OXYCODONE HYDROCHLORIDE 5 MG: 5 TABLET ORAL at 16:00

## 2023-01-01 RX ADMIN — LISINOPRIL 20 MG: 20 TABLET ORAL at 08:45

## 2023-01-01 RX ADMIN — TAMSULOSIN HYDROCHLORIDE 0.4 MG: 0.4 CAPSULE ORAL at 21:02

## 2023-01-01 RX ADMIN — LEVOTHYROXINE SODIUM 175 MCG: 175 TABLET ORAL at 07:06

## 2023-01-01 RX ADMIN — TAMSULOSIN HYDROCHLORIDE 0.4 MG: 0.4 CAPSULE ORAL at 20:13

## 2023-01-01 RX ADMIN — OXYCODONE HYDROCHLORIDE 5 MG: 5 TABLET ORAL at 16:27

## 2023-01-01 RX ADMIN — Medication 2 SPRAY: at 11:27

## 2023-01-01 RX ADMIN — Medication 1 DROP: at 08:18

## 2023-01-01 RX ADMIN — GABAPENTIN 600 MG: 300 CAPSULE ORAL at 13:53

## 2023-01-01 RX ADMIN — Medication 1 DROP: at 17:09

## 2023-01-01 RX ADMIN — SENNOSIDES 8.6 MG: 8.6 TABLET ORAL at 10:02

## 2023-01-01 RX ADMIN — GABAPENTIN 600 MG: 300 CAPSULE ORAL at 15:31

## 2023-01-01 RX ADMIN — LEVOTHYROXINE SODIUM 175 MCG: 175 TABLET ORAL at 06:33

## 2023-01-01 RX ADMIN — Medication 1 DROP: at 15:15

## 2023-01-01 RX ADMIN — GABAPENTIN 600 MG: 300 CAPSULE ORAL at 20:25

## 2023-01-01 RX ADMIN — VENLAFAXINE HYDROCHLORIDE 150 MG: 150 CAPSULE, EXTENDED RELEASE ORAL at 08:07

## 2023-01-01 RX ADMIN — GABAPENTIN 600 MG: 300 CAPSULE ORAL at 20:28

## 2023-01-01 RX ADMIN — Medication 1 DROP: at 16:11

## 2023-01-01 RX ADMIN — OXYCODONE HYDROCHLORIDE 5 MG: 5 TABLET ORAL at 14:19

## 2023-01-01 RX ADMIN — LEVOTHYROXINE SODIUM 175 MCG: 175 TABLET ORAL at 06:35

## 2023-01-01 RX ADMIN — SENNOSIDES 8.6 MG: 8.6 TABLET ORAL at 08:11

## 2023-01-01 RX ADMIN — GABAPENTIN 600 MG: 300 CAPSULE ORAL at 20:55

## 2023-01-01 RX ADMIN — Medication 2 SPRAY: at 13:46

## 2023-01-01 RX ADMIN — POTASSIUM CHLORIDE 20 MEQ: 1.5 POWDER, FOR SOLUTION ORAL at 18:09

## 2023-01-01 RX ADMIN — GABAPENTIN 600 MG: 300 CAPSULE ORAL at 08:31

## 2023-01-01 RX ADMIN — INSULIN GLARGINE 15 UNITS: 100 INJECTION, SOLUTION SUBCUTANEOUS at 23:43

## 2023-01-01 RX ADMIN — VENLAFAXINE HYDROCHLORIDE 150 MG: 150 CAPSULE, EXTENDED RELEASE ORAL at 10:07

## 2023-01-01 RX ADMIN — GABAPENTIN 600 MG: 300 CAPSULE ORAL at 08:01

## 2023-01-01 RX ADMIN — INSULIN ASPART 1 UNITS: 100 INJECTION, SOLUTION INTRAVENOUS; SUBCUTANEOUS at 18:47

## 2023-01-01 RX ADMIN — INSULIN ASPART 1 UNITS: 100 INJECTION, SOLUTION INTRAVENOUS; SUBCUTANEOUS at 13:50

## 2023-01-01 RX ADMIN — ATORVASTATIN CALCIUM 40 MG: 40 TABLET, FILM COATED ORAL at 10:36

## 2023-01-01 RX ADMIN — Medication 2 SPRAY: at 07:57

## 2023-01-01 RX ADMIN — ATORVASTATIN CALCIUM 40 MG: 40 TABLET, FILM COATED ORAL at 08:07

## 2023-01-01 RX ADMIN — OXYCODONE HYDROCHLORIDE 5 MG: 5 TABLET ORAL at 11:15

## 2023-01-01 RX ADMIN — Medication 2 SPRAY: at 16:04

## 2023-01-01 RX ADMIN — LEVOTHYROXINE SODIUM 175 MCG: 175 TABLET ORAL at 07:41

## 2023-01-01 RX ADMIN — Medication 1 DROP: at 12:30

## 2023-01-01 RX ADMIN — TOPIRAMATE 100 MG: 100 TABLET ORAL at 08:58

## 2023-01-01 RX ADMIN — VENLAFAXINE HYDROCHLORIDE 150 MG: 150 CAPSULE, EXTENDED RELEASE ORAL at 08:17

## 2023-01-01 RX ADMIN — ATORVASTATIN CALCIUM 40 MG: 40 TABLET, FILM COATED ORAL at 07:57

## 2023-01-01 RX ADMIN — LISINOPRIL 20 MG: 20 TABLET ORAL at 09:51

## 2023-01-01 RX ADMIN — Medication 1 DROP: at 07:58

## 2023-01-01 RX ADMIN — Medication 2 SPRAY: at 09:51

## 2023-01-01 RX ADMIN — GABAPENTIN 600 MG: 300 CAPSULE ORAL at 09:15

## 2023-01-01 RX ADMIN — Medication 1 DROP: at 11:30

## 2023-01-01 RX ADMIN — RIVAROXABAN 20 MG: 10 TABLET, FILM COATED ORAL at 08:06

## 2023-01-01 RX ADMIN — TOPIRAMATE 100 MG: 100 TABLET ORAL at 07:42

## 2023-01-01 RX ADMIN — MIRABEGRON 25 MG: 25 TABLET, FILM COATED, EXTENDED RELEASE ORAL at 22:12

## 2023-01-01 RX ADMIN — Medication 1 DROP: at 16:15

## 2023-01-01 RX ADMIN — OXYCODONE HYDROCHLORIDE 5 MG: 5 TABLET ORAL at 04:40

## 2023-01-01 RX ADMIN — INSULIN ASPART 2 UNITS: 100 INJECTION, SOLUTION INTRAVENOUS; SUBCUTANEOUS at 10:14

## 2023-01-01 RX ADMIN — TOPIRAMATE 100 MG: 100 TABLET ORAL at 10:12

## 2023-01-01 RX ADMIN — ATORVASTATIN CALCIUM 40 MG: 40 TABLET, FILM COATED ORAL at 09:50

## 2023-01-01 RX ADMIN — LISINOPRIL 20 MG: 20 TABLET ORAL at 09:05

## 2023-01-01 RX ADMIN — LEVOTHYROXINE SODIUM 175 MCG: 175 TABLET ORAL at 06:02

## 2023-01-01 RX ADMIN — Medication 2 SPRAY: at 21:33

## 2023-01-01 RX ADMIN — INSULIN GLARGINE 30 UNITS: 100 INJECTION, SOLUTION SUBCUTANEOUS at 22:00

## 2023-01-01 RX ADMIN — INSULIN GLARGINE 15 UNITS: 100 INJECTION, SOLUTION SUBCUTANEOUS at 23:05

## 2023-01-01 RX ADMIN — ACETAMINOPHEN 975 MG: 325 TABLET, FILM COATED ORAL at 08:05

## 2023-01-01 RX ADMIN — OXYCODONE HYDROCHLORIDE 2.5 MG: 5 TABLET ORAL at 20:32

## 2023-01-01 RX ADMIN — TOPIRAMATE 100 MG: 100 TABLET ORAL at 09:57

## 2023-01-01 RX ADMIN — INSULIN ASPART 1 UNITS: 100 INJECTION, SOLUTION INTRAVENOUS; SUBCUTANEOUS at 13:38

## 2023-01-01 RX ADMIN — INSULIN GLARGINE 15 UNITS: 100 INJECTION, SOLUTION SUBCUTANEOUS at 21:15

## 2023-01-01 RX ADMIN — INSULIN ASPART 1 UNITS: 100 INJECTION, SOLUTION INTRAVENOUS; SUBCUTANEOUS at 19:07

## 2023-01-01 RX ADMIN — VENLAFAXINE HYDROCHLORIDE 150 MG: 150 CAPSULE, EXTENDED RELEASE ORAL at 10:39

## 2023-01-01 RX ADMIN — LISINOPRIL 20 MG: 20 TABLET ORAL at 09:36

## 2023-01-01 RX ADMIN — GABAPENTIN 600 MG: 300 CAPSULE ORAL at 13:43

## 2023-01-01 RX ADMIN — OXYCODONE HYDROCHLORIDE 5 MG: 5 TABLET ORAL at 04:56

## 2023-01-01 RX ADMIN — Medication 1 DROP: at 20:19

## 2023-01-01 RX ADMIN — Medication 1 DROP: at 13:07

## 2023-01-01 RX ADMIN — THERA TABS 1 TABLET: TAB at 08:45

## 2023-01-01 RX ADMIN — Medication 1 DROP: at 17:49

## 2023-01-01 RX ADMIN — SENNOSIDES 8.6 MG: 8.6 TABLET ORAL at 08:26

## 2023-01-01 RX ADMIN — INSULIN ASPART 10 UNITS: 100 INJECTION, SOLUTION INTRAVENOUS; SUBCUTANEOUS at 08:52

## 2023-01-01 RX ADMIN — RIVAROXABAN 20 MG: 10 TABLET, FILM COATED ORAL at 08:41

## 2023-01-01 RX ADMIN — TAMSULOSIN HYDROCHLORIDE 0.4 MG: 0.4 CAPSULE ORAL at 20:20

## 2023-01-01 RX ADMIN — MIRABEGRON 25 MG: 25 TABLET, FILM COATED, EXTENDED RELEASE ORAL at 20:38

## 2023-01-01 RX ADMIN — Medication 1 DROP: at 15:39

## 2023-01-01 RX ADMIN — GABAPENTIN 600 MG: 300 CAPSULE ORAL at 22:04

## 2023-01-01 RX ADMIN — INSULIN GLARGINE 30 UNITS: 100 INJECTION, SOLUTION SUBCUTANEOUS at 21:45

## 2023-01-01 RX ADMIN — GABAPENTIN 600 MG: 300 CAPSULE ORAL at 15:52

## 2023-01-01 RX ADMIN — LISINOPRIL 20 MG: 20 TABLET ORAL at 09:27

## 2023-01-01 RX ADMIN — Medication 1 MG: at 20:14

## 2023-01-01 RX ADMIN — Medication 1 DROP: at 17:31

## 2023-01-01 RX ADMIN — VENLAFAXINE HYDROCHLORIDE 150 MG: 150 CAPSULE, EXTENDED RELEASE ORAL at 08:54

## 2023-01-01 RX ADMIN — Medication 1 DROP: at 11:22

## 2023-01-01 RX ADMIN — Medication 2 SPRAY: at 13:42

## 2023-01-01 RX ADMIN — MIRABEGRON 25 MG: 25 TABLET, FILM COATED, EXTENDED RELEASE ORAL at 21:07

## 2023-01-01 RX ADMIN — INSULIN ASPART 1 UNITS: 100 INJECTION, SOLUTION INTRAVENOUS; SUBCUTANEOUS at 14:48

## 2023-01-01 RX ADMIN — INSULIN ASPART 1 UNITS: 100 INJECTION, SOLUTION INTRAVENOUS; SUBCUTANEOUS at 16:55

## 2023-01-01 RX ADMIN — Medication 1 DROP: at 12:37

## 2023-01-01 RX ADMIN — Medication 1 DROP: at 09:58

## 2023-01-01 RX ADMIN — Medication 1 DROP: at 07:41

## 2023-01-01 RX ADMIN — TAMSULOSIN HYDROCHLORIDE 0.4 MG: 0.4 CAPSULE ORAL at 21:44

## 2023-01-01 RX ADMIN — TOPIRAMATE 100 MG: 100 TABLET ORAL at 08:40

## 2023-01-01 RX ADMIN — LEVOTHYROXINE SODIUM 175 MCG: 175 TABLET ORAL at 06:56

## 2023-01-01 RX ADMIN — INSULIN GLARGINE 15 UNITS: 100 INJECTION, SOLUTION SUBCUTANEOUS at 22:19

## 2023-01-01 RX ADMIN — Medication 2 SPRAY: at 16:20

## 2023-01-01 RX ADMIN — LEVOTHYROXINE SODIUM 175 MCG: 175 TABLET ORAL at 06:25

## 2023-01-01 RX ADMIN — TAMSULOSIN HYDROCHLORIDE 0.4 MG: 0.4 CAPSULE ORAL at 20:04

## 2023-01-01 RX ADMIN — Medication 1 DROP: at 18:03

## 2023-01-01 RX ADMIN — Medication 1 DROP: at 17:04

## 2023-01-01 RX ADMIN — TAMSULOSIN HYDROCHLORIDE 0.4 MG: 0.4 CAPSULE ORAL at 21:31

## 2023-01-01 RX ADMIN — ATORVASTATIN CALCIUM 40 MG: 40 TABLET, FILM COATED ORAL at 08:05

## 2023-01-01 RX ADMIN — OXYCODONE HYDROCHLORIDE 5 MG: 5 TABLET ORAL at 18:17

## 2023-01-01 RX ADMIN — LEVOTHYROXINE SODIUM 175 MCG: 175 TABLET ORAL at 05:59

## 2023-01-01 RX ADMIN — LISINOPRIL 20 MG: 20 TABLET ORAL at 08:31

## 2023-01-01 RX ADMIN — TIZANIDINE 2 MG: 2 TABLET ORAL at 12:25

## 2023-01-01 RX ADMIN — OXYCODONE HYDROCHLORIDE 2.5 MG: 5 TABLET ORAL at 05:47

## 2023-01-01 RX ADMIN — INSULIN GLARGINE 15 UNITS: 100 INJECTION, SOLUTION SUBCUTANEOUS at 22:49

## 2023-01-01 RX ADMIN — LISINOPRIL 20 MG: 20 TABLET ORAL at 10:02

## 2023-01-01 RX ADMIN — MIRABEGRON 25 MG: 25 TABLET, FILM COATED, EXTENDED RELEASE ORAL at 02:28

## 2023-01-01 RX ADMIN — OXYCODONE HYDROCHLORIDE 5 MG: 5 TABLET ORAL at 04:55

## 2023-01-01 RX ADMIN — TAMSULOSIN HYDROCHLORIDE 0.4 MG: 0.4 CAPSULE ORAL at 21:32

## 2023-01-01 RX ADMIN — VENLAFAXINE HYDROCHLORIDE 150 MG: 150 CAPSULE, EXTENDED RELEASE ORAL at 08:27

## 2023-01-01 RX ADMIN — Medication 1 DROP: at 20:14

## 2023-01-01 RX ADMIN — GABAPENTIN 600 MG: 300 CAPSULE ORAL at 20:48

## 2023-01-01 RX ADMIN — LEVOTHYROXINE SODIUM 175 MCG: 175 TABLET ORAL at 06:26

## 2023-01-01 RX ADMIN — ATORVASTATIN CALCIUM 40 MG: 40 TABLET, FILM COATED ORAL at 08:00

## 2023-01-01 RX ADMIN — METFORMIN ER 500 MG 500 MG: 500 TABLET ORAL at 16:29

## 2023-01-01 RX ADMIN — OXYCODONE HYDROCHLORIDE 5 MG: 5 TABLET ORAL at 21:05

## 2023-01-01 RX ADMIN — Medication 2 SPRAY: at 16:18

## 2023-01-01 RX ADMIN — Medication 2 SPRAY: at 21:37

## 2023-01-01 RX ADMIN — SITAGLIPTIN 100 MG: 50 TABLET, FILM COATED ORAL at 09:15

## 2023-01-01 RX ADMIN — GABAPENTIN 600 MG: 300 CAPSULE ORAL at 17:57

## 2023-01-01 RX ADMIN — SITAGLIPTIN 100 MG: 50 TABLET, FILM COATED ORAL at 08:22

## 2023-01-01 RX ADMIN — VENLAFAXINE HYDROCHLORIDE 150 MG: 150 CAPSULE, EXTENDED RELEASE ORAL at 08:30

## 2023-01-01 RX ADMIN — LISINOPRIL 20 MG: 20 TABLET ORAL at 08:22

## 2023-01-01 RX ADMIN — Medication 1 DROP: at 20:47

## 2023-01-01 RX ADMIN — METFORMIN ER 500 MG 500 MG: 500 TABLET ORAL at 17:11

## 2023-01-01 RX ADMIN — INSULIN ASPART 1 UNITS: 100 INJECTION, SOLUTION INTRAVENOUS; SUBCUTANEOUS at 11:30

## 2023-01-01 RX ADMIN — GABAPENTIN 600 MG: 300 CAPSULE ORAL at 14:33

## 2023-01-01 RX ADMIN — INSULIN ASPART 2 UNITS: 100 INJECTION, SOLUTION INTRAVENOUS; SUBCUTANEOUS at 11:38

## 2023-01-01 RX ADMIN — OXYCODONE HYDROCHLORIDE 2.5 MG: 5 TABLET ORAL at 13:15

## 2023-01-01 RX ADMIN — LISINOPRIL 20 MG: 20 TABLET ORAL at 09:44

## 2023-01-01 RX ADMIN — RIVAROXABAN 20 MG: 10 TABLET, FILM COATED ORAL at 09:24

## 2023-01-01 RX ADMIN — OXYCODONE HYDROCHLORIDE 5 MG: 5 TABLET ORAL at 02:05

## 2023-01-01 RX ADMIN — MIRABEGRON 25 MG: 25 TABLET, FILM COATED, EXTENDED RELEASE ORAL at 20:19

## 2023-01-01 RX ADMIN — RIVAROXABAN 20 MG: 10 TABLET, FILM COATED ORAL at 08:26

## 2023-01-01 RX ADMIN — VENLAFAXINE HYDROCHLORIDE 150 MG: 150 CAPSULE, EXTENDED RELEASE ORAL at 08:05

## 2023-01-01 RX ADMIN — MIRABEGRON 25 MG: 25 TABLET, FILM COATED, EXTENDED RELEASE ORAL at 21:57

## 2023-01-01 RX ADMIN — MIRABEGRON 25 MG: 25 TABLET, FILM COATED, EXTENDED RELEASE ORAL at 20:08

## 2023-01-01 RX ADMIN — INSULIN ASPART 2 UNITS: 100 INJECTION, SOLUTION INTRAVENOUS; SUBCUTANEOUS at 13:38

## 2023-01-01 RX ADMIN — GABAPENTIN 600 MG: 300 CAPSULE ORAL at 09:27

## 2023-01-01 RX ADMIN — GABAPENTIN 600 MG: 300 CAPSULE ORAL at 14:46

## 2023-01-01 RX ADMIN — Medication 2 SPRAY: at 12:34

## 2023-01-01 RX ADMIN — OXYCODONE HYDROCHLORIDE 5 MG: 5 TABLET ORAL at 11:57

## 2023-01-01 RX ADMIN — ATORVASTATIN CALCIUM 40 MG: 40 TABLET, FILM COATED ORAL at 08:58

## 2023-01-01 RX ADMIN — OXYCODONE HYDROCHLORIDE 5 MG: 5 TABLET ORAL at 20:14

## 2023-01-01 RX ADMIN — TAMSULOSIN HYDROCHLORIDE 0.4 MG: 0.4 CAPSULE ORAL at 21:55

## 2023-01-01 RX ADMIN — Medication 1 DROP: at 17:41

## 2023-01-01 RX ADMIN — LEVOTHYROXINE SODIUM 175 MCG: 175 TABLET ORAL at 07:03

## 2023-01-01 RX ADMIN — OXYCODONE HYDROCHLORIDE 5 MG: 5 TABLET ORAL at 19:53

## 2023-01-01 RX ADMIN — RIVAROXABAN 20 MG: 10 TABLET, FILM COATED ORAL at 09:02

## 2023-01-01 RX ADMIN — INSULIN ASPART 2 UNITS: 100 INJECTION, SOLUTION INTRAVENOUS; SUBCUTANEOUS at 12:57

## 2023-01-01 RX ADMIN — TOPIRAMATE 100 MG: 100 TABLET ORAL at 10:07

## 2023-01-01 RX ADMIN — PIPERACILLIN AND TAZOBACTAM 3.38 G: 3; .375 INJECTION, POWDER, FOR SOLUTION INTRAVENOUS at 00:47

## 2023-01-01 RX ADMIN — SITAGLIPTIN 100 MG: 50 TABLET, FILM COATED ORAL at 09:58

## 2023-01-01 RX ADMIN — ATORVASTATIN CALCIUM 40 MG: 40 TABLET, FILM COATED ORAL at 08:38

## 2023-01-01 RX ADMIN — VENLAFAXINE HYDROCHLORIDE 150 MG: 150 CAPSULE, EXTENDED RELEASE ORAL at 07:42

## 2023-01-01 RX ADMIN — OXYCODONE HYDROCHLORIDE 5 MG: 5 TABLET ORAL at 08:11

## 2023-01-01 RX ADMIN — SITAGLIPTIN 100 MG: 50 TABLET, FILM COATED ORAL at 11:04

## 2023-01-01 RX ADMIN — GABAPENTIN 600 MG: 300 CAPSULE ORAL at 20:18

## 2023-01-01 RX ADMIN — Medication 1 DROP: at 12:40

## 2023-01-01 RX ADMIN — TOPIRAMATE 100 MG: 100 TABLET ORAL at 09:06

## 2023-01-01 RX ADMIN — LEVOTHYROXINE SODIUM 175 MCG: 175 TABLET ORAL at 06:42

## 2023-01-01 RX ADMIN — Medication 2 SPRAY: at 11:29

## 2023-01-01 RX ADMIN — MIRABEGRON 25 MG: 25 TABLET, FILM COATED, EXTENDED RELEASE ORAL at 20:32

## 2023-01-01 RX ADMIN — INSULIN ASPART 2 UNITS: 100 INJECTION, SOLUTION INTRAVENOUS; SUBCUTANEOUS at 17:29

## 2023-01-01 RX ADMIN — MIRABEGRON 25 MG: 25 TABLET, FILM COATED, EXTENDED RELEASE ORAL at 21:56

## 2023-01-01 RX ADMIN — GABAPENTIN 600 MG: 300 CAPSULE ORAL at 20:08

## 2023-01-01 RX ADMIN — ATORVASTATIN CALCIUM 40 MG: 40 TABLET, FILM COATED ORAL at 09:06

## 2023-01-01 RX ADMIN — TAMSULOSIN HYDROCHLORIDE 0.4 MG: 0.4 CAPSULE ORAL at 21:16

## 2023-01-01 RX ADMIN — LISINOPRIL 10 MG: 10 TABLET ORAL at 09:08

## 2023-01-01 RX ADMIN — TOPIRAMATE 100 MG: 100 TABLET ORAL at 08:39

## 2023-01-01 RX ADMIN — RIVAROXABAN 20 MG: 10 TABLET, FILM COATED ORAL at 08:32

## 2023-01-01 RX ADMIN — Medication 1 DROP: at 13:46

## 2023-01-01 RX ADMIN — INSULIN ASPART 2 UNITS: 100 INJECTION, SOLUTION INTRAVENOUS; SUBCUTANEOUS at 09:50

## 2023-01-01 RX ADMIN — SITAGLIPTIN 100 MG: 50 TABLET, FILM COATED ORAL at 08:25

## 2023-01-01 RX ADMIN — LEVOTHYROXINE SODIUM 175 MCG: 175 TABLET ORAL at 05:47

## 2023-01-01 RX ADMIN — OXYCODONE HYDROCHLORIDE 5 MG: 5 TABLET ORAL at 13:26

## 2023-01-01 RX ADMIN — GABAPENTIN 600 MG: 300 CAPSULE ORAL at 20:26

## 2023-01-01 RX ADMIN — Medication 1 DROP: at 08:02

## 2023-01-01 RX ADMIN — RIVAROXABAN 20 MG: 10 TABLET, FILM COATED ORAL at 09:14

## 2023-01-01 RX ADMIN — ATORVASTATIN CALCIUM 40 MG: 40 TABLET, FILM COATED ORAL at 10:34

## 2023-01-01 RX ADMIN — LEVOTHYROXINE SODIUM 175 MCG: 175 TABLET ORAL at 06:48

## 2023-01-01 RX ADMIN — INSULIN ASPART 1 UNITS: 100 INJECTION, SOLUTION INTRAVENOUS; SUBCUTANEOUS at 17:25

## 2023-01-01 RX ADMIN — ATORVASTATIN CALCIUM 40 MG: 40 TABLET, FILM COATED ORAL at 09:15

## 2023-01-01 RX ADMIN — GABAPENTIN 600 MG: 300 CAPSULE ORAL at 15:32

## 2023-01-01 RX ADMIN — Medication 1 DROP: at 08:43

## 2023-01-01 RX ADMIN — OXYCODONE HYDROCHLORIDE 5 MG: 5 TABLET ORAL at 01:20

## 2023-01-01 RX ADMIN — INSULIN ASPART 10 UNITS: 100 INJECTION, SOLUTION INTRAVENOUS; SUBCUTANEOUS at 09:10

## 2023-01-01 RX ADMIN — INSULIN GLARGINE 15 UNITS: 100 INJECTION, SOLUTION SUBCUTANEOUS at 21:56

## 2023-01-01 RX ADMIN — INSULIN ASPART 2 UNITS: 100 INJECTION, SOLUTION INTRAVENOUS; SUBCUTANEOUS at 12:05

## 2023-01-01 RX ADMIN — GABAPENTIN 600 MG: 300 CAPSULE ORAL at 09:35

## 2023-01-01 RX ADMIN — GABAPENTIN 600 MG: 300 CAPSULE ORAL at 10:36

## 2023-01-01 RX ADMIN — MIRABEGRON 25 MG: 25 TABLET, FILM COATED, EXTENDED RELEASE ORAL at 21:55

## 2023-01-01 RX ADMIN — GABAPENTIN 600 MG: 300 CAPSULE ORAL at 10:12

## 2023-01-01 RX ADMIN — SITAGLIPTIN 100 MG: 50 TABLET, FILM COATED ORAL at 09:27

## 2023-01-01 RX ADMIN — INSULIN ASPART 1 UNITS: 100 INJECTION, SOLUTION INTRAVENOUS; SUBCUTANEOUS at 08:58

## 2023-01-01 RX ADMIN — MIRABEGRON 25 MG: 25 TABLET, FILM COATED, EXTENDED RELEASE ORAL at 20:47

## 2023-01-01 RX ADMIN — Medication 1 DROP: at 09:28

## 2023-01-01 RX ADMIN — RIVAROXABAN 20 MG: 10 TABLET, FILM COATED ORAL at 08:00

## 2023-01-01 RX ADMIN — INSULIN ASPART 1 UNITS: 100 INJECTION, SOLUTION INTRAVENOUS; SUBCUTANEOUS at 08:22

## 2023-01-01 RX ADMIN — INSULIN GLARGINE 15 UNITS: 100 INJECTION, SOLUTION SUBCUTANEOUS at 21:26

## 2023-01-01 RX ADMIN — INSULIN ASPART 1 UNITS: 100 INJECTION, SOLUTION INTRAVENOUS; SUBCUTANEOUS at 16:34

## 2023-01-01 RX ADMIN — LISINOPRIL 20 MG: 20 TABLET ORAL at 10:36

## 2023-01-01 RX ADMIN — METFORMIN ER 500 MG 500 MG: 500 TABLET ORAL at 17:08

## 2023-01-01 RX ADMIN — MIRABEGRON 25 MG: 25 TABLET, FILM COATED, EXTENDED RELEASE ORAL at 21:22

## 2023-01-01 RX ADMIN — INSULIN GLARGINE 15 UNITS: 100 INJECTION, SOLUTION SUBCUTANEOUS at 22:38

## 2023-01-01 RX ADMIN — GABAPENTIN 600 MG: 300 CAPSULE ORAL at 20:15

## 2023-01-01 RX ADMIN — INSULIN GLARGINE 15 UNITS: 100 INJECTION, SOLUTION SUBCUTANEOUS at 22:27

## 2023-01-01 RX ADMIN — INSULIN ASPART 1 UNITS: 100 INJECTION, SOLUTION INTRAVENOUS; SUBCUTANEOUS at 08:00

## 2023-01-01 RX ADMIN — GABAPENTIN 600 MG: 300 CAPSULE ORAL at 13:02

## 2023-01-01 RX ADMIN — LISINOPRIL 40 MG: 10 TABLET ORAL at 08:05

## 2023-01-01 RX ADMIN — Medication 1 DROP: at 10:39

## 2023-01-01 RX ADMIN — Medication 1 DROP: at 09:37

## 2023-01-01 RX ADMIN — MIRABEGRON 25 MG: 25 TABLET, FILM COATED, EXTENDED RELEASE ORAL at 20:04

## 2023-01-01 RX ADMIN — LISINOPRIL 20 MG: 20 TABLET ORAL at 08:06

## 2023-01-01 RX ADMIN — OXYCODONE HYDROCHLORIDE 5 MG: 5 TABLET ORAL at 00:30

## 2023-01-01 RX ADMIN — Medication 1 DROP: at 09:26

## 2023-01-01 RX ADMIN — INSULIN ASPART 1 UNITS: 100 INJECTION, SOLUTION INTRAVENOUS; SUBCUTANEOUS at 18:08

## 2023-01-01 RX ADMIN — TAMSULOSIN HYDROCHLORIDE 0.4 MG: 0.4 CAPSULE ORAL at 22:14

## 2023-01-01 RX ADMIN — INSULIN ASPART 2 UNITS: 100 INJECTION, SOLUTION INTRAVENOUS; SUBCUTANEOUS at 19:07

## 2023-01-01 RX ADMIN — Medication 2 SPRAY: at 08:29

## 2023-01-01 RX ADMIN — LISINOPRIL 20 MG: 20 TABLET ORAL at 09:58

## 2023-01-01 RX ADMIN — SITAGLIPTIN 100 MG: 50 TABLET, FILM COATED ORAL at 09:11

## 2023-01-01 RX ADMIN — OXYCODONE HYDROCHLORIDE 5 MG: 5 TABLET ORAL at 19:51

## 2023-01-01 RX ADMIN — Medication 2 SPRAY: at 11:16

## 2023-01-01 RX ADMIN — Medication 1 DROP: at 20:23

## 2023-01-01 RX ADMIN — SENNOSIDES 8.6 MG: 8.6 TABLET ORAL at 09:06

## 2023-01-01 RX ADMIN — GABAPENTIN 600 MG: 300 CAPSULE ORAL at 13:08

## 2023-01-01 RX ADMIN — GABAPENTIN 600 MG: 300 CAPSULE ORAL at 15:51

## 2023-01-01 RX ADMIN — SENNOSIDES 8.6 MG: 8.6 TABLET ORAL at 08:58

## 2023-01-01 RX ADMIN — INSULIN ASPART 1 UNITS: 100 INJECTION, SOLUTION INTRAVENOUS; SUBCUTANEOUS at 08:55

## 2023-01-01 RX ADMIN — INSULIN ASPART 2 UNITS: 100 INJECTION, SOLUTION INTRAVENOUS; SUBCUTANEOUS at 12:34

## 2023-01-01 RX ADMIN — Medication 2 SPRAY: at 20:11

## 2023-01-01 RX ADMIN — RIVAROXABAN 20 MG: 10 TABLET, FILM COATED ORAL at 10:36

## 2023-01-01 RX ADMIN — Medication 1 DROP: at 16:04

## 2023-01-01 RX ADMIN — TAMSULOSIN HYDROCHLORIDE 0.4 MG: 0.4 CAPSULE ORAL at 20:23

## 2023-01-01 RX ADMIN — GABAPENTIN 600 MG: 300 CAPSULE ORAL at 19:51

## 2023-01-01 RX ADMIN — OXYCODONE HYDROCHLORIDE 5 MG: 5 TABLET ORAL at 16:45

## 2023-01-01 RX ADMIN — VENLAFAXINE HYDROCHLORIDE 150 MG: 150 CAPSULE, EXTENDED RELEASE ORAL at 09:07

## 2023-01-01 RX ADMIN — OXYCODONE HYDROCHLORIDE 5 MG: 5 TABLET ORAL at 15:36

## 2023-01-01 RX ADMIN — MIRABEGRON 25 MG: 25 TABLET, FILM COATED, EXTENDED RELEASE ORAL at 22:36

## 2023-01-01 RX ADMIN — PIPERACILLIN AND TAZOBACTAM 3.38 G: 3; .375 INJECTION, POWDER, FOR SOLUTION INTRAVENOUS at 17:36

## 2023-01-01 RX ADMIN — TOPIRAMATE 100 MG: 100 TABLET ORAL at 08:42

## 2023-01-01 RX ADMIN — VENLAFAXINE HYDROCHLORIDE 150 MG: 150 CAPSULE, EXTENDED RELEASE ORAL at 08:53

## 2023-01-01 RX ADMIN — INSULIN ASPART 10 UNITS: 100 INJECTION, SOLUTION INTRAVENOUS; SUBCUTANEOUS at 09:56

## 2023-01-01 RX ADMIN — INSULIN GLARGINE 15 UNITS: 100 INJECTION, SOLUTION SUBCUTANEOUS at 22:08

## 2023-01-01 RX ADMIN — OXYCODONE HYDROCHLORIDE 2.5 MG: 5 TABLET ORAL at 20:31

## 2023-01-01 RX ADMIN — INSULIN ASPART 1 UNITS: 100 INJECTION, SOLUTION INTRAVENOUS; SUBCUTANEOUS at 18:05

## 2023-01-01 RX ADMIN — INSULIN ASPART 1 UNITS: 100 INJECTION, SOLUTION INTRAVENOUS; SUBCUTANEOUS at 08:25

## 2023-01-01 RX ADMIN — Medication 1 DROP: at 11:15

## 2023-01-01 RX ADMIN — LEVOTHYROXINE SODIUM 175 MCG: 175 TABLET ORAL at 05:39

## 2023-01-01 RX ADMIN — MIRABEGRON 25 MG: 25 TABLET, FILM COATED, EXTENDED RELEASE ORAL at 21:34

## 2023-01-01 RX ADMIN — GABAPENTIN 600 MG: 300 CAPSULE ORAL at 10:15

## 2023-01-01 RX ADMIN — LISINOPRIL 20 MG: 20 TABLET ORAL at 08:32

## 2023-01-01 RX ADMIN — GABAPENTIN 600 MG: 300 CAPSULE ORAL at 13:20

## 2023-01-01 RX ADMIN — VENLAFAXINE HYDROCHLORIDE 150 MG: 150 CAPSULE, EXTENDED RELEASE ORAL at 10:46

## 2023-01-01 RX ADMIN — SENNOSIDES 8.6 MG: 8.6 TABLET ORAL at 09:12

## 2023-01-01 RX ADMIN — OXYCODONE HYDROCHLORIDE 5 MG: 5 TABLET ORAL at 02:03

## 2023-01-01 RX ADMIN — OXYCODONE HYDROCHLORIDE 5 MG: 5 TABLET ORAL at 09:50

## 2023-01-01 RX ADMIN — OXYCODONE HYDROCHLORIDE 5 MG: 5 TABLET ORAL at 06:34

## 2023-01-01 RX ADMIN — INSULIN GLARGINE 15 UNITS: 100 INJECTION, SOLUTION SUBCUTANEOUS at 22:05

## 2023-01-01 RX ADMIN — OXYCODONE HYDROCHLORIDE 2.5 MG: 5 TABLET ORAL at 21:34

## 2023-01-01 RX ADMIN — TOPIRAMATE 100 MG: 100 TABLET ORAL at 08:02

## 2023-01-01 RX ADMIN — SITAGLIPTIN 100 MG: 50 TABLET, FILM COATED ORAL at 09:44

## 2023-01-01 RX ADMIN — OXYCODONE HYDROCHLORIDE 5 MG: 5 TABLET ORAL at 08:30

## 2023-01-01 RX ADMIN — INSULIN GLARGINE 15 UNITS: 100 INJECTION, SOLUTION SUBCUTANEOUS at 22:26

## 2023-01-01 RX ADMIN — GABAPENTIN 600 MG: 300 CAPSULE ORAL at 21:07

## 2023-01-01 RX ADMIN — GABAPENTIN 600 MG: 300 CAPSULE ORAL at 16:47

## 2023-01-01 RX ADMIN — LISINOPRIL 20 MG: 20 TABLET ORAL at 08:17

## 2023-01-01 RX ADMIN — TOPIRAMATE 100 MG: 100 TABLET ORAL at 09:31

## 2023-01-01 RX ADMIN — OXYCODONE HYDROCHLORIDE 5 MG: 5 TABLET ORAL at 21:34

## 2023-01-01 RX ADMIN — TOPIRAMATE 100 MG: 100 TABLET ORAL at 08:07

## 2023-01-01 RX ADMIN — INSULIN ASPART 10 UNITS: 100 INJECTION, SOLUTION INTRAVENOUS; SUBCUTANEOUS at 11:58

## 2023-01-01 RX ADMIN — INSULIN ASPART 1 UNITS: 100 INJECTION, SOLUTION INTRAVENOUS; SUBCUTANEOUS at 13:44

## 2023-01-01 RX ADMIN — Medication 1 DROP: at 15:36

## 2023-01-01 RX ADMIN — INSULIN ASPART 1 UNITS: 100 INJECTION, SOLUTION INTRAVENOUS; SUBCUTANEOUS at 12:57

## 2023-01-01 RX ADMIN — INSULIN ASPART 1 UNITS: 100 INJECTION, SOLUTION INTRAVENOUS; SUBCUTANEOUS at 17:00

## 2023-01-01 RX ADMIN — Medication 1 DROP: at 21:58

## 2023-01-01 RX ADMIN — MIRABEGRON 25 MG: 25 TABLET, FILM COATED, EXTENDED RELEASE ORAL at 22:02

## 2023-01-01 RX ADMIN — TAMSULOSIN HYDROCHLORIDE 0.4 MG: 0.4 CAPSULE ORAL at 22:20

## 2023-01-01 RX ADMIN — MIRABEGRON 25 MG: 25 TABLET, FILM COATED, EXTENDED RELEASE ORAL at 21:30

## 2023-01-01 RX ADMIN — SITAGLIPTIN 100 MG: 50 TABLET, FILM COATED ORAL at 08:21

## 2023-01-01 RX ADMIN — GABAPENTIN 600 MG: 300 CAPSULE ORAL at 17:23

## 2023-01-01 RX ADMIN — GABAPENTIN 600 MG: 300 CAPSULE ORAL at 19:16

## 2023-01-01 RX ADMIN — MIRABEGRON 25 MG: 25 TABLET, FILM COATED, EXTENDED RELEASE ORAL at 21:15

## 2023-01-01 RX ADMIN — INSULIN ASPART 1 UNITS: 100 INJECTION, SOLUTION INTRAVENOUS; SUBCUTANEOUS at 12:10

## 2023-01-01 RX ADMIN — OXYCODONE HYDROCHLORIDE 2.5 MG: 5 TABLET ORAL at 19:05

## 2023-01-01 RX ADMIN — INSULIN ASPART 1 UNITS: 100 INJECTION, SOLUTION INTRAVENOUS; SUBCUTANEOUS at 10:09

## 2023-01-01 RX ADMIN — INSULIN ASPART 2 UNITS: 100 INJECTION, SOLUTION INTRAVENOUS; SUBCUTANEOUS at 12:38

## 2023-01-01 RX ADMIN — MIRABEGRON 25 MG: 25 TABLET, FILM COATED, EXTENDED RELEASE ORAL at 22:39

## 2023-01-01 RX ADMIN — OXYCODONE HYDROCHLORIDE 5 MG: 5 TABLET ORAL at 06:48

## 2023-01-01 RX ADMIN — ATORVASTATIN CALCIUM 40 MG: 40 TABLET, FILM COATED ORAL at 07:58

## 2023-01-01 RX ADMIN — INSULIN ASPART 1 UNITS: 100 INJECTION, SOLUTION INTRAVENOUS; SUBCUTANEOUS at 17:37

## 2023-01-01 RX ADMIN — RIVAROXABAN 20 MG: 10 TABLET, FILM COATED ORAL at 08:39

## 2023-01-01 RX ADMIN — VENLAFAXINE HYDROCHLORIDE 150 MG: 150 CAPSULE, EXTENDED RELEASE ORAL at 09:35

## 2023-01-01 RX ADMIN — Medication 1 DROP: at 17:50

## 2023-01-01 RX ADMIN — INSULIN ASPART 1 UNITS: 100 INJECTION, SOLUTION INTRAVENOUS; SUBCUTANEOUS at 18:22

## 2023-01-01 RX ADMIN — GABAPENTIN 600 MG: 300 CAPSULE ORAL at 13:28

## 2023-01-01 RX ADMIN — Medication 2 SPRAY: at 17:27

## 2023-01-01 RX ADMIN — OXYCODONE HYDROCHLORIDE 5 MG: 5 TABLET ORAL at 08:50

## 2023-01-01 RX ADMIN — OXYCODONE HYDROCHLORIDE 5 MG: 5 TABLET ORAL at 23:55

## 2023-01-01 RX ADMIN — GABAPENTIN 600 MG: 300 CAPSULE ORAL at 10:40

## 2023-01-01 RX ADMIN — Medication 2 SPRAY: at 20:14

## 2023-01-01 RX ADMIN — Medication 1 DROP: at 09:42

## 2023-01-01 RX ADMIN — LISINOPRIL 10 MG: 10 TABLET ORAL at 10:21

## 2023-01-01 RX ADMIN — GABAPENTIN 600 MG: 300 CAPSULE ORAL at 08:58

## 2023-01-01 RX ADMIN — LISINOPRIL 20 MG: 20 TABLET ORAL at 09:02

## 2023-01-01 RX ADMIN — GABAPENTIN 600 MG: 300 CAPSULE ORAL at 16:29

## 2023-01-01 RX ADMIN — GABAPENTIN 600 MG: 300 CAPSULE ORAL at 20:13

## 2023-01-01 RX ADMIN — INSULIN GLARGINE 30 UNITS: 100 INJECTION, SOLUTION SUBCUTANEOUS at 22:40

## 2023-01-01 RX ADMIN — LEVOTHYROXINE SODIUM 175 MCG: 175 TABLET ORAL at 05:57

## 2023-01-01 RX ADMIN — SITAGLIPTIN 100 MG: 50 TABLET, FILM COATED ORAL at 09:21

## 2023-01-01 RX ADMIN — SITAGLIPTIN 100 MG: 50 TABLET, FILM COATED ORAL at 09:22

## 2023-01-01 RX ADMIN — Medication 2 SPRAY: at 08:40

## 2023-01-01 RX ADMIN — Medication 1 DROP: at 16:39

## 2023-01-01 RX ADMIN — Medication 2 SPRAY: at 16:00

## 2023-01-01 RX ADMIN — SENNOSIDES 8.6 MG: 8.6 TABLET ORAL at 09:23

## 2023-01-01 RX ADMIN — ATORVASTATIN CALCIUM 40 MG: 40 TABLET, FILM COATED ORAL at 08:39

## 2023-01-01 ASSESSMENT — ACTIVITIES OF DAILY LIVING (ADL)
ADLS_ACUITY_SCORE: 67
ADLS_ACUITY_SCORE: 67
ADLS_ACUITY_SCORE: 61
ADLS_ACUITY_SCORE: 61
ADLS_ACUITY_SCORE: 67
ADLS_ACUITY_SCORE: 60
ADLS_ACUITY_SCORE: 59
ADLS_ACUITY_SCORE: 63
ADLS_ACUITY_SCORE: 63
ADLS_ACUITY_SCORE: 59
ADLS_ACUITY_SCORE: 59
ADLS_ACUITY_SCORE: 67
ADLS_ACUITY_SCORE: 67
ADLS_ACUITY_SCORE: 64
ADLS_ACUITY_SCORE: 35
ADLS_ACUITY_SCORE: 69
ADLS_ACUITY_SCORE: 71
ADLS_ACUITY_SCORE: 62
ADLS_ACUITY_SCORE: 58
ADLS_ACUITY_SCORE: 35
ADLS_ACUITY_SCORE: 59
ADLS_ACUITY_SCORE: 61
ADLS_ACUITY_SCORE: 63
ADLS_ACUITY_SCORE: 67
ADLS_ACUITY_SCORE: 67
ADLS_ACUITY_SCORE: 61
ADLS_ACUITY_SCORE: 63
ADLS_ACUITY_SCORE: 67
ADLS_ACUITY_SCORE: 60
ADLS_ACUITY_SCORE: 63
ADLS_ACUITY_SCORE: 45
ADLS_ACUITY_SCORE: 55
ADLS_ACUITY_SCORE: 63
ADLS_ACUITY_SCORE: 55
ADLS_ACUITY_SCORE: 63
ADLS_ACUITY_SCORE: 71
ADLS_ACUITY_SCORE: 71
ADLS_ACUITY_SCORE: 65
ADLS_ACUITY_SCORE: 67
ADLS_ACUITY_SCORE: 65
ADLS_ACUITY_SCORE: 59
ADLS_ACUITY_SCORE: 61
EQUIPMENT_CURRENTLY_USED_AT_HOME: WALKER, STANDARD
ADLS_ACUITY_SCORE: 60
ADLS_ACUITY_SCORE: 59
ADLS_ACUITY_SCORE: 48
ADLS_ACUITY_SCORE: 63
ADLS_ACUITY_SCORE: 61
ADLS_ACUITY_SCORE: 65
ADLS_ACUITY_SCORE: 61
ADLS_ACUITY_SCORE: 55
ADLS_ACUITY_SCORE: 67
ADLS_ACUITY_SCORE: 55
ADLS_ACUITY_SCORE: 59
ADLS_ACUITY_SCORE: 65
ADLS_ACUITY_SCORE: 53
ADLS_ACUITY_SCORE: 59
ADLS_ACUITY_SCORE: 61
ADLS_ACUITY_SCORE: 65
ADLS_ACUITY_SCORE: 59
ADLS_ACUITY_SCORE: 67
ADLS_ACUITY_SCORE: 53
ADLS_ACUITY_SCORE: 65
ADLS_ACUITY_SCORE: 56
ADLS_ACUITY_SCORE: 63
ADLS_ACUITY_SCORE: 63
ADLS_ACUITY_SCORE: 61
ADLS_ACUITY_SCORE: 67
ADLS_ACUITY_SCORE: 59
ADLS_ACUITY_SCORE: 55
ADLS_ACUITY_SCORE: 65
ADLS_ACUITY_SCORE: 55
ADLS_ACUITY_SCORE: 63
ADLS_ACUITY_SCORE: 67
ADLS_ACUITY_SCORE: 59
ADLS_ACUITY_SCORE: 65
ADLS_ACUITY_SCORE: 55
ADLS_ACUITY_SCORE: 59
ADLS_ACUITY_SCORE: 65
ADLS_ACUITY_SCORE: 45
ADLS_ACUITY_SCORE: 59
ADLS_ACUITY_SCORE: 59
ADLS_ACUITY_SCORE: 55
ADLS_ACUITY_SCORE: 67
ADLS_ACUITY_SCORE: 55
ADLS_ACUITY_SCORE: 63
ADLS_ACUITY_SCORE: 67
ADLS_ACUITY_SCORE: 65
ADLS_ACUITY_SCORE: 61
ADLS_ACUITY_SCORE: 67
ADLS_ACUITY_SCORE: 61
ADLS_ACUITY_SCORE: 59
ADLS_ACUITY_SCORE: 64
ADLS_ACUITY_SCORE: 59
ADLS_ACUITY_SCORE: 63
ADLS_ACUITY_SCORE: 35
ADLS_ACUITY_SCORE: 67
WEAR_GLASSES_OR_BLIND: NO
ADLS_ACUITY_SCORE: 45
ADLS_ACUITY_SCORE: 55
ADLS_ACUITY_SCORE: 59
ADLS_ACUITY_SCORE: 63
ADLS_ACUITY_SCORE: 67
ADLS_ACUITY_SCORE: 59
ADLS_ACUITY_SCORE: 64
ADLS_ACUITY_SCORE: 67
ADLS_ACUITY_SCORE: 59
ADLS_ACUITY_SCORE: 67
ADLS_ACUITY_SCORE: 61
HEARING_DIFFICULTY_OR_DEAF: NO
ADLS_ACUITY_SCORE: 65
ADLS_ACUITY_SCORE: 71
ADLS_ACUITY_SCORE: 67
ADLS_ACUITY_SCORE: 59
ADLS_ACUITY_SCORE: 65
ADLS_ACUITY_SCORE: 67
ADLS_ACUITY_SCORE: 67
ADLS_ACUITY_SCORE: 61
ADLS_ACUITY_SCORE: 59
ADLS_ACUITY_SCORE: 67
ADLS_ACUITY_SCORE: 67
ADLS_ACUITY_SCORE: 61
ADLS_ACUITY_SCORE: 61
ADLS_ACUITY_SCORE: 55
ADLS_ACUITY_SCORE: 63
ADLS_ACUITY_SCORE: 71
ADLS_ACUITY_SCORE: 67
ADLS_ACUITY_SCORE: 55
ADLS_ACUITY_SCORE: 59
ADLS_ACUITY_SCORE: 65
ADLS_ACUITY_SCORE: 63
ADLS_ACUITY_SCORE: 65
ADLS_ACUITY_SCORE: 59
ADLS_ACUITY_SCORE: 63
ADLS_ACUITY_SCORE: 63
ADLS_ACUITY_SCORE: 67
ADLS_ACUITY_SCORE: 59
ADLS_ACUITY_SCORE: 40
ADLS_ACUITY_SCORE: 61
ADLS_ACUITY_SCORE: 63
ADLS_ACUITY_SCORE: 59
ADLS_ACUITY_SCORE: 61
ADLS_ACUITY_SCORE: 69
ADLS_ACUITY_SCORE: 35
ADLS_ACUITY_SCORE: 45
ADLS_ACUITY_SCORE: 67
ADLS_ACUITY_SCORE: 63
ADLS_ACUITY_SCORE: 67
ADLS_ACUITY_SCORE: 59
ADLS_ACUITY_SCORE: 63
ADLS_ACUITY_SCORE: 65
ADLS_ACUITY_SCORE: 65
DOING_ERRANDS_INDEPENDENTLY_DIFFICULTY: YES
ADLS_ACUITY_SCORE: 59
ADLS_ACUITY_SCORE: 65
ADLS_ACUITY_SCORE: 40
ADLS_ACUITY_SCORE: 71
ADLS_ACUITY_SCORE: 59
ADLS_ACUITY_SCORE: 59
ADLS_ACUITY_SCORE: 67
ADLS_ACUITY_SCORE: 55
ADLS_ACUITY_SCORE: 67
ADLS_ACUITY_SCORE: 63
ADLS_ACUITY_SCORE: 63
ADLS_ACUITY_SCORE: 45
ADLS_ACUITY_SCORE: 63
ADLS_ACUITY_SCORE: 55
ADLS_ACUITY_SCORE: 59
ADLS_ACUITY_SCORE: 67
ADLS_ACUITY_SCORE: 59
ADLS_ACUITY_SCORE: 55
WALKING_OR_CLIMBING_STAIRS: OTHER (SEE COMMENTS)
ADLS_ACUITY_SCORE: 65
ADLS_ACUITY_SCORE: 63
ADLS_ACUITY_SCORE: 67
ADLS_ACUITY_SCORE: 60
ADLS_ACUITY_SCORE: 58
ADLS_ACUITY_SCORE: 60
ADLS_ACUITY_SCORE: 61
ADLS_ACUITY_SCORE: 60
ADLS_ACUITY_SCORE: 67
DRESSING/BATHING: DRESSING DIFFICULTY, ASSISTANCE 1 PERSON
ADLS_ACUITY_SCORE: 41
ADLS_ACUITY_SCORE: 71
ADLS_ACUITY_SCORE: 71
ADLS_ACUITY_SCORE: 59
ADLS_ACUITY_SCORE: 62
ADLS_ACUITY_SCORE: 69
DRESS: 2-->COMPLETELY DEPENDENT
ADLS_ACUITY_SCORE: 67
ADLS_ACUITY_SCORE: 55
ADLS_ACUITY_SCORE: 59
ADLS_ACUITY_SCORE: 69
ADLS_ACUITY_SCORE: 59
ADLS_ACUITY_SCORE: 67
ADLS_ACUITY_SCORE: 63
ADLS_ACUITY_SCORE: 63
ADLS_ACUITY_SCORE: 67
ADLS_ACUITY_SCORE: 55
ADLS_ACUITY_SCORE: 63
ADLS_ACUITY_SCORE: 63
ADLS_ACUITY_SCORE: 65
ADLS_ACUITY_SCORE: 55
ADLS_ACUITY_SCORE: 59
ADLS_ACUITY_SCORE: 69
TRANSFERRING: 2-->COMPLETELY DEPENDENT
ADLS_ACUITY_SCORE: 63
ADLS_ACUITY_SCORE: 71
ADLS_ACUITY_SCORE: 62
ADLS_ACUITY_SCORE: 63
ADLS_ACUITY_SCORE: 63
ADLS_ACUITY_SCORE: 60
ADLS_ACUITY_SCORE: 63
ADLS_ACUITY_SCORE: 65
ADLS_ACUITY_SCORE: 63
ADLS_ACUITY_SCORE: 59
ADLS_ACUITY_SCORE: 65
DRESS: 2-->COMPLETELY DEPENDENT (NOT DEVELOPMENTALLY APPROPRIATE)
ADLS_ACUITY_SCORE: 59
ADLS_ACUITY_SCORE: 65
ADLS_ACUITY_SCORE: 54
ADLS_ACUITY_SCORE: 71
ADLS_ACUITY_SCORE: 67
ADLS_ACUITY_SCORE: 63
ADLS_ACUITY_SCORE: 65
ADLS_ACUITY_SCORE: 63
ADLS_ACUITY_SCORE: 55
ADLS_ACUITY_SCORE: 63
ADLS_ACUITY_SCORE: 64
ADLS_ACUITY_SCORE: 67
ADLS_ACUITY_SCORE: 69
ADLS_ACUITY_SCORE: 63
ADLS_ACUITY_SCORE: 62
ADLS_ACUITY_SCORE: 63
ADLS_ACUITY_SCORE: 67
ADLS_ACUITY_SCORE: 67
ADLS_ACUITY_SCORE: 60
ADLS_ACUITY_SCORE: 67
ADLS_ACUITY_SCORE: 63
ADLS_ACUITY_SCORE: 67
ADLS_ACUITY_SCORE: 67
ADLS_ACUITY_SCORE: 59
ADLS_ACUITY_SCORE: 67
ADLS_ACUITY_SCORE: 55
ADLS_ACUITY_SCORE: 63
ADLS_ACUITY_SCORE: 61
ADLS_ACUITY_SCORE: 63
ADLS_ACUITY_SCORE: 59
ADLS_ACUITY_SCORE: 61
ADLS_ACUITY_SCORE: 59
ADLS_ACUITY_SCORE: 63
ADLS_ACUITY_SCORE: 55
ADLS_ACUITY_SCORE: 59
ADLS_ACUITY_SCORE: 61
ADLS_ACUITY_SCORE: 55
ADLS_ACUITY_SCORE: 61
ADLS_ACUITY_SCORE: 63
ADLS_ACUITY_SCORE: 67
CONCENTRATING,_REMEMBERING_OR_MAKING_DECISIONS_DIFFICULTY: NO
ADLS_ACUITY_SCORE: 45
ADLS_ACUITY_SCORE: 59
ADLS_ACUITY_SCORE: 67
ADLS_ACUITY_SCORE: 63
ADLS_ACUITY_SCORE: 67
ADLS_ACUITY_SCORE: 64
ADLS_ACUITY_SCORE: 67
ADLS_ACUITY_SCORE: 45
ADLS_ACUITY_SCORE: 67
ADLS_ACUITY_SCORE: 61
ADLS_ACUITY_SCORE: 59
ADLS_ACUITY_SCORE: 67
ADLS_ACUITY_SCORE: 65
ADLS_ACUITY_SCORE: 61
ADLS_ACUITY_SCORE: 63
ADLS_ACUITY_SCORE: 60
ADLS_ACUITY_SCORE: 69
ADLS_ACUITY_SCORE: 63
ADLS_ACUITY_SCORE: 35
ADLS_ACUITY_SCORE: 53
ADLS_ACUITY_SCORE: 67
TRANSFERRING: 2-->COMPLETELY DEPENDENT (NOT DEVELOPMENTALLY APPROPRIATE)
ADLS_ACUITY_SCORE: 67
ADLS_ACUITY_SCORE: 61
ADLS_ACUITY_SCORE: 67
ADLS_ACUITY_SCORE: 59
ADLS_ACUITY_SCORE: 67
ADLS_ACUITY_SCORE: 63
ADLS_ACUITY_SCORE: 65
ADLS_ACUITY_SCORE: 67
ADLS_ACUITY_SCORE: 59
ADLS_ACUITY_SCORE: 59
ADLS_ACUITY_SCORE: 55
ADLS_ACUITY_SCORE: 53
ADLS_ACUITY_SCORE: 61
ADLS_ACUITY_SCORE: 67
ADLS_ACUITY_SCORE: 55
ADLS_ACUITY_SCORE: 65
ADLS_ACUITY_SCORE: 59
ADLS_ACUITY_SCORE: 55
ADLS_ACUITY_SCORE: 59
ADLS_ACUITY_SCORE: 63
ADLS_ACUITY_SCORE: 51
ADLS_ACUITY_SCORE: 61
ADLS_ACUITY_SCORE: 67
ADLS_ACUITY_SCORE: 53
ADLS_ACUITY_SCORE: 55
ADLS_ACUITY_SCORE: 63
ADLS_ACUITY_SCORE: 55
ADLS_ACUITY_SCORE: 63
ADLS_ACUITY_SCORE: 67
ADLS_ACUITY_SCORE: 55
ADLS_ACUITY_SCORE: 60
ADLS_ACUITY_SCORE: 45
ADLS_ACUITY_SCORE: 67
ADLS_ACUITY_SCORE: 45
ADLS_ACUITY_SCORE: 64
ADLS_ACUITY_SCORE: 61
ADLS_ACUITY_SCORE: 59
ADLS_ACUITY_SCORE: 53
ADLS_ACUITY_SCORE: 59
ADLS_ACUITY_SCORE: 67
ADLS_ACUITY_SCORE: 59
ADLS_ACUITY_SCORE: 67
ADLS_ACUITY_SCORE: 65
ADLS_ACUITY_SCORE: 63
TOILETING: 1-->ASSISTANCE (EQUIPMENT/PERSON) NEEDED (NOT DEVELOPMENTALLY APPROPRIATE)
ADLS_ACUITY_SCORE: 71
ADLS_ACUITY_SCORE: 59
ADLS_ACUITY_SCORE: 63
ADLS_ACUITY_SCORE: 67
ADLS_ACUITY_SCORE: 59
ADLS_ACUITY_SCORE: 67
ADLS_ACUITY_SCORE: 67
ADLS_ACUITY_SCORE: 63
ADLS_ACUITY_SCORE: 59
ADLS_ACUITY_SCORE: 63
ADLS_ACUITY_SCORE: 59
ADLS_ACUITY_SCORE: 61
ADLS_ACUITY_SCORE: 63
ADLS_ACUITY_SCORE: 61
ADLS_ACUITY_SCORE: 67
ADLS_ACUITY_SCORE: 62
ADLS_ACUITY_SCORE: 45
ADLS_ACUITY_SCORE: 69
ADLS_ACUITY_SCORE: 59
ADLS_ACUITY_SCORE: 65
ADLS_ACUITY_SCORE: 60
ADLS_ACUITY_SCORE: 69
ADLS_ACUITY_SCORE: 67
ADLS_ACUITY_SCORE: 63
ADLS_ACUITY_SCORE: 61
ADLS_ACUITY_SCORE: 59
ADLS_ACUITY_SCORE: 65
ADLS_ACUITY_SCORE: 56
ADLS_ACUITY_SCORE: 63
ADLS_ACUITY_SCORE: 55
ADLS_ACUITY_SCORE: 63
ADLS_ACUITY_SCORE: 65
ADLS_ACUITY_SCORE: 40
ADLS_ACUITY_SCORE: 67
ADLS_ACUITY_SCORE: 63
ADLS_ACUITY_SCORE: 61
ADLS_ACUITY_SCORE: 40
ADLS_ACUITY_SCORE: 53
ADLS_ACUITY_SCORE: 61
ADLS_ACUITY_SCORE: 59
ADLS_ACUITY_SCORE: 69
ADLS_ACUITY_SCORE: 63
ADLS_ACUITY_SCORE: 71
ADLS_ACUITY_SCORE: 63
DIFFICULTY_EATING/SWALLOWING: NO
ADLS_ACUITY_SCORE: 63
ADLS_ACUITY_SCORE: 59
ADLS_ACUITY_SCORE: 69
ADLS_ACUITY_SCORE: 63
ADLS_ACUITY_SCORE: 53
ADLS_ACUITY_SCORE: 67
ADLS_ACUITY_SCORE: 59
ADLS_ACUITY_SCORE: 59
ADLS_ACUITY_SCORE: 65
ADLS_ACUITY_SCORE: 61
ADLS_ACUITY_SCORE: 55
ADLS_ACUITY_SCORE: 63
ADLS_ACUITY_SCORE: 60
ADLS_ACUITY_SCORE: 67
ADLS_ACUITY_SCORE: 61
ADLS_ACUITY_SCORE: 60
ADLS_ACUITY_SCORE: 60
ADLS_ACUITY_SCORE: 65
ADLS_ACUITY_SCORE: 59
ADLS_ACUITY_SCORE: 65
ADLS_ACUITY_SCORE: 67
ADLS_ACUITY_SCORE: 67
ADLS_ACUITY_SCORE: 64
ADLS_ACUITY_SCORE: 55
ADLS_ACUITY_SCORE: 59
ADLS_ACUITY_SCORE: 63
ADLS_ACUITY_SCORE: 67
ADLS_ACUITY_SCORE: 61
ADLS_ACUITY_SCORE: 63
ADLS_ACUITY_SCORE: 63
ADLS_ACUITY_SCORE: 59
ADLS_ACUITY_SCORE: 59
ADLS_ACUITY_SCORE: 63
TOILETING: 2-->COMPLETELY DEPENDENT
ADLS_ACUITY_SCORE: 59
ADLS_ACUITY_SCORE: 63
ADLS_ACUITY_SCORE: 67
ADLS_ACUITY_SCORE: 55
DRESSING/BATHING_DIFFICULTY: YES
ADLS_ACUITY_SCORE: 59
ADLS_ACUITY_SCORE: 61
ADLS_ACUITY_SCORE: 67
ADLS_ACUITY_SCORE: 59
ADLS_ACUITY_SCORE: 63
ADLS_ACUITY_SCORE: 67
ADLS_ACUITY_SCORE: 61
ADLS_ACUITY_SCORE: 59
ADLS_ACUITY_SCORE: 59
ADLS_ACUITY_SCORE: 63
ADLS_ACUITY_SCORE: 63
ADLS_ACUITY_SCORE: 67
ADLS_ACUITY_SCORE: 65
ADLS_ACUITY_SCORE: 55
ADLS_ACUITY_SCORE: 63
ADLS_ACUITY_SCORE: 61
ADLS_ACUITY_SCORE: 63
ADLS_ACUITY_SCORE: 67
ADLS_ACUITY_SCORE: 59
ADLS_ACUITY_SCORE: 64
ADLS_ACUITY_SCORE: 63
ADLS_ACUITY_SCORE: 65
ADLS_ACUITY_SCORE: 67
ADLS_ACUITY_SCORE: 55
ADLS_ACUITY_SCORE: 59
ADLS_ACUITY_SCORE: 63
ADLS_ACUITY_SCORE: 67
ADLS_ACUITY_SCORE: 67
ADLS_ACUITY_SCORE: 69
ADLS_ACUITY_SCORE: 59
ADLS_ACUITY_SCORE: 71
ADLS_ACUITY_SCORE: 59
ADLS_ACUITY_SCORE: 63
ADLS_ACUITY_SCORE: 65
ADLS_ACUITY_SCORE: 67
ADLS_ACUITY_SCORE: 45
ADLS_ACUITY_SCORE: 63
ADLS_ACUITY_SCORE: 65
ADLS_ACUITY_SCORE: 67
ADLS_ACUITY_SCORE: 59
ADLS_ACUITY_SCORE: 63
ADLS_ACUITY_SCORE: 58
ADLS_ACUITY_SCORE: 63
ADLS_ACUITY_SCORE: 67
ADLS_ACUITY_SCORE: 63
ADLS_ACUITY_SCORE: 67
ADLS_ACUITY_SCORE: 55
ADLS_ACUITY_SCORE: 65
ADLS_ACUITY_SCORE: 59
ADLS_ACUITY_SCORE: 63
ADLS_ACUITY_SCORE: 67
ADLS_ACUITY_SCORE: 65
ADLS_ACUITY_SCORE: 67
ADLS_ACUITY_SCORE: 45
ADLS_ACUITY_SCORE: 67
ADLS_ACUITY_SCORE: 55
ADLS_ACUITY_SCORE: 55
ADLS_ACUITY_SCORE: 65
ADLS_ACUITY_SCORE: 59
ADLS_ACUITY_SCORE: 63
ADLS_ACUITY_SCORE: 61
ADLS_ACUITY_SCORE: 67
ADLS_ACUITY_SCORE: 65
ADLS_ACUITY_SCORE: 67
ADLS_ACUITY_SCORE: 62
ADLS_ACUITY_SCORE: 61
ADLS_ACUITY_SCORE: 59
TOILETING_ASSISTANCE: TOILETING DIFFICULTY, ASSISTANCE 1 PERSON
ADLS_ACUITY_SCORE: 61
ADLS_ACUITY_SCORE: 63
ADLS_ACUITY_SCORE: 65
ADLS_ACUITY_SCORE: 63
ADLS_ACUITY_SCORE: 60
ADLS_ACUITY_SCORE: 48
ADLS_ACUITY_SCORE: 59
ADLS_ACUITY_SCORE: 63
ADLS_ACUITY_SCORE: 61
ADLS_ACUITY_SCORE: 67
ADLS_ACUITY_SCORE: 55
ADLS_ACUITY_SCORE: 59
ADLS_ACUITY_SCORE: 55
ADLS_ACUITY_SCORE: 67
ADLS_ACUITY_SCORE: 59
ADLS_ACUITY_SCORE: 55
ADLS_ACUITY_SCORE: 59
ADLS_ACUITY_SCORE: 67
ADLS_ACUITY_SCORE: 67
ADLS_ACUITY_SCORE: 55
ADLS_ACUITY_SCORE: 67
ADLS_ACUITY_SCORE: 67
ADLS_ACUITY_SCORE: 55
WALKING_OR_CLIMBING_STAIRS_DIFFICULTY: YES
ADLS_ACUITY_SCORE: 67
ADLS_ACUITY_SCORE: 59
ADLS_ACUITY_SCORE: 69
ADLS_ACUITY_SCORE: 45
ADLS_ACUITY_SCORE: 71
ADLS_ACUITY_SCORE: 61
ADLS_ACUITY_SCORE: 63
ADLS_ACUITY_SCORE: 61
ADLS_ACUITY_SCORE: 59
ADLS_ACUITY_SCORE: 67
ADLS_ACUITY_SCORE: 63
CHANGE_IN_FUNCTIONAL_STATUS_SINCE_ONSET_OF_CURRENT_ILLNESS/INJURY: NO
ADLS_ACUITY_SCORE: 59
ADLS_ACUITY_SCORE: 71
ADLS_ACUITY_SCORE: 69
ADLS_ACUITY_SCORE: 67
ADLS_ACUITY_SCORE: 53
ADLS_ACUITY_SCORE: 65
ADLS_ACUITY_SCORE: 59
ADLS_ACUITY_SCORE: 63
ADLS_ACUITY_SCORE: 63
ADLS_ACUITY_SCORE: 67
ADLS_ACUITY_SCORE: 63
ADLS_ACUITY_SCORE: 40
ADLS_ACUITY_SCORE: 67
ADLS_ACUITY_SCORE: 59
ADLS_ACUITY_SCORE: 55
ADLS_ACUITY_SCORE: 59
ADLS_ACUITY_SCORE: 67
ADLS_ACUITY_SCORE: 61
ADLS_ACUITY_SCORE: 61
ADLS_ACUITY_SCORE: 63
ADLS_ACUITY_SCORE: 61
ADLS_ACUITY_SCORE: 67
ADLS_ACUITY_SCORE: 63
ADLS_ACUITY_SCORE: 55
ADLS_ACUITY_SCORE: 67
ADLS_ACUITY_SCORE: 59
ADLS_ACUITY_SCORE: 40
ADLS_ACUITY_SCORE: 63
ADLS_ACUITY_SCORE: 55
ADLS_ACUITY_SCORE: 63
ADLS_ACUITY_SCORE: 67
ADLS_ACUITY_SCORE: 59
ADLS_ACUITY_SCORE: 59
ADLS_ACUITY_SCORE: 67
ADLS_ACUITY_SCORE: 67
ADLS_ACUITY_SCORE: 63
ADLS_ACUITY_SCORE: 63
ADLS_ACUITY_SCORE: 67
ADLS_ACUITY_SCORE: 61
ADLS_ACUITY_SCORE: 59
ADLS_ACUITY_SCORE: 67
ADLS_ACUITY_SCORE: 61
ADLS_ACUITY_SCORE: 63
ADLS_ACUITY_SCORE: 67
ADLS_ACUITY_SCORE: 61
ADLS_ACUITY_SCORE: 55
ADLS_ACUITY_SCORE: 65
ADLS_ACUITY_SCORE: 65
ADLS_ACUITY_SCORE: 67
ADLS_ACUITY_SCORE: 53
ADLS_ACUITY_SCORE: 60
ADLS_ACUITY_SCORE: 67
ADLS_ACUITY_SCORE: 63
ADLS_ACUITY_SCORE: 67
ADLS_ACUITY_SCORE: 59
ADLS_ACUITY_SCORE: 63
ADLS_ACUITY_SCORE: 61
ADLS_ACUITY_SCORE: 59
ADLS_ACUITY_SCORE: 63
ADLS_ACUITY_SCORE: 55
ADLS_ACUITY_SCORE: 59
ADLS_ACUITY_SCORE: 55
ADLS_ACUITY_SCORE: 64
ADLS_ACUITY_SCORE: 65
ADLS_ACUITY_SCORE: 63
ADLS_ACUITY_SCORE: 63
ADLS_ACUITY_SCORE: 67
ADLS_ACUITY_SCORE: 63
ADLS_ACUITY_SCORE: 59
ADLS_ACUITY_SCORE: 63
ADLS_ACUITY_SCORE: 59
TOILETING_ISSUES: YES
ADLS_ACUITY_SCORE: 67
ADLS_ACUITY_SCORE: 63
ADLS_ACUITY_SCORE: 61
ADLS_ACUITY_SCORE: 67
ADLS_ACUITY_SCORE: 59
ADLS_ACUITY_SCORE: 65
ADLS_ACUITY_SCORE: 63
ADLS_ACUITY_SCORE: 59
ADLS_ACUITY_SCORE: 67
ADLS_ACUITY_SCORE: 60
ADLS_ACUITY_SCORE: 61
ADLS_ACUITY_SCORE: 61
ADLS_ACUITY_SCORE: 62
ADLS_ACUITY_SCORE: 59
ADLS_ACUITY_SCORE: 63
ADLS_ACUITY_SCORE: 67
ADLS_ACUITY_SCORE: 61
ADLS_ACUITY_SCORE: 67
ADLS_ACUITY_SCORE: 63
ADLS_ACUITY_SCORE: 65
ADLS_ACUITY_SCORE: 59
ADLS_ACUITY_SCORE: 59
ADLS_ACUITY_SCORE: 63
ADLS_ACUITY_SCORE: 62
ADLS_ACUITY_SCORE: 55
ADLS_ACUITY_SCORE: 63
ADLS_ACUITY_SCORE: 67
ADLS_ACUITY_SCORE: 55
ADLS_ACUITY_SCORE: 60
ADLS_ACUITY_SCORE: 67
ADLS_ACUITY_SCORE: 67
ADLS_ACUITY_SCORE: 55
ADLS_ACUITY_SCORE: 61
ADLS_ACUITY_SCORE: 67
ADLS_ACUITY_SCORE: 59
ADLS_ACUITY_SCORE: 67
ADLS_ACUITY_SCORE: 59
ADLS_ACUITY_SCORE: 59
ADLS_ACUITY_SCORE: 55
ADLS_ACUITY_SCORE: 67
ADLS_ACUITY_SCORE: 67
ADLS_ACUITY_SCORE: 63
ADLS_ACUITY_SCORE: 65
ADLS_ACUITY_SCORE: 45
ADLS_ACUITY_SCORE: 62
ADLS_ACUITY_SCORE: 67
ADLS_ACUITY_SCORE: 67
ADLS_ACUITY_SCORE: 59
ADLS_ACUITY_SCORE: 61
ADLS_ACUITY_SCORE: 67
ADLS_ACUITY_SCORE: 45
ADLS_ACUITY_SCORE: 67
ADLS_ACUITY_SCORE: 59
ADLS_ACUITY_SCORE: 63
ADLS_ACUITY_SCORE: 56
ADLS_ACUITY_SCORE: 67
ADLS_ACUITY_SCORE: 59
ADLS_ACUITY_SCORE: 63
ADLS_ACUITY_SCORE: 61
ADLS_ACUITY_SCORE: 67
ADLS_ACUITY_SCORE: 55
ADLS_ACUITY_SCORE: 65
ADLS_ACUITY_SCORE: 67
ADLS_ACUITY_SCORE: 63
ADLS_ACUITY_SCORE: 55
ADLS_ACUITY_SCORE: 56
ADLS_ACUITY_SCORE: 67
ADLS_ACUITY_SCORE: 61
ADLS_ACUITY_SCORE: 59
ADLS_ACUITY_SCORE: 59
ADLS_ACUITY_SCORE: 63
ADLS_ACUITY_SCORE: 63
ADLS_ACUITY_SCORE: 59
ADLS_ACUITY_SCORE: 67
ADLS_ACUITY_SCORE: 45
ADLS_ACUITY_SCORE: 69
ADLS_ACUITY_SCORE: 71
ADLS_ACUITY_SCORE: 67
ADLS_ACUITY_SCORE: 63
ADLS_ACUITY_SCORE: 40
ADLS_ACUITY_SCORE: 41
ADLS_ACUITY_SCORE: 64
ADLS_ACUITY_SCORE: 61
ADLS_ACUITY_SCORE: 67
ADLS_ACUITY_SCORE: 65
ADLS_ACUITY_SCORE: 71
ADLS_ACUITY_SCORE: 63
ADLS_ACUITY_SCORE: 71
ADLS_ACUITY_SCORE: 61
ADLS_ACUITY_SCORE: 67
ADLS_ACUITY_SCORE: 60
ADLS_ACUITY_SCORE: 59
ADLS_ACUITY_SCORE: 67
ADLS_ACUITY_SCORE: 62
ADLS_ACUITY_SCORE: 67
ADLS_ACUITY_SCORE: 67
ADLS_ACUITY_SCORE: 55
ADLS_ACUITY_SCORE: 55
ADLS_ACUITY_SCORE: 61
FALL_HISTORY_WITHIN_LAST_SIX_MONTHS: NO
ADLS_ACUITY_SCORE: 59
ADLS_ACUITY_SCORE: 67
ADLS_ACUITY_SCORE: 63
ADLS_ACUITY_SCORE: 65
ADLS_ACUITY_SCORE: 59
ADLS_ACUITY_SCORE: 40
ADLS_ACUITY_SCORE: 60
ADLS_ACUITY_SCORE: 59
BATHING: 2-->COMPLETELY DEPENDENT (NOT DEVELOPMENTALLY APPROPRIATE)
ADLS_ACUITY_SCORE: 67
ADLS_ACUITY_SCORE: 59
ADLS_ACUITY_SCORE: 65
ADLS_ACUITY_SCORE: 61
ADLS_ACUITY_SCORE: 67
ADLS_ACUITY_SCORE: 65
ADLS_ACUITY_SCORE: 67
ADLS_ACUITY_SCORE: 63
ADLS_ACUITY_SCORE: 59
ADLS_ACUITY_SCORE: 67
ADLS_ACUITY_SCORE: 40
ADLS_ACUITY_SCORE: 60
ADLS_ACUITY_SCORE: 67
ADLS_ACUITY_SCORE: 45
ADLS_ACUITY_SCORE: 67
ADLS_ACUITY_SCORE: 59
ADLS_ACUITY_SCORE: 61
ADLS_ACUITY_SCORE: 59
ADLS_ACUITY_SCORE: 61
ADLS_ACUITY_SCORE: 67

## 2023-01-06 NOTE — ED PROVIDER NOTES
History     Chief Complaint:  Wound Infection       The history is limited by the condition of the patient (Dementia).      Miley Tolbert is a 77 year old female with a history of stage IV sacral region pressure ulcer, type 2 diabetes mellitus, schizophrenia, and dementia who presents with a wound. She has had the sacral pressure ulcer for a while, and it has been getting worse despite outpatient treatment. She is wheelchair bound.     Additionally, she has been in and out of nursing homes because she does not do well in that setting. Certain issues include agitation and delusions. She currently lives with her daughter in a house, but is having difficulty providing adequate care for her. She is currently in need of placement due to being a vulnerable adult.     Independent Historian: the patient, daughter, and EMS    Review of External Notes: Prior nursing notes sent in with patient.     ROS:  Review of Systems   Unable to perform ROS: Dementia     Allergies:  Celecoxib  Clonazepam  Insulin Lispro  Iodinated Contrast Media [Diagnostic X-Ray Materials]  Iodine  Phenobarbital  Phenytoin     Medications:    Lipitor   Lisinopril   Topamax   Flomax   Myrbetriq   Effexor  Januvia   Synthroid   Xarelto   Lantus Solostar   Zanaflex   Gabapentin     Past Medical History:    A-fib (H)   Chronic kidney disease, stage III (moderate) (H)   COPD (chronic obstructive pulmonary disease) (H)   Dementia (H)   Type 2 DM    Dysphagia   Failure to thrive in adult   HTN (hypertension)   Hyperlipidemia LDL goal <100   Hypothyroidism   Major depression   Personal history of DVT (deep vein thrombosis)   Pressure sore   Schizophrenia (H)   Sepsis (H)  Seizure   Morbid obesity   Myocardial infarction   DVT   Stage IV pressure ulcer of sacral region   Anemia   CHF  MDD  Hypothyroidism   GERD   Hypovitaminosis D   Stage 3 CKD     Past Surgical History:    Cholecystectomy     Family History:    Mother - cataracts     Social  History:  PCP: No primary care provider on file.   The patient presents to the ED with her daughter via EMS    Physical Exam     Patient Vitals for the past 24 hrs:   BP Temp Temp src Pulse Resp SpO2 Weight   01/06/23 1505 (!) 140/78 98.9  F (37.2  C) Oral 111 20 96 % 63.5 kg (140 lb)        Physical Exam  Physical Exam   General:  Sitting on bed alone at bedside.   HENT:  No obvious trauma to head  Right Ear:  External ear normal.   Left Ear:  External ear normal.   Nose:  Nose normal.   Eyes:  Conjunctivae and EOM are normal. Pupils are equal, round, and reactive.   Neck: Normal range of motion. Neck supple. No tracheal deviation present.   CV:  Normal heart sounds. No murmur heard.  Pulm/Chest: Effort normal and breath sounds normal.   Abd: Soft. No distension. There is no tenderness. There is no rigidity, no rebound and no guarding.   M/S: Normal range of motion.   Neuro: Alert. GCS 14, pleasantly confused. No focal weakness  Skin: Skin is warm and dry. No rash noted. Not diaphoretic. decubitus ulcer on sacrum, please see photos in chart.  Psych: Normal mood and affect. Behavior is normal.     Emergency Department Course   Laboratory:  Labs Ordered and Resulted from Time of ED Arrival to Time of ED Departure   BASIC METABOLIC PANEL - Abnormal       Result Value    Sodium 139      Potassium 3.9      Chloride 108      Carbon Dioxide (CO2) 23      Anion Gap 8      Urea Nitrogen 19      Creatinine 0.69      Calcium 9.4      Glucose 305 (*)     GFR Estimate 88     CBC WITH PLATELETS AND DIFFERENTIAL - Abnormal    WBC Count 6.7      RBC Count 4.59      Hemoglobin 11.9      Hematocrit 38.6      MCV 84      MCH 25.9 (*)     MCHC 30.8 (*)     RDW 14.6      Platelet Count 325      % Neutrophils 76      % Lymphocytes 16      % Monocytes 6      % Eosinophils 1      % Basophils 1      % Immature Granulocytes 0      NRBCs per 100 WBC 0      Absolute Neutrophils 5.1      Absolute Lymphocytes 1.1      Absolute Monocytes 0.4       Absolute Eosinophils 0.1      Absolute Basophils 0.0      Absolute Immature Granulocytes 0.0      Absolute NRBCs 0.0     ISTAT GASES LACTATE VENOUS POCT - Abnormal    Lactic Acid POCT 1.8      Bicarbonate Venous POCT 22      O2 Sat, Venous POCT 45 (*)     pCO2V Venous POCT 38 (*)     pH Venous POCT 7.36      pO2 Venous POCT 26     ROUTINE UA WITH MICROSCOPIC REFLEX TO CULTURE   BLOOD CULTURE   BLOOD CULTURE     Emergency Department Course & Assessments:  Interventions:  Topamax 100 mg p.o.    Independent Interpretation (X-rays, CTs, rhythm strip):  None     Consultations/Discussion of Management or Tests:  1609 I spoke with [place] from Hospitalist Services, who accepts the patient for admission.    Social Determinants of Health affecting care:  The patient lives with her daughter   Vulnerable adult      Disposition:  The patient was admitted to the hospital under the care of Dr. Bojorquez.     Impression & Plan    Medical Decision Making:  Miley Tolbert is a very pleasant 77 year old year old patient who presents to the emergency department with concern of a sacral ulcer.  This has been present for a few months now.  It is not getting better and the daughter is having difficulty taking care of her at home.  She had prior lived in a facility in [place].  The daughter does not feel safe with her at home anymore.  Please look under the media tablet to see pictures of this decubitus ulcer.  She is afebrile and has no leukocytosis. Doubt this is infected.  Two blood cultures were obtained in case it may be infected and there is bacteremia present.  No indication for antibiotics at this time.  The patient be admitted to the hospital for observation for continued evaluation and treatment.  Patient has a history of seizures.  She is due for her Topamax which she did not take so was provided while in the emergency department to help ensure she does not have a seizure.  I spoke to the hospitalist,  who has  agreed to admit the patient for continued evaluation and treatment and to involve the wound care team.    The treatment plan was discussed with the patient and they expressed understanding of this plan and consented to the plan.  In addition, the patient will return to the emergency department if their symptoms persist, worsen, if new symptoms arise or if there is any concern as other pathology may be present that is not evident at this time. They also understand the importance of close follow up in the clinic and if unable to do so will return to the emergency department for a reevaluation. All questions were answered.    Diagnosis:    ICD-10-CM    1. Pressure injury of skin of sacral region, unspecified injury stage  L89.159       2. Generalized weakness  R53.1       3. Hyperglycemia  R73.9            Scribe Disclosure:  I, Gordon Marrero, am serving as a scribe at 3:24 PM on 1/6/2023 to document services personally performed by Jame Huynh DO based on my observations and the provider's statements to me.     1/6/2023    Jame Huynh DO Anderson, Robert James, DO  01/06/23 0190

## 2023-01-06 NOTE — H&P
[facility]    History and Physical  Hospitalist       Date of Admission:  1/6/2023    Assessment & Plan   Miley Tolbert is a 77 year old female with history of hypertension, CHF, atrial fibrillation on Xarelto, history of for lower extremity DVT, coronary artery disease, chronic kidney disease stage III, COPD, hypothyroidism, poorly controlled type 2 diabetes with hyperglycemia, GERD, fecal and urinary incontinence who presents with progressively worsening sacral decubitus ulcer and inability of the family to provide care for the patient    Patient was brought to the ER by the daughter who is her primary caregiver.  Patient is mostly wheelchair and bedbound and has been having progressively worsening stage IV sacral ulcer decubitus ulcer.  She is homebound and uses Ginny lift.  Uses wheelchair to ambulate.  Patient was at nursing home previously but family felt she was not receiving adequate care and decided to transfer her to receive home care.  She was receiving OT and home health RN care and wound care for her pressure wound.      Today patient daughter states that she cannot provide further care for the patient and is hoping to get some help from staff.  Patient is currently receiving home health 3 times a week. . Patient's wounds have also been getting worse.  She has a left leg wound on the calf that is stage II ulcer and has a stage IV sacral ulcer that has gone through to the bone.  Daughter is hoping for memory care unit placement.  Patient also has progressive dementia with behavioral issues and has history of schizoaffective disorder.  She usually receives her care at [facility].  Reviewed her notes in care everywhere.  It appears that patient has been receiving escalating wound cares over the last 1 month for progressively worsening wounds.    Per the EMS report patient is currently living and failure to thrive environment and has been having progressively  worsening pressure wounds that have very foul-smelling.  Patient needs 24/7 care preferably in a memory care unit and the current set up is not adequate for the level of needs that the patient is having.    Patient herself reports that she is almost completely bedbound and uses diapers for bowel movements.  Is not even using wheelchair at this time.    1) progressively worsening stage IV sacral wound and stage II left calf wound  -Wound care consult placed  -Is foul-smelling.  -No evidence of active infection.  - blood culture done   -No leukocytosis.  Lactate within normal.  Afebrile.  - and care coordination consulted.  Patient will likely need placement to geriatric unit.  Unsafe to be discharged back home.    #2 poorly controlled type 2 diabetes mellitus with peripheral neuropathy  -On Lantus 35 units at home with a 10 to 14 units of aspart prior to meals.  Will place her on 30 units of Lantus in the morning and 10 units of aspart with meals and sliding scale insulin aspart with carb controlled diet.  -HbA1c 10.  -On gabapentin 600 mg 3 times daily  -Holding home Januvia.    #3 paroxysmal atrial fibrillation-patient is currently in sinus rhythm.  Anticoagulated with rivaroxaban.  Continue Xarelto 20 mg daily.    4.  Progressive dementia with behavioral disturbance in the setting of schizoaffective disorder  -Patient is at high risk for delirium.  -Maintain sleep-wake cycles.  -Avoid benzodiazepines.  -Continue venlafaxine 150 mg daily, topiramate 100 mg daily  -Currently oriented to place and person.  Answers all the questions appropriately.    #5 history of coronary artery disease with CHF  Hypertension  -on lisinopril 40 mg daily.-   -Last echo on 5/5/2022 showed EF of 55 to 60% with normal left ventricular cavity size.    6.  Hyperlipidemia-on atorvastatin 40 daily.    7.  History of DVT-on Xarelto.    #8 chronic pain-on Tylenol, Zanaflex.  As needed oxycodone as needed for pain control.    9.   Urinary incontinence-on tamsulosin 0.4 mg daily.    10.  Chronic kidney disease stage III-creatinine stable and at baseline.        Clinically Significant Risk Factors Present on Admission                  # Hypertension: home medication list includes antihypertensive(s)               DVT Prophylaxis: DOAC  Code Status: DNR / DNI    Disposition: Expected discharge when medically stable    Miesha Bojorquez MD, MD  [phone] (p)  [phone] (c)    Primary Care Physician   *Jade Bautista    Chief Complaint    Inability to take care of patient at home with progressively worsening pressure wounds    History is obtained from the patient and review of medical records.    History of Present Illness   Miley Tolbert is a 77 year old female with history of hypertension, CHF, atrial fibrillation on Xarelto, history of for lower extremity DVT, coronary artery disease, chronic kidney disease stage III, COPD, hypothyroidism, poorly controlled type 2 diabetes with hyperglycemia, GERD, fecal and urinary incontinence who presents with progressively worsening sacral decubitus ulcer and inability of the family to provide care for the patient    Patient was brought to the ER by the daughter who is her primary caregiver.  Patient is mostly wheelchair and bedbound and has been having progressively worsening stage IV sacral ulcer decubitus ulcer.  She is homebound and uses Ginny lift.  Uses wheelchair to ambulate.  Patient was at nursing home previously but family felt she was not receiving adequate care and decided to transfer her to receive home care.  She was receiving OT and home health RN care and wound care for her pressure wound.      Today patient daughter states that she cannot provide further care for the patient and is hoping to get some help from staff.  Patient's wounds have also been getting worse.  She has a left leg wound on the calf that is stage II ulcer and has a stage IV wound ulcer that has gone through to the  bone.  Daughter is hoping for memory care unit placement.  Patient also has progressive dementia with behavioral issues and has history of schizoaffective disorder.  She usually receives her care at [facility].  Reviewed her notes in care everywhere.  It appears that patient has been receiving escalating wound cares over the last 1 month for progressively worsening wounds.    Patient was admitted at [facility] from 5/18/2022 to 5/26/2022 for similar problems including inability to care for patient at home and failure to thrive with chronic stage IV sacral ulcer.  At that time patient was eventually discharged back to her daughters home with a Ginny lift, PCA services, Occupational Therapy and home health RN.    Per the EMS report patient is currently living and failure to thrive environment and has been having progressively worsening pressure wounds that have very foul-smelling.  Patient needs 24/7 care preferably in a memory care unit and the current set up is not adequate for the level of needs that the patient is having.      Past Medical History    I have reviewed this patient's medical history and updated it with pertinent information if needed.   Past Medical History:   Diagnosis Date     A-fib (H)      Chronic kidney disease, stage III (moderate) (H)      COPD (chronic obstructive pulmonary disease) (H)      Dementia (H)      Diabetes (H)      Dysphagia      Failure to thrive in adult      HTN (hypertension)      Hyperlipidemia LDL goal <100      Hypothyroidism      Major depression      Personal history of DVT (deep vein thrombosis)      Pressure sore      Schizophrenia (H)      Sepsis (H)      Past Surgical History   I have reviewed this patient's surgical history and updated it with pertinent information if needed.  History reviewed. No pertinent surgical history.  Prior to Admission Medications   Prior to Admission Medications   Prescriptions Last Dose Informant Patient Reported? Taking?    atorvastatin (LIPITOR) 40 MG tablet   Yes Yes   Sig: Take 40 mg by mouth daily   carboxymethylcellulose (REFRESH PLUS) 0.5 % SOLN ophthalmic solution   Yes Yes   Sig: Place 1 drop into both eyes 4 times daily   diclofenac (VOLTAREN) 1 % topical gel   Yes No   Si times daily.Apply topically to left knee four times a day for pain   gabapentin (NEURONTIN) 300 MG capsule   Yes Yes   Sig: Take 2 capsules by mouth 3 times daily   insulin glargine (LANTUS PEN) 100 UNIT/ML pen   Yes Yes   Sig: Inject 36 Units Subcutaneous   levothyroxine (SYNTHROID/LEVOTHROID) 175 MCG tablet   Yes Yes   Sig: Take 1 tablet by mouth daily   lisinopril (ZESTRIL) 40 MG tablet   Yes Yes   Sig: Take 40 mg by mouth   mirabegron (MYRBETRIQ) 25 MG 24 hr tablet   Yes Yes   Sig: Take 1 tablet by mouth daily      Facility-Administered Medications: None     Allergies   Allergies   Allergen Reactions     Celecoxib      Clonazepam      Insulin Lispro      Iodinated Contrast Media [Diagnostic X-Ray Materials]      Iodine      Phenobarbital      Phenytoin      Social History   I have reviewed this patient's social history and updated it with pertinent information if needed.   Miley Lowe     Family History   I have reviewed this patient's family history and updated it with pertinent information if needed.     Review of Systems   The 10 point Review of Systems is negative other than noted in the HPI or here.     Physical Exam   Temp: 98.9  F (37.2  C) Temp src: Oral BP: (!) 140/78 Pulse: 111   Resp: 20 SpO2: 96 % O2 Device: None (Room air)    Vital Signs with Ranges  Temp:  [98.9  F (37.2  C)] 98.9  F (37.2  C)  Pulse:  [111] 111  Resp:  [20] 20  BP: (140)/(78) 140/78  SpO2:  [96 %] 96 %  140 lbs 0 oz    Physical Exam  Constitutional:       Appearance: Normal appearance.   HENT:      Head: Normocephalic.   Eyes:      Pupils: Pupils are equal, round, and reactive to light.   Cardiovascular:      Rate and Rhythm: Normal rate and regular rhythm.       Pulses: Normal pulses.      Heart sounds: Normal heart sounds.   Pulmonary:      Effort: Pulmonary effort is normal. No respiratory distress.      Breath sounds: Normal breath sounds.   Abdominal:      General: Abdomen is flat. Bowel sounds are normal. There is no distension.      Tenderness: There is no abdominal tenderness. There is no guarding.   Musculoskeletal:         General: Normal range of motion.      Cervical back: Normal range of motion.      Comments: Contractures with her right hand   Skin:     General: Skin is warm and dry.      Comments: Stage II pressure injury over left calf and stage IV sacral wound, thickened skin over both her feet.  Her right leg is externally rotated.   Neurological:      General: No focal deficit present.   Psychiatric:         Mood and Affect: Mood normal.                 Data   Data reviewed today:  I personally reviewed the Wound image(s) showing Significant worsening.  Recent Labs   Lab 01/06/23  1538   WBC 6.7   HGB 11.9   MCV 84         POTASSIUM 3.9   CHLORIDE 108   CO2 23   BUN 19   CR 0.69   ANIONGAP 8   MELISSA 9.4   *       No results found for this or any previous visit (from the past 24 hour(s)).

## 2023-01-06 NOTE — ED TRIAGE NOTES
"Pt goes by \"travis\". Comes from home where home care and daughter care for pt. Pt significant medical hx- DM, schizo-affective, dementia w/ behavior issues, several very large wounds. Sacral wound is through to bone. Left leg wound on calf stage 2. Pt significant history of multiple episodes of sepsis. Daughter states she is unable to provide the care that the patient needs. Hoping for help from staff. Pt is wheelchair bound. Lives on top level of a large home up 8 flights of stairs. Daughter is seeking care for wounds, home care and memory care.     Triage Assessment     Row Name 01/06/23 1509       Triage Assessment (Adult)    Airway WDL WDL       Respiratory WDL    Respiratory WDL WDL       Skin Circulation/Temperature WDL    Skin Circulation/Temperature WDL WDL       Cardiac WDL    Cardiac WDL WDL       Peripheral/Neurovascular WDL    Peripheral Neurovascular WDL X       Cognitive/Neuro/Behavioral WDL    Cognitive/Neuro/Behavioral WDL X              "

## 2023-01-06 NOTE — ED PROVIDER NOTES
Patient seen at change of shift.  I met the paramedics, and received report from the, as well as briefly talked to the patient's daughter.  This is a 77-year-old female with history of schizoaffective disorder, dementia, who presents from home with concerns of worsening decubitus ulcers, and inadequate level of care.  Per full report from paramedic, the patient is receiving visits from home health about 3 times a week.  She has been in and out of nursing homes, but because of behavioral issues, including agitation and delusions, the patient does not do well in that setting.  She has been living at home with her daughter in a very large house on the second or third floor.  The patient is wheelchair-bound.  Daughter is at the bedside, and is appropriately caring for the patient, but admits that she is not able to provide adequate cares.      Patient was rolled, and images below demonstrate her chronic wounds.    Sacrum:        LLE:       On exam, the patient is alert, specific preferences about how to be moved in the bed.  She is breathing comfortably.  Abdomen is soft, nontender.  Wounds are as above.  She has chronic appearing thick scaly skin to bilateral feet.  Patient discussed with oncoming provider, Dr. Huynh, who will assume care.     Kay Kim MD  01/06/23 2008

## 2023-01-06 NOTE — ED NOTES
Patient and daughter refused urinary catheter at this time. Purewick was requested and applied. Daughter going home at this time.     Patient removed all monitoring devices earlier; however, remains cooperative and pleasant with redirection.

## 2023-01-06 NOTE — PHARMACY-ADMISSION MEDICATION HISTORY
Pharmacy Medication History  Admission medication history interview status for the 1/6/2023  admission is complete. See EPIC admission navigator for prior to admission medications     Location of Interview: Phone  Medication history sources: Patient's family/friend (pt's daughter, John Carr, [phone]) and Care Everywhere    Significant changes made to the medication list:      In the past week, patient estimated taking medication this percent of the time: unknown    Additional medication history information:   --  Spoke with pt's daughter, John, via phone as she was going through the medication bottles at home.  John set up her mother's medications for her.    Medication reconciliation completed by provider prior to medication history? Yes    Time spent in this activity: 30 minutes    Prior to Admission medications    Medication Sig Last Dose Taking? Auth Provider Long Term End Date   acetaminophen (TYLENOL) 500 MG tablet Take 500 mg by mouth 3 times daily NEW Yes Unknown, Entered By History     albuterol (PROAIR HFA/PROVENTIL HFA/VENTOLIN HFA) 108 (90 Base) MCG/ACT inhaler Inhale 1-2 puffs into the lungs every 6 hours as needed for shortness of breath, wheezing or cough prn Yes Unknown, Entered By History Yes    atorvastatin (LIPITOR) 40 MG tablet Take 40 mg by mouth At Bedtime 1/5/2023 Yes Reported, Patient Yes    calcium carbonate (OS-MELISSA) 1500 (600 Ca) MG tablet Take 600 mg by mouth 2 times daily 1/5/2023 Yes Unknown, Entered By History     carboxymethylcellulose (REFRESH PLUS) 0.5 % SOLN ophthalmic solution Place 1 drop into both eyes 4 times daily as needed for dry eyes prn Yes Reported, Patient     gabapentin (NEURONTIN) 300 MG capsule Take 2 capsules by mouth 3 times daily 1/5/2023 Yes Reported, Patient Yes    insulin aspart (NOVOLOG FLEXPEN) 100 UNIT/ML pen Inject 14-16 Units Subcutaneous 3 times daily (with meals) 1/5/2023 Yes Unknown, Entered By History Yes    insulin glargine (LANTUS PEN)  100 UNIT/ML pen Inject 34 Units Subcutaneous At Bedtime 1/5/2023 at hs Yes Reported, Patient Yes    levothyroxine (SYNTHROID/LEVOTHROID) 175 MCG tablet Take 175 mcg by mouth daily 1/5/2023 at am Yes Reported, Patient Yes    lisinopril (ZESTRIL) 40 MG tablet Take 40 mg by mouth daily 1/5/2023 at am Yes Reported, Patient Yes    mirabegron (MYRBETRIQ) 25 MG 24 hr tablet Take 25 mg by mouth At Bedtime 1/5/2023 at hs Yes Reported, Patient     rivaroxaban ANTICOAGULANT (XARELTO) 20 MG TABS tablet Take 20 mg by mouth daily (with breakfast) 1/5/2023 at am Yes Unknown, Entered By History Yes    senna (SENOKOT) 8.6 MG tablet Take 1 tablet by mouth every other day unknown Yes Unknown, Entered By History     sitagliptin (JANUVIA) 100 MG tablet Take 100 mg by mouth daily 1/5/2023 at am Yes Unknown, Entered By History Yes    tamsulosin (FLOMAX) 0.4 MG capsule Take 0.4 mg by mouth At Bedtime 1/5/2023 Yes Unknown, Entered By History     tiZANidine (ZANAFLEX) 2 MG tablet Take 2 mg by mouth 2 times daily as needed for muscle spasms NEW Yes Unknown, Entered By History     topiramate (TOPAMAX) 100 MG tablet Take 100 mg by mouth daily For seizures 1/5/2023 at am Yes Unknown, Entered By History Yes    venlafaxine (EFFEXOR XR) 150 MG 24 hr capsule Take 150 mg by mouth daily 1/5/2023 at am Yes Unknown, Entered By History Yes    VITAMIN D PO Take 1 tablet by mouth daily OTC - dose is unknown to patient. 1/5/2023 Yes Unknown, Entered By History         The information provided in this note is only as accurate as the sources available at the time of update(s)

## 2023-01-06 NOTE — ED NOTES
Bed: ED08  Expected date:   Expected time:   Means of arrival:   Comments:  513  76 F alert to baseline/sig wound to backside/needs care  2607

## 2023-01-06 NOTE — ED NOTES
[facility]  ED Nurse Handoff Report    ED Chief complaint: Wound Infection      ED Diagnosis:   Final diagnoses:   Pressure injury of skin of sacral region, unspecified injury stage   Generalized weakness       Code Status: Full Code    Allergies:   Allergies   Allergen Reactions     Celecoxib      Clonazepam      Insulin Lispro      Iodinated Contrast Media [Diagnostic X-[employer] Materials]      Iodine      Phenobarbital      Phenytoin        Patient Story: Presents with failure to thrive as evidenced by decreased PO intake. Additionally, daughter is concerned for a UTI and worsening decubitus ulcer.  Focused Assessment:  Alert, disoriented to time and forgetful. IV in R lower arm. Purewick in place. Patient is total cares.     Labs Ordered and Resulted from Time of ED Arrival to Time of ED Departure   BASIC METABOLIC PANEL - Abnormal       Result Value    Sodium 139      Potassium 3.9      Chloride 108      Carbon Dioxide (CO2) 23      Anion Gap 8      Urea Nitrogen 19      Creatinine 0.69      Calcium 9.4      Glucose 305 (*)     GFR Estimate 88     CBC WITH PLATELETS AND DIFFERENTIAL - Abnormal    WBC Count 6.7      RBC Count 4.59      Hemoglobin 11.9      Hematocrit 38.6      MCV 84      MCH 25.9 (*)     MCHC 30.8 (*)     RDW 14.6      Platelet Count 325      % Neutrophils 76      % Lymphocytes 16      % Monocytes 6      % Eosinophils 1      % Basophils 1      % Immature Granulocytes 0      NRBCs per 100 WBC 0      Absolute Neutrophils 5.1      Absolute Lymphocytes 1.1      Absolute Monocytes 0.4      Absolute Eosinophils 0.1      Absolute Basophils 0.0      Absolute Immature Granulocytes 0.0      Absolute NRBCs 0.0     ISTAT GASES LACTATE VENOUS POCT - Abnormal    Lactic Acid POCT 1.8      Bicarbonate Venous POCT 22      O2 Sat, Venous POCT 45 (*)     pCO2V Venous POCT 38 (*)     pH Venous POCT 7.36      pO2 Venous POCT 26     ROUTINE UA WITH MICROSCOPIC REFLEX TO CULTURE   BLOOD CULTURE   BLOOD  CULTURE       No orders to display         Treatments and/or interventions provided: Monitoring  Patient's response to treatments and/or interventions: Tolerated well    To be done/followed up on inpatient unit:  Continue with plan of care per admitting MD.      Does this patient have any cognitive concerns?: Baseline dementia, Forgetful and Disoriented to time    Activity level - Baseline/Home:  Total Care  Activity Level - Current:   Total Care    Patient's Preferred language: English   Needed?: No    Isolation: None  Infection: Not Applicable  Patient tested for COVID 19 prior to admission: NO  Bariatric?: No    Vital Signs:   Vitals:    01/06/23 1505   BP: (!) 140/78   Pulse: 111   Resp: 20   Temp: 98.9  F (37.2  C)   TempSrc: Oral   SpO2: 96%   Weight: 63.5 kg (140 lb)       Cardiac Rhythm:Cardiac Rhythm: Sinus tachycardia    Was the PSS-3 completed:   Yes  What interventions are required if any?               Family Comments: Daughter went home  OBS brochure/video discussed/provided to patient/family: N/A                For the majority of the shift this patient's behavior was Green.     ED NURSE PHONE NUMBER: *[phone]

## 2023-01-07 PROBLEM — L89.159 PRESSURE INJURY OF SKIN OF SACRAL REGION, UNSPECIFIED INJURY STAGE: Status: ACTIVE | Noted: 2023-01-01

## 2023-01-07 PROBLEM — R53.1 GENERALIZED WEAKNESS: Status: ACTIVE | Noted: 2023-01-01

## 2023-01-07 PROBLEM — R73.9 HYPERGLYCEMIA: Status: ACTIVE | Noted: 2023-01-01

## 2023-01-07 NOTE — PROGRESS NOTES
[facility]    Medicine Progress Note - Hospitalist Service    Date of Admission:  1/6/2023    RAPID RESPONSE CALLED JUST PRIOR TO SEEING PATIENT (11:45 AM).  Patient hypotensive (manually checked by nursing staff).  Will bolus one liter normal saline.  Will initiate Zosyn, vancomycin IV STAT.  Case discussed with rapid response RANJANA.  Abx should likely have been started on admission.    Assessment & Plan   Miley Tolbert is a 77 year old female with history of hypertension, CHF, atrial fibrillation on Xarelto, history of for lower extremity DVT, coronary artery disease, chronic kidney disease stage III, COPD, hypothyroidism, poorly controlled type 2 diabetes with hyperglycemia, GERD, fecal and urinary incontinence who presents with progressively worsening sacral decubitus ulcer and inability of the family to provide care for the patient     Patient was brought to the ER by the daughter who is her primary caregiver.  Patient is mostly wheelchair and bedbound and has been having progressively worsening stage IV sacral ulcer decubitus ulcer.  She is homebound and uses Ginny lift.  Uses wheelchair to ambulate.  Patient was at nursing home previously but family felt she was not receiving adequate care and decided to transfer her to receive home care.  She was receiving OT and home health RN care and wound care for her pressure wound.       Today patient daughter states that she cannot provide further care for the patient and is hoping to get some help from staff.  Patient is currently receiving home health 3 times a week. . Patient's wounds have also been getting worse.  She has a left leg wound on the calf that is stage II ulcer and has a stage IV sacral ulcer that has gone through to the bone.  Daughter is hoping for memory care unit placement.  Patient also has progressive dementia with behavioral issues and has history of schizoaffective disorder.  She usually receives her [facility] at  [facility].  Reviewed her notes in care everywhere.  It appears that patient has been receiving escalating wound cares over the last 1 month for progressively worsening wounds.     Per the EMS report patient is currently living and failure to thrive environment and has been having progressively worsening pressure wounds that have very foul-smelling.  Patient needs 24/7 care preferably in a memory care unit and the current set up is not adequate for the level of needs that the patient is having.     Patient herself reports that she is almost completely bedbound and uses diapers for bowel movements.  Is not even using wheelchair at this time.     #Progressively worsening stage IV sacral wound and stage II left calf wound  -- Wound care consult placed  -- Wound is foul smelling  -- Antibiotics should likely have been initiated on admission  -- Monitor blood cultures  -- Check STAT CBC, CMP, lactic acid  --  and care coordination consulted.  -- Patient will likely need placement to geriatric unit.  -- Unsafe to be discharged back home.     #Poorly controlled type 2 diabetes mellitus with peripheral neuropathy  -- On Lantus 35 units at home with a 10 to 14 units of aspart prior to meals.  Will place her on 30 units of Lantus in the morning and 10 units of aspart with meals and sliding scale insulin aspart with carb controlled diet.  -- HbA1c 10.  -- On gabapentin 600 mg 3 times daily  -- Holding home Januvia.     #Paroxysmal atrial fibrillation  -- Patient is currently in sinus rhythm.  -- Anticoagulated with rivaroxaban.  -- Continue Xarelto 20 mg daily.     #Progressive dementia with behavioral disturbance in the setting of schizoaffective disorder  -- Patient is at high risk for delirium.  -- Maintain sleep-wake cycles.  -- Avoid benzodiazepines.  -- Continue venlafaxine 150 mg daily, topiramate 100 mg daily  -- Currently oriented to place and person.  Answers all the questions  appropriately.     #Hx coronary artery disease with CHF  #Hypertension  -- Lisinopril held in the setting of hypotension  -- Last echo on 5/5/2022 showed EF of 55 to 60% with normal left ventricular cavity size     #Hyperlipidemia  -- Continue atorvastatin 40 daily     #History of DVT  -- Continue Xarelto.     #Chronic pain  -- Continue ylenol, Zanaflex.  As needed oxycodone as needed for pain control.     #Urinary incontinence  -- On tamsulosin 0.4 mg daily.     #Chronic kidney disease, stage III  -- Creatinine stable and at baseline       Diet: Moderate Consistent Carb (60 g CHO per Meal) Diet    DVT Prophylaxis: DOAC  Espino Catheter: PRESENT, indication:    Lines: None     Cardiac Monitoring: None  Code Status: No CPR- Do NOT Intubate      Clinically Significant Risk Factors Present on Admission               # Drug Induced Coagulation Defect: home medication list includes an anticoagulant medication    # Hypertension: home medication list includes antihypertensive(s)     # DMII: A1C = 10.0 % (Ref range: 0.0 - 5.6 %) within past 3 months            Disposition Plan      Expected Discharge Date: 01/09/2023        Discharge Comments: placement; was living with daughter; has multiple wounds.   SW/CC  WOC consult          Johnny Vega DO, MHS  Hospitalist Service  [facility]  Securely message with [employer] (more info)  Text page via [employer] Paging/Directory   ______________________________________________________________________    Interval History   Rapid response called (please see above).  Patient reports she feels like the lights are going out.  Patient is DNR/DNI.  Unable to obtain further ROS from patient at present 2/2 altered mental status.    Physical Exam   Vital Signs: Temp: 98.3  F (36.8  C) Temp src: Oral BP: (!) 85/45 Pulse: 90   Resp: 18 SpO2: 97 % O2 Device: None (Room air)    Weight: 155 lbs 6.4 oz    GENERAL: Alert; awake; well-nourished; mentation appears.  HEENT:  Normocephalic; atraumatic; PERRLA; MMM.  CV: RRR; normal S1, S2; no rubs, murmurs, or gallops.  RESP: Lung fields clear to aucultation B/L; no wheezing or crepitations.  GI: Abdomen is soft, nontender, nondistended; no organomegaly; normal bowel sounds.  : Deferred genital examination.   MSK: Dressing over right calf.  DERM: Did not closely examine backside.  NEURO: No focal deficits appreciated; strength & sensorium are grossly intact.  PSYCH: No active hallucinations; affect, insight appear within normal limits.    Medical Decision Making       60 MINUTES SPENT BY ME on the date of service doing chart review, history, exam, documentation & further activities per the note.      Data     I have personally reviewed the following data over the past 24 hrs:    6.7  \   11.2 (L)   / 302     140 108 18 /  183 (H)   3.6 22 0.71 \       TSH: N/A T4: N/A A1C: 10.0 (H)       Procal: N/A CRP: N/A Lactic Acid: 1.8         Imaging results reviewed over the past 24 hrs:   No results found for this or any previous visit (from the past 24 hour(s)).

## 2023-01-07 NOTE — PROGRESS NOTES
Observation Goals:     -diagnostic tests and consults completed and resulted  - Partially Met     -vital signs normal or at patient baseline - Met

## 2023-01-07 NOTE — PROVIDER NOTIFICATION
MD Notification    Notified Person: MD    Notified Person Name: SHU ALVAREZ     Notification Date/Time: 01/07/23 0110    Notification Interaction: amcom    Purpose of Notification: 75[address] A.B.  Just FYI, pt is bedbound and has stage 4 sacral ulcer and stage 3 pressure ulcer on right leg. Can I get a pulsate mattress ordered on this patient? thanks Terra RN *[id number]    Orders Received:    Comments:

## 2023-01-07 NOTE — CODE/RAPID RESPONSE
"[facility]    RRT Note  1/7/2023   Time Called: 1143am    RRT called for: Hypotension    Assessment & Plan   IMPRESSION & PLAN:    Miley Tolbert is a 77 year old female w/ PMH of hypertension, heart failure NOS, A. fib on DOAC, DVT, CAD LVAD, CKD 3, COPD, hypothyroidism, poorly controlled DM2, progressive dementia and schizoaffective disorder who was admitted on 1/6/2023 for inability to be cared for at home in setting of multiple stage IV ulcers.  She was initially registered to observation.    On the a.m. of 1/7/2023 nursing assistant was talking with patient who reported that she was \"passing out\" and \"sun going out\".  NA checked BP which was 75/40, recheck was 67/48.  RN was notified and RRT was activated.  Around the very same time hospitalist attending physician came to round on patient as well started IV fluid bolus and broad-spectrum antimicrobials.    On my arrival patient reports she feels that she is \"floating\".  She is a \"little dizzy\" but denies chest pain or dyspnea.  She does endorse feeling \"foggy\".  BP was 67/48.  That is on the left upper extremity as well in a semi-Cerrato's position.  When laid supine and BP is checked on the right upper extremity BP is 82/46 subsequent BPs are 85/45, 102/44, 100/43.  SPO2 96-99% on room air, heart rate 88, oral temp 97.8 patient is awake and talking.  She is awake talking, is able to follow commands to wiggle toes and stick out her tongue.  Skin is warm, no appreciable mottling    Impression  Shock state/ hypotension, acute symptomatic  Near syncope  Differential diagnosis:  -Likely septic shock possibly from skin and soft tissue infection, considered urinary source but UA was remarkably unremarkable  - Medications may be playing a role, patient received 40 mg lisinopril on the a.m. of 1/7/2023  - Considered hypovolemia, she been eating and drinking well, no bleeding but may be having some degree of insensible losses from her " wounds  - considered ACS, cardiogenic shock    INTERVENTIONS:  -stat IVF bolus of 1L NS as ordered by rounding hospitalist attending provider  -stat ekg  -stat gluc 183mg/dL  -atbx (Zosyn, vancomycin) as ordered by rounding hospitalist attending provider  -hold acei and flomax  -stat CMP, CBC  -30mL/kg IVF is 2100mL, can reevaluate when 1L is complete; if more volume needed will use LR  -bld cx already in progress from 1/6/2023    Working diagnosis: Likely septic shock from skin and soft tissue infection    At the end of the RRTBP is 113/53, more talkative    Disposition:  Continue current level of care    Discussed with and defer further cares to hospitalist attending physician Dr. Johnny Vega     Addendum:  Labs notable for LA 2.3, hgb 10.7, K3.3    Code Status: No CPR- Do NOT Intubate    Allergies   Allergies   Allergen Reactions     Celecoxib      Clonazepam      Insulin Lispro      Iodinated Contrast Media [Diagnostic X-Ray Materials]      Iodine      Phenobarbital      Phenytoin        Physical Exam   Vital Signs with Ranges:  Temp:  [98.3  F (36.8  C)-99.8  F (37.7  C)] 98.3  F (36.8  C)  Pulse:  [] 90  Resp:  [18-20] 18  BP: ()/(45-78) 85/45  SpO2:  [96 %-100 %] 97 %  No intake/output data recorded.    Constitutional: vs as above and/or per EMR  General:  adult pt lying in bed without acute distress   GCS:   Motor 6=Obeys commands   Verbal 4=Confused   Eye Opening 4=Spontaneous   Total: 14       Neuro: +follows commands wiggle toes and show 2 fingers bilat, face symmetric, tongue midline, speech fluent  Eyes pupils equal round 3mm briskly reactive bilat, sclera nonicteric, noninjected, conjunctiva pink,  Head, ENT & mouth: NC/AT,  mouth moist oral mucosa  Neck: supple  CV S1S2  resp: CTAB upper and lower lobes  gi:normoactive bowel sounds, soft, nontender, nondisteded  Ext: no edema or mottling  Skin: no rashes on exposed skin  Lymph: defer  Musculoskeletal no bony joint deformities      Data      EKG:  Interpreted by ELIU Vidales CNP  Time reviewed: 1216pm  Symptoms at time of EKG: presyncope   Rhythm: normal sinus   Rate: Normal  Axis: Normal  Ectopy: none  Conduction: normal  ST Segments/ T Waves: Non-specific ST-T wave changes  Q Waves: none  Comparison to prior: Unchanged from 1/6/23    Clinical Impression: NSR w/ nonspecific T wave changes  ABG:  -  Recent Labs   Lab 01/06/23  1540   PH 7.36     CBC with Diff:  Recent Labs   Lab Test 01/07/23  0947   WBC 6.7   HGB 11.2*   MCV 83           Lactic Acid:    2.3    Comprehensive Metabolic Panel:  Recent Labs   Lab 01/07/23  1148 01/07/23  0947   NA  --  140   POTASSIUM  --  3.6   CHLORIDE  --  108   CO2  --  22   ANIONGAP  --  10   * 195*   BUN  --  18   CR  --  0.71   GFRESTIMATED  --  86   MELISSA  --  9.0       UA:  Recent Labs   Lab 01/07/23  0656   COLOR Yellow   APPEARANCE Clear   URINEGLC 70*   URINEBILI Negative   URINEKETONE Negative   SG 1.024   UBLD Moderate*   URINEPH 5.5   PROTEIN 10*   NITRITE Negative   LEUKEST Trace*   RBCU 12*   WBCU 3     Time Spent on this Encounter   I spent 38 minutes (1147am - 1225pm) of critical care time on the unit/floor managing the care of Miley Tolbert. Upon evaluation, this patient had a high probability of imminent or life-threatening deterioration due to shock state , which required my direct attention, intervention, and personal management including review of ekg, volume expansion 100% of my time was spent at the bedside counseling the patient and/or coordinating care regarding services listed in this note.    Belkys Bates CNP  Hospitalist [place] RANJANA  [phone]

## 2023-01-07 NOTE — PHARMACY-VANCOMYCIN DOSING SERVICE
"Pharmacy Vancomycin Initial Note  Date of Service 2023  Patient's  1945  77 year old, female    Indication: Skin and Soft Tissue Infection    Current estimated CrCl = Estimated Creatinine Clearance: 61.9 mL/min (based on SCr of 0.71 mg/dL).    Creatinine for last 3 days  2023:  3:38 PM Creatinine 0.69 mg/dL  2023:  9:47 AM Creatinine 0.71 mg/dL    Recent Vancomycin Level(s) for last 3 days  No results found for requested labs within last 72 hours.      Vancomycin IV Administrations (past 72 hours)      No vancomycin orders with administrations in past 72 hours.                Nephrotoxins and other renal medications (From now, onward)    Start     Dose/Rate Route Frequency Ordered Stop    23 1300  vancomycin (VANCOCIN) 1,500 mg in 0.9% NaCl 250 mL intermittent infusion         1,500 mg  over 90 Minutes Intravenous EVERY 24 HOURS 23 1223      23 1230  vancomycin (VANCOCIN) 2,000 mg in 0.9% NaCl 500 mL intermittent infusion         2,000 mg  over 2 Hours Intravenous ONCE 23 1223      23 1200  piperacillin-tazobactam (ZOSYN) 3.375 g vial to attach to  mL bag        Note to Pharmacy: For [facility], [facility] and [facility]: For Zosyn-naive patients, use the \"Zosyn initial dose + extended infusion\" order panel.    3.375 g  over 30 Minutes Intravenous EVERY 6 HOURS 23 1149      23 0800  [Held by provider]  lisinopril (ZESTRIL) tablet 40 mg        (Held by provider since Sat 2023 at 1159 by Belkys Bates, ELIU CNP.Hold Reason: Change in Vitals)   Note to Pharmacy: PTA Sig:Take 40 mg by mouth      40 mg Oral DAILY 23 2241            Contrast Orders - past 72 hours (72h ago, onward)    None          [employer] Prediction of Planned Initial Vancomycin Regimen     Loading dose: 2000 mg at 13:00 2023.  Regimen: 1500 mg IV every 24 hours.  Start time: 12:22 on 2023  Exposure target: AUC24 (range)400-600 mg/L.hr   AUC24,ss: 496 mg/L.hr  Probability of " AUC24 > 400: 73 %  Ctrough,ss: 13.6 mg/L  Probability of Ctrough,ss > 20: 21 %  Probability of nephrotoxicity (Lodise PAIGE 2009): 9 %       Plan:  1. Start vancomycin  91079 mg IV q24h after an initial 2gm loading dose.   2. Vancomycin monitoring method: AUC  3. Vancomycin therapeutic monitoring goal: 400-600 mg*h/L  4. Pharmacy will check vancomycin levels as appropriate in 1-3 Days.    5. Serum creatinine levels will be ordered daily for the first week of therapy and at least twice weekly for subsequent weeks.      Paula Ramirez, [facility]

## 2023-01-07 NOTE — PROGRESS NOTES
RECEIVING UNIT ED HANDOFF REVIEW    ED Nurse Handoff Report was reviewed by: Jenny Soto RN on January 6, 2023 at 10:30 PM

## 2023-01-07 NOTE — PROGRESS NOTES
Observation Goals:    -diagnostic tests and consults completed and resulted  - in progress, awaiting WOC consult    -vital signs normal or at patient baseline - vitally stable and on room air, except BP at 165/71. Hx of CKD3.

## 2023-01-07 NOTE — PROGRESS NOTES
Shift: 5685-0861  Orientation/Cognitive: AOx3, disoriented to place  Observation Goals (Met/ Not Met): not met, awaiting WOC consult  Mobility Level/Assist Equipment: A2 lift  Fall Risk (Y/N): yes  Behavior Concerns: calm and cooperative  Pain Management: constant sharp pain on left hip, calf, and bilateral feet. Pain on buttock.  Tele/VS/O2: vitally stable and on room air. Hx of CKD3. /60 (BP Location: Right arm)   Pulse 87   Temp 98.5  F (36.9  C) (Oral)   Resp 18   Wt 70.5 kg (155 lb 6.4 oz)   SpO2 98%     ABNL Lab/BG:  at 0200; urinalysis with UA culture - pending result  Diet: moderate carb 60g diet  Bowel/Bladder: incontinent bowel and bladder, Bladder scanned at 0230 = 336ml urine retention, encouraged to urinate - unable to void. Placed manrique catheter - UO from drainage urine bag close to 360ml.  Skin Concerns: stage 4 pressure ulcer on coccyx - cleanse with normal saline, applied foam dressing, stage 3 pressure ulcer on right lateral calf, re-inforced foam dressing, previous foam dressing 75% soaked with serous.  Drains/Devices: PIV SL and purewick  Tests/Procedures for next shift: none  Anticipated DC date & active delays: TBD  Patient Stated Goal for Today: pain and wound treatment    Observation Goals:    -diagnostic tests and consults completed and resulted  - in progress, awaiting WOC consult    -vital signs normal or at patient baseline - vitally stable and on room air, except BP at /60. Hx of CKD3.

## 2023-01-08 NOTE — PLAN OF CARE
Goal Outcome Evaluation:         Orientation/Cognitive: WDL  Observation Goals (Met/ Not Met): N/A; pt is inpatient  Mobility Level/Assist Equipment: 2-3 assist; lift  Fall Risk (Y/N): Yes  Behavior Concerns: None  Pain Management: Pt denied pain except for some with turning  Tele/VS/O2: AVSS; RA; no Tele  ABNL Lab/BG: Hgb 10.7  Diet: Mod/Carb  Bowel/Bladder: Voiding in good amounts via manrique; 1 moderate brown, formed, stool, and stool smear x 1overnight  Skin Concerns: Very large sacral/coccyx wound; red with a small amount of serosanguinous drainage; area cleaned/redressed x 2 after each bowel movement; R lateral calf wound with mepilex in place  Drains/Devices: Manrique  Tests/Procedures for next shift: WOCN, CM/SW consults ordered  Anticipated DC date & active delays: To be decided  Patient Stated Goal for Today: None given

## 2023-01-08 NOTE — PROGRESS NOTES
[facility]    Medicine Progress Note - Hospitalist Service    Date of Admission:  1/6/2023    Assessment & Plan   Miley Tolbert is a 77 year old female with history of hypertension, CHF, atrial fibrillation on Xarelto, history of for lower extremity DVT, coronary artery disease, chronic kidney disease stage III, COPD, hypothyroidism, poorly controlled type 2 diabetes with hyperglycemia, GERD, fecal and urinary incontinence who presents with progressively worsening sacral decubitus ulcer and inability of the family to provide care for the patient     Patient was brought to the ER by the daughter who is her primary caregiver.  Patient is mostly wheelchair and bedbound and has been having progressively worsening stage IV sacral ulcer decubitus ulcer.  She is homebound and uses Ginny lift.  Uses wheelchair to ambulate.  Patient was at nursing home previously but family felt she was not receiving adequate care and decided to transfer her to receive home care.  She was receiving OT and home health RN care and wound care for her pressure wound.       Today patient daughter states that she cannot provide further care for the patient and is hoping to get some help from staff.  Patient is currently receiving home health 3 times a week. . Patient's wounds have also been getting worse.  She has a left leg wound on the calf that is stage II ulcer and has a stage IV sacral ulcer that has gone through to the bone.  Daughter is hoping for memory care unit placement.  Patient also has progressive dementia with behavioral issues and has history of schizoaffective disorder.  She usually receives her care at [facility].  Reviewed her notes in care everywhere.  It appears that patient has been receiving escalating wound cares over the last 1 month for progressively worsening wounds.     Per the EMS report patient is currently living and failure to thrive environment and has been having  progressively worsening pressure wounds that have very foul-smelling.  Patient needs 24/7 care preferably in a memory care unit and the current set up is not adequate for the level of needs that the patient is having.     Patient herself reports that she is almost completely bedbound and uses diapers for bowel movements.  Is not even using wheelchair at this time.     #Progressively worsening stage IV sacral wound and stage II left calf wound  -- Wound care consult placed  -- Wound is foul smelling  -- Antibiotics should likely have been initiated on admission  -- Continue Zosyn, vancomycin IV for now  -- Monitor blood cultures  -- Check STAT CBC, CMP, lactic acid  --  and care coordination consulted.  -- Patient will likely need placement to geriatric unit.  -- Unsafe to be discharged back home  -- Increase oxycodone from 2.5 to 5 mg PO q4h     #Poorly controlled type 2 diabetes mellitus with peripheral neuropathy  -- On Lantus 35 units at home with a 10 to 14 units of aspart prior to meals.  Will place her on 30 units of Lantus in the morning and 10 units of aspart with meals and sliding scale insulin aspart with carb controlled diet.  -- HbA1c 10.  -- On gabapentin 600 mg 3 times daily  -- Holding home Januvia.     #Paroxysmal atrial fibrillation  -- Patient is currently in sinus rhythm.  -- Anticoagulated with rivaroxaban.  -- Continue Xarelto 20 mg daily.     #Progressive dementia with behavioral disturbance in the setting of schizoaffective disorder  -- Patient is at high risk for delirium.  -- Maintain sleep-wake cycles.  -- Avoid benzodiazepines.  -- Continue venlafaxine 150 mg daily, topiramate 100 mg daily  -- Currently oriented to place and person.  Answers all the questions appropriately.     #Hx coronary artery disease with CHF  #Hypertension  -- Lisinopril held in the setting of hypotension  -- Last echo on 5/5/2022 showed EF of 55 to 60% with normal left ventricular cavity  "size     #Hyperlipidemia  -- Continue atorvastatin 40 daily     #History of DVT  -- Continue Xarelto.     #Chronic pain  -- Continue ylenol, Zanaflex.  As needed oxycodone as needed for pain control.     #Urinary incontinence  -- On tamsulosin 0.4 mg daily.     #Chronic kidney disease, stage III  -- Creatinine stable and at baseline       Diet: Moderate Consistent Carb (60 g CHO per Meal) Diet    DVT Prophylaxis: DOAC  Espino Catheter: PRESENT, indication: Wound Healing  Lines: None     Cardiac Monitoring: None  Code Status: No CPR- Do NOT Intubate      Clinically Significant Risk Factors Present on Admission        # Hypokalemia: Lowest K = 3.3 mmol/L in last 2 days, will replace as needed    # Hypercalcemia: corrected calcium is >10.1, will monitor as appropriate    # Hypoalbuminemia: Lowest albumin = 2.4 g/dL at 1/8/2023  7:01 AM, will monitor as appropriate  # Drug Induced Coagulation Defect: home medication list includes an anticoagulant medication    # Hypertension: home medication list includes antihypertensive(s)     # DMII: A1C = 10.0 % (Ref range: 0.0 - 5.6 %) within past 3 months    # Overweight: Estimated body mass index is 26.67 kg/m  as calculated from the following:    Height as of this encounter: 1.626 m (5' 4\").    Weight as of this encounter: 70.5 kg (155 lb 6.4 oz).           Disposition Plan      Expected Discharge Date: 01/11/2023        Discharge Comments: placement; was living with daughter; has multiple wounds.   SW/CC  WOC consult          Johnny Vega DO  Hospitalist Service  [facility]  Securely message with Raegan (more info)  Text page via [facility] Paging/Directory   ______________________________________________________________________    Interval History   Patient reports feeling improved today.  Patient reports \"rectal\" pain that just came on.  Discussed care plan with nursing staff.  Per nursing staff, patient ate 75% of breakfast.  Per nursing staff, patient is " drinking adequately.    Physical Exam   Vital Signs: Temp: 98.4  F (36.9  C) Temp src: Oral BP: 104/58 Pulse: 90   Resp: 16 SpO2: 98 % O2 Device: None (Room air)    Weight: 155 lbs 6.4 oz    GENERAL: Alert and oriented x 3; no acute distress; well-nourished.  HEENT: Normocephalic; atraumatic; PERRLA; MMM.  CV: RRR; normal S1, S2; (+) murmur.  RESP: Lung fields clear to aucultation B/L; no wheezing or crepitations.  GI: Abdomen is soft, nontender, nondistended; no organomegaly; normal bowel sounds.  : Deferred genital examination.   MSK: No clubbing, cyanosis, or edema.  DERM: Did not closely examine backside.  NEURO: No focal deficits appreciated; strength & sensorium are grossly intact.  PSYCH: No active hallucinations; affect, insight appear within normal limits.    Medical Decision Making       50 MINUTES SPENT BY ME on the date of service doing chart review, history, exam, documentation & further activities per the note.      Data     I have personally reviewed the following data over the past 24 hrs:    6.1  \   10.7 (L)   / 263     139 112 (H) 21 /  164 (H)   4.1 22 0.81 \       ALT: 9 AST: 12 AP: 100 TBILI: 0.7   ALB: 2.4 (L) TOT PROTEIN: 6.0 (L) LIPASE: N/A       Procal: N/A CRP: N/A Lactic Acid: 1.5         Imaging results reviewed over the past 24 hrs:   No results found for this or any previous visit (from the past 24 hour(s)).

## 2023-01-08 NOTE — PROGRESS NOTES
Orientation/Cognitive: A&Ox3. Disoriented to placed.  Observation Goals (Met/ Not Met): Inpatient  Mobility Level/Assist Equipment: A-2 reposition, Lift  Fall Risk (Y/N): Y  Behavior Concerns: None  Pain Management: PRN Oxy 2.5mg. Ix Ketorolac given.  Tele/VS/O2: b/p 149/54  ABNL Lab/B  Diet: Mod. Carb  Bowel/Bladder: Incont. Bowel.  Skin Concerns: Pressure injury to RLE and Sacrum/buttocks  Drains/Devices: Espino Catheter  Tests/Procedures for next shift: AM Lab draw BMP & CBC  Anticipated DC date & active delays: TBD  Patient Stated Goal for Today: Pain management

## 2023-01-08 NOTE — PROGRESS NOTES
Orientation/Cognitive: A/OX4.  Observation Goals (Met/ Not Met):  inpatient  Mobility Level/Assist Equipment: 2 assist; lift. Not OOB this shift.   Fall Risk (Y/N): Yes  Behavior Concerns: None  Pain Management: Zanaflex,Gabapentin and Oxy  Tele/VS/O2: Soft BP, other VSS on RA.   ABNL Lab/BG: Hgb 10.7  Diet: Mod/Carb  Bowel/Bladder: Espino catheter patent.   Skin Concerns:Sacral/coccyx wound; R lateral calf wound with mepilex in place  Drains/Devices: Espino Catheter , PIV w/ bolus fluid infusing.   Tests/Procedures for next shift: WOCN, CM/SW consults ordered  Anticipated DC date & active delays: TBD  Patient Stated Goal for Today: Pain management.

## 2023-01-08 NOTE — PROVIDER NOTIFICATION
MD Notification    Notified Person: MD    Notified Person Name: Rl Goodman COLUNGA    Notification Date/Time: 1/7/23 2133    Notification Interaction: [email or web address]    Purpose of Notification: Pain management. Pt refusing prn tylenol. Requesting for alternative pain regimen.    Orders Received: 650 mg Tylenol ordered.    Comments:

## 2023-01-09 NOTE — PROGRESS NOTES
[facility]  Medicine Progress Note - Hospitalist Service    Date of Admission:  1/6/2023    Assessment & Plan   Miley Tolbert is a 77 year old female with history of hypertension, CHF, atrial fibrillation on Xarelto, history of for lower extremity DVT, coronary artery disease, chronic kidney disease stage III, COPD, hypothyroidism, poorly controlled type 2 diabetes with hyperglycemia, GERD, fecal and urinary incontinence who presented with progressively worsening sacral decubitus ulcer and inability of the family to provide care for the patient.       #Progressively worsening stage IV sacral wound and stage II left calf and heel wounds  # suspected sepsis, no obvious source  Reportedly wound was foul smelling, soiled with urine/stool. No fever, and WBC normal at presentation. Hypotensive a day after admission after receiving lisinopril.   - Presumed sacral wound infection and started on broad spectrum abx, although does not appear infected. Wound on Lt heel appears concerning.   - Check procal, CRP. Lt heel XR and podiatry consult.  - blood cultures negative, WBC normal, discontinue vancomycin. Will continue zosyn.   - Wound care consulted and following, appreciate assistance.   -  and care coordination consulted for discharge planning, will need placement to geriatric unit as daughter unable to care for her and unsafe to be discharged back home  - PRN oxycodone for pain control.      #Poorly controlled type 2 diabetes mellitus with peripheral neuropathy: HbA1C 10  On Lantus 35 units with a 10 to 14 units of aspart prior to meals at home.  - Continue Lantus 30 units qAM and 10 units of aspart with meals and sliding scale insulin aspart with carb controlled diet.  -- On gabapentin 600 mg 3 times daily  -- Holding home Januvia.  -- BS 70-190s     #Paroxysmal atrial fibrillation  In sinus rhythm.  -- Anticoagulated with rivaroxaban, resumed.     #Progressive dementia with  "behavioral disturbance in the setting of schizoaffective disorder  -- Patient is at high risk for delirium.  -- Maintain sleep-wake cycles.  -- Avoid benzodiazepines.  -- Continue venlafaxine 150 mg daily, topiramate 100 mg daily  -- Currently oriented to place and person.  Answers all the questions appropriately.     #Hx coronary artery disease with CHF  #Hypertension  -- Lisinopril held in the setting of hypotension  -- Last echo on 5/5/2022 showed EF of 55 to 60% with normal left ventricular cavity size     #Hyperlipidemia  -- Continue atorvastatin 40 daily     #History of DVT  -- Continue Xarelto.     #Chronic pain  -- Continue ylenol, Zanaflex. As needed oxycodone as needed for pain control.     #Urinary incontinence  -- On tamsulosin 0.4 mg daily.  -- manrique catheter placed given worsening decub and contamination with urine.      #Chronic kidney disease, stage III  -- Creatinine stable and at baseline       Diet: Moderate Consistent Carb (60 g CHO per Meal) Diet    DVT Prophylaxis: DOAC  Manrique Catheter: PRESENT, indication: Pressure Ulcer with Incontinence  Lines: None     Cardiac Monitoring: None  Code Status: No CPR- Do NOT Intubate      Clinically Significant Risk Factors              # Hypoalbuminemia: Lowest albumin = 2.4 g/dL at 1/8/2023  7:01 AM, will monitor as appropriate           # DMII: A1C = 10.0 % (Ref range: 0.0 - 5.6 %) within past 3 months, PRESENT ON ADMISSION  # Overweight: Estimated body mass index is 26.67 kg/m  as calculated from the following:    Height as of this encounter: 1.626 m (5' 4\").    Weight as of this encounter: 70.5 kg (155 lb 6.4 oz)., PRESENT ON ADMISSION         Disposition Plan      Expected Discharge Date: 01/11/2023    Discharge Delays: Placement - LTC    Discharge Comments: placement; was living with daughter; has multiple wounds.   SW/CC  WOC consult          Katya Bello MD  Hospitalist Service  [facility]  Securely message with Raegan " (more info)  Text page via [facility] Paging/Directory   ______________________________________________________________________    Interval History      Daughter at bedside, states she can not take care of her at home.  Reported worsening decub wounds. Patient reports pain Lt heel/calf.  -afebrile and no hypotension.        Physical Exam   Vital Signs: Temp: 98.2  F (36.8  C) Temp src: Oral BP: 124/66 Pulse: 89   Resp: 18 SpO2: 100 % O2 Device: None (Room air)    Weight: 155 lbs 6.4 oz    GENERAL: Alert and oriented x 3; no acute distress   HEENT: PARUL EOMI  CV: regular S1, S2; (+) murmur.  RESP: Lung fields clear to aucultation B/L; normal WOB  GI: Abdomen is soft, nontender, nondistended; no organomegaly; normal bowel sounds.  : Espino in place (placed on admission)  MSK: paraparesis, bilateral leg edema and changes of chronic venous stasis   NEURO: bilateral lower extremities paraparesis   PSYCH: No active hallucinations; affect, insight appear within normal limits.    Medical Decision Making       50 MINUTES SPENT BY ME on the date of service doing chart review, history, exam, documentation & further activities per the note.      Data     I have personally reviewed the following data over the past 24 hrs:    N/A  \   N/A   / N/A     N/A N/A N/A /  190 (H)   4.5 N/A N/A \       Imaging results reviewed over the past 24 hrs:   No results found for this or any previous visit (from the past 24 hour(s)).

## 2023-01-09 NOTE — PROGRESS NOTES
Shift: 4950-4684  Orientation/Cognitive: AOx4  Observation Goals (Met/ Not Met): inpatient  Mobility Level/Assist Equipment: A2 lift/sho; wheelchair bound;  Fall Risk (Y/N): yes  Behavior Concerns: calm and cooperative  Pain Management: pain   Tele/VS/O2: vitally stable and on room air;   ABNL Lab/BG: none  Diet: moderate carb 60g  Bowel/Bladder: on indwelling manrique catheter; incontinent bowel; 2 large loose BM  Skin Concerns: stage 4 pressure ulcer; stage 2-3 pressure ulcer on right lateral calf  Drains/Devices: manrique catheter - draining well. PIV SL  Tests/Procedures for next shift: awaiting WOC consult  Anticipated DC date & active delays: TBD  Patient Stated Goal for Today: pain and wound healing

## 2023-01-09 NOTE — PROGRESS NOTES
Attempted to place PIV on right AC but unable to get access. Pt requested to look on left side. As writer and bedside RN attempted to place PIV on left upper arm. Pt refused. IV team will sign off for now. Please consult again if needed.

## 2023-01-09 NOTE — CONSULTS
[facility] Nurse Inpatient Assessment     Consulted for: Sacral wound    Patient History (according to provider note(s):         Miley Tolbert is a 77 year old female with history of hypertension, CHF, atrial fibrillation on Xarelto, history of for lower extremity DVT, coronary artery disease, chronic kidney disease stage III, COPD, hypothyroidism, poorly controlled type 2 diabetes with hyperglycemia, GERD, fecal and urinary incontinence who presents with progressively worsening sacral decubitus ulcer and inability of the family to provide care for the patient    Areas Assessed:      Areas visualized during today's visit: Posterior surfaces , Skin folds  and Lower extremities     Wound location: Left Heel        Last photo: 1/9/23  Wound due to: Pressure Injury  Wound history/plan of care: [place] and family brought patient home with concerns of not being able to care for her. Pt has significant pain. Discussed with Dr. Bello, pt may benefit vascular studies and imaging possible podiatry consult.  Wound base: 50 % pale whitish tissue, 50 % pale pink tissue     Palpation of the wound bed: normal      Drainage: small     Description of drainage: serous     Measurements (length x width x depth, in cm): 1  x 1  x  0.3 cm      Tunneling: N/A     Undermining: N/A  Periwound skin: non blanchable erythema purple/pink erythema in dark skin tone vascular vs. pressure, dry scale and callus      Color: pink and purple      Temperature: normal   Odor: none  Pain: moderate, shooting and intermittent  Pain interventions prior to dressing change: See MAR, slow and gentle cares  Treatment goal: Heal  and Increase granulation  STATUS: initial assessment  Supplies ordered: at bedside     Wound location: Right Calf      Last photo: 1/9/23  Wound due to: suspect trauma vs pressure  Wound history/plan of care: pt reports she all of a sudden developed several  wounds a few months ago, however unclear of true etiology. Dressing gently removed and significant amt of bleeding at left lateral edge required 15mins of constant pressure to stop  Wound base: 10 % tendon, 20 % hypertrophic spongy tissue 70% red moist tissue     Palpation of the wound bed: boggy      Drainage: small     Description of drainage: serosanguinous and bloody     Measurements (length x width x depth, in cm): 4.7  x 3.4  x  0.3 cm      Tunneling: N/A     Undermining: N/A  Periwound skin: Intact      Color: normal and consistent with surrounding tissue      Temperature: normal   Odor: none  Pain: moderate, shooting and intermittent  Pain interventions prior to dressing change: See MAR, slow and gentle cares  Treatment goal: Drainage control, Infection control/prevention, Decrease moisture, Protection and protect tendon  STATUS: initial assessment  Supplies ordered: ordered vashe, hydrogel and polymem foam     Wound location: Coccyx/right coccyx        Last photo: 1/9/23  Wound due to: Pressure Injury  Stage 4 pressure injury Present on admission  Wound history/plan of care: pt reports she all of a sudden developed several wounds a few months ago. Wound is very painful for patient and when dressing removed Right coccyx wound bleeding significantly. After 15mins of consistent pressure wound is still bleeding, Hemostat applied.   Wound base: 100 % pink to red tissue with 3 superficial ulcerations between 2 main open wounds that are deep dermal wounds with whitish/pink tissue.      Palpation of the wound bed: normal      Drainage: moderate     Description of drainage: serosanguinous and bloody     Measurements (length x width x depth, in cm): 2 main wounds Coccyx 3 x 3 x 1.3cm and Right coccyx 3 x 3 x 0.5cm  With 3 ulcerations in between that are less than 1 x 1 x 0.3cm     Tunneling: N/A     Undermining: N/A  Periwound skin: Intact, Scar tissue and hyperpigmentation      Color: pale tissue due to scarring and  superior to wound hyperpigmentation      Temperature: normal   Odor: none  Pain: moderate, shooting and intermittent  Pain interventions prior to dressing change: See MAR, slow and gentle cares  Treatment goal: Infection control/prevention, Increase granulation and Pain control  STATUS: initial assessment  Supplies ordered: ordered vashe     Skin Injury Location: pannus    Last photo: none taken  Skin injury due to: Intertrigo  Skin history and plan of care:   Pt with deep creases and few scattered open areas   Affected area:      Skin assessment: Superficial Erosion of epidermis     Measurements (length x width x depth, in cm) 4 open areas to pannus crease all less than 0.5 x 0.5 x 0.1cm     Color: pink moist dermis     Temperature  normal      Drainage: scant .      Color: serous      Odor: none  Pain: denies , none  Pain interventions prior to dressing change: N/A  Treatment goal: Heal  and Decrease moisture  STATUS: initial assessment  Supplies ordered: ordered Triad paste     Skin Injury Location: right breast    Last photo: none taken   Skin injury due to: Intertrigo  Skin history and plan of care:   Pt with deep breast creases and 1 open area  Affected area:      Skin assessment: Superficial Erosion of epidermis     Measurements (length x width x depth, in cm) 0.3  x 0.5  x  0.1 cm      Color: pink     Temperature  normal      Drainage: scant .      Color: serous      Odor: none  Pain: denies , none  Pain interventions prior to dressing change: N/A  Treatment goal: Heal  and Decrease moisture  STATUS: initial assessment  Supplies ordered: at bedside       Treatment Plan:     Coccyx wound(s): Daily  and PRN incontinence  *Soak dressing prior to removal to prevent bleeding. If bleeding occurs hold pressure for 10mins. If bleeding is ongoing apply small piece of hemostat (package in room)  1. Cleanse wound with Vashe #(200478) soaked gauze. Soak gauze and allow to sit in wound bed for 10 minutes then remove.  2.  "Moisten new gauze or kerlix with vashe, wring out excess so it is damp and gently pack into wound (this will remain in wound as primary dressing)  3. Apply Cavilon No Sting Skin Prep (#098209) to periwound and allow to dry.  4.  Cover wound with  Sacral Dressing (PS#[id number]) or ABD and Tape.  5. Time and date dressing change    Right Lateral Buttock: Every other day and PRN incontinence:  1. Clean wound with saline or MicroKlenz Spray, pat dry  2. Wipe / \"clean\" the surrounding periwound tissue with skin prep (Cavilon No Sting Skin Prep #[id number]) and allow to dry. This will help protect periwound and help dressing adherence  3. Press a Mepilex  Border Dressing (#461888) to the area, making sure to conform nicely to skin curvatures.   4. Time and date dressing change  NOTE  -Reposition pt side to side jhoan when in bed, every 2 hours-get the pt way over on side to completely offload pressure. This will benefit skin and respiratory function   -Keep heels elevated and floating on pillows at all times. Try using at least 2 pillows under each calf.  -When up to the chair pt needs to fully offload every 2 hours and use a chair cushion if needed       Right Lateral Calf: Daily  1. Cleanse wound with Vashe (#431401) soaked gauze.   2. Apply Thin bead of Skintegrity Hydrogel (#367595) to a piece of Polymem foam (#196409) (Polymem foam can be cut larger than the wound)  3.  Secure with kerlix and tape.  4. Time/Date/Initial dressing change     Left Heel: Daily  1. Cleanse wound with Vashe #(971934) soaked gauze.   2. Cut a small piece of Aquacel AG (#939625) and pack into wound  3. Cover with  Border Dressing (#[id number])  4. Time/Date/Initial dressing change       Pannus and Right Breast: Daily  1.Cleanse the area with Francine cleanse and protect and francois dry wipes/soft cloth.   2. Apply nickel thick layer of Triad paste (#271176) to wound bed and thin layer over reddened areas   **No need to remove all paste after each incontinent " episode, remove soiled paste and reapply as needed.  **If complete removal of paste is necessary use baby oil/mineral oil (Located in Pharmacy) and soft wash cloth.   Leave the brief off in bed to let the area dry as much as possible.   Use only one Covidien pad in between mattress and pt. No brief while in bed.         Orders: Written    RECOMMEND PRIMARY TEAM ORDER: None, at this time  Education provided: importance of repositioning, plan of care, wound progress, Moisture management and Off-loading pressure  Discussed plan of care with: Patient, Family and Nurse  WO nurse follow-up plan: weekly  Notify WOC if wound(s) deteriorate.  Nursing to notify the Provider(s) and re-consult the WOC Nurse if new skin concern.    DATA:     Current support surface: Standard  Low air loss (PETE pump, Isolibrium, Pulsate, skin guard, etc)  Containment of urine/stool: Incontinence Protocol and Incontinent pad in bed  BMI: Body mass index is 26.67 kg/m .   Active diet order: Orders Placed This Encounter      Moderate Consistent Carb (60 g CHO per Meal) Diet     Output: I/O last 3 completed shifts:  In: 640 [P.O.:640]  Out: 2100 [Urine:2100]     Labs: Recent Labs   Lab 01/08/23  0701 01/08/23  0700 01/07/23  0947 01/06/23  1538   ALBUMIN 2.4*  --    < >  --    HGB  --  10.7*   < > 11.9   WBC  --  6.1   < > 6.7   A1C  --   --   --  10.0*    < > = values in this interval not displayed.     Pressure injury risk assessment:   Sensory Perception: 3-->slightly limited  Moisture: 3-->occasionally moist  Activity: 1-->bedfast  Mobility: 2-->very limited  Nutrition: 3-->adequate  Friction and Shear: 1-->problem  Naif Score: 13    Tha Seaman RN CWOCN  Dept. Pager: [phone]  Dept. Office Number: [phone]

## 2023-01-09 NOTE — CONSULTS
Care Management Initial Consult    General Information  Assessment completed with: Patient, Ada, John  Type of CM/SW Visit: Initial Assessment    Primary Care Provider verified and updated as needed: Yes   Readmission within the last 30 days: no previous admission in last 30 days      Reason for Consult: discharge planning  Advance Care Planning: Advance Care Planning Reviewed: other (see comments) (No acp docs)          Communication Assessment  Patient's communication style: spoken language (English or Bilingual)    Hearing Difficulty or Deaf: no        Cognitive  Cognitive/Neuro/Behavioral: WDL  Level of Consciousness: alert  Arousal Level: opens eyes spontaneously  Orientation: oriented x 4  Mood/Behavior: calm, cooperative  Best Language: 0 - No aphasia  Speech: clear, spontaneous, logical    Living Environment:   People in home: child(raymond), adult  John  Current living Arrangements: house      Able to return to prior arrangements: no (Needs LTC)       Family/Social Support:  Care provided by: child(raymond)  Provides care for: no one  Marital Status:   Children          Description of Support System: Involved, Supportive    Support Assessment: Adequate social supports, Adequate family and caregiver support, Caregiver difficulty providing physical care/safety    Current Resources:   Patient receiving home care services: Yes  Skilled Home Care Services: Skilled Nursing, Occupational Therapy, Physicial Therapy  Community Resources: None  Equipment currently used at home: walker, rolling  Supplies currently used at home: None    Employment/Financial:  Employment Status: retired        Financial Concerns:             Lifestyle & Psychosocial Needs:  Social Determinants of Health     Tobacco Use: Not on file   Alcohol Use: Not on file   Financial Resource Strain: Not on file   Food Insecurity: Not on file   Transportation Needs: Not on file   Physical Activity: Not on file   Stress: Not on file    Social Connections: Not on file   Intimate Partner Violence: Not on file   Depression: Not on file   Housing Stability: Not on file       Functional Status:  Prior to admission patient needed assistance:              Mental Health Status:          Chemical Dependency Status:                Values/Beliefs:  Spiritual, Cultural Beliefs, Holiness Practices, Values that affect care: no               Additional Information:  Per care management/social work consult for discharge planning, patient was admitted on 1/7/2023 with progressively worsening stage IV sacral wound and stage II left calf wound. Tentative date of discharge is 1/11/2023. Writer reviewed chart. Writer talked to patient and John (dtr) in patient's room. John stated that patient was living in John's house and receiving home care (3 times a week-RN,PT, OT through [employer]). John was doing wound care the days that home care wasn't there. She said that it became a lot of work and she believes patient needs to move to LTC for more care. She said that patient has been to [facility] and [facility]. She doesn't want writer to send referrals to these facilities as it was a bad experience. She asked for referrals to be sent near [place] if possible. She said that patient's insurance covered the LTC stay. Writer said she would follow up with financial counselors regarding her insurance.     Writer emailed Yayo (financial counselor) regarding patient's insurance and if LTC is covered under her insurance. Yayo stated that patient has [employer] [facility] and [employer].     Writer sent referrals via [facility] to [facility], [facility], [facility], [facility], [facility], [facility], [place], [facility], [facility], [facility], [facility], [facility], [facility], [facility], and [place].    SUSANNA Jackson

## 2023-01-09 NOTE — PROGRESS NOTES
Orientation/Cognitive: A&Ox3. Disoriented to place.  Observation Goals (Met/ Not Met): Inpatient  Mobility Level/Assist Equipment: Lift  Fall Risk (Y/N): Y  Behavior Concerns: None  Pain Management: PRN Oxy 5mg Q4H for sacrum and leg pain  Tele/VS/O2: b/p 111/50, stable on RA  ABNL Lab/B  Diet: Mod. Carb diet  Bowel/Bladder: Incont. B&B  Skin Concerns: pressure injury to buttocks and RLE  Drains/Devices: Espino Catheter  Tests/Procedures for next shift: None  Anticipated DC date & active delays: TBD  Patient Stated Goal for Today: Pain management

## 2023-01-09 NOTE — PROCEDURES
[facility]    Single Lumen Midline Placement    Date/Time: 1/9/2023 5:34 PM  Performed by: Johnny Veras RN  Authorized by: Katya Bello MD   Indications: vascular access      UNIVERSAL PROTOCOL   Site Marked: Yes  Prior Images Obtained and Reviewed:  Yes  Required items: Required blood products, implants, devices and special equipment available    Patient identity confirmed:  Verbally with patient  NA - No sedation, light sedation, or local anesthesia  Confirmation Checklist:  Patient's identity using two indicators, relevant allergies, procedure was appropriate and matched the consent or emergent situation and correct equipment/implants were available  Time out: Immediately prior to the procedure a time out was called    Universal Protocol: the Joint Commission Universal Protocol was followed    Preparation: Patient was prepped and draped in usual sterile fashion       ANESTHESIA    Anesthesia: Local infiltration  Local Anesthetic:  Lidocaine 1% without epinephrine  Anesthetic Total (mL):  2      SEDATION    Patient Sedated: No        Preparation: skin prepped with 2% chlorhexidine  Skin prep agent: skin prep agent completely dried prior to procedure  Sterile barriers: maximum sterile barriers were used: cap, mask, sterile gown, sterile gloves, and large sterile sheet  Hand hygiene: hand hygiene performed prior to central venous catheter insertion  Type of line used: Midline  Catheter type: single lumen  Lumen type: non-valved  Catheter size: 4 Fr  Brand: Bard  Lot number: [id number]  Placement method: venipuncture, MST and ultrasound  Number of attempts: 1  Difficulty threading catheter: no  Successful placement: yes  Orientation: right    Location: brachial vein (medial)  Tip Location: distal to axillary vein  Arm circumference: adults 10 cm  Extremity circumference: 33  Visible catheter length: 0  Internal length: 20 cm  Total catheter length: 20  Dressing and securement:  adhesive securement device, alcohol impregnated caps, blood removed, blood cleaned with CHG, chlorhexidine disc applied, sterile dressing applied and transparent dressing  Post procedure assessment: blood return through all ports  PROCEDURE   Patient Tolerance:  Patient tolerated the procedure well with no immediate complicationsDescribe Procedure: Primary RN notified ok to use Midline .

## 2023-01-09 NOTE — PROGRESS NOTES
Notified provider about indwelling manrique catheter discussed removal or continued need.    Did provider choose to remove indwelling manrique catheter?  no     Provider's manrique indication for keeping indwelling manrique catheter: . Wound healing    Is there an order for indwelling manrique catheter?  yes    *If there is a plan to keep manrique catheter in place at discharge daily notification with provider is not necessary.

## 2023-01-09 NOTE — PROGRESS NOTES
VSS. A/O. Total care. Oxy given. Coccyx, bi leg dressing changed. Repo Q2. Tolerating diet. incont of bowel. Espino good UOP. Dusky, scaly and very sensitive LE.     Very hard stick, IV team unable to get IV, MD paged for midline if continue with IV antibiotic. vanco d'cd.

## 2023-01-10 NOTE — PROGRESS NOTES
[facility]  Medicine Progress Note - Hospitalist Service    Date of Admission:  1/6/2023    Assessment & Plan   Miley Tolbert is a 77 year old female with history of hypertension, CHF, atrial fibrillation on Xarelto, history of for lower extremity DVT, coronary artery disease, chronic kidney disease stage III, COPD, hypothyroidism, poorly controlled type 2 diabetes with hyperglycemia, GERD, fecal and urinary incontinence who presented with progressively worsening sacral decubitus ulcer and inability of the family to provide care for the patient.       #Progressively worsening stage IV sacral wound and stage II left calf and heel wounds  # suspected sepsis, no obvious source  Reportedly wound was foul smelling, soiled with urine/stool. No fever, and WBC normal at presentation. Hypotensive a day after admission after receiving lisinopril.   - Presumed sacral wound infection and started on broad spectrum abx, although does not appear infected. Wound on Lt heel appears concerning.   - Procal and CRP mildly elevated. Lt heel XR without osteomyelitis.    - Podiatry consulted, ultrasound Doppler study shows VENUS within normal limits.  - blood cultures negative, WBC normal.  Vancomycin discontinued.   -CT abdomen pelvis and right thigh ordered for other potential sources including diverticulitis/cholecystitis and also to look for any hematoma given ongoing drop in hemoglobin.  If no source of infection, will discontinue Zosyn as well and monitor.   - Wound care consulted and following, appreciate assistance.   -  and care coordination consulted for discharge planning, will need placement to geriatric unit as daughter unable to care for her and unsafe to be discharged back home  - PRN oxycodone for pain control.      #Poorly controlled type 2 diabetes mellitus with peripheral neuropathy: HbA1C 10  On Lantus 35 units with a 10 to 14 units of aspart prior to meals at home.  -  Continue Lantus 30 units qAM and 10 units of aspart with meals and sliding scale insulin aspart with carb controlled diet.  -- On gabapentin 600 mg 3 times daily  -- Holding home Januvia.  -- BS 70-190s     #Paroxysmal atrial fibrillation  In sinus rhythm.  -- Anticoagulated with rivaroxaban, resumed.     #Progressive dementia with behavioral disturbance in the setting of schizoaffective disorder  -- Patient is at high risk for delirium.  -- Maintain sleep-wake cycles.  -- Avoid benzodiazepines.  -- Continue venlafaxine 150 mg daily, topiramate 100 mg daily  -- Currently oriented to place and person.  Answers all the questions appropriately.     #Hx coronary artery disease with CHF  #Hypertension  -- Lisinopril held in the setting of hypotension  -- Last echo on 5/5/2022 showed EF of 55 to 60% with normal left ventricular cavity size     #Hyperlipidemia  -- Continue atorvastatin 40 daily     #History of DVT  -- Continue Xarelto.     #Chronic pain  -- Continue ylenol, Zanaflex. As needed oxycodone as needed for pain control.  --Lt knee XR ordered given pain with attempted movement     #Urinary incontinence, now with retention  -- On tamsulosin 0.4 mg daily.  -- manrique catheter placed given worsening decub and contamination with urine in the setting of urinary retention.   -- plan discontinue catheter in AM.      #Chronic kidney disease, stage III  -- Creatinine stable and at baseline       Diet: Moderate Consistent Carb (60 g CHO per Meal) Diet    DVT Prophylaxis: DOAC  Manrique Catheter: PRESENT, indication: Pressure Ulcer with Incontinence  Lines: PRESENT             Cardiac Monitoring: None  Code Status: No CPR- Do NOT Intubate      Clinically Significant Risk Factors              # Hypoalbuminemia: Lowest albumin = 2.4 g/dL at 1/8/2023  7:01 AM, will monitor as appropriate           # DMII: A1C = 10.0 % (Ref range: 0.0 - 5.6 %) within past 3 months, PRESENT ON ADMISSION  # Overweight: Estimated body mass index is  "26.67 kg/m  as calculated from the following:    Height as of this encounter: 1.626 m (5' 4\").    Weight as of this encounter: 70.5 kg (155 lb 6.4 oz)., PRESENT ON ADMISSION         Disposition Plan      Expected Discharge Date: 01/11/2023    Discharge Delays: Placement - LTC    Discharge Comments: placement; was living with daughter; has multiple wounds.   SW/CC-- needs placement.  LTC vs. TCU   WOC consult  Podiatry consult to assess left heel.          Katya Bello MD  Hospitalist Service  [facility]  Securely message with [employer] (more info)  Text page via [employer] Paging/Directory   ______________________________________________________________________    Interval History      Anemia has slightly worsened, no hematochezia or melena.  Denies abdominal pain.  No nausea or vomiting.    -Blood pressure remains stable   -Remains afebrile  -Left lower extremity movement is painful especially around the knee.       Physical Exam   Vital Signs: Temp: 98.7  F (37.1  C) Temp src: Oral BP: 128/72 Pulse: 94   Resp: 16 SpO2: 98 % O2 Device: None (Room air)    Weight: 155 lbs 6.4 oz    GENERAL: Alert and oriented x 3; no acute distress   HEENT: PARUL EOMI  CV: regular S1, S2; (+) murmur.  RESP: Lung fields clear to aucultation B/L; normal WOB  GI: Abdomen is soft, nontender, nondistended; no organomegaly; normal bowel sounds.  : Espino in place (placed on admission)  MSK: paraparesis, Rt thigh is deformed, and swollen, bilateral leg edema and changes of chronic venous stasis. Painful when attempted to move Lt leg/knee  NEURO: bilateral lower extremities paraparesis   PSYCH: No active hallucinations; affect, insight appear within normal limits.    Medical Decision Making       50 MINUTES SPENT BY ME on the date of service doing chart review, history, exam, documentation & further activities per the note.   Discussed with patient and nursing staff, discussed with radiology staff given contrast " allergy and prophylaxis and timing per CT, discussed with pharmacy as well.  Care plan discussed with patient's daughter as well.    Data     I have personally reviewed the following data over the past 24 hrs:    6.6  \   9.2 (L)   / 244     142 113 (H) 12 / 77   3.6 23 0.69 \       Procal: 0.59 (H) CRP: 9.8 (H) Lactic Acid: N/A       Ferritin:  63 % Retic:  N/A LDH:  N/A       Imaging results reviewed over the past 24 hrs:   Recent Results (from the past 24 hour(s))   XR Calcaneus Left G/E 2 Views    Narrative    EXAM: XR CALCANEUS LEFT G/E 2 VIEWS  LOCATION: [facility]  DATE/TIME: 1/9/2023 6:36 PM    INDICATION: chronic wound, eval for bony erosion. Heel pain.  COMPARISON: None.      Impression    IMPRESSION: No fracture. No erosive or destructive bony changes. Osteopenia.   XR Chest 2 Views    Narrative    EXAM: XR CHEST 2 VIEWS  LOCATION: [facility]  DATE/TIME: 1/9/2023 6:39 PM    INDICATION: hypotensive, exploring source infection  COMPARISON: None.      Impression    IMPRESSION: There is a right upper extremity PICC line catheter with its tip in the mid, right axillary vein.    Lungs are clear. Heart and pulmonary vascularity are normal. No signs of pneumonia.       US VENUS Doppler No Exercise    Narrative    IR VENUS US VENUS DOPPLER NO EXERCISE, 1-2 LEVELS, BILAT   1/10/2023 11:22  AM     HISTORY: Left medial heel ulcer, significant pain to leg. Eval for PAD    COMPARISON: None.    FINDINGS:  Right VENUS:   DP: 0.99  PT: 1.1.    Left VENUS:   DP: 1.17   PT: 1.21.    Waveforms: Triphasic in the distal tibial arteries      Impression    IMPRESSION: Ankle-brachial indices are within normal limits.    VENUS CRITERIA:  >0.95 Normal  0.90 - 0.94 Mild  0.5 - 0.89 Moderate  0.2 - 0.49 Severe  <0.2 Critical    FABIOLA BOTELLO MD         SYSTEM ID:  [id number]

## 2023-01-10 NOTE — PROGRESS NOTES
Jorge tinoco, patients care coordinator with [facility], called for update on patients hospital stay. His number is, [phone].

## 2023-01-10 NOTE — PROGRESS NOTES
Antimicrobial Stewardship Team Note    Antimicrobial Stewardship Program - A joint venture between [facility] and [facility] [employer] to optimize antibiotic management.     Patient: Miley Tolbert  MRN: [id number]  Allergies: Celecoxib, Clonazepam, Insulin lispro, Iodinated contrast media [diagnostic x-ray materials], Iodine, Phenobarbital, and Phenytoin    Brief Summary: Miley Tolbert is a 77 year old female admitted on 1/6/22 with progressively worsening sacral decubitus ulcer. PMH significant for T2DM, HTN, CHF, a fib, CAD, CKD3, COPD, hypothyroidism, and GERD. Patient is wheelchair and bedbound and family reports progressively worsening stage IV sacral ulcer.    Antibiotics were initially deferred given no evidence of active infection, no leukocytosis, and afebrile. On 1/7, she became hypotensive so a rapid response was called and patient was given IV fluids and started on Zosyn and vancomycin with concern for infection. Notably this hypotension occurred after lisinopril was administered. Vancomycin was discontinued on 1/9 and podiatry was consulted for L heel wound. XR without osteomyelitis and podiatry notes patient is clinically without signs of infection.    WBC count normal this hospitalization, no fevers, stable on room air. Blood cultures negative, CXR with clear lungs.         Active Anti-infective Medications   (From admission, onward)                 Start     Stop    01/07/23 1200  piperacillin-tazobactam  3.375 g,   Intravenous,   EVERY 6 HOURS        Osteomyelitis        --                  Assessment: Chronic decubitus ulcer, concern for infection  Patient presented with sacral pressure ulcer, and antibiotics were initially held as no acute infection was noted. Patient had hypotensive episode on 1/7 so Zosyn and vancomycin were started with concern for sepsis. Sacral wound and left heel wound appear to be chronic as patient has no acute signs of infection  with normal WBC count and no fevers. Patient is currently on day 4 of broad spectrum antibiotics with Zosyn, recommend stopping at this time.    Recommendations:  Stop Zosyn.    Discussed with ID Staff MD Bere Tamez, PharmD, CAROL    Vital Signs/Clinical Features:  Vitals         01/08 0700  01/09 0659 01/09 0700  01/10 0659 01/10 0700  01/10 1350   Most Recent      Temp ( F) 98.1 -  98.8    97.5 -  98.7      98.7     98.7 (37.1) 01/10 1146    Pulse 82 -  90    76 -  100      94     94 01/10 1146    Resp 16 -  18    16 -  18      16     16 01/10 1146    /52 -  139/74    107/58 -  163/78      128/72     128/72 01/10 1146    SpO2 (%) 96 -  99    97 -  100      98     98 01/10 1146            Labs  Estimated Creatinine Clearance: 63.7 mL/min (based on SCr of 0.69 mg/dL).  Recent Labs   Lab Test 01/06/23  1538 01/07/23  0947 01/07/23  1215 01/08/23  0701 01/09/23  1506 01/10/23  0805   CR 0.69 0.71 0.93 0.81 0.67 0.69       Recent Labs   Lab Test 12/03/21  0539 01/06/23  1538 01/07/23  0947 01/07/23  1215 01/08/23  0700 01/10/23  0805   WBC 3.7* 6.7 6.7 5.9 6.1 6.6   HGB 10.9* 11.9 11.2* 10.7* 10.7* 9.2*   HCT 36.6 38.6 35.6 35.3 35.2 30.1*   MCV 80 84 83 85 87 86    325 302 295 263 244       Recent Labs   Lab Test 01/07/23  1215 01/08/23  0701   BILITOTAL 0.5 0.7   ALKPHOS 101 100   ALBUMIN 2.5* 2.4*   AST 7 12   ALT 9 9       Recent Labs   Lab Test 01/06/23  1540 01/07/23  1215 01/07/23  1626 01/09/23  1506   PCAL  --   --   --  0.59*   LACT 1.8 2.3* 1.5  --    CRP  --   --   --  9.8*             Culture Results:  7-Day Micro Results       Procedure Component Value Units Date/Time    Blood Culture Peripheral Blood [[id number]]  (Normal) Collected: 01/06/23 1618    Order Status: Completed Lab Status: Preliminary result Updated: 01/09/23 2031    Specimen: Peripheral Blood      Culture No growth after 3 days    Blood Culture Peripheral Blood [[id number]]  (Normal) Collected: 01/06/23  1618    Order Status: Completed Lab Status: Preliminary result Updated: 01/09/23 2031    Specimen: Peripheral Blood      Culture No growth after 3 days            Recent Labs   Lab Test 01/07/23  0656   URINEPH 5.5   NITRITE Negative   LEUKEST Trace*   WBCU 3                         Imaging: XR Chest 2 Views    Result Date: 1/9/2023  EXAM: XR CHEST 2 VIEWS LOCATION: [facility] DATE/TIME: 1/9/2023 6:39 PM INDICATION: hypotensive, exploring source infection COMPARISON: None.     IMPRESSION: There is a right upper extremity PICC line catheter with its tip in the mid, right axillary vein. Lungs are clear. Heart and pulmonary vascularity are normal. No signs of pneumonia.     US VENUS Doppler No Exercise    Result Date: 1/10/2023  IR VENUS US VENUS DOPPLER NO EXERCISE, 1-2 LEVELS, BILAT   1/10/2023 11:22 AM HISTORY: Left medial heel ulcer, significant pain to leg. Eval for PAD COMPARISON: None. FINDINGS: Right VENUS: DP: 0.99 PT: 1.1. Left VENUS: DP: 1.17 PT: 1.21. Waveforms: Triphasic in the distal tibial arteries     IMPRESSION: Ankle-brachial indices are within normal limits. VENUS CRITERIA: >0.95 Normal 0.90 - 0.94 Mild 0.5 - 0.89 Moderate 0.2 - 0.49 Severe <0.2 Critical FABIOLA BOTELLO MD   SYSTEM ID:  [id number]    XR Calcaneus Left G/E 2 Views    Result Date: 1/9/2023  EXAM: XR CALCANEUS LEFT G/E 2 VIEWS LOCATION: [facility] DATE/TIME: 1/9/2023 6:36 PM INDICATION: chronic wound, eval for bony erosion. Heel pain. COMPARISON: None.     IMPRESSION: No fracture. No erosive or destructive bony changes. Osteopenia.

## 2023-01-10 NOTE — PLAN OF CARE
Orientation/Cognitive: AO X4  Observation Goals (Met/ Not Met): Inpatient status  Mobility Level/Assist Equipment: Total care with lift. Bed bound at baseline, T and R  Fall Risk (Y/N): Y  Behavior Concerns: None   Pain Management: Tripp LE, oxycodone given   Tele/VS/O2:  VSS on RA   ABNL Lab/BG: See lab results. , 113  Diet: Moderate Carb   Bowel/Bladder: Espino is patent with adquate UOP. Incontinent BM  x1, Large, loose and brown in color  Skin Concerns: Wounds on coccyx, tripp legs, heels. Dressing changed per wound care plan  Drains/Devices: Espino catheter and Midline right arm SL. Intermittent IV Zosyn  Tests/Procedures for next: BG monitoring, Wound cares, SW and Podiatry consults today  Anticipated DC date & active delays: TBD  Patient Stated Goal for Today: Pain control and wound cares

## 2023-01-10 NOTE — PROGRESS NOTES
Shift: [id number]  Orientation/Cognitive: A+Ox4  Observation Goals (Met/ Not Met): N/A d/t inpatient status  Mobility Level/Assist Equipment: Total care with lift. W/C bound at baseline  Fall Risk (Y/N): Y  Behavior Concerns: None   Pain Management: Tripp LEs   Tele/VS/O2:  VSS on RA ex HN, asymptomatic   ABNL Lab/BG: See lab results.   Diet: Moderate carb   Bowel/Bladder: Espino is patent with adquate UOP. Incontinent BM  x1  Skin Concerns: Wounds on coccyx, tripp legs, heels. Dressings/Mepilex in place. WOCN is following. Dusky, scaly, sensitive legs  Drains/Devices: Espino catheter and Midline right arm SL. Intermittent IV Zosyn  Tests/Procedures for next: TR and Rest  Anticipated DC date & active delays: TBD  Patient Stated Goal for Today: Pain control and wound healing

## 2023-01-10 NOTE — CONSULTS
[facility]  CONSULTATION NOTE    CHIEF COMPLAINT:      I was asked by Katya Bello to evaluate this patient for left heel.    PATIENT HISTORY:  Miley Tolbert is a 77 year old female  with a past medical history significant for what's listed below. She was brought to the hospital by her daughter, due difficulty caring for patient at home. She depends on a wheelchair for mobility and requires use a sho lift. She spends the majority of her time in bed.   -She has several pressure ulcers, most predominantly to the sacrum. Podiatry has been consulted for the left heel. She states she has significant pain to the site. She states that the majority of her pain though is to her left knee. She states that pain is severe. When evaluating her left foot, she cries out due to knee pain. She denies N/F/V/C/D.         Review of Systems:  A 10 point review of systems was performed and is positive for that noted above in the patient history.  All other areas are negative.     PAST MEDICAL HISTORY:   Past Medical History:   Diagnosis Date     A-fib (H)      Chronic kidney disease, stage III (moderate) (H)      COPD (chronic obstructive pulmonary disease) (H)      Dementia (H)      Diabetes (H)      Dysphagia      Failure to thrive in adult      HTN (hypertension)      Hyperlipidemia LDL goal <100      Hypothyroidism      Major depression      Personal history of DVT (deep vein thrombosis)      Pressure sore      Schizophrenia (H)      Sepsis (H)         PAST SURGICAL HISTORY: History reviewed. No pertinent surgical history.     MEDICATIONS:  Reviewed in Epic. Current.     ALLERGIES:    Allergies   Allergen Reactions     Celecoxib      Clonazepam      Insulin Lispro      Iodinated Contrast Media [Diagnostic X-Ray Materials]      Iodine      Phenobarbital      Phenytoin         SOCIAL HISTORY:   Social History     Socioeconomic History     Marital status:      Spouse name: Not on file      "Number of children: Not on file     Years of education: Not on file     Highest education level: Not on file   Occupational History     Not on file   Tobacco Use     Smoking status: Not on file     Smokeless tobacco: Not on file   Substance and Sexual Activity     Alcohol use: Not on file     Drug use: Not on file     Sexual activity: Not on file   Other Topics Concern     Not on file   Social History Narrative     Not on file     Social Determinants of Health     Financial Resource Strain: Not on file   Food Insecurity: Not on file   Transportation Needs: Not on file   Physical Activity: Not on file   Stress: Not on file   Social Connections: Not on file   Intimate Partner Violence: Not on file   Housing Stability: Not on file        FAMILY HISTORY: No family history on file.     EXAM:Vitals: /58 (BP Location: Left arm)   Pulse 76   Temp 98.3  F (36.8  C) (Oral)   Resp 16   Ht 1.626 m (5' 4\")   Wt 70.5 kg (155 lb 6.4 oz)   SpO2 98%   BMI 26.67 kg/m    BMI= Body mass index is 26.67 kg/m .    LABS:   Hba1c: 10% on 1/6/23    Last Comprehensive Metabolic Panel:  Sodium   Date Value Ref Range Status   01/08/2023 139 133 - 144 mmol/L Final   10/08/2007 142 133 - 144 mmol/L Final     Potassium   Date Value Ref Range Status   01/09/2023 4.5 3.4 - 5.3 mmol/L Final     Comment:     Specimen slightly hemolyzed, potassium may be falsely elevated.   10/08/2007 3.7 3.4 - 5.3 mmol/L Final     Chloride   Date Value Ref Range Status   01/08/2023 112 (H) 94 - 109 mmol/L Final   10/08/2007 99 94 - 109 mmol/L Final     Carbon Dioxide   Date Value Ref Range Status   10/08/2007 33 (H) 20 - 32 mmol/L Final     Carbon Dioxide (CO2)   Date Value Ref Range Status   01/08/2023 22 20 - 32 mmol/L Final     Anion Gap   Date Value Ref Range Status   01/08/2023 5 3 - 14 mmol/L Final   10/08/2007 9.5 6 - 17 mmol/L Final     Glucose   Date Value Ref Range Status   01/08/2023 164 (H) 70 - 99 mg/dL Final   10/08/2007 141 (H) 60 - 99 " mg/dL Final     GLUCOSE BY METER POCT   Date Value Ref Range Status   01/09/2023 116 (H) 70 - 99 mg/dL Final     Urea Nitrogen   Date Value Ref Range Status   01/08/2023 21 7 - 30 mg/dL Final   10/08/2007 29 7 - 30 mg/dL Final     Creatinine   Date Value Ref Range Status   01/09/2023 0.67 0.52 - 1.04 mg/dL Final   10/08/2007 1.70 (H) 0.60 - 1.30 mg/dL Final     GFR Estimate   Date Value Ref Range Status   01/09/2023 88 >60 mL/min/1.73m2 Final     Comment:     Effective December 21, 2021 eGFRcr in adults is calculated using the 2021 CKD-EPI creatinine equation which includes age and gender (Jean-Pierre et al., NEJ, DOI: 10.1056/SAULhu8969503)   10/08/2007 32 (L) >60 mL/min/1.7m2 Final     Calcium   Date Value Ref Range Status   01/08/2023 8.5 8.5 - 10.1 mg/dL Final   10/08/2007 10.3 8.5 - 10.4 mg/dL Final     Lab Results   Component Value Date    WBC 6.6 01/10/2023    WBC 8.7 09/15/2007     Lab Results   Component Value Date    RBC 3.49 01/10/2023    RBC 4.84 09/15/2007     Lab Results   Component Value Date    HGB 9.2 01/10/2023    HGB 12.6 09/15/2007     Lab Results   Component Value Date    HCT 30.1 01/10/2023    HCT 38.1 09/15/2007     Lab Results   Component Value Date     01/10/2023     09/15/2007      No results found for: INR     General appearance: Patient is alert and fully cooperative with history & exam.  No sign of distress is noted during the visit.     Respiratory: Breathing is regular & unlabored while sitting.      HEENT: Hearing is intact to spoken word.  Speech is clear.  No gross evidence of visual impairment that would impact ambulation.      Dermatologic: Left heel evaluated, small medial heel ulcer. Depth to subcutaneous tissue. No significant drainage. No probe to bone. No cellulitis. Noted pain to site and to entire leg.   Right proximal leg ulcer, granular wound bed with exposed tendon. Dressing left intact today.      Vascular: Dorsalis pedis is palpable and posterior tibial pulse  is difficult to palpate. CFT and skin temperature is normal to both lower extremities.       Neurologic: Lower extremity sensation is intact, bilateral foot, to light touch.  No evidence of neurological-based weakness or contracture in the lower extremities.       Musculoskeletal: Patient is non ambulatory.     Psychiatric: Affect is pleasant & appropriate.      All cultures:  Recent Labs   Lab 01/06/23  1618   CULTURE No growth after 3 days  No growth after 3 days        IMAGING:     I personally reviewed the images and agree with the reports as stated.    IMPRESSION: No fracture. No erosive or destructive bony changes. Osteopenia.    ASSESSMENT:  1. Left heel ulcer   2. Uncontrolled Diabetes --> hba1c: 10   3. Clinical signs of peripheral arterial disease      MEDICAL DECISION MAKING:   -Patient seen at bedside, difficult to evaluate left heel due to pain to entire leg, per patient. Patient does state it's primarily to the knee. Will defer to medicine service in regards to this.     -Xray doesn't show osteomyelitis. Clinically patient without signs of infection. There is concern though for arterial disease. Will order vascular studies and likely need vascular consult.     -Will also order prevalon boots to be applied to both feet at all times to prevent further tissue breakdown.     -Cont wound care per WOC orders.       Thank you for the consultation request and the opportunity to participate in the care of Miley De La Rosa DPM   [place] [facility]/Foot & Ankle Surgery  [phone]

## 2023-01-10 NOTE — PROGRESS NOTES
Spoke with CT, Patient has received first dose of medrol at 1548. Next dose of medrol due at 0130 and benadryl 1 hr prior to CT scan. Okay to have CT after 0330 AM 1/11.

## 2023-01-11 NOTE — PLAN OF CARE
Goal Outcome Evaluation:      Plan of Care Reviewed With: patient    Overall Patient Progress: no changeOverall Patient Progress: no change    Orientation/Cognitive: AO X4  Observation Goals (Met/ Not Met): Inpatient status  Mobility Level/Assist Equipment: Total care with lift. Bed bound at baseline, T and R  Fall Risk (Y/N): Y  Behavior Concerns: occasionally refusing re postioning  Pain Management: Tripp LE, oxycodone given Q4  Tele/VS/O2:  VSS on RA   ABNL Lab/BG: See results  Diet: Moderate Carb   Bowel/Bladder: Espino is patent with adquate UOP.   Skin Concerns: Wounds on coccyx, tripp legs, heels. Dressing changed per wound care plan  Drains/Devices: Espino catheter and Midline right arm SL. Intermittent IV Zosyn, Unit collect for labs  Tests/Procedures for next: CT of abd/R thigh, XR of L knee  Anticipated DC date & active delays: TBD  Patient Stated Goal for Today: Pain control

## 2023-01-11 NOTE — PROGRESS NOTES
[facility]  Medicine Progress Note - Hospitalist Service    Date of Admission:  1/6/2023    Assessment & Plan   Miley Tolbert is a 77 year old female with history of hypertension, CHF, atrial fibrillation on Xarelto, history of for lower extremity DVT, coronary artery disease, chronic kidney disease stage III, COPD, hypothyroidism, poorly controlled type 2 diabetes with hyperglycemia, GERD, fecal and urinary incontinence who presented with progressively worsening sacral decubitus ulcer and inability of the family to provide care for the patient.       #Progressively worsening stage IV sacral wound and stage II left calf and heel wounds  # suspected sepsis, no obvious source  Reportedly wound was foul smelling, soiled with urine/stool. No fever, and WBC normal at presentation. Hypotensive a day after admission after receiving lisinopril.   - Presumed sacral wound infection and started on broad spectrum abx, although does not appear infected. Wound on Lt heel appears concerning.   - Procal and CRP mildly elevated. Lt heel XR without osteomyelitis.    - Podiatry consulted, ultrasound Doppler study shows VENUS within normal limits.  No intervention needed, signed off.  - blood cultures negative, WBC normal.  Vancomycin discontinued.   -CT abdomen pelvis and right thigh obtained to evaluate for other potential sources including diverticulitis/cholecystitis and also to look for any hematoma given ongoing drop in hemoglobin.  No source of infection, Zosyn discontinued.  No source of bleeding.- Wound care consulted and following, appreciate assistance.   -  and care coordination consulted for discharge planning, will need placement to geriatric unit as daughter unable to care for her and unsafe to be discharged back home  - PRN oxycodone for pain control.      #Poorly controlled type 2 diabetes mellitus with peripheral neuropathy: HbA1C 10  On Lantus 35 units with a 10 to 14  units of aspart prior to meals at home.  - Continue Lantus 30 units qAM and 10 units of aspart with meals and sliding scale insulin aspart with carb controlled diet.  - Blood sugars are elevated today but due to the steroid. Continue current insulin regimen as no more doses of steroid planned.   -- On gabapentin 600 mg 3 times daily  -- Holding home Januvia.     #Paroxysmal atrial fibrillation  In sinus rhythm.  -- Continuing with PTA Xarelto    #Progressive dementia with behavioral disturbance in the setting of schizoaffective disorder and intermittent delirium  -- Maintain sleep-wake cycles.  -- Avoid benzodiazepines.  -- Continue venlafaxine 150 mg daily, topiramate 100 mg daily  -- Currently oriented to place and person.  Answers all the questions appropriately.     #Hx coronary artery disease with CHF  #Hypertension  -- Lisinopril held in the setting of hypotension  -- Last echo on 5/5/2022 showed EF of 55 to 60% with normal left ventricular cavity size     #Hyperlipidemia  -- Continue atorvastatin 40 daily     #History of DVT  -- Continue Xarelto.     #Chronic pain  -- Continue ylenol, Zanaflex. As needed oxycodone as needed for pain control.  -- Lt knee XR ordered given pain with attempted movement     #Urinary incontinence, now with retention  -- On tamsulosin 0.4 mg daily.  -- manrique catheter placed given worsening decub and contamination with urine in the setting of urinary retention.   -- Discontinued 1/11 AM, bladder scan as needed.  If recurrent retention, plan is to discharge with Manrique catheter, follow-up with urology and voiding trial.     #Chronic kidney disease, stage III  -- Creatinine stable and at baseline       Diet: Moderate Consistent Carb (60 g CHO per Meal) Diet    DVT Prophylaxis: DOAC  Manrique Catheter: Not present  Lines: PRESENT             Cardiac Monitoring: None  Code Status: No CPR- Do NOT Intubate      Clinically Significant Risk Factors              # Hypoalbuminemia: Lowest albumin  "= 2.4 g/dL at 1/8/2023  7:01 AM, will monitor as appropriate           # DMII: A1C = 10.0 % (Ref range: 0.0 - 5.6 %) within past 3 months   # Overweight: Estimated body mass index is 26.67 kg/m  as calculated from the following:    Height as of this encounter: 1.626 m (5' 4\").    Weight as of this encounter: 70.5 kg (155 lb 6.4 oz).          Disposition Plan      Expected Discharge Date: 01/12/2023    Discharge Delays: Placement - LTC    Discharge Comments: placement; was living with daughter; has multiple wounds.   SW/CC-- needs placement.  LTC vs. TCU   WOC consult  Podiatry consult to assess left heel.          Katya Bello MD  Hospitalist Service  [facility]  Securely message with [employer] (more info)  Text page via [employer] Paging/Directory   ______________________________________________________________________    Interval History      No acute issues reported by nursing staff.  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  As per daughter, not unusual.  -Epsino catheter was discontinued this morning, due to void.  -Reports her mouth is dry   -Remains afebrile, blood pressure now slightly elevated.  -Blood sugars also increasing since receiving steroids yesterday.       Physical Exam   Vital Signs: Temp: 98.2  F (36.8  C) Temp src: Oral BP: (!) 142/60 Pulse: 92   Resp: 16 SpO2: 97 % O2 Device: None (Room air)    Weight: 155 lbs 6.4 oz    GENERAL: Alert and oriented to self/place/yr; no acute distress   HEENT: PARUL EOMI  CV: regular S1, S2; (+) murmur.  RESP: Lung fields clear to aucultation B/L; normal WOB  GI: Abdomen is soft, nontender, nondistended; no organomegaly; normal bowel sounds.  : Espino in place (placed on admission)  MSK: paraparesis, Rt thigh is deformed, and swollen, bilateral leg edema and changes of chronic venous stasis.    NEURO: bilateral lower extremities paraparesis   PSYCH: oriented but talking " irrelevant (that she is an MD and worked in [employer])     Medical Decision Making       40 MINUTES SPENT BY ME on the date of service doing chart review, history, exam, documentation & further activities per the note.   Discussed with patient and nursing staff, Care plan discussed with patient's daughter as well.    Data     I have personally reviewed the following data over the past 24 hrs:    9.3  \   10.2 (L)   / 285     140 112 (H) 12 /  233 (H)   4.5 24 0.63 \       Imaging results reviewed over the past 24 hrs:   Recent Results (from the past 24 hour(s))   CTA Abdomen Pelvis with Contrast    Narrative    EXAM: CT ABDOMEN AND PELVIS WITHOUT AND WITH CONTRAST  LOCATION: [facility]  DATE/TIME: 01/11/2023, 4:07 AM    INDICATION: Sepsis. Evaluate for intra-abdominal bleeding.  COMPARISON: None.    TECHNIQUE: CT abdomen and pelvis prior to the administration of intravenous contrast. CT angiogram abdomen and pelvis following the injection of IV contrast during arterial and renal excretory phases. 2D and 3D MIP reconstructions were performed by the   CT technologist. Dose reduction techniques were used.  CONTRAST: 95 mL Isovue 370.    FINDINGS:    GASTROINTESTINAL TRACT: No dilatation of the small or large bowel. A few colonic diverticula are present without evidence of diverticulitis. A small to moderate amount of intermediate attenuation fluid, measuring approximately 40 Hounsfield units, is   present throughout the colonic lumen.    LOWER CHEST: Coronary artery calcification.    HEPATOBILIARY: The gallbladder is not visualized.    SPLEEN: Unremarkable.    PANCREAS: Moderate diffuse pancreatic atrophy.    ADRENAL GLANDS: Unremarkable.    KIDNEYS/BLADDER: A few less than 0.5 cm nonobstructing calculi in the left kidney. 1.3 cm intermediate attenuation exophytic lesion extending from the posterior aspect of the inferior pole of the left kidney (series 10 image 39). A balloon tip catheter    is present in the urinary bladder lumen.    LYMPH NODES: Unremarkable.    PELVIC ORGANS: A few calcified uterine fibroids, measuring up to 3 cm in diameter.    MUSCULOSKELETAL: No acute findings.    VASCULATURE: Atherosclerotic calcification in the aorta. The abdominal aorta is normal in caliber without dissection. A filter is present in the inferior vena cava. Stents are present within bilateral common iliac veins.    OTHER: Subcutaneous edema in the inferior pelvis and visualized portions of bilateral thighs.      Impression    IMPRESSION:   1.  A small to moderate amount of intermediate attenuation fluid is present throughout the colonic lumen. This is nonspecific, but could represent blood products or relate to gastroenteritis. There is no CT evidence of active gastrointestinal bleeding or   other active bleeding in the abdomen or pelvis.  2.  No other acute abnormality identified in the abdomen or pelvis.   3.  1.3 cm indeterminate lesion in the inferior pole of the left kidney. This could represent a complex cyst or a solid lesion. Recommend comparison with prior imaging studies, if available. If not available, a renal mass protocol MR or CT could be   considered in six months for reevaluation.       CT Femur Thigh Right w Contrast    Narrative    EXAM: CT FEMUR THIGH RIGHT WITH CONTRAST  LOCATION: [facility]  DATE/TIME: 01/11/2023, 4:08 AM    INDICATION: Pain. Sepsis. Deformity of the thigh. Evaluate for hematoma and infection.  COMPARISON: CTA abdomen and pelvis with IV contrast 01/11/2023.  TECHNIQUE: IV contrast. Axial, sagittal and coronal thin-section reconstruction. Dose reduction techniques were used.   CONTRAST: 95 mL Isovue 370 IV.    FINDINGS:     BONES:  -Anatomic alignment of the right femur without fracture, dislocation, lytic or destructive process. Degenerative changes right hip and knee joints, narrowing worse involving the knee joint. Similar degenerative changes  are also seen involving the left   hip and knee joints. Degenerative changes involving the joints of the pelvis. Demineralization of the visualized bones.    SOFT TISSUES:  -Postmenopausal heterogeneous uterus with multiple calcified fibroids. Espino catheter within the urinary bladder. Common iliac vascular stents. Phleboliths. Rectosigmoid diverticulosis without acute inflammation. Diffuse body wall edema. No discrete   fluid collection.      Impression    IMPRESSION:  1.  Degenerative changes right hip and knee joints, narrowing worse involving the right knee joint. No fracture, dislocation or x-ray evidence of avascular necrosis. Demineralization of the visualized bones. Diffuse soft tissue swelling. No discrete   fluid collection.    2.  Common iliac vascular stents. Espino catheter within the urinary bladder. Multiple calcified fibroids. Distal colonic diverticulosis.       XR Knee Left 1/2 Views    Narrative    EXAM: XR KNEE LEFT 1/2 VIEWS  LOCATION: [facility]  DATE/TIME: 1/11/2023 4:14 AM    INDICATION: Lt knee cap pain.  COMPARISON: None.      Impression    IMPRESSION: Moderate to severe tricompartmental osteoarthritic changes of the knee. No fracture or joint effusion. Somewhat high riding patella, uncertain if positional. Atherosclerotic vascular calcifications.

## 2023-01-11 NOTE — PROGRESS NOTES
[facility]  PROGRESS NOTE - UPDATE       ABIs  FINDINGS:  Right VENUS:   DP: 0.99  PT: 1.1.     Left VENUS:   DP: 1.17   PT: 1.21.     Waveforms: Triphasic in the distal tibial arteries                                                                   IMPRESSION: Ankle-brachial indices are within normal limits.     ASSESSMENT:  1. Left heel ulcer   2. Uncontrolled Diabetes --> hba1c: 10   3. Clinical signs of peripheral arterial disease      MEDICAL DECISION MAKING:   -Reviewed patient's vascular studies, appears to have appropriate blood flow for healing.     -No signs of osteomyelitis on xray. No signs of infection clinically or systemically.     -Would continue local wound care. Sites should be offloaded at all times to prevent worsening tissue breakdown.     -Will sign off. Please call with any questions.     Gracie De La Rosa DPM   [facility]  [phone]

## 2023-01-11 NOTE — PLAN OF CARE
Orientation/Cognitive: A&O X4  Observation Goals (Met/ Not Met): Inpatient   Mobility Level/Assist Equipment: Total care with lift. Bed bound at baseline, T and R, patient refuses at times  Fall Risk (Y/N): Y  Behavior Concerns: Refuses cares at times  Pain Management: Tripp LE, oxycodone given   Tele/VS/O2:  VSS on RA   ABNL Lab/BG: Hgb 9.2;  and 269; A1c 10; CT abdomen, CT R femur, and Xray L leg completed, see results.  Diet: Mod Carb   Bowel/Bladder: Espino is patent with adquate UOP. Incontinent BM  x1, Md, loose and brown in color  Skin Concerns: Wounds on coccyx, tripp legs, heels. Dressings CDI.  Refused coccyx dressing change.  Drains/Devices: Espino catheter and Midline right arm SL, unit collect. Intermittent IV Zosyn  Tests/Procedures for next: BG monitoring, Wound cares  Anticipated DC date & active delays: Discharge pending placement to LTC, SW following.  Patient Stated Goal for Today: Pain control   Other: Podiatry and WOC following.  Prevalon boots to bilateral feet.

## 2023-01-11 NOTE — PROGRESS NOTES
Notified provider about indwelling manrique catheter discussed removal or continued need.    Did provider choose to remove indwelling manrique catheter? No -but, okay to remove tomorrow per provider note/discussion    Provider's manrique indication for keeping indwelling manrique catheter: wound, retention     Is there an order for indwelling manrique catheter? Yes    *If there is a plan to keep manrique catheter in place at discharge daily notification with provider is not necessary.

## 2023-01-11 NOTE — PROVIDER NOTIFICATION
MD Notification    Notified Person: MD    Notified Person Name: Dr. Bojorquez    Notification Date/Time: 1700, 1/10/23    Notification Interaction: Raegan    Purpose of Notification: Per AMT they recommend stopping zosyn     Orders Received: Please continue.     Comments:

## 2023-01-11 NOTE — PROVIDER NOTIFICATION
Notified provider about indwelling manrique catheter discussed removal or continued need.    Did provider choose to remove indwelling manrique catheter? Yes    Provider's manrique indication for keeping indwelling manrique catheter: Other - manrique removed.    Is there an order for indwelling manrique catheter? Yes    *If there is a plan to keep manrique catheter in place at discharge daily notification with provider is not necessary.       Attempting trial void.

## 2023-01-12 NOTE — PLAN OF CARE
Orientation/Cognitive: A/O  Observation Goals (Met/ Not Met): IP  Mobility Level/Assist Equipment: Total care with lift. Bed bound at baseline, T and R, patient refuses at times  Fall Risk (Y/N): Yes  Behavior Concerns: Refuses cares at times, more agreeable with clustered care  Pain Management: BLE, more pain on left, oxy x2  Tele/VS/O2:  VSS on RA   ABNL Lab/BG: BG 97, 124  Diet: Mod Carb   Bowel/Bladder: Incontinent of bowel and bladder, purewick in place, bladder scan 43 ml  Skin Concerns: Wounds on coccyx, zuhair legs, heels. Dressings CDI.  Drains/Devices: Midline right arm SL, unit collect. Reports discomfort with flushes  Tests/Procedures for next: BG monitoring, Wound cares  Anticipated DC date & active delays: Discharge pending placement to LTC, SW following.  Patient Stated Goal for Today: Pain management  Other: WOC following. Prevalon boots to bilateral feet. 2+ R leg edema.

## 2023-01-12 NOTE — PROGRESS NOTES
Care Management Follow Up    Length of Stay (days): 5    Expected Discharge Date: 01/16/2023     Concerns to be Addressed:     discharge planning  Patient plan of care discussed at interdisciplinary rounds: Yes    Anticipated Discharge Disposition: Long Term Care     Anticipated Discharge Services: None  Anticipated Discharge DME: None    Patient/family educated on Medicare website which has current facility and service quality ratings: yes  Education Provided on the Discharge Plan:    Patient/Family in Agreement with the Plan: yes    Referrals Placed by CM/SW: Post Acute Facilities  Private pay costs discussed: Not applicable    Additional Information:  No accepting facility yet. Additional referrals sent to facilities that take [employer], as this is pt's primary insurance, per protocol. SW following.       SEAN Cook, LGSW  [phone] Desk phone  [phone] Cell/text (Preferred)  [facility]

## 2023-01-12 NOTE — PROGRESS NOTES
[facility]  Medicine Progress Note - Hospitalist Service    Date of Admission:  1/6/2023    Assessment & Plan   Miley Tolbert is a 77 year old female with history of hypertension, CHF, atrial fibrillation on Xarelto, history of for lower extremity DVT, coronary artery disease, chronic kidney disease stage III, COPD, hypothyroidism, poorly controlled type 2 diabetes with hyperglycemia, GERD, fecal and urinary incontinence who presented with progressively worsening sacral decubitus ulcer and inability of the family to provide care for the patient.       #Progressively worsening stage IV sacral wound and stage II left calf and heel wounds  # suspected sepsis, no obvious source  Reportedly wound was foul smelling, soiled with urine/stool. No fever, and WBC normal at presentation. Hypotensive a day after admission after receiving lisinopril.   - Presumed sacral wound infection and started on broad spectrum abx, although does not appear infected. Wound on Lt heel appears concerning.   - Procal and CRP mildly elevated. Lt heel XR without osteomyelitis.    - Podiatry consulted, ultrasound Doppler study shows VENUS within normal limits.  No intervention needed, signed off.  - blood cultures negative, WBC normal.  Vancomycin discontinued.   -CT abdomen pelvis and right thigh obtained to evaluate for other potential sources including diverticulitis/cholecystitis and also to look for any hematoma given ongoing drop in hemoglobin.  No source of infection, Zosyn discontinued.  No source of bleeding.- Wound care consulted and following, appreciate assistance.   -  and care coordination consulted for discharge planning, will need placement to geriatric unit as daughter unable to care for her and unsafe to be discharged back home  - PRN oxycodone for pain control.      #Poorly controlled type 2 diabetes mellitus with peripheral neuropathy: HbA1C 10  On Lantus 35 units with a 10 to 14  units of aspart prior to meals at home.  - Continue Lantus 30 units qAM and 10 units of aspart with meals and sliding scale insulin aspart with carb controlled diet.  - Blood sugars are elevated today but due to the steroid. Continue current insulin regimen as no more doses of steroid planned.   -- On gabapentin 600 mg 3 times daily  -- Holding home Januvia.     #Paroxysmal atrial fibrillation  In sinus rhythm.  -- Continuing with PTA Xarelto    #Progressive dementia with behavioral disturbance in the setting of schizoaffective disorder and intermittent delirium  -- Maintain sleep-wake cycles.  -- Avoid benzodiazepines.  -- Continue venlafaxine 150 mg daily, topiramate 100 mg daily  -- Currently oriented to place and person.  Answers all the questions appropriately.     #Hx coronary artery disease with CHF  #Hypertension  -- Lisinopril held in the setting of hypotension and resumed at lower dose subsequently as BP trended up.   -- Last echo on 5/5/2022 showed EF of 55 to 60% with normal left ventricular cavity size     #Hyperlipidemia  -- Continue atorvastatin 40 daily     #History of DVT  -- Continue Xarelto.     #Chronic pain  -- Continue ylenol, Zanaflex. As needed oxycodone as needed for pain control.  -- Lt knee XR ordered given pain with attempted movement     #Urinary incontinence, now with retention  -- On tamsulosin 0.4 mg daily.  -- manrique catheter placed given worsening decub and contamination with urine in the setting of urinary retention.   -- Discontinued 1/11 AM, bladder scan as needed.  If recurrent retention, re insert Manrique catheter      #Chronic kidney disease, stage III  -- Creatinine stable and at baseline       Diet: Moderate Consistent Carb (60 g CHO per Meal) Diet    DVT Prophylaxis: DOAC  Manrique Catheter: Not present  Lines: PRESENT             Cardiac Monitoring: None  Code Status: No CPR- Do NOT Intubate      Clinically Significant Risk Factors              # Hypoalbuminemia: Lowest albumin  "= 2.4 g/dL at 1/8/2023  7:01 AM, will monitor as appropriate           # DMII: A1C = 10.0 % (Ref range: 0.0 - 5.6 %) within past 3 months   # Overweight: Estimated body mass index is 26.67 kg/m  as calculated from the following:    Height as of this encounter: 1.626 m (5' 4\").    Weight as of this encounter: 70.5 kg (155 lb 6.4 oz).          Disposition Plan  Once LTC facility available     Expected Discharge Date: 01/16/2023    Discharge Delays: Placement - LTC    Discharge Comments: placement; was living with daughter; has multiple wounds.   SW/CC-- needs placement.  LTC          Katya Bello MD  Hospitalist Service  [facility]  Securely message with [employer] (more info)  Text page via [employer] Paging/Directory   ______________________________________________________________________    Interval History      No acute issues reported by nursing staff.  Espino catheter was discontinued, PVR less than 300.         Physical Exam   Vital Signs: Temp: 98  F (36.7  C) Temp src: Oral BP: (!) 150/82 Pulse: 89   Resp: 18 SpO2: 98 % O2 Device: None (Room air)    Weight: 155 lbs 6.4 oz    GENERAL: Alert and oriented to self/place/yr; no acute distress. frequent lip smacking movement.    HEENT: PARUL EOMI  CV: regular S1, S2; (+) murmur.  RESP: Lung fields clear to aucultation B/L; normal WOB  GI: Abdomen is soft, non tender   : Espino has been discontinued since  MSK: paraparesis, Rt thigh is deformed, and swollen, bilateral leg edema and changes of chronic venous stasis.    NEURO: bilateral lower extremities paraparesis   PSYCH: oriented to place, self, year    Medical Decision Making       20 MINUTES SPENT BY ME on the date of service doing chart review, history, exam, documentation & further activities per the note.   Discussed with patient and nursing staff     Data         Imaging results reviewed over the past 24 hrs:   No results found for this or any previous visit (from the past 24 " hour(s)).

## 2023-01-12 NOTE — PLAN OF CARE
Orientation/Cognitive: A&O X4  Observation Goals (Met/ Not Met): Inpatient   Mobility Level/Assist Equipment: Total care with lift. Bed bound at baseline, T and R, patient refuses at times  Fall Risk (Y/N): Y  Behavior Concerns: Refuses cares at times  Pain Management: Tripp LE, oxycodone given   Tele/VS/O2:  VSS on RA   ABNL Lab/BG:  and 138; A1c 10; CT abdomen, CT R femur, and Xray L leg completed, see results.  Diet: Mod Carb   Bowel/Bladder: Incontinent of bowel and bladder, purewick in place.  Bladder scanned for 127.  Skin Concerns: Wounds on coccyx, tripp legs, heels. Dressings CDI.  Refused coccyx dressing  Drains/Devices: Midline right arm SL, unit collect.   Tests/Procedures for next: BG monitoring, Wound cares  Anticipated DC date & active delays: Discharge pending placement to LTC, SW following.  Patient Stated Goal for Today: Pain control   Other: WOC following. Prevalon boots to bilateral feet. 2+ R leg edema.   Normal rate, regular rhythm.  Heart sounds S1, S2.

## 2023-01-12 NOTE — CARE PLAN
Orientation/Cognitive: AO X4  Observation Goals (Met/ Not Met): Inpatient status  Mobility Level/Assist Equipment: lift, Bed bound at baseline, T and R  Fall Risk (Y/N): Y  Behavior Concerns: occasionally refusing re postioning  Pain Management: LLE pain, oxycodone given x2  Tele/VS/O2:  VSS on RA ex HTN  ABNL Lab/BG: See results  Diet: Moderate Carb, good appetite  Bowel/Bladder: incontinent of bowel/blader - manrique removed today at 11:00, able to void x1  Skin Concerns: Wounds on coccyx, zuhair legs, heels. Dressing changed per wound care plan  Drains/Devices: d Midline right arm SL. Unit collect for labs  Tests/Procedures for next: SW following for LTC placement.   Anticipated DC date & active delays: TBD  Patient Stated Goal for Today: Pain control

## 2023-01-13 NOTE — PLAN OF CARE
Goal Outcome Evaluation:    Pt here with stage four pressure injury to coccyx. A&O. VSS ex baseline HTN, on RA.  Mod carb diet, tolerates liquids. BS checks before meals, 194. Takes pills well. Up with lift, bedbound. Full cares and is particular about how/when turns/ cares happen. States pain as a 6-8, oxy given PRNx2, see MAR. Has Prevalon boots on bilaterally. Tenderness noted on bilateral upper and lower extremities.  Discharge pending LTC.

## 2023-01-13 NOTE — PROGRESS NOTES
[facility]  Medicine Progress Note - Hospitalist Service    Date of Admission:  1/6/2023    Assessment & Plan   Miley Tolbert is a 77 year old female with history of hypertension, CHF, atrial fibrillation on Xarelto, history of for lower extremity DVT, coronary artery disease, chronic kidney disease stage III, COPD, hypothyroidism, poorly controlled type 2 diabetes with hyperglycemia, GERD, fecal and urinary incontinence who presented with progressively worsening sacral decubitus ulcer and inability of the family to provide care for the patient.       #Progressively worsening stage IV sacral wound and stage II left calf and heel wounds  # suspected sepsis, no obvious source  Reportedly wound was foul smelling, soiled with urine/stool. No fever, and WBC normal at presentation. Hypotensive a day after admission after receiving lisinopril.   - Presumed sacral wound infection and started on broad spectrum abx, although does not appear infected. Wound on Lt heel appeared concerning.   - Procal and CRP mildly elevated. Lt heel XR without osteomyelitis.    - Podiatry consulted, ultrasound Doppler study shows VENUS within normal limits.  No intervention needed, signed off.  - blood cultures negative, WBC normal.  Vancomycin discontinued.   -CT abdomen pelvis and right thigh obtained to evaluate for other potential sources including diverticulitis/cholecystitis and also to look for any hematoma given ongoing drop in hemoglobin.  No source of infection, Zosyn discontinued.  No source of bleeding.  - Wound care consulted and following, appreciate assistance.   -  and care coordination consulted for discharge planning, will need placement to geriatric unit as daughter unable to care for her and unsafe to be discharged back home  - PRN oxycodone for pain control.      #Poorly controlled type 2 diabetes mellitus with peripheral neuropathy: HbA1C 10  On Lantus 35 units with a 10 to 14  units of aspart prior to meals at home.  - Continued with Lantus 30 units qAM and 10 units of aspart with meals and sliding scale insulin aspart with carb controlled diet. Dose adjusted due to hypoglycemia (following 2 doses of novolog in short interval) and reviewed with RN  -- On gabapentin 600 mg 3 times daily  -- Holding home Januvia.     #Paroxysmal atrial fibrillation  In sinus rhythm.  -- Continuing with PTA Xarelto    #Progressive dementia with behavioral disturbance in the setting of schizoaffective disorder and intermittent delirium  -- Maintain sleep-wake cycles.  -- Avoid benzodiazepines.  -- Continue venlafaxine 150 mg daily, topiramate 100 mg daily  -- Currently oriented to place and person.  Answers all the questions appropriately.     #Hx coronary artery disease with CHF  #Hypertension  -- Lisinopril held in the setting of hypotension and resumed at lower dose subsequently as BP trended up.   -- Last echo on 5/5/2022 showed EF of 55 to 60% with normal left ventricular cavity size     #Hyperlipidemia  -- Continue atorvastatin 40 daily     #History of DVT  -- Continue Xarelto.     #Chronic pain  -- Continue ylenol, Zanaflex. As needed oxycodone as needed for pain control.  -- Lt knee XR ordered given pain with attempted movement     #Urinary incontinence, now with retention  -- On tamsulosin 0.4 mg daily.  -- manrique catheter placed given worsening decub and contamination with urine in the setting of urinary retention.   -- Discontinued 1/11 AM, bladder scan as needed.  If recurrent retention, re insert Manrique catheter      #Chronic kidney disease, stage III  -- Creatinine stable and at baseline    Difficult stick, Midline was placed while IV ibx was needed. Discussed with nursing staff to attempt peripheral IV in next 24 hours and discontinue midline.        Diet: Moderate Consistent Carb (60 g CHO per Meal) Diet    DVT Prophylaxis: DOAC  Manrique Catheter: Not present  Lines: PRESENT             Cardiac  "Monitoring: None  Code Status: No CPR- Do NOT Intubate      Clinically Significant Risk Factors              # Hypoalbuminemia: Lowest albumin = 2.4 g/dL at 1/8/2023  7:01 AM, will monitor as appropriate           # DMII: A1C = 10.0 % (Ref range: 0.0 - 5.6 %) within past 3 months   # Overweight: Estimated body mass index is 26.67 kg/m  as calculated from the following:    Height as of this encounter: 1.626 m (5' 4\").    Weight as of this encounter: 70.5 kg (155 lb 6.4 oz).          Disposition Plan  Once LTC facility available     Expected Discharge Date: 01/16/2023    Discharge Delays: Placement - LTC  *Medically Ready for Discharge    Discharge Comments: placement; was living with daughter; has multiple wounds.   SW/CC-- needs placement.  LTC          Katya Bello MD  Hospitalist Service  [facility]  Securely message with [employer] (more info)  Text page via [employer] Paging/Directory   ______________________________________________________________________    Interval History      No acute issues reported by nursing staff. Patient states bottom is sore.        Physical Exam   Vital Signs: Temp: 97  F (36.1  C) Temp src: Oral BP: (!) 165/73 Pulse: 82   Resp: 16 SpO2: 96 % O2 Device: None (Room air)    Weight: 155 lbs 6.4 oz    GENERAL: Alert and oriented to self/place/yr; no acute distress. frequent lip smacking movement.    HEENT: PARUL EOMI  CV: regular S1, S2; (+) murmur.  RESP: Lung fields clear to aucultation B/L; normal WOB  GI: Abdomen is soft, non tender   : Espino has been discontinued since  MSK: paraparesis, Rt thigh is deformed, and swollen, bilateral leg edema and changes of chronic venous stasis.    NEURO: bilateral lower extremities paraparesis   PSYCH: oriented to place, self, year    Medical Decision Making       20 MINUTES SPENT BY ME on the date of service doing chart review, history, exam, documentation & further activities per the note.   Discussed with patient and " nursing staff     Data         Imaging results reviewed over the past 24 hrs:   No results found for this or any previous visit (from the past 24 hour(s)).

## 2023-01-13 NOTE — PLAN OF CARE
Goal Outcome Evaluation:         [id number]  VSS ex HTN. Aox4. Refused turn and repo at times. K protocol, WNL. Mod carb diet, BS check. PW with good amy UOP. Pt incont B/B. Midline saline locked. Pt has wound on coccyx, buttocks, right inner thigh,calf,  and left heel. Mepis in place. Pt also reported abrasion on right palm. States its from nails digging into skin. Wound cleaned and dressed. Prevalon boots in place bilat. PRN Oxycodone given x2 for pain. Discharge pending LTC.

## 2023-01-13 NOTE — PLAN OF CARE
Pt is A&Ox4, denied SOB. C/o pain in BLE and coccyx, PRN oxy given x1 this shift. Pt stated relief. Pt has wound to coccyx, right lower buttock, right inner thigh, right calf, and left heel. All CDI and were changed today before admission. Pt is 2 assist with lift and repositioning. Incontinent of bowel and bladder, purewick in place. BG q4hr. Was 67 before dinner, provider held evening insulin and adjusted insulin orders. BG was 82 after dinner, then 197 at bedtime. R midline is CDI and heparin locked. On K protocol, recheck in the AM. Takes meds whole with water. Pending long tern care placement.

## 2023-01-14 NOTE — PROGRESS NOTES
RRT called for new onset of chest pain. Pain radiated to left arm along with SOB. Stat EGK, troponin, and Nitroglycerin ordered. 1 Nitroglycerin given with relief of chest pain but pt became hypotensive.  Pt BP increased after initial dose, last BP is 106/48.

## 2023-01-14 NOTE — PLAN OF CARE
Goal Outcome Evaluation:    Pt here with severe pressure ulcer wounds to coccyx. A&Ox4. LS clear and equal bilaterally. Pain rating varying from 4-10. Oxy given PRNx2, see MAR. RRT called this shift around 1400, see notes. Mod carb diet, tolerates thin liquids. Takes pills well. BG checks. Up with lift. Plan for trop results to come back and reassess midline, as not able to get much blood return. Discharge pending TCU placement.

## 2023-01-14 NOTE — PLAN OF CARE
[id number]    VSS on RA ex HTN. A/Ox4. A2 w/ lift, total care. T/R q2hr. Wounds to coccyx, panis, R breast, L heel, R calf. C/O pain in R leg, PRN oxy given x2. Mepis in place. Wound cares done this shift after bowel incontinence. Mod consistent carb diet (60g), ACHS B.G. checks. K+ protocol. PW w/ good UOP, amy. Incontinent B/B. Midline saline locked, good blood return. Prevalon boots in place bilaterally. Discharge pending LTC, SW following.

## 2023-01-14 NOTE — PROVIDER NOTIFICATION
MD Notification    Notified Person: MD    Notified Person Name: Dr. Bello    Notification Date/Time: 12:37, 1/14/23    Notification Interaction: Page    Purpose of Notification:    Pt's noon BG is 76, would you like us to give the scheduled 10 units of insulin still?       Orders Received: Hold insulin    Comments:

## 2023-01-14 NOTE — PLAN OF CARE
Goal Outcome Evaluation:    Pt is alert and oriented. VSS on RA. Complain of 7/10 back pain, PRN oxycodone administered. Mod card diet. B/B incontinent, external female cath in place with adequate UOP.  Wound on coccyx, buttocks, R thigh  and left heel. Mepis in place.IV  Midline SL. Discharge pending.

## 2023-01-14 NOTE — PROGRESS NOTES
[facility]  Medicine Progress Note - Hospitalist Service    Date of Admission:  1/6/2023    Assessment & Plan   Miley Tolbert is a 77 year old female with history of hypertension, CHF, atrial fibrillation on Xarelto, history of for lower extremity DVT, coronary artery disease, chronic kidney disease stage III, COPD, hypothyroidism, poorly controlled type 2 diabetes with hyperglycemia, GERD, fecal and urinary incontinence who presented with progressively worsening sacral decubitus ulcer and inability of the family to provide care for the patient.       #Progressively worsening stage IV sacral wound and stage II left calf and heel wounds  # suspected sepsis, no obvious source  Reportedly wound was foul smelling, soiled with urine/stool. No fever, and WBC normal at presentation. Hypotensive a day after admission after receiving lisinopril.   - Presumed sacral wound infection and started on broad spectrum abx, although does not appear infected. Wound on Lt heel appeared concerning.   - Procal and CRP mildly elevated. Lt heel XR without osteomyelitis.    - Podiatry consulted, ultrasound Doppler study shows VENUS within normal limits.  No intervention needed, signed off.  - blood cultures negative, WBC normal.  Vancomycin discontinued.   -CT abdomen pelvis and right thigh obtained to evaluate for other potential sources including diverticulitis/cholecystitis and also to look for any hematoma given ongoing drop in hemoglobin.  No source of infection, Zosyn discontinued.  No source of bleeding.  - Wound care consulted and following, appreciate assistance.   -  and care coordination consulted for discharge planning, will need placement to geriatric unit as daughter unable to care for her and unsafe to be discharged back home  - PRN oxycodone for pain control.      #Poorly controlled type 2 diabetes mellitus with peripheral neuropathy: HbA1C 10  On Lantus 35 units with a 10 to 14  units of aspart prior to meals at home.  - Continued with Lantus 30 units qAM and 10 units of aspart with meals and sliding scale insulin aspart with carb controlled diet. Dose adjusted/reduced due to hypoglycemia   -- On gabapentin 600 mg 3 times daily  -- Holding home Januvia.     #Paroxysmal atrial fibrillation  In sinus rhythm.  -- Continuing with PTA Xarelto    #Progressive dementia with behavioral disturbance in the setting of schizoaffective disorder and intermittent delirium  -- Maintain sleep-wake cycles.  -- Avoid benzodiazepines.  -- Continue venlafaxine 150 mg daily, topiramate 100 mg daily  -- Currently oriented to place and person.  Answers all the questions appropriately.     #Hx coronary artery disease with CHF  #Hypertension  -- Lisinopril held in the setting of hypotension and resumed at lower dose subsequently as BP trended up.   -- Last echo on 5/5/2022 showed EF of 55 to 60% with normal left ventricular cavity size     #Hyperlipidemia  -- Continue atorvastatin 40 daily     #History of DVT  -- Continue Xarelto.     #Chronic pain  -- Continue ylenol, Zanaflex. As needed oxycodone as needed for pain control.  -- Lt knee XR ordered given pain with attempted movement     #Urinary incontinence, now with retention  -- On tamsulosin 0.4 mg daily.  -- manrique catheter placed given worsening decub and contamination with urine in the setting of urinary retention.   -- Discontinued 1/11 AM, bladder scan as needed.  If recurrent retention, re insert Manrique catheter      #Chronic kidney disease, stage III  -- Creatinine stable and at baseline    Difficult stick, Midline was placed while IV ibx was needed. If able to establish peripheral IV, discontinue midline       Diet: Moderate Consistent Carb (60 g CHO per Meal) Diet    DVT Prophylaxis: DOAC  Manrique Catheter: Not present  Lines: PRESENT             Cardiac Monitoring: None  Code Status: No CPR- Do NOT Intubate      Clinically Significant Risk Factors           "    # Hypoalbuminemia: Lowest albumin = 2.4 g/dL at 1/8/2023  7:01 AM, will monitor as appropriate           # DMII: A1C = 10.0 % (Ref range: 0.0 - 5.6 %) within past 3 months   # Obesity: Estimated body mass index is 31.77 kg/m  as calculated from the following:    Height as of this encounter: 1.626 m (5' 4\").    Weight as of this encounter: 84 kg (185 lb 1.6 oz).          Disposition Plan  Once LTC facility available     Expected Discharge Date: 01/16/2023    Discharge Delays: Placement - LTC  *Medically Ready for Discharge    Discharge Comments: placement; was living with daughter; has multiple wounds.   SW/CC-- needs placement.  LTC          Katya Bello MD  Hospitalist Service  [facility]  Securely message with [employer] (more info)  Text page via [employer] Paging/Directory   ______________________________________________________________________    Interval History      No acute issues reported by nursing staff. Ongoing soreness over the bottom, taking oxy regularly, no sedation or worsened confusion.         Physical Exam   Vital Signs: Temp: 98.7  F (37.1  C) Temp src: Oral BP: 114/57 Pulse: 96   Resp: 16 SpO2: 94 % O2 Device: None (Room air)    Weight: 185 lbs 1.6 oz    GENERAL: Alert and oriented to self/place/yr; no acute distress. frequent lip smacking movement.    HEENT: PARUL EOMI  CV: regular S1, S2; (+) murmur.  RESP: Lung fields clear to aucultation B/L; normal WOB  GI: Abdomen is soft, non tender   MSK: paraparesis, Rt thigh is deformed, and swollen, bilateral leg edema and changes of chronic venous stasis.    NEURO: bilateral lower extremities paraparesis   PSYCH: oriented to place, self, year    Medical Decision Making       20 MINUTES SPENT BY ME on the date of service doing chart review, history, exam, documentation & further activities per the note.   Discussed with patient and nursing staff     Data     I have personally reviewed the following data over the past 24 " hrs:    N/A  \   N/A   / N/A     N/A N/A N/A /  101 (H)   3.7 N/A N/A \       Imaging results reviewed over the past 24 hrs:   No results found for this or any previous visit (from the past 24 hour(s)).

## 2023-01-14 NOTE — CODE/RAPID RESPONSE
[facility]    RRT Note  1/14/2023   Time Called: 2:05 PM    Code Status: No CPR- Do NOT Intubate    I was called to evaluate Miley Tolbert, who is a 77 year old female with a PMH of hypertension, CHF, atrial fibrillation on Xarelto, history of lower extremity DVT, CAD, chronic kidney disease stage III, COPD, hypothyroidism, poorly controlled DM type II GERD, fecal and urinary incontinence who was admitted on 1/6/2023 for progressively worsening sacral decubitus ulcer and inability of the family to provide care for the patient.    Assessment & Plan     Chest pain suspect 2/2 to unstable angina   Upon arrival patient is nontoxic-appearing, lying in bed not in acute respiratory distress.  Initial vital signs include HR 95, /71, RR 18, SPO2 96% on room air. Patient has midsternal chest pain that radiates into her left arm that started after reaching for a cup of water on her bedside table.  She describes it as an intermittent stabbing and constant chest pressure.  She does endorse shortness of breath.  She denies any lightheadedness, dizziness, palpitations, nausea, fatigue, or pain that radiates into her back.  On exam skin is warm and dry.  Lungs are CTA.  Chest pain is not reproducible.  RRR.  No murmur, rub, or gallop.    Differentials  - Acute Coronary Syndrome: Risk factors for ACS include history of coronary artery disease, HTN, HLD, DM2, obesity, CKD stage III. Patient reports she takes nitroglycerin sometimes at home, however unable to describe how often.  - MSK: pain not reproducible, although pain was triggered after reaching for her cup on her bedside table  -GERD; less likely    Leading diagnosis is unstable angina vs. NSTEMI    INTERVENTIONS:  - 12-lead EKG; Sinus rhythm with no acute ischemic changes  - SL nitroglycerin x1  -  Troponin x3    After 1 SL nitroglycerin chest pain and pressure has resolved, and patient is no longer SOB. She did experience some transient  hypotension BP 85/44 and was symptomatic with nausea, hot flash, and lightheadedness. 4 mg IV zofran given. Symptoms resolved after BP normalized. BP now 106/48. Patient can remain on station 88. Will reevaluate need for higher level of care or heart center if troponin elevated. Discussed with and defer further cares to Dr. Bello.      Tha Torres NP  [facility]  Securely message with the [employer] (learn more here)  Text page via [employer] Paging/Directory          Physical Exam   Vital Signs with Ranges:  Temp:  [98  F (36.7  C)-98.7  F (37.1  C)] 98.7  F (37.1  C)  Pulse:  [] 100  Resp:  [16] 16  BP: ()/(43-86) 106/48  SpO2:  [94 %-100 %] 97 %  I/O last 3 completed shifts:  In: 240 [P.O.:240]  Out: 400 [Urine:400]      Physical Exam   General:  Appears stated age, no acute respiratory distress.   Skin:  Warm, dry. No rashes or lesions on exposed skin. Unable to visualize leg wounds.  HEENT:  Normocephalic, atraumatic.  Neck:  Supple.  Chest:  Breath sounds CTA and no increased work of breathing on room air.  Cardiovascular:  RRR, no rub or murmur.   Abdomen:  Soft, non-tender, non-distended.  Musculoskeletal:  Moves all four extremities.   Neurological:  No focal neuro deficits.  Psychiatric:  Calm and cooperative.    Data     EKG:  Interpreted by Tha Torres NP  Time reviewed: 14:07  Symptoms at time of EKG: chest pain   Rhythm: normal sinus   Rate: 91  Axis: Normal  Ectopy: none  Conduction: normal  ST Segments/ T Waves: No acute ischemic changes  Q Waves: none  Comparison to prior: Unchanged    Clinical Impression: no acute changes    IMAGING: (X-ray/CT/MRI)   No results found for this or any previous visit (from the past 24 hour(s)).    CBC with Diff:  Recent Labs   Lab Test 01/11/23  0622   WBC 9.3   HGB 10.2*   MCV 85         No results found for: RETICABSCT  No results found for: RETP    Comprehensive Metabolic Panel:  Recent Labs   Lab  01/14/23  1410 01/14/23  0806 01/14/23  0541 01/11/23  0832 01/11/23  0622 01/08/23  1213 01/08/23  0701   NA  --   --   --   --  140   < > 139   POTASSIUM  --   --  3.7   < > 4.5   < > 4.1   CHLORIDE  --   --   --   --  112*   < > 112*   CO2  --   --   --   --  24   < > 22   ANIONGAP  --   --   --   --  4   < > 5   *   < >  --    < > 243*   < > 164*   BUN  --   --   --   --  12   < > 21   CR  --   --   --   --  0.63   < > 0.81   GFRESTIMATED  --   --   --   --  90   < > 73   MELISSA  --   --   --   --  8.9   < > 8.5   PROTTOTAL  --   --   --   --   --   --  6.0*   ALBUMIN  --   --   --   --   --   --  2.4*   BILITOTAL  --   --   --   --   --   --  0.7   ALKPHOS  --   --   --   --   --   --  100   AST  --   --   --   --   --   --  12   ALT  --   --   --   --   --   --  9    < > = values in this interval not displayed.         Time Spent on this Encounter     CRITICAL CARE TIME  I spent 40 minutes (2:10 - 2:50) of critical care time on the unit/floor managing the care of Miley Tolbert. Upon evaluation, this patient had a high probability of imminent or life-threatening deterioration due to Acute chest pain concerning for Acute Coronary Syndrome which required my direct attention, intervention, and personal management. 100% of my time was spent at the bedside counseling the patient and/or coordinating care regarding services listed in this note.

## 2023-01-15 NOTE — PROGRESS NOTES
[facility]  Medicine Progress Note - Hospitalist Service    Date of Admission:  1/6/2023    Assessment & Plan   Miley Tolbert is a 77 year old female with history of hypertension, CHF, atrial fibrillation on Xarelto, history of for lower extremity DVT, coronary artery disease, chronic kidney disease stage III, COPD, hypothyroidism, poorly controlled type 2 diabetes with hyperglycemia, GERD, fecal and urinary incontinence who presented with progressively worsening sacral decubitus ulcer and inability of the family to provide care for the patient.       #Progressively worsening stage IV sacral wound and stage II left calf and heel wounds  # suspected sepsis, no obvious source  Reportedly wound was foul smelling, soiled with urine/stool. No fever, and WBC normal at presentation. Hypotensive a day after admission after receiving lisinopril.   - Presumed sacral wound infection and started on broad spectrum abx, although does not appear infected. Wound on Lt heel appeared concerning.   - Procal and CRP mildly elevated. Lt heel XR without osteomyelitis.    - Podiatry consulted, ultrasound Doppler study shows VENUS within normal limits.  No intervention needed, signed off.  - blood cultures negative, WBC normal.  Vancomycin discontinued.   -CT abdomen pelvis and right thigh obtained to evaluate for other potential sources including diverticulitis/cholecystitis and also to look for any hematoma given ongoing drop in hemoglobin.  No source of infection, Zosyn discontinued.  No source of bleeding.  - Wound care consulted and following, appreciate assistance.   -  and care coordination consulted for discharge planning, will need placement to geriatric unit as daughter unable to care for her and unsafe to be discharged back home  - PRN oxycodone for pain control.      #Poorly controlled type 2 diabetes mellitus with peripheral neuropathy and hypoglycemic spells: HbA1C 10  On Lantus  35 units with a 10 to 14 units of aspart prior to meals at home.  - Continued with Lantus 30 units and 10 units of aspart with meals and sliding scale insulin aspart with carb controlled diet. Dose adjusted/reduced due to hypoglycemia.  -- On gabapentin 600 mg 3 times daily  -- Holding home Januvia.     #Paroxysmal atrial fibrillation  In sinus rhythm.  -- Continuing with PTA Xarelto    #Progressive dementia with behavioral disturbance in the setting of schizoaffective disorder and intermittent delirium  -- Maintain sleep-wake cycles.  -- Avoid benzodiazepines.  -- Continue venlafaxine 150 mg daily, topiramate 100 mg daily  -- Currently oriented to place and person.  Answers all the questions appropriately.     #Hx coronary artery disease with CHF  #Hypertension  #CAD  -- Lisinopril held in the setting of hypotension and resumed at lower dose subsequently as BP trended up.   -- Last echo on 5/5/2022 showed EF of 55 to 60% with normal left ventricular cavity size  -- Chest pain RRT 1/15. Serial troponin negative. EKG without acute ischemic findings. Symptoms resolved, unclear if with nitroglycerine or spontaneously.   -- if recurrent pain, will get NM stress test given underlying known CAD.      #Hyperlipidemia  -- Continue atorvastatin 40 daily     #History of DVT  -- Continue Xarelto.     #Chronic pain  -- Continue ylenol, Zanaflex. As needed oxycodone as needed for pain control.  -- Lt knee XR ordered given pain with attempted movement     #Urinary incontinence, now with retention  -- On tamsulosin 0.4 mg daily.  -- manrique catheter placed given worsening decub and contamination with urine in the setting of urinary retention.   -- Discontinued 1/11 AM, bladder scan as needed.  If recurrent retention, re insert Manrique catheter      #Chronic kidney disease, stage III  -- Creatinine stable and at baseline    Difficult stick, Midline was placed while IV ibx was needed.      Diet: Moderate Consistent Carb (60 g CHO per  "Meal) Diet    DVT Prophylaxis: DOAC  Espino Catheter: Not present  Lines: PRESENT             Cardiac Monitoring: None  Code Status: No CPR- Do NOT Intubate      Clinically Significant Risk Factors              # Hypoalbuminemia: Lowest albumin = 2.4 g/dL at 1/8/2023  7:01 AM, will monitor as appropriate           # DMII: A1C = 10.0 % (Ref range: 0.0 - 5.6 %) within past 3 months   # Obesity: Estimated body mass index is 31.77 kg/m  as calculated from the following:    Height as of this encounter: 1.626 m (5' 4\").    Weight as of this encounter: 84 kg (185 lb 1.6 oz).          Disposition Plan  Once LTC facility available     Expected Discharge Date: 01/17/2023    Discharge Delays: Placement - LTC  *Medically Ready for Discharge    Discharge Comments: placement; was living with daughter; has multiple wounds.   SW/CC-- needs placement.  LTC          Katya Bello MD  Hospitalist Service  [facility]  Securely message with [employer] (more info)  Text page via [employer] Paging/Directory   ______________________________________________________________________    Interval History      Patient developed chest pain yesterday, mid sternal but felt pain went to Lt arm, after stretching for a cup of water at the tray. RRT was called, received SL nitroglycerine and became hypotensive to 80s although symptom resolved. In the evening felt like she had reflux. No recurrence of symptoms today.    -noted BS 68 this am. Asymptomatic.       Physical Exam   Vital Signs: Temp: 98.4  F (36.9  C) Temp src: Oral BP: 118/58 Pulse: 80   Resp: 18 SpO2: 92 % O2 Device: None (Room air)    Weight: 185 lbs 1.6 oz    GENERAL: Alert and oriented to self/place/yr; no acute distress.   HEENT: PARUL EOMI. frequent lip smacking movement.    CV: regular S1, S2; (+) murmur.  RESP: Lung fields clear to aucultation B/L; normal WOB  GI: Abdomen is soft, non tender   MSK: paraparesis as well as Lt sided weakness. Rt thigh is deformed, " and swollen, bilateral leg edema and changes of chronic venous stasis.    NEURO: bilateral lower extremities paraparesis   PSYCH: oriented to place, self, year    Medical Decision Making       35 MINUTES SPENT BY ME on the date of service doing chart review, history, exam, documentation & further activities per the note.   Discussed with patient and nursing staff     Data     I have personally reviewed the following data over the past 24 hrs:    N/A  \   N/A   / N/A     N/A N/A N/A /  111 (H)   3.9 N/A N/A \       Imaging results reviewed over the past 24 hrs:   No results found for this or any previous visit (from the past 24 hour(s)).

## 2023-01-15 NOTE — PLAN OF CARE
[id number]    VSS on RA ex HTN. Pt reported episode of chest pain that went away quickly with rest after it had occurred. A/Ox4. A2 w/ lift, total care. T/R q2hr. Wounds to coccyx, panis, R breast, L heel, R calf. C/O pain in R leg, PRN oxy given x1. Mepis in place. Wound cares done this shift after bowel incontinence + bladder. Mod consistent carb diet (60g), ACHS B.G. checks. K+ protocol. PW w/ good UOP, amy. Incontinent B/B. Midline saline locked, no blood return, now lab draws. Prevalon boots in place bilaterally. Discharge pending LTC, SW following.

## 2023-01-15 NOTE — PLAN OF CARE
A&Ox4. Ax2, lift turn and repo. Refuses repositioning at times. Mod carb diet, tolerates thin liquids. Takes pills well. BG checks, last was 167.  K replacement, last was 3.7. Replaced 1x this shift, recheck in am. Pain in buttock area near wound and bilaterally in legs. PRN oxy available. All wounds covered in mepilexs. Discharge pending TCU placement.

## 2023-01-15 NOTE — PLAN OF CARE
Goal Outcome Evaluation:    A&O x4. VSS on RA. Pain managed with PRN oxy Q4 hr. Denies N/V/D. Denied the tylenol stating she is allergic. T&R q2, wounds on sacrum, coccyx, buttocks, and legs. Encourage fluid and protein intake. Diet is carb control with good appetite, BG checks AC and HS- . Incontinent of B&B, purwick in place with good UOP, no BM this shift. Has dentures. Medically stable for discharge, awaiting LTC placement, continue plan of care.

## 2023-01-16 NOTE — PROGRESS NOTES
[facility]    Hospitalist Progress Note    Date of Service (when I saw the patient): 01/16/2023  Admit date: 1/6/2023    Interval History   Full details of events over last 24 hours outlined below.   Donald offers no complaints.  She is playing solitary while I visit and does not look up to engage in conversation but is smiling and jovial.   Denies any SOB, CP, abdominal pain, N/V/D.    Assessment & Plan   Miley Tolbert is a 77 year old female with history of hypertension, CHF, atrial fibrillation on Xarelto, history of for lower extremity DVT, coronary artery disease, chronic kidney disease stage III, COPD, hypothyroidism, poorly controlled type 2 diabetes with hyperglycemia, GERD, fecal and urinary incontinence who presented with progressively worsening sacral decubitus ulcer and inability of the family to provide care for the patient.       Progressively worsening stage IV sacral wound and stage II left calf and heel wounds  Initially suspected sepsis, but no infection found  Reportedly wound was foul smelling, soiled with urine/stool. No fever, and WBC normal at presentation. Hypotensive a day after admission after receiving lisinopril.   * Presumed sacral wound infection and started on broad spectrum abx, although does not appear infected. Wound on Lt heel appeared concerning.   * Procal and CRP mildly elevated. Lt heel XR without osteomyelitis.        Podiatry consulted, ultrasound Doppler study shows VENUS within normal limits.  No intervention needed, signed off.    lood cultures negative, WBC normal.  Vancomycin discontinued.     CT abdomen pelvis and right thigh obtained to evaluate for other potential sources including diverticulitis/cholecystitis and also to look for any hematoma given ongoing drop in hemoglobin.  No source of infection, Zosyn discontinued.  No source of bleeding.    Wound care consulted and following, appreciate assistance.      and care  coordination consulted for discharge planning, will need placement to geriatric unit as daughter unable to care for her and unsafe to be discharged back home    PRN oxycodone for pain control.  Currently using 20 to 25 mg/day.  We will discuss tapering off of this     Poorly controlled type 2 diabetes mellitus with peripheral neuropathy and hypoglycemic spells: HbA1C 10 on 1/6/2023  On Lantus 35 units with a 10 to 14 units of aspart prior to meals at home.      Continued with Lantus 30 units and 10 units of aspart with meals and sliding scale insulin aspart with carb controlled diet. Dose adjusted/reduced due to hypoglycemia.    On gabapentin 600 mg 3 times daily    Holding home Januvia.      Recent Labs   Lab 01/16/23  1128 01/16/23  0647 01/16/23  0144 01/15/23  2156 01/15/23  1639 01/15/23  1143   * 120* 177* 169* 98 111*              Paroxysmal atrial fibrillation  In sinus rhythm.  -- Continuing with PTA Xarelto     Progressive dementia with behavioral disturbance in the setting of schizoaffective disorder and intermittent delirium  -- Maintain sleep-wake cycles.  -- Avoid benzodiazepines.  -- Continue PTA venlafaxine 150 mg daily, topiramate 100 mg daily  -- Currently oriented to place and person.  Answers all the questions appropriately.     CAD with HFpEF  Hypertension  -- Lisinopril held in the setting of hypotension and resumed at lower dose subsequently as BP trended up.   -- Last echo on 5/5/2022 showed EF of 55 to 60% with normal left ventricular cavity size  -- Chest pain RRT 1/15. Serial troponin negative. EKG without acute ischemic findings. Symptoms resolved, unclear if with nitroglycerine or spontaneously.   -- if recurrent pain, will get NM stress test given underlying known CAD.      Hyperlipidemia  -- Continue atorvastatin 40 daily     History of DVT  -- Continue Xarelto.     Chronic pain  -- Continue ylenol, Zanaflex. As needed oxycodone as needed for pain control.  -- Lt knee XR ordered  "given pain with attempted movement -this showed moderate to severe tricompartmental osteoarthritic changes.  No fracture or effusion     Urinary incontinence, now with retention  -- On tamsulosin 0.4 mg daily.  -- manrique catheter placed given worsening decub and contamination with urine in the setting of urinary retention.   -- Discontinued 1/11 AM, bladder scan as needed.  If recurrent retention, re insert Manrique catheter   --External catheter in place on 1/16     Chronic kidney disease, stage II  -- Creatinine stable and at baseline    Recent Labs   Lab 01/11/23  0622 01/10/23  0805   CR 0.63 0.69          ACCESS Difficult stick, Midline was placed while IV ibx was needed.       Clinically Significant Risk Factors              # Hypoalbuminemia: Lowest albumin = 2.4 g/dL at 1/8/2023  7:01 AM, will monitor as appropriate          # DMII: A1C = 10.0 % (Ref range: 0.0 - 5.6 %) within past 3 months   # Obesity: Estimated body mass index is 31.77 kg/m  as calculated from the following:    Height as of this encounter: 1.626 m (5' 4\").    Weight as of this encounter: 84 kg (185 lb 1.6 oz).            Diet: Orders Placed This Encounter      Moderate Consistent Carb (60 g CHO per Meal) Diet     IVF: None  Manrique Catheter: Not present     DVT Prophylaxis: DOAC  Code Status: No CPR- Do NOT Intubate     Disposition: Expected discharge: Adequately stable awaiting discharge to LTC.  Appreciate social work's assist    Waleska Gar MD  Hospitalist Service  [facility]  Securely message with the [employer] (learn more here)  Text page via [employer] Paging/Directory    -Data reviewed today: I reviewed all new labs and imaging results over the last 24 hours. I personally reviewed no images or EKG's today.    Physical Exam   Temp: 98.8  F (37.1  C) Temp src: Oral BP: 132/65 Pulse: 90   Resp: 17 SpO2: 96 % O2 Device: None (Room air)    Vitals:    01/06/23 1505 01/06/23 2358 01/14/23 0735   Weight: 63.5 kg " (140 lb) 70.5 kg (155 lb 6.4 oz) 84 kg (185 lb 1.6 oz)     Vital Signs with Ranges  Temp:  [98.8  F (37.1  C)-99.2  F (37.3  C)] 98.8  F (37.1  C)  Pulse:  [] 90  Resp:  [17-18] 17  BP: (110-158)/(60-81) 132/65  SpO2:  [92 %-96 %] 96 %  I/O last 3 completed shifts:  In: 480 [P.O.:480]  Out: 2125 [Urine:2125]    Today's Exam  Constitutional: NAD,   Neuropsyche:  Jovial, alert and oriented, unable to give history regarding recent events.  Respiratory: Breathing comfortable  Cardiovascular:   no edema.  Skin/Integumen:  No acute rash or sign of bleeding.     Medications   All medications reviewed on 01/16/23        artificial saliva  2 spray Swish & Spit 4x Daily     atorvastatin  40 mg Oral Daily     carboxymethylcellulose PF  1 drop Both Eyes 4x Daily     gabapentin  600 mg Oral TID     insulin aspart  6 Units Subcutaneous Daily with supper     insulin aspart  10 Units Subcutaneous Daily with breakfast     insulin aspart  10 Units Subcutaneous Daily with lunch     insulin aspart  1-7 Units Subcutaneous TID AC     insulin aspart  1-5 Units Subcutaneous At Bedtime     insulin glargine  28 Units Subcutaneous At Bedtime     levothyroxine  175 mcg Oral QAM AC     lisinopril  20 mg Oral Daily     mirabegron  25 mg Oral At Bedtime     rivaroxaban ANTICOAGULANT  20 mg Oral Daily with breakfast     sennosides  8.6 mg Oral Every Other Day     sodium chloride (PF)  10 mL Intracatheter Q8H     tamsulosin  0.4 mg Oral At Bedtime     topiramate  100 mg Oral Daily     venlafaxine  150 mg Oral Daily     PRN Meds: acetaminophen, albuterol, glucose **OR** dextrose **OR** glucagon, lidocaine 4%, lidocaine (buffered or not buffered), melatonin, naloxone **OR** naloxone **OR** naloxone **OR** naloxone, nitroGLYcerin, ondansetron **OR** ondansetron, oxyCODONE, prochlorperazine **OR** prochlorperazine **OR** prochlorperazine, senna-docusate **OR** senna-docusate, sodium chloride (PF), tiZANidine    Data   Recent Labs   Lab  01/16/23  1128 01/16/23  0647 01/16/23  0144 01/15/23  0756 01/15/23  0217 01/14/23  0806 01/14/23  0541 01/11/23  0832 01/11/23  0622 01/10/23  1147 01/10/23  0805 01/09/23  1732 01/09/23  1506   WBC  --   --   --   --   --   --   --   --  9.3  --  6.6  --   --    HGB  --   --   --   --   --   --   --   --  10.2*  --  9.2*  --   --    MCV  --   --   --   --   --   --   --   --  85  --  86  --   --    PLT  --   --   --   --   --   --   --   --  285  --  244  --   --    NA  --   --   --   --   --   --   --   --  140  --  142  --   --    POTASSIUM  --  3.8  --   --  3.9  --  3.7   < > 4.5  --  3.6   < >  --    CHLORIDE  --   --   --   --   --   --   --   --  112*  --  113*  --   --    CO2  --   --   --   --   --   --   --   --  24  --  23  --   --    BUN  --   --   --   --   --   --   --   --  12  --  12  --   --    CR  --   --   --   --   --   --   --   --  0.63  --  0.69  --  0.67   ANIONGAP  --   --   --   --   --   --   --   --  4  --  6  --   --    MELISSA  --   --   --   --   --   --   --   --  8.9  --  8.2*  --   --    * 120* 177*   < >  --    < >  --    < > 243*   < > 153*   < >  --     < > = values in this interval not displayed.       No results found for this or any previous visit (from the past 24 hour(s)).

## 2023-01-16 NOTE — PLAN OF CARE
Problem: Admitted 1/6 for progressively worsening stage IV sacral wound and stage II left calf and heel wounds  Hx: HTN, CHF, AFIB on Xarelto, lower extremity DVT, CAD, CKD, COPD, DM II, GERD  Vitals: HTN on RA  Orientation/Neuro: A/Ox4  Activity Level: A2 lift. T/R in bed. Refuses.  Diet: MOD CHO, good appetite  GI/: Purewick in place, adequate output  Labs: K protocol, K 3.9  IV Fluids/Drains/Tubes: Midline SL, good blood return  Incisions/Dressings/Skin: Wounds to coccyx, panis, R breast, L heel, R calf  Pain Management: PRN Oxy   Plan: Discharge pending LTC placement.

## 2023-01-16 NOTE — PLAN OF CARE
Goal Outcome Evaluation:       A/Ox4. VSS on RA. K 3.8-replaced w/ AM draw. Blood sugar checks (120, 148,) T/R Q2- refuses. Wound cares done per care order. PRN oxy buttock BLE leg pain. Denies chest pain. Mod carb diet. Purewick. Incontinent bowels- no BM this shift. R upper arm midline. DIC pending LTC

## 2023-01-16 NOTE — PROGRESS NOTES
"SPIRITUAL HEALTH SERVICES Progress Note  [facility] Unit [address]    Saw pt Miley (\"Donald\") M Tomasz per length of stay. Offered emotional support as Donald engaged in reflective conversation.      Patient/Family Understanding of Illness and Goals of Care - Donald reported, \"I do need support because I've been getting depressed here,\" as she reflected on the \"pain in my legs,\" how it is \"hard to be in a hospital,\" and an interaction with a staff person.      Distress and Loss -   o Donald reflected on an experience with a staff person that caused her to cry last night. I offered to share the Patient Relations phone number with her but she declined.  o Donald named that at times she has felt like \"jumping out of a window\" - she immediately followed this up with \"I am not having these thoughts now. The Lord does not want me to harm myself.\"       Strengths, Coping, and Resources - Donald relies deeply on her \"deandre in the Lord\" to sustain the \"vy in my soul\": \"He knows my needs and provides for me.\" Donald named that she has not been very hungry but today she was excited to eat a hamburger, which arrived as our visit was finishing. At the end of our visit, Donald appeared visibly relieved.      Meaning, Beliefs, and Spirituality - Donald named that in her \"crying last night, I prayed to the Lord and the Lord heard me.\" Donald shared that she feels \"better\" and \"less pain\" today after sharing her experiences with God: \"He is my everything. He is with me.\"       Plan of Care - Informed oDnald how to request additional  support should she need it at anytime. I will follow while Donald remains on the unit. [facility]S remains available.    Giselle Vargas MDiv  Chaplain Resident  Pager: [phone]   "

## 2023-01-16 NOTE — PLAN OF CARE
[id number]  VSS on RA ex HTN.  A/Ox4. A2 w/ lift, total care. T/R q2hr. Wounds to coccyx, panis, R breast, L heel, R calf. C/O pain in R + L leg, PRN oxy given x1. C/O swelling in the R knee. Mepis in place. Wound cares done this shift after incontinence. Mod consistent carb diet (60g), ACHS B.G. checks. K+ protocol. PW w/ good UOP, amy. Incontinent B/B. Midline saline locked. Prevalon boots in place bilaterally. Discharge pending LTC, SW following.

## 2023-01-17 NOTE — PROGRESS NOTES
[facility] Nurse Inpatient Assessment     Consulted for: Sacral wound    Patient History (according to provider note(s):         Miley Tolbert is a 77 year old female with history of hypertension, CHF, atrial fibrillation on Xarelto, history of for lower extremity DVT, coronary artery disease, chronic kidney disease stage III, COPD, hypothyroidism, poorly controlled type 2 diabetes with hyperglycemia, GERD, fecal and urinary incontinence who presents with progressively worsening sacral decubitus ulcer and inability of the family to provide care for the patient    Areas Assessed:      Areas visualized during today's visit: Posterior surfaces , Skin folds  and Lower extremities     Wound location: Left Heel         Last photo: 1/9/23, 1/17(picture not saved  Wound due to: Pressure Injury  Wound history/plan of care: [place] and family brought patient home with concerns of not being able to care for her. Pt has significant pain. Discussed with Dr. Bello, pt may benefit vascular studies and imaging possible podiatry consult.  Wound base: 100% resurfaced scar tissue     Palpation of the wound bed: normal      Drainage: none     Description of drainage: none     Measurements (length x width x depth, in cm): 1 x 1 x 0      Tunneling: N/A     Undermining: N/A  Periwound skin: non blanchable erythema purple/pink erythema in dark skin tone vascular vs. pressure, dry scale and callus      Color: pink and purple      Temperature: normal   Odor: none  Pain: moderate, shooting and intermittent  Pain interventions prior to dressing change: See MAR, slow and gentle cares  Treatment goal: Heal  and Increase granulation  STATUS: closed  Supplies ordered: at bedside     Wound location: Right Calf  Last photo: 1/17/23 1/9/23        Wound due to: suspect trauma vs pressure  Wound history/plan of care: pt reports she all of a sudden developed several  wounds a few months ago, however unclear of true etiology. Dressing gently removed and significant amt of bleeding at left lateral edge required 15mins of constant pressure to stop  Wound base: 10 % tendon, 80% red granulation     Palpation of the wound bed: boggy      Drainage: small     Description of drainage: serosanguinous and bloody     Measurements (length x width x depth, in cm): 4.2  x 2.2  x  0.3 cm      Tunneling: N/A     Undermining: N/A  Periwound skin: Intact      Color: normal and consistent with surrounding tissue      Temperature: normal   Odor: none  Pain: moderate, shooting and intermittent  Pain interventions prior to dressing change: See MAR, slow and gentle cares  Treatment goal: Drainage control, Infection control/prevention, Decrease moisture, Protection and protect tendon  STATUS: initial assessment  Supplies ordered: ordered vashe, hydrogel and polymem foam     Wound location: Coccyx/right coccyx    Last photo: 1/17/23 1/9/23          Wound due to: Pressure Injury  Stage 4 pressure injury Present on admission  Wound history/plan of care: pt reports she all of a sudden developed several wounds a few months ago. Wound is very painful for patient and when dressing removed Right coccyx wound bleeding significantly. After 15mins of consistent pressure wound is still bleeding, Hemostat applied.   Wound base: Coccyx midline 80 % red tissue and 20% muscle. Right of coccyx 100% granulation     Palpation of the wound bed: normal      Drainage: moderate     Description of drainage: serosanguinous and bloody     Measurements (length x width x depth, in cm): 2 main wounds Coccyx 4 x 3 x 1.6cm and Right coccyx 3 x 2 x 0.2cm  With 2 ulcerations in between that are less than 1 x 1 x 0.3cm     Tunneling: N/A     Undermining: N/A  Periwound skin: Intact, Scar tissue and hyperpigmentation      Color: pale tissue due to scarring and superior to wound hyperpigmentation      Temperature: normal   Odor:  none  Pain: moderate, shooting and intermittent  Pain interventions prior to dressing change: See MAR, slow and gentle cares  Treatment goal: Infection control/prevention, Increase granulation and Pain control  STATUS: improving  Supplies: At bedside     Skin Injury Location: pannus    Last photo: none taken  Skin injury due to: Intertrigo  Skin history and plan of care:   Pt with deep creases and few scattered open areas   Affected area:      Skin assessment: Superficial Erosion of epidermis     Measurements (length x width x depth, in cm) 4 open areas to pannus crease all less than 0.5 x 0.5 x 0.1cm     Color: pink moist dermis     Temperature  normal      Drainage: scant .      Color: serous      Odor: none  Pain: denies , none  Pain interventions prior to dressing change: N/A  Treatment goal: Heal  and Decrease moisture  STATUS: initial assessment  Supplies ordered: ordered Triad paste     Skin Injury Location: right breast     Last photo: none taken   Skin injury due to: Intertrigo  Skin history and plan of care:   Pt with deep breast creases and 1 open area  Affected area:      Skin assessment: Superficial Erosion of epidermis     Measurements (length x width x depth, in cm) 0.3  x 0.5  x  0.1 cm      Color: pink     Temperature  normal      Drainage: scant .      Color: serous      Odor: none  Pain: denies , none  Pain interventions prior to dressing change: N/A  Treatment goal: Heal  and Decrease moisture  STATUS: initial assessment  Supplies ordered: at bedside       Treatment Plan:     Coccyx wound(s): Daily  and PRN incontinence  *Soak dressing prior to removal to prevent bleeding. If bleeding occurs hold pressure for 10mins. If bleeding is ongoing apply small piece of hemostat (package in room)  1. Cleanse wound with Vashe #(303168) soaked gauze. Soak gauze and allow to sit in wound bed for 10 minutes then remove.  2. Moisten new gauze or kerlix with vashe, wring out excess so it is damp and gently pack  "into wound (this will remain in wound as primary dressing)  3. Apply Cavilon No Sting Skin Prep (#975150) to periwound and allow to dry.  4.  Cover wound with  Sacral Dressing (PS#[id number]) or ABD and Tape.  5. Time and date dressing change    Right Lateral Buttock: Every other day and PRN incontinence:  1. Clean wound with saline or MicroKlenz Spray, pat dry  2. Wipe / \"clean\" the surrounding periwound tissue with skin prep (Cavilon No Sting Skin Prep #[id number]) and allow to dry. This will help protect periwound and help dressing adherence  3. Press a Mepilex  Border Dressing (#714265) to the area, making sure to conform nicely to skin curvatures.   4. Time and date dressing change  NOTE  -Reposition pt side to side jhoan when in bed, every 2 hours-get the pt way over on side to completely offload pressure. This will benefit skin and respiratory function   -Keep heels elevated and floating on pillows at all times. Try using at least 2 pillows under each calf.  -When up to the chair pt needs to fully offload every 2 hours and use a chair cushion if needed       Right Lateral Calf: Daily  1. Cleanse wound with Vashe (#111826) soaked gauze.   2. Apply Thin bead of Skintegrity Hydrogel (#920703) to a piece of Polymem foam (#013014) (Polymem foam can be cut larger than the wound)  3.  Secure with kerlix and tape.  4. Time/Date/Initial dressing change   * Apply Sween 24 to all intact skin of LE    Left Heel: Daily  1. Cleanse with saline and blot dry  3. Cover with  Border Dressing (#349488)  4. Time/Date/Initial dressing change   * Apply Sween 24 to all intact skin of LE      Pannus and Right Breast: Daily  1.Cleanse the area with Francine cleanse and protect and francois dry wipes/soft cloth.   2. Apply nickel thick layer of Triad paste (#474513) to wound bed and thin layer over reddened areas   **No need to remove all paste after each incontinent episode, remove soiled paste and reapply as needed.  **If complete removal of paste " is necessary use baby oil/mineral oil (Located in Pharmacy) and soft wash cloth.   Leave the brief off in bed to let the area dry as much as possible.   Use only one Covidien pad in between mattress and pt. No brief while in bed.       Orders: Reviewed and Updated    RECOMMEND PRIMARY TEAM ORDER: None, at this time  Education provided: importance of repositioning, plan of care, wound progress, Moisture management and Off-loading pressure  Discussed plan of care with: Patient, Family and Nurse  WO nurse follow-up plan: weekly  Notify WOC if wound(s) deteriorate.  Nursing to notify the Provider(s) and re-consult the WOC Nurse if new skin concern.    DATA:     Current support surface: Standard  Low air loss (PETE pump, Isolibrium, Pulsate, skin guard, etc)  Containment of urine/stool: Incontinence Protocol and Incontinent pad in bed  BMI: Body mass index is 31.77 kg/m .   Active diet order: Orders Placed This Encounter      Moderate Consistent Carb (60 g CHO per Meal) Diet     Output: I/O last 3 completed shifts:  In: 780 [P.O.:780]  Out: 1710 [Urine:1710]     Labs:   Recent Labs   Lab 01/11/23  0622   HGB 10.2*   WBC 9.3     Pressure injury risk assessment:   Sensory Perception: 3-->slightly limited  Moisture: 3-->occasionally moist  Activity: 1-->bedfast  Mobility: 2-->very limited  Nutrition: 3-->adequate  Friction and Shear: 1-->problem  Naif Score: 13    Tha Seaman RN CWOCN  Dept. Pager: [phone]  Dept. Office Number: [phone]

## 2023-01-17 NOTE — PLAN OF CARE
Vitals stable. Disorientated to situation. Up w/ lift, repos in bed- refuses at times, needs a lot of encouragement. C/o generalized pain especially w/ any movement- some improvement after Oxy. Ate well for supper. Incontinent- using purewick. Wounds to sacrum/buttock, L calf, R heel- daily wound cares completed on day shift, wearing rooke boots. Awaiting LTC placement for discharge, SW following.

## 2023-01-17 NOTE — PROGRESS NOTES
[facility]    Hospitalist Progress Note    Date of Service (when I saw the patient): 01/17/2023  Admit date: 1/6/2023    Interval History   Full details of events over last 24 hours outlined below.   Donald offers no complaints.  She is playing solitary while I visit and does not look up to engage in conversation but is smiling and jovial.   Denies any SOB, CP, abdominal pain, N/V/D.    Assessment & Plan   Miley Tolbert is a 77 year old female with history of hypertension, CHF, atrial fibrillation on Xarelto, history of for lower extremity DVT, coronary artery disease, chronic kidney disease stage III, COPD, hypothyroidism, poorly controlled type 2 diabetes with hyperglycemia, GERD, fecal and urinary incontinence who presented with progressively worsening sacral decubitus ulcer and inability of the family to provide care for the patient.       Progressively worsening stage IV sacral wound and stage II left calf and heel wounds  Initially suspected sepsis, but no infection found  Reportedly wound was foul smelling, soiled with urine/stool. No fever, and WBC normal at presentation. Hypotensive a day after admission after receiving lisinopril.   * Presumed sacral wound infection and started on broad spectrum abx, although does not appear infected. Wound on Lt heel appeared concerning.   * Procal and CRP mildly elevated. Lt heel XR without osteomyelitis.        Podiatry consulted, ultrasound Doppler study shows VENUS within normal limits.  No intervention needed, signed off.    lood cultures negative, WBC normal.  Vancomycin discontinued.     CT abdomen pelvis and right thigh obtained to evaluate for other potential sources including diverticulitis/cholecystitis and also to look for any hematoma given ongoing drop in hemoglobin.  No source of infection, Zosyn discontinued.  No source of bleeding.    Wound care consulted and following, appreciate assistance.      and care  coordination consulted for discharge planning, will need placement to geriatric unit as daughter unable to care for her and unsafe to be discharged back home    PRN oxycodone added by prior hospitalist for pain control.  Currently using 20 to 25 mg/day.  Given her advanced age and dementia, she is at high risk for complications from this medication.  Therefore, I am going to decrease frequency to every 6 hours and will continue to wean off.       Poorly controlled type 2 diabetes mellitus with peripheral neuropathy and hypoglycemic spells: HbA1C 10 on 1/6/2023  On Lantus 35 units with a 10 to 14 units of aspart prior to meals at home.      Continued with Lantus 28 units and 6 units of aspart with meals and sliding scale insulin aspart with carb controlled diet. Dose adjusted/reduced due to hypoglycemia.    Note low AM blood sugar of 89.  Decrease Lantus to 18 units at bedtime on 1/17    On gabapentin 600 mg 3 times daily    Holding home Januvia.      Recent Labs   Lab 01/17/23  0727 01/17/23  0147 01/16/23  2155 01/16/23  1643 01/16/23  1128 01/16/23  0647   GLC 89 130* 149* 184* 148* 120*         Poor fitting dentures  Complaints of painful mouth on 1/17. Examined mouth there are no sores.  But noted her dentures are very poor fitting.  Removed dentures and she has a flap of soft tissue on the upper anterior gum line.     Oral surgery consulted for their advice, but given this is not an urgent issue I did leave my number so they can call and discuss recommendations rather than seeing the patient    Discussed with RN to only use dentures while eating otherwise been    Viscous lidocaine PRN ordered.     Paroxysmal atrial fibrillation  In sinus rhythm.    Continuing with PTA Xarelto     Progressive dementia with behavioral disturbance in the setting of schizoaffective disorder and intermittent delirium    Maintain sleep-wake cycles.    Avoid benzodiazepines.    Continue PTA venlafaxine 150 mg daily, topiramate 100 mg  "daily    Currently oriented to place and person.  Answers all the questions appropriately.     CAD with HFpEF  Hypertension  * Last echo on 5/5/2022 showed EF of 55 to 60% with normal left ventricular cavity size.     Lisinopril held in the setting of hypotension and resumed at lower dose subsequently as BP trended up.     Chest pain RRT 1/15. Serial troponin negative. EKG without acute ischemic findings. Symptoms resolved, unclear if with nitroglycerine or spontaneously.     If recurrent pain, will get NM stress test given underlying known CAD.      Hyperlipidemia    Continue atorvastatin 40 daily     History of DVT    Continue Xarelto.     Chronic pain    Continue ylenol, Zanaflex. As needed oxycodone as needed for pain control.    Lt knee XR ordered given pain with attempted movement > Showed moderate to severe tricompartmental osteoarthritic changes.  No fracture or effusion.      Urinary incontinence, now with retention    On tamsulosin 0.4 mg daily.    Espino catheter placed given worsening decub and contamination with urine in the setting of urinary retention.     Discontinued 1/11 AM, bladder scan as needed.  If recurrent retention, re insert Espino catheter     External catheter in place on 1/17     Chronic kidney disease, stage II, stable    Recent Labs   Lab 01/11/23  0622   CR 0.63          ACCESS Difficult stick, Midline was placed while IV ibx was needed.       Clinically Significant Risk Factors              # Hypoalbuminemia: Lowest albumin = 2.4 g/dL at 1/8/2023  7:01 AM, will monitor as appropriate          # DMII: A1C = 10.0 % (Ref range: 0.0 - 5.6 %) within past 3 months   # Obesity: Estimated body mass index is 31.77 kg/m  as calculated from the following:    Height as of this encounter: 1.626 m (5' 4\").    Weight as of this encounter: 84 kg (185 lb 1.6 oz).            Diet: Orders Placed This Encounter      Moderate Consistent Carb (60 g CHO per Meal) Diet     IVF: None  Espino Catheter: Not " present     DVT Prophylaxis: DOAC  Code Status: No CPR- Do NOT Intubate     Disposition: Expected discharge: Adequately stable awaiting discharge to LTC.  Appreciate social work's assist  Communication: Discussed with RN on 01/17/23      Waelska Gar MD  Hospitalist Service  [facility]  Securely message with the Vocera Web Console (learn more here)  Text page via [employer] Paging/Directory    -Data reviewed today: I reviewed all new labs and imaging results over the last 24 hours. I personally reviewed no images or EKG's today.    Physical Exam   Temp: 99.2  F (37.3  C) Temp src: Oral BP: (!) 156/68 Pulse: 94   Resp: 18 SpO2: 95 % O2 Device: None (Room air)    Vitals:    01/06/23 1505 01/06/23 2358 01/14/23 0735   Weight: 63.5 kg (140 lb) 70.5 kg (155 lb 6.4 oz) 84 kg (185 lb 1.6 oz)     Vital Signs with Ranges  Temp:  [98.6  F (37  C)-99.2  F (37.3  C)] 99.2  F (37.3  C)  Pulse:  [88-94] 94  Resp:  [18] 18  BP: (136-173)/(61-81) 156/68  SpO2:  [93 %-100 %] 95 %  I/O last 3 completed shifts:  In: 780 [P.O.:780]  Out: 1710 [Urine:1710]    Today's Exam  Constitutional: NAD,   Neuropsyche:  Alert, answers simple questions appropriately, unable to give history regarding recent events.  Respiratory: Breathing comfortable  Cardiovascular:  2+ edema, lower extremity protectors in place.  Skin/Integumen:  No acute rash or sign of bleeding.     Medications   All medications reviewed on 01/17/23        artificial saliva  2 spray Swish & Spit 4x Daily     atorvastatin  40 mg Oral Daily     carboxymethylcellulose PF  1 drop Both Eyes 4x Daily     gabapentin  600 mg Oral TID     insulin aspart  6 Units Subcutaneous Daily with supper     insulin aspart  10 Units Subcutaneous Daily with breakfast     insulin aspart  10 Units Subcutaneous Daily with lunch     insulin aspart  1-7 Units Subcutaneous TID AC     insulin aspart  1-5 Units Subcutaneous At Bedtime     insulin glargine  28 Units Subcutaneous At  Bedtime     levothyroxine  175 mcg Oral QAM AC     lisinopril  20 mg Oral Daily     mirabegron  25 mg Oral At Bedtime     rivaroxaban ANTICOAGULANT  20 mg Oral Daily with breakfast     sennosides  8.6 mg Oral Every Other Day     sodium chloride (PF)  10 mL Intracatheter Q8H     tamsulosin  0.4 mg Oral At Bedtime     topiramate  100 mg Oral Daily     venlafaxine  150 mg Oral Daily     PRN Meds: acetaminophen, albuterol, glucose **OR** dextrose **OR** glucagon, lidocaine 4%, lidocaine (buffered or not buffered), melatonin, naloxone **OR** naloxone **OR** naloxone **OR** naloxone, nitroGLYcerin, ondansetron **OR** ondansetron, oxyCODONE, prochlorperazine **OR** prochlorperazine **OR** prochlorperazine, senna-docusate **OR** senna-docusate, sodium chloride (PF), tiZANidine    Data   Recent Labs   Lab 01/17/23  0727 01/17/23  0544 01/17/23  0147 01/16/23  2155 01/16/23  1128 01/16/23  0647 01/15/23  0756 01/15/23  0217 01/11/23  0832 01/11/23  0622   WBC  --   --   --   --   --   --   --   --   --  9.3   HGB  --   --   --   --   --   --   --   --   --  10.2*   MCV  --   --   --   --   --   --   --   --   --  85   PLT  --   --   --   --   --   --   --   --   --  285   NA  --   --   --   --   --   --   --   --   --  140   POTASSIUM  --  4.1  --   --   --  3.8  --  3.9   < > 4.5   CHLORIDE  --   --   --   --   --   --   --   --   --  112*   CO2  --   --   --   --   --   --   --   --   --  24   BUN  --   --   --   --   --   --   --   --   --  12   CR  --   --   --   --   --   --   --   --   --  0.63   ANIONGAP  --   --   --   --   --   --   --   --   --  4   MELISSA  --   --   --   --   --   --   --   --   --  8.9   GLC 89  --  130* 149*   < > 120*   < >  --    < > 243*    < > = values in this interval not displayed.       No results found for this or any previous visit (from the past 24 hour(s)).

## 2023-01-17 NOTE — PLAN OF CARE
Shift note:    Alert, oriented to time, place, person and situation, pain level was 7/0-10, located at the right leg, pain was managed with 5 mg of Oxycodone with improvement, wounds on the lower limb and the coccyx are dressed with dressing clean dry and intact. Lower limbs in Cam boots. Up with lift, 2 hourly turning and repositioning, 4 hourly blood glucose checks, regular assessment for pain, patient was kept dry.

## 2023-01-18 NOTE — PROGRESS NOTES
[facility]    Hospitalist Progress Note    Date of Service (when I saw the patient): 01/18/2023  Admit date: 1/6/2023    Interval History   Full details of events over last 24 hours outlined below.   Patient states that last night she had an argument with the staff and it caused some chest pain and she had to calm her breathing down.  When she calmed down her chest pain resolved.  She also starts telling me how she was crying and no one understands but her crying is her spiritual language to God.  She states that she was able to find peace with God and then was interrupted when she just wanted to stay with God.  She is otherwise oriented to situation time and place.  She is cooperative.  Answers questions appropriately.  Denies any current SOB, CP, abdominal pain, N/V/D.    Assessment & Plan   Miley Tolbert is a 77 year old female with history of hypertension, CHF, atrial fibrillation on Xarelto, history of for lower extremity DVT, coronary artery disease, chronic kidney disease stage III, COPD, hypothyroidism, poorly controlled type 2 diabetes with hyperglycemia, GERD, fecal and urinary incontinence who presented with progressively worsening sacral decubitus ulcer and inability of the family to provide care for the patient.       Progressively worsening stage IV sacral wound and stage II left calf and heel wounds  Initially suspected sepsis, but no infection found  Reportedly wound was foul smelling, soiled with urine/stool. No fever, and WBC normal at presentation. Hypotensive a day after admission after receiving lisinopril.   * Presumed sacral wound infection and started on broad spectrum abx, although does not appear infected. Wound on Lt heel appeared concerning.   * Procal and CRP mildly elevated. Lt heel XR without osteomyelitis.        Podiatry consulted, ultrasound Doppler study shows VENUS within normal limits.  No intervention needed, signed off.    lood cultures negative,  WBC normal.  Vancomycin discontinued.     CT abdomen pelvis and right thigh obtained to evaluate for other potential sources including diverticulitis/cholecystitis and also to look for any hematoma given ongoing drop in hemoglobin.  No source of infection, Zosyn discontinued.  No source of bleeding.    Wound care consulted and following, appreciate assistance.      and care coordination consulted for discharge planning, will need placement to geriatric unit as daughter unable to care for her and unsafe to be discharged back home    PRN oxycodone added by prior hospitalist for pain control.  Currently using 20 to 25 mg/day.  Given her advanced age and dementia, she is at high risk for complications from this medication.  Therefore, I am decreasing frequency to every 8 hours PRN and will continue to wean off.       Poorly controlled type 2 diabetes mellitus with peripheral neuropathy and hypoglycemic spells: HbA1C 10 on 1/6/2023  On Lantus 35 units with a 10 to 14 units of aspart prior to meals at home.      Continued with Lantus 28 units and 6 units of aspart with meals and sliding scale insulin aspart with carb controlled diet. Dose adjusted/reduced due to hypoglycemia.    Noted low AM blood sugar of 89 on 1/17/23.  Decreased Lantus to 18 units at bedtime on 1/17    On gabapentin 600 mg 3 times daily    Holding home Januvia.      Recent Labs   Lab 01/18/23  1656 01/18/23  1125 01/18/23  0747 01/18/23  0148 01/17/23  2110 01/17/23  1656   * 153* 101* 127* 153* 160*         Poor fitting dentures  Epulis fissuratum  Complaints of painful mouth on 1/17. Examined mouth there are no sores.  But noted her dentures are very poor fitting.  Removed dentures and she has a flap of soft tissue on the upper anterior gum line.     Discussed with oral surgery on 01/18/23 this is extra tissue (epulis fissuratum) that often occurs in patients with poor fitting dentures. It should be excised and she should have  "dentures refitted.  Consulted care management to help expedite referral to dentist/oral surgery following discharge.     Discussed with RN to only use dentures while eating otherwise been    Viscous lidocaine PRN ordered.     Dental soft diet ordered on 01/18/23    Paroxysmal atrial fibrillation  In sinus rhythm.    Continuing with PTA Xarelto     Progressive dementia with behavioral disturbance in the setting of schizoaffective disorder and intermittent delirium    Maintain sleep-wake cycles.    Avoid benzodiazepines.    Continue PTA venlafaxine 150 mg daily, topiramate 100 mg daily    Currently oriented to place and person.  Answers all the questions appropriately.     CAD with HFpEF  Hypertension  * Last echo on 5/5/2022 showed EF of 55 to 60% with normal left ventricular cavity size.     Lisinopril held in the setting of hypotension and resumed at lower dose subsequently as BP trended up.     Chest pain RRT 1/15. Serial troponin negative. EKG without acute ischemic findings. Symptoms resolved, unclear if with nitroglycerine or spontaneously.     If recurrent pain, will get NM stress test given underlying known CAD.     Patient c/o  episode of chest pain when having an argument with staff overnight, resolved as she calmed down.. RN unaware of this and nothing in notes.  I do note her history of schizoaffective disorder and she also talked about \"crying to God\" overnight.     Troponin added to a.m. labs.  If negative I would continue to monitor and hold on further testing at this point.     Hyperlipidemia    Continue atorvastatin 40 daily     History of DVT    Continue Xarelto.     Chronic pain    Continue ylenol, Zanaflex. As needed oxycodone as needed for pain control.    Lt knee XR ordered given pain with attempted movement > Showed moderate to severe tricompartmental osteoarthritic changes.  No fracture or effusion.      Urinary incontinence  Urinary retention, resolved    On tamsulosin 0.4 mg daily.    Espino " "catheter placed given worsening decub and contamination with urine in the setting of urinary retention.     Discontinued 1/11 AM, bladder scan as needed.  If recurrent retention, re insert Espino catheter     External catheter in place on 1/17     Chronic kidney disease, stage II, stable    Recent Labs   Lab 01/18/23  0550   CR 0.74          ACCESS Difficult stick, Midline was placed while IV ibx was needed.       Clinically Significant Risk Factors              # Hypoalbuminemia: Lowest albumin = 2.4 g/dL at 1/8/2023  7:01 AM, will monitor as appropriate          # DMII: A1C = 10.0 % (Ref range: 0.0 - 5.6 %) within past 3 months   # Obesity: Estimated body mass index is 31.77 kg/m  as calculated from the following:    Height as of this encounter: 1.626 m (5' 4\").    Weight as of this encounter: 84 kg (185 lb 1.6 oz).            Diet: Orders Placed This Encounter      Moderate Consistent Carb (60 g CHO per Meal) Diet     IVF: None  Espino Catheter: Not present     DVT Prophylaxis: DOAC  Code Status: No CPR- Do NOT Intubate     Disposition: Expected discharge: Adequately stable awaiting discharge to LTC.  Appreciate social work's assist  Communication: Discussed with RN on 01/17/23      Waleska Gar MD  Hospitalist Service  [facility]  Securely message with the Vocera Web Console (learn more here)  Text page via [employer] Paging/Directory    -Data reviewed today: I reviewed all new labs and imaging results over the last 24 hours. I personally reviewed no images or EKG's today.    Physical Exam   Temp: 98.1  F (36.7  C) Temp src: Axillary BP: (!) 156/54 Pulse: 102   Resp: 18 SpO2: 95 % O2 Device: None (Room air)    Vitals:    01/06/23 1505 01/06/23 2358 01/14/23 0735   Weight: 63.5 kg (140 lb) 70.5 kg (155 lb 6.4 oz) 84 kg (185 lb 1.6 oz)     Vital Signs with Ranges  Temp:  [98.1  F (36.7  C)-100.3  F (37.9  C)] 98.1  F (36.7  C)  Pulse:  [] 102  Resp:  [16-21] 18  BP: (114-156)/(45-73) " 156/54  SpO2:  [92 %-97 %] 95 %  I/O last 3 completed shifts:  In: -   Out: 900 [Urine:900]    Today's Exam  Constitutional: NAD,   Neuropsyche:  Alert, answers simple questions appropriately, unable to give history regarding recent events.  Respiratory: Breathing comfortably, lungs clear without wheezes or crackles  Cardiovascular: Heart regular, 1- 2+ edema, lower extremity protectors in place.  Gastrointestinal: Abdomen soft, nontender nondistended with normal bowel sounds  Skin/Integumen:  No acute rash or sign of bleeding.     Medications   All medications reviewed on 01/17/23        artificial saliva  2 spray Swish & Spit 4x Daily     atorvastatin  40 mg Oral Daily     carboxymethylcellulose PF  1 drop Both Eyes 4x Daily     gabapentin  600 mg Oral TID     insulin aspart  6 Units Subcutaneous Daily with supper     insulin aspart  10 Units Subcutaneous Daily with breakfast     insulin aspart  10 Units Subcutaneous Daily with lunch     insulin aspart  1-7 Units Subcutaneous TID AC     insulin aspart  1-5 Units Subcutaneous At Bedtime     insulin glargine  18 Units Subcutaneous At Bedtime     levothyroxine  175 mcg Oral QAM AC     lisinopril  20 mg Oral Daily     mirabegron  25 mg Oral At Bedtime     rivaroxaban ANTICOAGULANT  20 mg Oral Daily with breakfast     sennosides  8.6 mg Oral Every Other Day     sodium chloride (PF)  10 mL Intracatheter Q8H     tamsulosin  0.4 mg Oral At Bedtime     topiramate  100 mg Oral Daily     venlafaxine  150 mg Oral Daily     PRN Meds: acetaminophen, albuterol, glucose **OR** dextrose **OR** glucagon, lidocaine 4%, lidocaine (viscous), lidocaine (buffered or not buffered), melatonin, naloxone **OR** naloxone **OR** naloxone **OR** naloxone, nitroGLYcerin, ondansetron **OR** ondansetron, oxyCODONE, prochlorperazine **OR** prochlorperazine **OR** prochlorperazine, senna-docusate **OR** senna-docusate, sodium chloride (PF), tiZANidine    Data   Recent Labs   Lab 01/18/23  3006  01/18/23  1125 01/18/23  0747 01/18/23  0550 01/17/23  0727 01/17/23  0544 01/16/23  1128 01/16/23  0647   POTASSIUM  --   --   --  3.9  --  4.1  --  3.8   CR  --   --   --  0.74  --   --   --   --    * 153* 101*  --    < >  --    < > 120*    < > = values in this interval not displayed.       No results found for this or any previous visit (from the past 24 hour(s)).

## 2023-01-18 NOTE — PLAN OF CARE
Goal Outcome Evaluation:    A&O x4. VSS on RA. Paina managed with PRN oral oxy Q3 hr. Denies N/V/D. T&R Q2, will sometimes deny. Wound cares completed by WOC nurse today, reports that they are all doing better. Diet carb controlled with good appetite, BG checks. Incontinent of B&B, no BM this shift, purwick in place with good OP. Continue plan of care, keeping pt off of stage 4 sacral wound. Medically stable awaiting discharge to LTC facility.

## 2023-01-18 NOTE — PLAN OF CARE
Shift note:  A/O x 4, C/O pain associated with repositioning, pain was managed with Oxycodone 5 mg, wound dressings are clean, dry and intact, pure wick draining clear yellow urine with adequate urine output, breath sound is clear, bowel sound active, no BM. 4 hourly blood glucose checks, 2 hourly repositioning and regular assessment for pain.

## 2023-01-19 NOTE — PLAN OF CARE
Goal Outcome Evaluation:    AOx4, can be particular with cares at time. VSS on RA ex tachy at times. Up A2 and lift, T/R q 2 hrs. Tolerating Mod CHO and mech soft diet, BS checks with meals and HS w/sliding scale insulin. PRN oxy given 1x for 6/10 L knee pain. Midline SL. Wound cares to coccyx and BLE completed, new mepilexes CDI. Rooke boots in place. Purewick in place, good UOP. No BM this shift. Waiting to be seen by OralSurg and SW. Discharge to LTC pending placement.

## 2023-01-19 NOTE — PROGRESS NOTES
[facility]    Medicine Progress Note - Hospitalist Service        Date of Admission:  1/6/2023  2:22 PM    Assessment & Plan:   Miley Tolbert is a 77 year old female with history of hypertension, CHF, atrial fibrillation on Xarelto, history of for lower extremity DVT, coronary artery disease, chronic kidney disease stage III, COPD, hypothyroidism, poorly controlled type 2 diabetes with hyperglycemia, GERD, fecal and urinary incontinence who presented with progressively worsening sacral decubitus ulcer and inability of the family to provide care for the patient.       Progressively worsening stage IV sacral wound and stage II left calf and heel wounds.  Sepsis ruled out.  -Reportedly wound was foul smelling, soiled with urine/stool.  * Presumed sacral wound infection and started on broad spectrum abx, although does not appear infected. Wound on Lt heel appeared concerning.   * Procal and CRP mildly elevated. Lt heel XR without osteomyelitis.    -Podiatry consulted, ultrasound Doppler study shows VENUS within normal limits.  No intervention needed, signed off.  -CT abdomen pelvis and right thigh obtained to evaluate for other potential sources including diverticulitis/cholecystitis and also to look for any hematoma given ongoing drop in hemoglobin.  No source of infection, Zosyn discontinued.  No source of bleeding.  -Wound care consulted and following, appreciate assistance.   - and care coordination consulted for discharge planning, will need placement to geriatric unit as daughter unable to care for her and unsafe to be discharged back home  -Continue oxycodone every 8 hours as needed, noted plan by previous hospitalist to decrease the dose of narcotics in the context of dementia      Poorly controlled type 2 diabetes mellitus with peripheral neuropathy and hypoglycemic spells  -HbA1C 10 on 1/6/2023  -Prior to admission on Lantus 35 units and 10 to 14 units of aspart prior to  meals at home.  -Hold prior to admission Januvia  -Due to intermittent spells of hypoglycemia Lantus decreased to 18 units at bedtime on 1/17  -Continues on mealtime NovoLog at 10 units with breakfast, 10 units with lunch and 6 units with supper  -Medium intensity sliding scale    Poor fitting dentures  Epulis fissuratum  Complaints of painful mouth   -Poor fitting dentures.    -Previous hospitalist discussed with oral surgery on 01/18/23, regarding extra tissue (epulis fissuratum) that often occurs in patients with poor fitting dentures. It should be excised and she should have dentures refitted.    Care transition team consulted to help expedite referral to dentist/oral surgery following discharge.   -Lidocaine viscous as needed  -Soft diet     Paroxysmal atrial fibrillation  -Continue PTA Xarelto      Progressive dementia with behavioral disturbance in the setting of schizoaffective disorder and intermittent delirium  -Maintain sleep-wake cycles.  -Avoid benzodiazepines.  -Continue PTA venlafaxine 150 mg daily, topiramate 100 mg daily    CAD with HFpEF  Hypertension  Hyperlipidemia  * Last echo on 5/5/2022 showed EF of 55 to 60% with normal left ventricular cavity size.   -Lisinopril held in the setting of hypotension and resumed at lower dose subsequently as BP trended up.   -Chest pain RRT 1/15. Serial troponin negative. EKG without acute ischemic findings. Symptoms resolved, unclear if with nitroglycerine or spontaneously.   -If recurrent pain, will get NM stress test given underlying known CAD.   -Continue atorvastatin 40 daily     History of DVT  -Continue Xarelto.     Chronic pain  -Continue tylenol, Zanaflex. As needed oxycodone as needed for pain control.  -Lt knee XR showed moderate to severe tricompartmental osteoarthritic changes.  No fracture or effusion.      Urinary incontinence  Urinary retention, resolved  -Continue tamsulosin 0.4 mg daily.  -Espino catheter placed given worsening decub and  "contamination initially, which has subsequently been discontinued     Diet: Combination Diet Moderate Consistent Carb (60 g CHO per Meal) Diet; Mechanical/Dental Soft Diet     DVT Prophylaxis: DOAC   Espino Catheter: Not present  Code Status: No CPR- Do NOT Intubate     Disposition Plan      Expected Discharge Date: 01/20/2023    Discharge Delays: Placement - LTC  *Medically Ready for Discharge    Discharge Comments: placement; was living with daughter; has multiple wounds.   SW/CC-- needs placement.  LTC      Entered: Hank Herring MD 01/19/2023, 7:38 AM        Clinically Significant Risk Factors              # Hypoalbuminemia: Lowest albumin = 2.4 g/dL at 1/8/2023  7:01 AM, will monitor as appropriate          # DMII: A1C = 10.0 % (Ref range: 0.0 - 5.6 %) within past 3 months   # Obesity: Estimated body mass index is 31.77 kg/m  as calculated from the following:    Height as of this encounter: 1.626 m (5' 4\").    Weight as of this encounter: 84 kg (185 lb 1.6 oz).            The patient's care was discussed with the Bedside Nurse and Patient.    Hank Herring MD  Hospitalist Service  [facility]  Text Page 7AM-6PM  Securely message with the Vocera Web Console (learn more here)  Text page via [employer] Paging/Directory    ______________________________________________________________________    Interval History   No acute events overnight other than low-grade fever of 100.4.  Denies cough.  No abdominal pain.  Complains of ongoing left knee pain.  No chest pain.    Data reviewed today: I reviewed all medications, new labs and imaging results over the last 24 hours. I personally reviewed no images or EKG's today.    Physical Exam   Vital signs:  Temp: 99.2  F (37.3  C) Temp src: Oral BP: 130/55 Pulse: 86   Resp: 18 SpO2: 94 % O2 Device: None (Room air)   Height: 162.6 cm (5' 4\") Weight: 84 kg (185 lb 1.6 oz)  Estimated body mass index is 31.77 kg/m  as calculated from the following:    " "Height as of this encounter: 1.626 m (5' 4\").    Weight as of this encounter: 84 kg (185 lb 1.6 oz).      Wt Readings from Last 2 Encounters:   01/14/23 84 kg (185 lb 1.6 oz)       Gen: AAOX3, NAD  Resp: CTA B/L, normal WOB  CVS: RRR, no murmur  Abd/GI: Soft, non-tender. BS- normoactive.    Skin: Warm, dry no rashes  MSK: boots in place-bilateral lower extremity.  Neuro- CN- intact.       Data   Recent Labs   Lab 01/19/23  0147 01/18/23  2124 01/18/23  1656 01/18/23  0747 01/18/23  0550 01/17/23  0727 01/17/23  0544 01/16/23  1128 01/16/23  0647   POTASSIUM  --   --   --   --  3.9  --  4.1  --  3.8   CR  --   --   --   --  0.74  --   --   --   --    * 179* 148*   < >  --    < >  --    < > 120*    < > = values in this interval not displayed.       No results found for this or any previous visit (from the past 24 hour(s)).  Medications       artificial saliva  2 spray Swish & Spit 4x Daily     atorvastatin  40 mg Oral Daily     carboxymethylcellulose PF  1 drop Both Eyes 4x Daily     gabapentin  600 mg Oral TID     insulin aspart  6 Units Subcutaneous Daily with supper     insulin aspart  10 Units Subcutaneous Daily with breakfast     insulin aspart  10 Units Subcutaneous Daily with lunch     insulin aspart  1-7 Units Subcutaneous TID AC     insulin aspart  1-5 Units Subcutaneous At Bedtime     insulin glargine  18 Units Subcutaneous At Bedtime     levothyroxine  175 mcg Oral QAM AC     lisinopril  20 mg Oral Daily     mirabegron  25 mg Oral At Bedtime     rivaroxaban ANTICOAGULANT  20 mg Oral Daily with breakfast     sennosides  8.6 mg Oral Every Other Day     sodium chloride (PF)  10 mL Intracatheter Q8H     tamsulosin  0.4 mg Oral At Bedtime     topiramate  100 mg Oral Daily     venlafaxine  150 mg Oral Daily                 "

## 2023-01-19 NOTE — PLAN OF CARE
Goal Outcome Evaluation:    AOx4, can be particular with cares. VSS on RA ex tachy at times, temp max 100.4, refused Tylenol states that she's allergic to it. Up A2 and lift, T/R q 2 hrs, pt only wanted to be repositioned x1, refused other attempts and requested to let her sleep after 0130 reposition check. Tolerating Mod CHO and mech soft diet, BS checks with meals and HS w/sliding scale insulin. PRN oxy given 1x for 7/10 L knee pain/generalized pain. Midline SL, no blood return, made lab draw. Wound cares to coccyx and BLE, mepilex CDI. Rooke boots in place. Purewick in place, good UOP. No BM this shift. Waiting to be seen by OralSurtigre and SW. Discharge to LTC pending placement.

## 2023-01-20 NOTE — PLAN OF CARE
Goal Outcome Evaluation:     AOx4, particular with cares at times. VSS on RA. Up A2 and L. Tolerating mod CHO, mechanical soft diet. BS checks with meals, no sliding scale insulin given this shift. PRN oxy given 1x for 10/10 buttocks pain. Midline SL. Purewick in place, good UOP. BLE and coccyx wound cares completed per plan of care, mepilexes CDI. T/R q 2 hrs, refuses at times. Discharge to LTC pending placement.

## 2023-01-20 NOTE — PLAN OF CARE
Goal Outcome Evaluation: 1930-0700    A&O x4, particular with cares while repositioning. VSS on RA ex hypertension, SBP<180. Repositioning q2 as pt allows, usually refuses. Purewick in place with adequate output.  PRN oxy x2, refuses Tylenol and Zanaflex for buttock/bilateral lower extremity and mouth pain. Midline SL. Sacral mepilex change x1. R heel, R thigh mepilex CDI. BS stable. Tolerating diet, no nausea. Rooke boots on.     Discharge to LTC pending

## 2023-01-20 NOTE — PROGRESS NOTES
[facility]    Medicine Progress Note - Hospitalist Service        Date of Admission:  1/6/2023  2:22 PM    Assessment & Plan:   Miley Tolbert is a 77 year old female with history of hypertension, CHF, atrial fibrillation on Xarelto, history of for lower extremity DVT, coronary artery disease, chronic kidney disease stage III, COPD, hypothyroidism, poorly controlled type 2 diabetes with hyperglycemia, GERD, fecal and urinary incontinence who presented with progressively worsening sacral decubitus ulcer and inability of the family to provide care for the patient.       Progressively worsening stage IV sacral wound and stage II left calf and heel wounds.  Sepsis ruled out.  -Reportedly wound was foul smelling, soiled with urine/stool.  * Presumed sacral wound infection and started on broad spectrum abx, although does not appear infected.    * Procal and CRP mildly elevated. Lt heel XR without osteomyelitis.    -Podiatry consulted, ultrasound Doppler study shows VENUS within normal limits.  No intervention needed, signed off.  -CT abdomen pelvis and right thigh obtained to evaluate for other potential sources including diverticulitis/cholecystitis and also to look for any hematoma given ongoing drop in hemoglobin.  No source of infection, Zosyn discontinued.  No source of bleeding.  -Wound care consulted and following, appreciate assistance.   - and care coordination consulted for discharge planning, will need placement to geriatric unit as daughter unable to care for her and unsafe to be discharged back home  -Continue oxycodone every 8 hours as needed, noted plan by previous hospitalist to decrease the dose of narcotics in the context of dementia.  Patient requesting increase in dose although appears comfortable, will monitor at current doses for now.      Poorly controlled type 2 diabetes mellitus with peripheral neuropathy and hypoglycemic spells  -HbA1C 10 on 1/6/2023  -Prior to  admission on Lantus 35 units and 10 to 14 units of aspart prior to meals at home.  -Hold prior to admission Januvia  -Due to intermittent spells of hypoglycemia Lantus decreased to 18 units at bedtime on 1/17  -Continues on mealtime NovoLog at 10 units with breakfast, 10 units with lunch and 6 units with supper  -Medium intensity sliding scale    Poor fitting dentures  Epulis fissuratum  Complaints of painful mouth   -Poor fitting dentures.    -Previous hospitalist discussed with oral surgery on 01/18/23, regarding extra tissue (epulis fissuratum) that often occurs in patients with poor fitting dentures. It should be excised and she should have dentures refitted.  Care transition team consulted to help expedite referral to dentist/oral surgery following discharge.   -Lidocaine viscous as needed  -Soft diet     Paroxysmal atrial fibrillation  -Continue PTA Xarelto      Progressive dementia with behavioral disturbance in the setting of schizoaffective disorder and intermittent delirium  -Maintain sleep-wake cycles.  -Avoid benzodiazepines.  -Continue PTA venlafaxine 150 mg daily, topiramate 100 mg daily    CAD with HFpEF  Hypertension  Hyperlipidemia  * Last echo on 5/5/2022 showed EF of 55 to 60% with normal left ventricular cavity size.   -Lisinopril held in the setting of hypotension and resumed at lower dose subsequently as BP trended up.   -Chest pain RRT 1/15. Serial troponin negative. EKG without acute ischemic findings. Symptoms resolved, unclear if with nitroglycerine or spontaneously.   -Currently asymptomatic  -If recurrent pain, will get NM stress test given underlying known CAD.   -Continue atorvastatin 40 daily     History of DVT  -Continue Xarelto.     Chronic pain  -Continue tylenol, Zanaflex. As needed oxycodone as needed for pain control.  -Lt knee XR showed moderate to severe tricompartmental osteoarthritic changes.  No fracture or effusion.      Urinary incontinence  Urinary retention,  "resolved  -Continue tamsulosin 0.4 mg daily.  -Espino catheter has been discontinued, currently on external urinary catheter.     Diet: Combination Diet Moderate Consistent Carb (60 g CHO per Meal) Diet; Mechanical/Dental Soft Diet     DVT Prophylaxis: DOAC   Espino Catheter: Not present  Code Status: No CPR- Do NOT Intubate     Disposition Plan       Expected Discharge Date: 01/25/2023    Discharge Delays: Placement - LTC  *Medically Ready for Discharge    Discharge Comments: placement; was living with daughter; has multiple wounds.   SW/CC-- needs placement.  LTC      Entered: Hank Herring MD 01/20/2023, 11:26 AM        Clinically Significant Risk Factors              # Hypoalbuminemia: Lowest albumin = 2.4 g/dL at 1/8/2023  7:01 AM, will monitor as appropriate          # DMII: A1C = 10.0 % (Ref range: 0.0 - 5.6 %) within past 3 months   # Obesity: Estimated body mass index is 31.77 kg/m  as calculated from the following:    Height as of this encounter: 1.626 m (5' 4\").    Weight as of this encounter: 84 kg (185 lb 1.6 oz).            The patient's care was discussed with the Bedside Nurse and Patient.    Hank Herring MD  Hospitalist Service  [facility]  Text Page 7AM-6PM  Securely message with the Voc[employer] Web Console (learn more here)  Text page via [employer] Paging/Directory    ______________________________________________________________________    Interval History   No acute events overnight.  Afebrile.  Awaiting placement.    Data reviewed today: I reviewed all medications, new labs and imaging results over the last 24 hours. I personally reviewed no images or EKG's today.    Physical Exam   Vital signs:  Temp: 99.2  F (37.3  C) Temp src: Oral BP: 124/53 Pulse: 92   Resp: 16 SpO2: 95 % O2 Device: None (Room air)   Height: 162.6 cm (5' 4\") Weight: 84 kg (185 lb 1.6 oz)  Estimated body mass index is 31.77 kg/m  as calculated from the following:    Height as of this encounter: 1.626 " "m (5' 4\").    Weight as of this encounter: 84 kg (185 lb 1.6 oz).      Wt Readings from Last 2 Encounters:   01/14/23 84 kg (185 lb 1.6 oz)       Gen: AAOX3, NAD  Resp: CTA B/L, normal WOB  CVS: RRR, no murmur  Abd/GI: Soft, non-tender. BS- normoactive.    Skin: Warm, dry no rashes  MSK: boots in place-bilateral lower extremity.  Neuro- CN- intact.       Data   Recent Labs   Lab 01/20/23  0722 01/20/23  0226 01/19/23  2143 01/19/23  0734 01/19/23  0708 01/18/23  0747 01/18/23  0550   WBC 6.0  --   --   --   --   --   --    HGB 8.9*  --   --   --   --   --   --    MCV 86  --   --   --   --   --   --      --   --   --   --   --   --    POTASSIUM 4.0  --   --   --  4.0  --  3.9   CR  --   --   --   --   --   --  0.74   * 156* 186*   < >  --    < >  --     < > = values in this interval not displayed.       No results found for this or any previous visit (from the past 24 hour(s)).  Medications       artificial saliva  2 spray Swish & Spit 4x Daily     atorvastatin  40 mg Oral Daily     carboxymethylcellulose PF  1 drop Both Eyes 4x Daily     gabapentin  600 mg Oral TID     insulin aspart  6 Units Subcutaneous Daily with supper     insulin aspart  10 Units Subcutaneous Daily with breakfast     insulin aspart  10 Units Subcutaneous Daily with lunch     insulin aspart  1-7 Units Subcutaneous TID AC     insulin aspart  1-5 Units Subcutaneous At Bedtime     insulin glargine  18 Units Subcutaneous At Bedtime     levothyroxine  175 mcg Oral QAM AC     lisinopril  20 mg Oral Daily     mirabegron  25 mg Oral At Bedtime     rivaroxaban ANTICOAGULANT  20 mg Oral Daily with breakfast     sennosides  8.6 mg Oral Every Other Day     sodium chloride (PF)  10 mL Intracatheter Q8H     tamsulosin  0.4 mg Oral At Bedtime     topiramate  100 mg Oral Daily     venlafaxine  150 mg Oral Daily               "

## 2023-01-21 NOTE — PLAN OF CARE
Goal Outcome Evaluation:     [id number]:     A/Ox4. Calm. VSS on RA. Refuses most of skin and wounds assessments. Denies SOB/N/V. C/o pain at legs and buttock, managed with PRN PO oxycodone x1 with little improvement. T/R q2h but pt keeps refusing turns. Incontinent B/B, purewick in place with adequate UOP. On mod carb  diet, fair appetite per pt. BG checks per sliding scale.Takes whole pills with water. Discharge pending LTC placement.

## 2023-01-21 NOTE — PLAN OF CARE
A/Ox4. VSS on RA. Pain control with oxycodone. Complains of dental and coccyx pain. T/R q2. Purewick. Refuses turns majority of the day, was turned onto the same side with dressing change. Attempted education on turns and shifting weight, pt not receptive to learning. Coccyx and thigh wounds changed. Heel and R calf dressing change was refused. Tolerating mod carb diet. Discharge pending LTC placement.

## 2023-01-21 NOTE — PROGRESS NOTES
Patient alert and oriented x4, no confusion noted. VSS, patient on RA. Patient c/o pain 7/10 pain to bilateral lower extremities, prn pain medication given per orders--with moderate relief, frequency decreased from every 8 hours to every 6 hours prn. Educated patient on repositioning to alleviate pain, patient often refuses to allow staff to repositioned throughout shift. Wound dressings to L heel and R calf changed and redressed per orders. Patient tolerated wound dressing with minimal complaints of pain. Patient refused any other dressings at this time. Offloading boots in place to bilateral legs. , , carb count and ACHS blood glucose monitoring per orders. K+ 4.0, recheck in AM. Incontinent of bowel, purwick in place and patent, good output. Discharge pending TCU placement. Call light within reach, educated on use.

## 2023-01-21 NOTE — PROGRESS NOTES
[facility]    Medicine Progress Note - Hospitalist Service        Date of Admission:  1/6/2023  2:22 PM    Assessment & Plan:   Miley Tolbert is a 77 year old female with history of hypertension, CHF, atrial fibrillation on Xarelto, history of for lower extremity DVT, coronary artery disease, chronic kidney disease stage III, COPD, hypothyroidism, poorly controlled type 2 diabetes with hyperglycemia, GERD, fecal and urinary incontinence who presented with progressively worsening sacral decubitus ulcer and inability of the family to provide care for the patient.       Progressively worsening stage IV sacral wound and stage II left calf and heel wounds.  Sepsis ruled out.  -Reportedly wound was foul smelling, soiled with urine/stool.  * Presumed sacral wound infection and started on broad spectrum abx, although does not appear infected.    * Procal and CRP mildly elevated. Lt heel XR without osteomyelitis.    -Podiatry consulted, ultrasound Doppler study shows VENUS within normal limits.  No intervention needed, signed off.  -CT abdomen pelvis and right thigh obtained to evaluate for other potential sources including diverticulitis/cholecystitis and also to look for any hematoma given ongoing drop in hemoglobin.  No source of infection, Zosyn discontinued.  No source of bleeding.  -Wound care consulted and following, appreciate assistance.   - and care coordination consulted for discharge planning, will need placement to geriatric unit as daughter unable to care for her and unsafe to be discharged back home  -Continue oxycodone, will change from every 8 hours to every 6 hours as patient complaining of suboptimal pain control.    Poorly controlled type 2 diabetes mellitus with peripheral neuropathy and hypoglycemic spells  -HbA1C 10 on 1/6/2023  -Prior to admission on Lantus 35 units and 10 to 14 units of aspart prior to meals at home.  -Hold prior to admission Fawaduvia  -Due to  intermittent spells of hypoglycemia Lantus decreased to 18 units at bedtime on 1/17  -Continues on mealtime NovoLog at 10 units with breakfast, 10 units with lunch and 6 units with supper  -Medium intensity sliding scale    Poor fitting dentures  Epulis fissuratum  Complaints of painful mouth   -Poor fitting dentures.    -Previous hospitalist discussed with oral surgery on 01/18/23, regarding extra tissue (epulis fissuratum) that often occurs in patients with poor fitting dentures. It should be excised and she should have dentures refitted.  Care transition team consulted to help expedite referral to dentist/oral surgery following discharge.   -Lidocaine viscous as needed  -Soft diet     Paroxysmal atrial fibrillation  -Continue PTA Xarelto      Progressive dementia with behavioral disturbance in the setting of schizoaffective disorder and intermittent delirium  -Maintain sleep-wake cycles.  -Avoid benzodiazepines.  -Continue PTA venlafaxine 150 mg daily, topiramate 100 mg daily    CAD with HFpEF  Hypertension  Hyperlipidemia  * Last echo on 5/5/2022 showed EF of 55 to 60% with normal left ventricular cavity size.   -Lisinopril held in the setting of hypotension and resumed at lower dose subsequently as BP trended up.   -Chest pain RRT 1/15. Serial troponin negative. EKG without acute ischemic findings. Symptoms resolved, unclear if with nitroglycerine or spontaneously.   -Currently asymptomatic  -If recurrent pain, will get NM stress test given underlying known CAD.   -Continue atorvastatin 40 daily     History of DVT  -Continue Xarelto.     Chronic pain  -Continue tylenol, Zanaflex. As needed oxycodone as needed for pain control.  -Lt knee XR showed moderate to severe tricompartmental osteoarthritic changes.  No fracture or effusion.      Urinary incontinence  Urinary retention, resolved  -Continue tamsulosin 0.4 mg daily.  -Espino catheter has been discontinued, currently on external urinary catheter.     Diet:  "Combination Diet Moderate Consistent Carb (60 g CHO per Meal) Diet; Mechanical/Dental Soft Diet     DVT Prophylaxis: DOAC   Espino Catheter: Not present  Code Status: No CPR- Do NOT Intubate     Disposition Plan      Expected Discharge Date: 01/25/2023    Discharge Delays: Placement - LTC  *Medically Ready for Discharge    Discharge Comments: placement; was living with daughter; has multiple wounds.   SW/CC-- needs placement.  LTC      Entered: Hank Herring MD 01/21/2023, 11:13 AM        Clinically Significant Risk Factors              # Hypoalbuminemia: Lowest albumin = 2.4 g/dL at 1/8/2023  7:01 AM, will monitor as appropriate          # DMII: A1C = 10.0 % (Ref range: 0.0 - 5.6 %) within past 3 months   # Overweight: Estimated body mass index is 29.18 kg/m  as calculated from the following:    Height as of this encounter: 1.626 m (5' 4\").    Weight as of this encounter: 77.1 kg (170 lb).            The patient's care was discussed with the Bedside Nurse and Patient.    Hank Herring MD  Hospitalist Service  [facility]  Text Page 7AM-6PM  Securely message with the [employer] Web Console (learn more here)  Text page via [employer] Paging/Directory    ______________________________________________________________________    Interval History   Patient complaining of inadequate pain control with current regimen of oxycodone and requests changing from every 8 hours to every 6 hours.  No other acute concerns.  Awaiting placement.    Data reviewed today: I reviewed all medications, new labs and imaging results over the last 24 hours. I personally reviewed no images or EKG's today.    Physical Exam   Vital signs:  Temp: 98.4  F (36.9  C) Temp src: Oral BP: 130/70 Pulse: 94   Resp: 18 SpO2: 95 % O2 Device: None (Room air)   Height: 162.6 cm (5' 4\") Weight: 77.1 kg (170 lb) (removed pulsate power supply off bed and weighed pt; needs to be zero'd)  Estimated body mass index is 29.18 kg/m  as " "calculated from the following:    Height as of this encounter: 1.626 m (5' 4\").    Weight as of this encounter: 77.1 kg (170 lb).      Wt Readings from Last 2 Encounters:   01/21/23 77.1 kg (170 lb)       Gen: AAOX3, NAD  Resp: CTA B/L, normal WOB  CVS: RRR, no murmur  Abd/GI: Soft, non-tender. BS- normoactive.    Skin: Warm, dry no rashes  MSK: boots in place-bilateral lower extremity.  Neuro- CN- intact.       Data   Recent Labs   Lab 01/21/23  0839 01/21/23  0753 01/21/23  0159 01/20/23  2204 01/20/23  1154 01/20/23  0722 01/19/23  0734 01/19/23  0708 01/18/23  0747 01/18/23  0550   WBC  --   --   --   --   --  6.0  --   --   --   --    HGB  --   --   --   --   --  8.9*  --   --   --   --    MCV  --   --   --   --   --  86  --   --   --   --    PLT  --   --   --   --   --  252  --   --   --   --    POTASSIUM  --  4.0  --   --   --  4.0  --  4.0  --  3.9   CR  --   --   --   --   --   --   --   --   --  0.74   *  --  145* 146*   < > 156*   < >  --    < >  --     < > = values in this interval not displayed.       No results found for this or any previous visit (from the past 24 hour(s)).  Medications       artificial saliva  2 spray Swish & Spit 4x Daily     atorvastatin  40 mg Oral Daily     carboxymethylcellulose PF  1 drop Both Eyes 4x Daily     gabapentin  600 mg Oral TID     insulin aspart  6 Units Subcutaneous Daily with supper     insulin aspart  10 Units Subcutaneous Daily with breakfast     insulin aspart  10 Units Subcutaneous Daily with lunch     insulin aspart  1-7 Units Subcutaneous TID AC     insulin aspart  1-5 Units Subcutaneous At Bedtime     insulin glargine  18 Units Subcutaneous At Bedtime     levothyroxine  175 mcg Oral QAM AC     lisinopril  20 mg Oral Daily     mirabegron  25 mg Oral At Bedtime     rivaroxaban ANTICOAGULANT  20 mg Oral Daily with breakfast     sennosides  8.6 mg Oral Every Other Day     sodium chloride (PF)  10 mL Intracatheter Q8H     tamsulosin  0.4 mg Oral At " Bedtime     topiramate  100 mg Oral Daily     venlafaxine  150 mg Oral Daily

## 2023-01-22 NOTE — PLAN OF CARE
Goal Outcome Evaluation:     DATE & TIME: 01/22/23, [id number]    Summary: Failure to thrive, pressure ulcers, placement     Cognitive Concerns/ Orientation : A&O x 4    BEHAVIOR & AGGRESSION TOOL COLOR: GREEN    ABNL VS/O2: VSS on RA    MOBILITY: A x 2, lift, T&R q2h- causes a lot of pain, helps to have pt guide repo- refuses often     PAIN MANAGMENT: C/O buttock pain, Managed with PRN oxycodone x 1    DIET: Mod carb, mech soft     BOWEL/BLADDER: Incont B/B, purewick in place    ABNL LAB/BG: , 169    DRAIN/DEVICES: R Single lumen midline SL    SKIN: Stage 4 PU on coccyx, Stage 3 PU on R Lateral lower leg, Wounds to L heel, R breast, and pannus. Dressings CDI    D/C DAY/GOALS/PLACE: TBD- pending LTC placement

## 2023-01-22 NOTE — PLAN OF CARE
Goal Outcome Evaluation:    Pt is alert and oriented x 4, VSS on RA, incontinent of  b & b, mod carb diet, blood glucose checks. Pt also has pure wick in place with adequate output. Pt pain was controlled with oxycodone (5 mg). Pt refused her scheduled tylenol and stated she is allergic to it. Pt has multiple wounds. Pt wound on her buttocks was cleaned and new dressings put on. Pt is also turned every 2 hours. Pt has a single lumen  midline with no blood return.

## 2023-01-22 NOTE — PROGRESS NOTES
[facility]    Medicine Progress Note - Hospitalist Service        Date of Admission:  1/6/2023  2:22 PM    Assessment & Plan:   Miley Tolbert is a 77 year old female with history of hypertension, CHF, atrial fibrillation on Xarelto, history of for lower extremity DVT, coronary artery disease, chronic kidney disease stage III, COPD, hypothyroidism, poorly controlled type 2 diabetes with hyperglycemia, GERD, fecal and urinary incontinence who presented with progressively worsening sacral decubitus ulcer and inability of the family to provide care for the patient.       Progressively worsening stage IV sacral wound and stage II left calf and heel wounds.  Sepsis ruled out.  -Reportedly wound was foul smelling, soiled with urine/stool.  * Presumed sacral wound infection and started on broad spectrum abx, although does not appear infected.    * Procal and CRP mildly elevated. Lt heel XR without osteomyelitis.    -Podiatry consulted, ultrasound Doppler study shows VENUS within normal limits.  No intervention needed, signed off.  -CT abdomen pelvis and right thigh obtained to evaluate for other potential sources including diverticulitis/cholecystitis and also to look for any hematoma given ongoing drop in hemoglobin.  No source of infection, Zosyn discontinued.  No source of bleeding.  -Wound care consulted and following, appreciate assistance.   - and care coordination consulted for discharge planning, will need placement to geriatric unit as daughter unable to care for her and unsafe to be discharged back home  -Continue oxycodone at every 6 hours as needed    Poorly controlled type 2 diabetes mellitus with peripheral neuropathy and hypoglycemic spells  -HbA1C 10 on 1/6/2023  -Prior to admission on Lantus 35 units and 10 to 14 units of aspart prior to meals at home.  -Hold prior to admission Januvia  -Due to intermittent spells of hypoglycemia Lantus decreased to 18 units at bedtime  on 1/17  -Continues on mealtime NovoLog at 10 units with breakfast, 10 units with lunch and 6 units with supper  -Medium intensity sliding scale    Poor fitting dentures  Epulis fissuratum  Complaints of painful mouth   -Poor fitting dentures.    -Previous hospitalist discussed with oral surgery on 01/18/23, regarding extra tissue (epulis fissuratum) that often occurs in patients with poor fitting dentures. It should be excised and she should have dentures refitted.  Care transition team consulted to help expedite referral to dentist/oral surgery following discharge.   -Lidocaine viscous as needed  -Soft diet     Paroxysmal atrial fibrillation  -Continue PTA Xarelto      Progressive dementia with behavioral disturbance in the setting of schizoaffective disorder and intermittent delirium  -Maintain sleep-wake cycles.  -Avoid benzodiazepines.  -Continue PTA venlafaxine 150 mg daily, topiramate 100 mg daily    CAD with HFpEF  Hypertension  Hyperlipidemia  * Last echo on 5/5/2022 showed EF of 55 to 60% with normal left ventricular cavity size.   -Lisinopril held in the setting of hypotension and resumed at lower dose subsequently as BP trended up.   -Chest pain RRT 1/15. Serial troponin negative. EKG without acute ischemic findings. Symptoms resolved, unclear if with nitroglycerine or spontaneously.   -Currently asymptomatic  -If recurrent pain, will get NM stress test given underlying known CAD.   -Continue atorvastatin 40 daily     History of DVT  -Continue Xarelto.     Chronic pain  -Continue tylenol, Zanaflex. As needed oxycodone as needed for pain control.  -Lt knee XR showed moderate to severe tricompartmental osteoarthritic changes.  No fracture or effusion.      Urinary incontinence  Urinary retention, resolved  -Continue tamsulosin 0.4 mg daily.  -Espino catheter has been discontinued, currently on external urinary catheter.     Diet: Combination Diet Moderate Consistent Carb (60 g CHO per Meal) Diet;  "Mechanical/Dental Soft Diet     DVT Prophylaxis: DOAC   Espino Catheter: Not present  Code Status: No CPR- Do NOT Intubate     Disposition Plan      Expected Discharge Date: 01/25/2023    Discharge Delays: Placement - LTC  *Medically Ready for Discharge    Discharge Comments: placement; was living with daughter; has multiple wounds.   SW/CC-- needs placement.  LTC      Entered: Hank Herring MD 01/22/2023, 1:21 PM        Clinically Significant Risk Factors              # Hypoalbuminemia: Lowest albumin = 2.4 g/dL at 1/8/2023  7:01 AM, will monitor as appropriate          # DMII: A1C = 10.0 % (Ref range: 0.0 - 5.6 %) within past 3 months   # Overweight: Estimated body mass index is 29.18 kg/m  as calculated from the following:    Height as of this encounter: 1.626 m (5' 4\").    Weight as of this encounter: 77.1 kg (170 lb).            The patient's care was discussed with the Bedside Nurse and Patient.    Hank Herring MD  Hospitalist Service  [facility]  Text Page 7AM-6PM  Securely message with the Vocera Web Console (learn more here)  Text page via [employer] Paging/Directory    ______________________________________________________________________    Interval History   No acute events overnight.  Blood sugars adequately controlled.  Awaiting placement    Data reviewed today: I reviewed all medications, new labs and imaging results over the last 24 hours. I personally reviewed no images or EKG's today.    Physical Exam   Vital signs:  Temp: 97.7  F (36.5  C) Temp src: Oral BP: 127/58 Pulse: 90   Resp: 16 SpO2: 97 % O2 Device: None (Room air)   Height: 162.6 cm (5' 4\") Weight: 77.1 kg (170 lb) (removed pulsate power supply off bed and weighed pt; needs to be zero'd)  Estimated body mass index is 29.18 kg/m  as calculated from the following:    Height as of this encounter: 1.626 m (5' 4\").    Weight as of this encounter: 77.1 kg (170 lb).      Wt Readings from Last 2 Encounters:   01/21/23 " 77.1 kg (170 lb)       Gen: AAOX3, NAD  Resp: CTA B/L, normal WOB  CVS: RRR, no murmur  Abd/GI: Soft, non-tender. BS- normoactive.    Skin: Warm, dry no rashes  MSK: boots in place-bilateral lower extremity.  Neuro- CN- intact.       Data   Recent Labs   Lab 01/22/23  1156 01/22/23  0807 01/22/23  0643 01/21/23  2151 01/21/23  0839 01/21/23  0753 01/20/23  1154 01/20/23  0722 01/18/23  0747 01/18/23  0550   WBC  --   --   --   --   --   --   --  6.0  --   --    HGB  --   --   --   --   --   --   --  8.9*  --   --    MCV  --   --   --   --   --   --   --  86  --   --    PLT  --   --   --   --   --   --   --  252  --   --    POTASSIUM  --   --  4.0  --   --  4.0  --  4.0   < > 3.9   CR  --   --   --   --   --   --   --   --   --  0.74   * 171*  --  186*   < >  --    < > 156*   < >  --     < > = values in this interval not displayed.       No results found for this or any previous visit (from the past 24 hour(s)).  Medications       artificial saliva  2 spray Swish & Spit 4x Daily     atorvastatin  40 mg Oral Daily     carboxymethylcellulose PF  1 drop Both Eyes 4x Daily     gabapentin  600 mg Oral TID     insulin aspart  6 Units Subcutaneous Daily with supper     insulin aspart  10 Units Subcutaneous Daily with breakfast     insulin aspart  10 Units Subcutaneous Daily with lunch     insulin aspart  1-7 Units Subcutaneous TID AC     insulin aspart  1-5 Units Subcutaneous At Bedtime     insulin glargine  18 Units Subcutaneous At Bedtime     levothyroxine  175 mcg Oral QAM AC     lisinopril  20 mg Oral Daily     mirabegron  25 mg Oral At Bedtime     rivaroxaban ANTICOAGULANT  20 mg Oral Daily with breakfast     sennosides  8.6 mg Oral Every Other Day     sodium chloride (PF)  10 mL Intracatheter Q8H     tamsulosin  0.4 mg Oral At Bedtime     topiramate  100 mg Oral Daily     venlafaxine  150 mg Oral Daily

## 2023-01-22 NOTE — PLAN OF CARE
Goal Outcome Evaluation:     A/O x 4. VSS on RA. Tolerating mod  cho, mechanical soft diet. A/2 with lift. Repo/Turn q 2 hr. Female external cath on place with adequate UOP. Pain controlled with PRN po oxycodone. Sacral mepilex changed due to dressing saturated. Pt refused to assess L calf and R heel wound. BG checked and insulin given per sliding scale. T/R done. B/B incontinent. Midline SL. Discharge pending.

## 2023-01-23 NOTE — PLAN OF CARE
Goal Outcome Evaluation:    A&Ox4, VSS on RA, Pain managed with PRN Oxy x2. A2 with lift. T&R q2hrs, Pt refused rooke boots to be removed as well as full skin assessment. Refused T&R x1. Allowed dressing change on R calf. Discharge pending placement.

## 2023-01-23 NOTE — PLAN OF CARE
Goal Outcome Evaluation:    Patient is A/Ox 3. VSS on RA. .Pain managed with PRN PO oxycodone x 1. A/2 with lift. Turn/Rep q 2 hrs. Purewick in place.  Small formed BM x 1. Buttocks wound cleaned and new dressing applied.  Pt refused to remove boots and dressing change on R calf and LE. Midline with no blood return.  Discharge pending placement.

## 2023-01-23 NOTE — PROGRESS NOTES
A/Ox4. VSS on RA ex HTN. AX2, lift turn and repo. Refuses repos at times. Pt has pain in bilateral legs and sacral region. Sacral and hip wound cares done this shift. Rooke boots in place. Pain controlled with PRN oxycodone given 1x this shift. Incontinent of B&B, no BM this shift but incontinent of urine 1x. Purwick in place. Tolerates a mod carb diet, BG checks. Last BG was 92. SW following, placement pending.

## 2023-01-24 NOTE — PROGRESS NOTES
MD Notification    Notified Person: MD    Notified Person Name: Lily Kwon    Notification Date/Time: 1730 1/24/22    Notification Interaction: Pt didn't take NOC oxy and pain has been at a 7/10 and more severe with any sort of movement to L leg. Can I get an increase in pain meds please? And maybe a one time dose to do her dressing change that's overdue please?       Purpose of Notification:    Orders Received: PRN Oxycodone 5mg every 4 hours    Comments:

## 2023-01-24 NOTE — PROGRESS NOTES
[facility]    Medicine Progress Note - Hospitalist Service        Date of Admission:  1/6/2023  2:22 PM    Assessment & Plan:   Miley Tolbert is a 77 year old female with history of hypertension, CHF, atrial fibrillation on Xarelto, history of for lower extremity DVT, coronary artery disease, chronic kidney disease stage III, COPD, hypothyroidism, poorly controlled type 2 diabetes with hyperglycemia, GERD, fecal and urinary incontinence who presented with progressively worsening sacral decubitus ulcer and inability of the family to provide care for the patient.       Progressively worsening stage IV sacral wound and stage II left calf and heel wounds.  Sepsis ruled out.  -Reportedly wound was foul smelling, soiled with urine/stool.  -Presumed sacral wound infection and started on broad spectrum abx, although does not appear infected.    -Procal and CRP mildly elevated. Lt heel XR without osteomyelitis.    -Podiatry consulted, ultrasound Doppler study shows VENUS within normal limits.  No intervention needed, signed off.  -CT abdomen pelvis and right thigh obtained to evaluate for other potential sources including diverticulitis/cholecystitis and also to look for any hematoma given ongoing drop in hemoglobin.  No source of infection, Zosyn discontinued.    -Low-grade fever overnight, monitor for now  -Wound care consulted and following, appreciate assistance.   - and care coordination consulted for discharge planning, will need placement to geriatric unit as daughter unable to care for her and unsafe to be discharged back home  -Continue oxycodone at every 6 hours as needed    Poorly controlled type 2 diabetes mellitus with peripheral neuropathy and hypoglycemic spells  -HbA1C 10 on 1/6/2023  -Prior to admission on Lantus 35 units and 10 to 14 units of aspart prior to meals at home.  -Hold prior to admission Januvia  -Due to intermittent spells of hypoglycemia Lantus decreased to  18 units at bedtime on 1/17  -Continues on mealtime NovoLog at 10 units with breakfast, 10 units with lunch and 6 units with supper  -Medium intensity sliding scale  -Blood sugar adequately controlled on current regimen.    Poor fitting dentures  Epulis fissuratum  Complaints of painful mouth   -Poor fitting dentures.    -Previous hospitalist discussed with oral surgery on 01/18/23, regarding extra tissue (epulis fissuratum) that often occurs in patients with poor fitting dentures. It should be excised and she should have dentures refitted.  Care transition team consulted to help expedite referral to dentist/oral surgery following discharge.   -Lidocaine viscous as needed  -Soft diet     Paroxysmal atrial fibrillation  -Continue PTA Xarelto      Progressive dementia with behavioral disturbance in the setting of schizoaffective disorder and intermittent delirium  -Maintain sleep-wake cycles.  -Avoid benzodiazepines.  -Continue PTA venlafaxine 150 mg daily, topiramate 100 mg daily    CAD with HFpEF  Hypertension  Hyperlipidemia  * Last echo on 5/5/2022 showed EF of 55 to 60% with normal left ventricular cavity size.   -Lisinopril held in the setting of hypotension and resumed at lower dose subsequently as BP trended up.   -Chest pain RRT 1/15. Serial troponin negative. EKG without acute ischemic findings. Symptoms resolved, unclear if with nitroglycerine or spontaneously.   -Currently asymptomatic  -If recurrent pain, will get NM stress test given underlying known CAD.  No further issues since the RRT.  -Continue atorvastatin 40 daily     History of DVT  -Continue Xarelto.     Chronic pain  -Continue tylenol, Zanaflex. As needed oxycodone as needed for pain control.  -Lt knee XR showed moderate to severe tricompartmental osteoarthritic changes.  No fracture or effusion.      Urinary incontinence  Urinary retention, resolved  -Continue tamsulosin 0.4 mg daily.  -Espino catheter has been discontinued, currently on  "external urinary catheter.     Diet: Combination Diet Moderate Consistent Carb (60 g CHO per Meal) Diet; Mechanical/Dental Soft Diet     DVT Prophylaxis: DOAC   Espino Catheter: Not present  Code Status: No CPR- Do NOT Intubate     Disposition Plan       Expected Discharge Date: 01/25/2023    Discharge Delays: Placement - LTC  *Medically Ready for Discharge    Discharge Comments: placement; was living with daughter; has multiple wounds.   SW/CC-- needs placement.  LTC      Entered: Mariaelena Goldberg MD 01/24/2023, 7:17 AM        Clinically Significant Risk Factors              # Hypoalbuminemia: Lowest albumin = 2.4 g/dL at 1/8/2023  7:01 AM, will monitor as appropriate          # DMII: A1C = 10.0 % (Ref range: 0.0 - 5.6 %) within past 3 months   # Overweight: Estimated body mass index is 29.18 kg/m  as calculated from the following:    Height as of this encounter: 1.626 m (5' 4\").    Weight as of this encounter: 77.1 kg (170 lb).            The patient's care was discussed with the Bedside Nurse and Patient.    Mariaelena Goldberg MD  ______________________________________________________________________    Interval History   Patient in no acute distress this morning.  Denies any chest pain/sob/NVD.    Data reviewed today: I reviewed all medications, new labs and imaging results over the last 24 hours. I personally reviewed no images or EKG's today.    Physical Exam   Vital signs:  Temp: 99.8  F (37.7  C) Temp src: Oral BP: 138/68 Pulse: 65   Resp: 16 SpO2: 94 % O2 Device: None (Room air)   Height: 162.6 cm (5' 4\") Weight: 77.1 kg (170 lb) (removed pulsate power supply off bed and weighed pt; needs to be zero'd)  Estimated body mass index is 29.18 kg/m  as calculated from the following:    Height as of this encounter: 1.626 m (5' 4\").    Weight as of this encounter: 77.1 kg (170 lb).      Wt Readings from Last 2 Encounters:   01/21/23 77.1 kg (170 lb)       General: No acute distress, breathing comfortably on " room air  Neuro: EOMI, PERRLA. Facial muscles symmetric, strength/sensation grossly intact.  HEENT: Anicteric sclera. Oropharynx is clear. No lymphadenopathy.  Chest/Lungs: No accessory respiratory muscle use. Adequate air movement throughout. CTAB.  CV: Normal rate, regular rhythm. nl S1/S2. No m/r/g. Cap refill &lt;2s.  Abd: BS+. Soft, NTND.  Ext: Warm, well-perfused. Strong distal pulses.    Data   Recent Labs   Lab 01/24/23  0645 01/23/23  2151 01/23/23  1638 01/23/23  1318 01/23/23  1244 01/22/23  0807 01/22/23  0643 01/21/23  0839 01/21/23  0753 01/20/23  1154 01/20/23  0722 01/18/23  0747 01/18/23  0550   WBC  --   --   --   --   --   --   --   --   --   --  6.0  --   --    HGB  --   --   --   --   --   --   --   --   --   --  8.9*  --   --    MCV  --   --   --   --   --   --   --   --   --   --  86  --   --    PLT  --   --   --   --   --   --   --   --   --   --  252  --   --    POTASSIUM  --   --   --   --  3.8  --  4.0  --  4.0  --  4.0   < > 3.9   CR  --   --   --   --  0.65  --   --   --   --   --   --   --  0.74   GLC 97 164* 92   < >  --    < >  --    < >  --    < > 156*   < >  --     < > = values in this interval not displayed.       No results found for this or any previous visit (from the past 24 hour(s)).  Medications       artificial saliva  2 spray Swish & Spit 4x Daily     atorvastatin  40 mg Oral Daily     carboxymethylcellulose PF  1 drop Both Eyes 4x Daily     gabapentin  600 mg Oral TID     insulin aspart  6 Units Subcutaneous Daily with supper     insulin aspart  10 Units Subcutaneous Daily with breakfast     insulin aspart  10 Units Subcutaneous Daily with lunch     insulin aspart  1-7 Units Subcutaneous TID AC     insulin aspart  1-5 Units Subcutaneous At Bedtime     insulin glargine  18 Units Subcutaneous At Bedtime     levothyroxine  175 mcg Oral QAM AC     lisinopril  20 mg Oral Daily     mirabegron  25 mg Oral At Bedtime     rivaroxaban ANTICOAGULANT  20 mg Oral Daily with breakfast      sennosides  8.6 mg Oral Every Other Day     sodium chloride (PF)  10 mL Intracatheter Q8H     tamsulosin  0.4 mg Oral At Bedtime     topiramate  100 mg Oral Daily     venlafaxine  150 mg Oral Daily       I spent 35 minutes on this total encounter.

## 2023-01-24 NOTE — PROGRESS NOTES
Care Management Follow Up    Length of Stay (days): 17    Expected Discharge Date: 01/25/2023     Concerns to be Addressed:       Patient plan of care discussed at interdisciplinary rounds: Yes    Anticipated Discharge Disposition: Long Term Care     Anticipated Discharge Services: None  Anticipated Discharge DME: None    Patient/family educated on Medicare website which has current facility and service quality ratings: yes  Education Provided on the Discharge Plan:    Patient/Family in Agreement with the Plan: yes    Referrals Placed by CM/SW: Post Acute Facilities  Private pay costs discussed: Not applicable    Additional Information:  Patient is requiring Long Term Care. Referrals have been sent and the following are pending:    [facility]: CCR[facility] Team left VM 1/24  [facility]: CCR[facility] Team sent email 1/23- awaiting response    Declined:   [facility]  [facility] [place]  [facility] [place]  [facility]  [facility] [place]    Wendi Luke, SEAN, LGSW   Social Work   [facility]

## 2023-01-24 NOTE — PLAN OF CARE
[id number]      Pt is alert and oriented x 4, VSS on RA, incontinent of  b & b, mod carb diet, blood glucose checks. Pt also has pure wick in place with adequate output. Pt pain was controlled with oxycodone (5 mg). Pt has multiple wounds, its CDI. Pt is also turned every 2 hours and has a single lumen  midline with no blood return. Discharge pending.

## 2023-01-24 NOTE — PROGRESS NOTES
"SPIRITUAL HEALTH SERVICES Progress Note  [facility] [address]    Saw pt Miley Tolbert per follow-up. Offered emotional support as \"Donald\" engaged in reflective conversation.      Patient/Family Understanding of Illness and Goals of Care - Donald reflected on \"crying at night\" and her experiences during this hospitalization. I offered to give Donald the Patient Relations phone number and she accepted it.      Distress and Loss - Donald reflected on complicated memories from her life, her journey to \"healing,\" and the reality that all of her siblings and her parents have already .      Strengths, Coping, and Resources - Donald shared that her son visited her yesterday. Prayer is meaningful to Donald - Donald feels \"the Lord is holding and hugging me\" through these difficult times. Donald welcomed my offer to continue to keep her in my prayers.      Meaning, Beliefs, and Spirituality - Donald named, \"The Lord told me, 'Be encouraged,'\" and reflected on how the Lord's encouragement helps her to see a \"paved road to heaven\" ahead of her. Donald shared, \"I trust that God will find the right place for me in His time.\"      Plan of Care - Informed unit nurse manager of conversation. I will continue to follow. [facility] remains available.    Giselle Vargas MDiv  Chaplain Resident  Pager: [phone]     "

## 2023-01-24 NOTE — PROGRESS NOTES
"[facility]  WO Nurse Inpatient Assessment     Consulted for: Sacral wound      Areas Assessed:      Areas visualized during today's visit: Focused: and Lower extremities     Wound location: Left Heel Patient refused assessment of left heel 1/24     Last photo: 1/9/23, 1/17(picture not saved)  Wound due to: Pressure Injury  Wound history/plan of care: [place] and family brought patient home with concerns of not being able to care for her. Pt has significant pain. Discussed with Dr. Bello, pt may benefit vascular studies and imaging possible podiatry consult.  Wound base: 100% resurfaced scar tissue     Palpation of the wound bed: normal      Drainage: none     Description of drainage: none     Measurements (length x width x depth, in cm): 1 x 1 x 0      Tunneling: N/A     Undermining: N/A  Periwound skin: non blanchable erythema purple/pink erythema in dark skin tone vascular vs. pressure, dry scale and callus      Color: pink and purple      Temperature: normal   Odor: none  Pain: moderate, shooting and intermittent  Pain interventions prior to dressing change: See MAR, slow and gentle cares  Treatment goal: Heal  and Increase granulation  STATUS: closed  Supplies ordered: at bedside     Wound location: Right Calf  Last photo: 1/17/23    Wound due to: pressure injury POA   Wound history/plan of care: per prior [facility] charting \"pt reports she all of a sudden developed several wounds a few months ago, however unclear of true etiology.\"  Wound base: 10 % tendon, 80% red granulation     Palpation of the wound bed: boggy      Drainage: small     Description of drainage: serosanguinous      Measurements (length x width x depth, in cm): 4.2  x 2.2  x  0.2 cm      Tunneling: N/A     Undermining: N/A  Periwound skin: Intact      Color: normal and consistent with surrounding tissue      Temperature: normal   Odor: none  Pain: moderate, shooting and " "intermittent  Pain interventions prior to dressing change: slow and gentle cares  Treatment goal: Drainage control, Infection control/prevention, Decrease moisture, Protection and protect tendon  STATUS: improving   Supplies ordered: at bedside      Wound location: Coccyx/right coccyx Patient refused assessment of buttocks 1/24  Last photo: 1/17/23    Wound due to: Pressure Injury  Stage 4 pressure injury Present on admission  Wound history/plan of care: pt reports she all of a sudden developed several wounds a few months ago. Wound is very painful for patient and when dressing removed Right coccyx wound bleeding significantly. After 15mins of consistent pressure wound is still bleeding, Hemostat applied.   Wound base: Coccyx midline 80 % red tissue and 20% muscle. Right of coccyx 100% granulation     Palpation of the wound bed: normal      Drainage: moderate     Description of drainage: serosanguinous and bloody     Measurements (length x width x depth, in cm): 2 main wounds Coccyx 4 x 3 x 1.6cm and Right coccyx 3 x 2 x 0.2cm  With 2 ulcerations in between that are less than 1 x 1 x 0.3cm     Tunneling: N/A     Undermining: N/A  Periwound skin: Intact, Scar tissue and hyperpigmentation      Color: pale tissue due to scarring and superior to wound hyperpigmentation      Temperature: normal   Odor: none  Pain: moderate, shooting and intermittent  Pain interventions prior to dressing change: See MAR, slow and gentle cares  Treatment goal: Infection control/prevention, Increase granulation and Pain control  STATUS: improving  Supplies: At bedside     Treatment Plan:      Wound care  EVERY OTHER DAY      Comments: Right Lateral Buttock: Every other day and PRN incontinence:   1. Clean wound with saline or MicroKlenz Spray, pat dry   2. Wipe / \"clean\" the surrounding periwound tissue with skin prep (Cavilon No Sting Skin Prep #692305) and allow to dry. This will help protect periwound and help dressing adherence   3. " Press a Mepilex  Border Dressing (#[id number]) to the area, making sure to conform nicely to skin curvatures.   4. Time and date dressing change   NOTE   -Reposition pt side to side jhoan when in bed, every 2 hours-get the pt way over on side to completely offload pressure. This will benefit skin and respiratory function   -Keep heels elevated and floating on pillows at all times. Try using at least 2 pillows under each calf.   -When up to the chair pt needs to fully offload every 2 hours and use a chair cushion if needed         Wound care  DAILY        Comments: Pannus and Right Breast: Daily   1.Cleanse the area with Francine cleanse and protect and francois dry wipes/soft cloth.   2. Apply nickel thick layer of Triad paste (#761625) to wound bed and thin layer over reddened areas   **No need to remove all paste after each incontinent episode, remove soiled paste and reapply as needed.   **If complete removal of paste is necessary use baby oil/mineral oil (Located in Pharmacy) and soft wash cloth.   Leave the brief off in bed to let the area dry as much as possible.   Use only one Covidien pad in between mattress and pt. No brief while in bed.         Wound care  DAILY        Comments: Coccyx wound(s): Daily  and PRN incontinence   *Soak dressing prior to removal to prevent bleeding. If bleeding occurs hold pressure for 10mins. If bleeding is ongoing apply small piece of hemostat (package in room)   1. Cleanse wound with Vashe #(108891) soaked gauze. Soak gauze and allow to sit in wound bed for 10 minutes then remove.   2. Moisten new gauze or kerlix with vashe, wring out excess so it is damp and gently pack into wound (this will remain in wound as primary dressing)   3. Apply Cavilon No Sting Skin Prep (#866667) to periwound and allow to dry.   4.  Cover wound with  Sacral Dressing (PS#[id number]) or ABD and Tape.   5. Time and date dressing change         Wound care  DAILY        Comments: Right Lateral Calf: Daily   1.  "Cleanse wound with Vashe (#384179) soaked gauze.   2. Apply Thin bead of Skintegrity Hydrogel (#339865) to a piece of Polymem foam (#970165) (Polymem foam can be cut larger than the wound)   3.  Secure with kerlix and tape.   4. Time/Date/Initial dressing change   * Apply Sween 24 to all intact skin of LE         Wound care  DAILY        Comments: Left Heel: Daily   1. Cleanse with saline and blot dry   3. Cover with  Border Dressing (#[id number])   4. Time/Date/Initial dressing change   * Apply Sween 24 to all intact skin of LE          Orders: Reviewed and Updated    RECOMMEND PRIMARY TEAM ORDER: None, at this time  Education provided: importance of repositioning, plan of care, wound progress, Moisture management and Off-loading pressure  Discussed plan of care with: Patient and Nurse  WOC nurse follow-up plan: weekly, patient refused assessment of coccyx and left heel on 1/24, will reattempt reassessment of those areas 1/25   Notify WOC if wound(s) deteriorate.  Nursing to notify the Provider(s) and re-consult the WOC Nurse if new skin concern.    DATA:     Current support surface: Standard  Low air loss (PETE pump, Isolibrium, Pulsate, skin guard, etc)  Containment of urine/stool: Incontinence Protocol and Incontinent pad in bed  BMI: Body mass index is 29.18 kg/m .   Active diet order: Orders Placed This Encounter      Combination Diet Moderate Consistent Carb (60 g CHO per Meal) Diet; Mechanical/Dental Soft Diet     Output: I/O last 3 completed shifts:  In: 920 [P.O.:920]  Out: 400 [Urine:400]     Labs:   Recent Labs   Lab 01/20/23  0722   HGB 8.9*   WBC 6.0     Pressure injury risk assessment:   Sensory Perception: 3-->slightly limited  Moisture: 3-->occasionally moist  Activity: 1-->bedfast  Mobility: 2-->very limited  Nutrition: 3-->adequate  Friction and Shear: 1-->problem  Naif Score: 13    Dior CWOCN   1st: Vocera Connect, call \"Dior Sherman\" or call Group \"WO Nurse\"   (2nd option: leave a voicemail " at Dept. Office Number: [phone]. Messages checked occasionally M-F)

## 2023-01-25 NOTE — PROGRESS NOTES
[facility]    Medicine Progress Note - Hospitalist Service        Date of Admission:  1/6/2023  2:22 PM    Assessment & Plan:   Miley Tolbert is a 77 year old female with history of hypertension, CHF, atrial fibrillation on Xarelto, history of for lower extremity DVT, coronary artery disease, chronic kidney disease stage III, COPD, hypothyroidism, poorly controlled type 2 diabetes with hyperglycemia, GERD, fecal and urinary incontinence who presented with progressively worsening sacral decubitus ulcer and inability of the family to provide care for the patient.       Progressively worsening stage IV sacral wound and stage II left calf and heel wounds.  Sepsis ruled out.  -Reportedly wound was foul smelling, soiled with urine/stool.  -Presumed sacral wound infection and started on broad spectrum abx, although does not appear infected.    -Procal and CRP mildly elevated. Lt heel XR without osteomyelitis.    -Podiatry consulted, ultrasound Doppler study shows VENUS within normal limits.  No intervention needed, signed off.  -CT abdomen pelvis and right thigh obtained to evaluate for other potential sources including diverticulitis/cholecystitis and also to look for any hematoma given ongoing drop in hemoglobin.  No source of infection, Zosyn discontinued.    -Low-grade fever overnight, monitor for now  -Wound care consulted and following, appreciate assistance.   - and care coordination consulted for discharge planning, will need placement to geriatric unit as daughter unable to care for her and unsafe to be discharged back home  -Continue oxycodone at every 6 hours as needed    Poorly controlled type 2 diabetes mellitus with peripheral neuropathy and hypoglycemic spells  -HbA1C 10 on 1/6/2023  -Prior to admission on Lantus 35 units and 10 to 14 units of aspart prior to meals at home.  -Hold prior to admission Januvia  -Due to intermittent spells of hypoglycemia Lantus decreased to  18 units at bedtime on 1/17  -Continues on mealtime NovoLog at 10 units with breakfast, 10 units with lunch and 6 units with supper  -Medium intensity sliding scale  -Blood sugar adequately controlled on current regimen.    Poor fitting dentures  Epulis fissuratum  Complaints of painful mouth   -Poor fitting dentures.    -Previous hospitalist discussed with oral surgery on 01/18/23, regarding extra tissue (epulis fissuratum) that often occurs in patients with poor fitting dentures. It should be excised and she should have dentures refitted.  Care transition team consulted to help expedite referral to dentist/oral surgery following discharge.   -Lidocaine viscous as needed  -Soft diet     Paroxysmal atrial fibrillation  -Continue PTA Xarelto      Progressive dementia with behavioral disturbance in the setting of schizoaffective disorder and intermittent delirium  -Maintain sleep-wake cycles.  -Avoid benzodiazepines.  -Continue PTA venlafaxine 150 mg daily, topiramate 100 mg daily    CAD with HFpEF  Hypertension  Hyperlipidemia  * Last echo on 5/5/2022 showed EF of 55 to 60% with normal left ventricular cavity size.   -Lisinopril held in the setting of hypotension and resumed at lower dose subsequently as BP trended up.   -Chest pain RRT 1/15. Serial troponin negative. EKG without acute ischemic findings. Symptoms resolved, unclear if with nitroglycerine or spontaneously.   -Currently asymptomatic  -If recurrent pain, will get NM stress test given underlying known CAD.  No further issues since the RRT.  -Continue atorvastatin 40 daily     History of DVT  -Continue Xarelto.     Chronic pain  -Continue tylenol, Zanaflex. As needed oxycodone as needed for pain control.  -Lt knee XR showed moderate to severe tricompartmental osteoarthritic changes.  No fracture or effusion.      Urinary incontinence  Urinary retention, resolved  -Continue tamsulosin 0.4 mg daily.  -Espino catheter has been discontinued, currently on  "external urinary catheter.     Diet: Combination Diet Moderate Consistent Carb (60 g CHO per Meal) Diet; Mechanical/Dental Soft Diet     DVT Prophylaxis: DOAC   Espino Catheter: Not present  Code Status: No CPR- Do NOT Intubate     Disposition Plan      Expected Discharge Date: 01/25/2023    Discharge Delays: Placement - LTC  *Medically Ready for Discharge    Discharge Comments: placement; was living with daughter; has multiple wounds.   SW/CC-- needs placement.  LTC      Entered: Mariaelena Goldberg MD 01/25/2023, 7:42 AM        Clinically Significant Risk Factors              # Hypoalbuminemia: Lowest albumin = 2.4 g/dL at 1/8/2023  7:01 AM, will monitor as appropriate          # DMII: A1C = 10.0 % (Ref range: 0.0 - 5.6 %) within past 3 months   # Overweight: Estimated body mass index is 29.18 kg/m  as calculated from the following:    Height as of this encounter: 1.626 m (5' 4\").    Weight as of this encounter: 77.1 kg (170 lb).            The patient's care was discussed with the Bedside Nurse and Patient.    Mariaelena Goldberg MD  ______________________________________________________________________    Interval History   No distress  Pending LTACH     Data reviewed today: I reviewed all medications, new labs and imaging results over the last 24 hours. I personally reviewed no images or EKG's today.    Physical Exam   Vital signs:  Temp: 99.1  F (37.3  C) Temp src: Oral BP: (!) 144/61 Pulse: 97   Resp: 16 SpO2: 94 % O2 Device: None (Room air)   Height: 162.6 cm (5' 4\") Weight: 77.1 kg (170 lb) (removed pulsate power supply off bed and weighed pt; needs to be zero'd)  Estimated body mass index is 29.18 kg/m  as calculated from the following:    Height as of this encounter: 1.626 m (5' 4\").    Weight as of this encounter: 77.1 kg (170 lb).      Wt Readings from Last 2 Encounters:   01/21/23 77.1 kg (170 lb)       General: No acute distress, breathing comfortably on room air  Neuro: EOMI, PERRLA. Facial muscles " symmetric, strength/sensation grossly intact.  HEENT: Anicteric sclera. Oropharynx is clear. No lymphadenopathy.  Chest/Lungs: No accessory respiratory muscle use. Adequate air movement throughout. CTAB.  CV: Normal rate, regular rhythm. nl S1/S2. No m/r/g. Cap refill &lt;2s.  Abd: BS+. Soft, NTND.  Ext: Warm, well-perfused. Strong distal pulses.    Data   Recent Labs   Lab 01/25/23  0156 01/24/23  2147 01/24/23  1636 01/24/23  0808 01/24/23  0744 01/23/23  1318 01/23/23  1244 01/22/23  0807 01/22/23  0643 01/20/23  1154 01/20/23  0722   WBC  --   --   --   --   --   --   --   --   --   --  6.0   HGB  --   --   --   --   --   --   --   --   --   --  8.9*   MCV  --   --   --   --   --   --   --   --   --   --  86   PLT  --   --   --   --   --   --   --   --   --   --  252   POTASSIUM  --   --   --   --  3.9  --  3.8  --  4.0   < > 4.0   CR  --   --   --   --   --   --  0.65  --   --   --   --    * 164* 83   < >  --    < >  --    < >  --    < > 156*    < > = values in this interval not displayed.       No results found for this or any previous visit (from the past 24 hour(s)).  Medications       artificial saliva  2 spray Swish & Spit 4x Daily     atorvastatin  40 mg Oral Daily     carboxymethylcellulose PF  1 drop Both Eyes 4x Daily     gabapentin  600 mg Oral TID     insulin aspart  6 Units Subcutaneous Daily with supper     insulin aspart  10 Units Subcutaneous Daily with breakfast     insulin aspart  10 Units Subcutaneous Daily with lunch     insulin aspart  1-7 Units Subcutaneous TID AC     insulin aspart  1-5 Units Subcutaneous At Bedtime     insulin glargine  18 Units Subcutaneous At Bedtime     levothyroxine  175 mcg Oral QAM AC     lisinopril  20 mg Oral Daily     mirabegron  25 mg Oral At Bedtime     rivaroxaban ANTICOAGULANT  20 mg Oral Daily with breakfast     sennosides  8.6 mg Oral Every Other Day     sodium chloride (PF)  10 mL Intracatheter Q8H     tamsulosin  0.4 mg Oral At Bedtime      topiramate  100 mg Oral Daily     venlafaxine  150 mg Oral Daily       I spent 35 minutes on this total encounter.

## 2023-01-25 NOTE — CONSULTS
"[facility] Nurse Inpatient Assessment     Consulted for: buttock       Areas Assessed:      Areas visualized during today's visit: Focused:, Sacrum/coccyx and left heel     Wound location: buttocks coccyx and right buttock     Last photo: 1/25  Wound due to: Pressure Injury stage 4 present on admission   Wound history/plan of care: found with gauze and mepilex in use per prior care orders   Wound base: dermis, granulation tissue and non-granular tissue     Palpation of the wound bed: normal      Drainage: moderate     Description of drainage: serosanguinous and bloody      Measurements (length x width x depth, in cm): 2 main wounds: Coccyx 4 x 3 x 2.3cm and Right coccyx 3 x 2 x 0.2cm     Tunneling: N/A     Undermining: up to 0.5 cm from 10 o'clock to 2 oclock   Periwound skin: Superficial erosion and Scar tissue      Color: pink      Temperature: normal   Odor: none  Pain: moderate and severe, intermittent  Pain interventions prior to dressing change: oral medication provided by primary RN prior to consult , slow and gentle cares  and distraction  Treatment goal: Heal , Drainage control, Infection control/prevention, Increase granulation and Protection  STATUS: stable  Supplies ordered: at bedside and discussed with RN    Wound location: right posterior thigh     Last photo: 1/25  Wound due to: Friction and Moisture Associated Skin Damage (MASD)  Wound history/plan of care: found with mepilex 4x4\" in use   Wound base: 100 % dermis     Palpation of the wound bed: normal      Drainage: scant     Description of drainage: serosanguinous     Measurements (length x width x depth, in cm): 0.4  x 2  x  0.1 cm      Tunneling: N/A     Undermining: N/A  Periwound skin: Dry/scaly      Color: normal and consistent with surrounding tissue      Temperature: normal   Odor: none  Pain: denies , none  Pain interventions prior to dressing change: slow and gentle cares   Treatment goal: Heal   STATUS: " "initial assessment  Supplies ordered: at bedside and discussed with RN     Wound location: left heel     Last photo: 1/25  Wound due to: Pressure Injury POA, previous full thickness injury suspected due to scar tissue noted   Wound history/plan of care: found open to air  Wound base: 100 % scar tissue     Palpation of the wound bed: mild firm      Drainage: none     Description of drainage: none     Measurements (length x width x depth, in cm): 1  x 1  x  0 cm      Tunneling: N/A     Undermining: N/A  Periwound skin: Intact      Color: normal and consistent with surrounding tissue      Temperature: normal   Odor: none  Pain: denies , none  Pain interventions prior to dressing change: slow and gentle cares   Treatment goal: Protection  STATUS: stable  Supplies ordered: discussed with RN and discussed with patient      Wound location: Right Calf not assessed 1/25, assessment copied from 1/24   Last photo: 1/24/23  Wound due to: pressure injury POA   Wound history/plan of care: per prior woc charting \"pt reports she all of a sudden developed several wounds a few months ago, however unclear of true etiology.\"  Wound base: 10 % tendon, 80% red granulation     Palpation of the wound bed: boggy      Drainage: small     Description of drainage: serosanguinous      Measurements (length x width x depth, in cm): 4.2  x 2.2  x  0.2 cm      Tunneling: N/A     Undermining: N/A  Periwound skin: Intact      Color: normal and consistent with surrounding tissue      Temperature: normal   Odor: none  Pain: moderate, shooting and intermittent  Pain interventions prior to dressing change: slow and gentle cares  Treatment goal: Drainage control, Infection control/prevention, Decrease moisture, Protection and protect tendon  STATUS: improving   Supplies ordered: at bedside      Treatment Plan:      Wound care  Start:  01/25/23 1800,   2 TIMES DAILY,   Routine        Comments: Location: right breast, pannus, right buttocks, perianal, " "coccyx, right lateral calf   Care: provided twice daily by primary RN   1. Cleanse BID skin and buttocks with kamala spray cleanser and francois soft dry wipes, cleanse wounds with depth with gauze 4x4\" and wound cleanser (vashe or microkelz). Dry gently.   2. Apply Triad paste ( #[id number]) under right breast, to pannus, to medial thighs and perianal, to buttock wounds, to right lateral calf wound   3. Cover with adaptic gauze ( #[id number]) over right lateral calf and buttocks wounds   4. Wrap right lateral calf with kerlix   5. Apply underpad, no diaper, continue purewick external catheter         Wound care  Start:  01/26/23 0600,   DAILY,   Routine        Comments: Location: left heel   Care: provided daily by primary RN   1. Cleanse daily with commercial wound cleanser and 4x4\" gauze, pat dry   2. Apply mepilex 4x4\", ok to use each up to 5 days if not soiled   3. Apply heel boots when in bed ( #[id number]) or elevate heels off bed with pillows            Orders: Reviewed and Updated    RECOMMEND PRIMARY TEAM ORDER: None, at this time  Education provided: importance of repositioning, plan of care, Moisture management, Hygiene and Off-loading pressure  Discussed plan of care with: Patient and Nurse  WOC nurse follow-up plan: weekly  Notify WOC if wound(s) deteriorate.  Nursing to notify the Provider(s) and re-consult the WOC Nurse if new skin concern.    DATA:     Current support surface: Standard  Low air loss (PETE pump, Isolibrium, Pulsate, skin guard, etc)  Containment of urine/stool: Incontinence Protocol and Purewick external catheter   BMI: Body mass index is 29.18 kg/m .   Active diet order: Orders Placed This Encounter      Combination Diet Moderate Consistent Carb (60 g CHO per Meal) Diet; Mechanical/Dental Soft Diet     Output: I/O last 3 completed shifts:  In: 920 [P.O.:920]  Out: 1200 [Urine:1200]     Labs: Recent Labs   Lab 01/20/23  0722   HGB 8.9*   WBC 6.0     Pressure injury risk assessment: " "  Sensory Perception: 3-->slightly limited  Moisture: 3-->occasionally moist  Activity: 1-->bedfast  Mobility: 2-->very limited  Nutrition: 3-->adequate  Friction and Shear: 1-->problem  Naif Score: 13    Dior FITZPATRICKOCCARMEN   1st: Vocera Connect, call \"Dior Sherman\" or call Group \"[facility] Nurse\"   (2nd option: leave a voicemail at Dept. Office Number: [phone]. Messages checked occasionally M-F)    "

## 2023-01-25 NOTE — PLAN OF CARE
A/Ox4. VSS on RA. Pain control with oxycodone. Complains of dental and coccyx pain. T/R q2. Purewick. Refuses turns majority of the day, was turned onto the same side with dressing change. WOC changed dressings this AM ex R leg dressing. Tolerating mod carb diet. Discharge pending LTC placement.

## 2023-01-25 NOTE — PLAN OF CARE
Primary Diagnosis: Admitted 1/6 for wounds & LTC placement  Orientation: A/O x4, forgetful, tearful most of day  Aggression Stop Light: green  Mobility: A2 lift, T/R, refuses at times  Pain Management: Oxy x3 for severe L leg pain  Diet: MOD CHO, good appetite  Bowel/Bladder: Incontinent, purewick changed. Small inc BM.  Abnormal Lab/Assessments: K 3.9, BG 73 this AM, orange juice given, recheck 80 then had breakfast and more OJ, lunch , supper BG 87  Drain/Device/Wound: R calf, L heel, L buttock, sacral wounds changed this evening. Rooke boots removed per pt request (to be put back on later)  Consults: WOC, SW  D/C Day/Goals/Place: LTC placement

## 2023-01-25 NOTE — PLAN OF CARE
Goal Outcome Evaluation:       1900-0730  Pt A/O x4. VSS on RA x HTN. Pain managed w/ PRN oxycodone. Midline IV without blood return, SL. Purewick in place, incont B/B. Up A2 w/ lift. Q2h T/R, very gentle, micro turns. Dressings CDI. On mod carb diet w/ carb count. Dentures in denture cup by bathroom sink. Discharge pending LTC placement.

## 2023-01-26 NOTE — PROGRESS NOTES
[facility]    Medicine Progress Note - Hospitalist Service        Date of Admission:  1/6/2023  2:22 PM    Assessment & Plan:   Miley Tolbert is a 77 year old female with history of hypertension, CHF, atrial fibrillation on Xarelto, history of for lower extremity DVT, coronary artery disease, chronic kidney disease stage III, COPD, hypothyroidism, poorly controlled type 2 diabetes with hyperglycemia, GERD, fecal and urinary incontinence who presented with progressively worsening sacral decubitus ulcer and inability of the family to provide care for the patient.       Progressively worsening stage IV sacral wound and stage II left calf and heel wounds.  Sepsis ruled out.  -Reportedly wound was foul smelling, soiled with urine/stool.  -Presumed sacral wound infection and started on broad spectrum abx, although does not appear infected.    -Procal and CRP mildly elevated. Lt heel XR without osteomyelitis.    -Podiatry consulted, ultrasound Doppler study shows VENUS within normal limits.  No intervention needed, signed off.  -CT abdomen pelvis and right thigh obtained to evaluate for other potential sources including diverticulitis/cholecystitis and also to look for any hematoma given ongoing drop in hemoglobin.  No source of infection, Zosyn discontinued.    -Temp of 100.5 on 1/23, ok since then  -Wound care consulted and following, appreciate assistance.   - and care coordination consulted for discharge planning, will need placement to geriatric unit as daughter unable to care for her and unsafe to be discharged back home  -Continue oxycodone at every 6 hours as needed    Poorly controlled type 2 diabetes mellitus with peripheral neuropathy and hypoglycemic spells  -HbA1C 10 on 1/6/2023  -Prior to admission on Lantus 35 units and 10 to 14 units of aspart prior to meals at home.  -Hold prior to admission Januvia  -Due to intermittent spells of hypoglycemia Lantus decreased to 18  units at bedtime on 1/17  -Continues on mealtime NovoLog at 10 units with breakfast, 10 units with lunch and 6 units with supper  -Medium intensity sliding scale  -Blood sugar adequately controlled on current regimen.    Poor fitting dentures  Epulis fissuratum  Complaints of painful mouth   -Poor fitting dentures.    -Previous hospitalist discussed with oral surgery on 01/18/23, regarding extra tissue (epulis fissuratum) that often occurs in patients with poor fitting dentures. It should be excised and she should have dentures refitted.  Care transition team consulted to help expedite referral to dentist/oral surgery following discharge.   -Lidocaine viscous as needed  -Soft diet     Normocytic anemia  Admit Hb 11.9, down to 8.9 on 1/20. No evidence of blood loss.   Baseline Hb has been in 10 - 12 range. Monitor intermittently. Iron studies ok.    Paroxysmal atrial fibrillation  -Continue PTA Xarelto      Progressive dementia with behavioral disturbance in the setting of schizoaffective disorder and intermittent delirium  -Maintain sleep-wake cycles.  -Avoid benzodiazepines.  -Continue PTA venlafaxine 150 mg daily, topiramate 100 mg daily    CAD with HFpEF  Hypertension  Hyperlipidemia  * Last echo on 5/5/2022 showed EF of 55 to 60% with normal left ventricular cavity size.   -Lisinopril held in the setting of hypotension and resumed at lower dose subsequently as BP trended up.   -Chest pain RRT 1/15. Serial troponin negative. EKG without acute ischemic findings. Symptoms resolved, unclear if with nitroglycerine or spontaneously.   -Currently asymptomatic  -If recurrent pain, will get NM stress test given underlying known CAD.  No further issues since the RRT.  -Continue atorvastatin 40 daily     History of DVT  -Continue Xarelto.     Chronic pain  -Continue tylenol, Zanaflex. As needed oxycodone as needed for pain control.  -Lt knee XR showed moderate to severe tricompartmental osteoarthritic changes.  No fracture  "or effusion.      Urinary incontinence  Urinary retention, resolved  -Continue tamsulosin 0.4 mg daily.  -Espino catheter has been discontinued, currently on external urinary catheter.     Diet: Combination Diet Moderate Consistent Carb (60 g CHO per Meal) Diet; Mechanical/Dental Soft Diet     DVT Prophylaxis: DOAC   Espino Catheter: Not present  Code Status: No CPR- Do NOT Intubate     Disposition Plan      Expected Discharge Date: 01/26/2023    Discharge Delays: Placement - LTC  *Medically Ready for Discharge    Discharge Comments: placement; was living with daughter; has multiple wounds.   SW/CC-- needs placement.  LTC      Entered: Idania Luciano MD 01/26/2023, 9:34 AM        Clinically Significant Risk Factors              # Hypoalbuminemia: Lowest albumin = 2.4 g/dL at 1/8/2023  7:01 AM, will monitor as appropriate          # DMII: A1C = 10.0 % (Ref range: 0.0 - 5.6 %) within past 3 months   # Overweight: Estimated body mass index is 29.18 kg/m  as calculated from the following:    Height as of this encounter: 1.626 m (5' 4\").    Weight as of this encounter: 77.1 kg (170 lb).            The patient's care was discussed with the Bedside Nurse and Patient.    ______________________________________________________________________    Interval History   Patient has remained stable overnight.  This morning she was resting comfortably when seen.  Woke up easily.  Noted having some pain in her mouth and this is affecting ability to swallow/eat.  Reviewed chart regarding issue with dentures and needing outpatient dentist follow-up.  Also noted that her back pain was coming on and she requested oxycodone.  Pending LTACH     Data reviewed today: I reviewed all medications, new labs and imaging results over the last 24 hours. I personally reviewed no images or EKG's today.    Physical Exam   Vital signs:  Temp: 98.3  F (36.8  C) Temp src: Oral BP: 111/51 Pulse: 82   Resp: 16 SpO2: 95 % O2 Device: None (Room air)   Height: " "162.6 cm (5' 4\") Weight: 77.1 kg (170 lb) (removed pulsate power supply off bed and weighed pt; needs to be zero'd)  Estimated body mass index is 29.18 kg/m  as calculated from the following:    Height as of this encounter: 1.626 m (5' 4\").    Weight as of this encounter: 77.1 kg (170 lb).      Wt Readings from Last 2 Encounters:   01/21/23 77.1 kg (170 lb)       General: No acute distress, breathing comfortably on room air, alert and interacting appropriately  Chest/Lungs: No accessory respiratory muscle use. Adequate air movement throughout.  Nonlabored breathing  CV:   Abd: Soft, NTND.      Data   Recent Labs   Lab 01/26/23  0831 01/26/23  0149 01/25/23  2146 01/24/23  0808 01/24/23  0744 01/23/23  1318 01/23/23  1244 01/22/23  0807 01/22/23  0643 01/20/23  1154 01/20/23  0722   WBC  --   --   --   --   --   --   --   --   --   --  6.0   HGB  --   --   --   --   --   --   --   --   --   --  8.9*   MCV  --   --   --   --   --   --   --   --   --   --  86   PLT  --   --   --   --   --   --   --   --   --   --  252   POTASSIUM  --   --   --   --  3.9  --  3.8  --  4.0   < > 4.0   CR  --   --   --   --   --   --  0.65  --   --   --   --    GLC 97 154* 151*   < >  --    < >  --    < >  --    < > 156*    < > = values in this interval not displayed.       No results found for this or any previous visit (from the past 24 hour(s)).  Medications       artificial saliva  2 spray Swish & Spit 4x Daily     atorvastatin  40 mg Oral Daily     carboxymethylcellulose PF  1 drop Both Eyes 4x Daily     gabapentin  600 mg Oral TID     insulin aspart  6 Units Subcutaneous Daily with supper     insulin aspart  10 Units Subcutaneous Daily with breakfast     insulin aspart  10 Units Subcutaneous Daily with lunch     insulin aspart  1-7 Units Subcutaneous TID AC     insulin aspart  1-5 Units Subcutaneous At Bedtime     insulin glargine  18 Units Subcutaneous At Bedtime     levothyroxine  175 mcg Oral QAM AC     lisinopril  20 mg Oral " Daily     mirabegron  25 mg Oral At Bedtime     rivaroxaban ANTICOAGULANT  20 mg Oral Daily with breakfast     sennosides  8.6 mg Oral Every Other Day     sodium chloride (PF)  10 mL Intracatheter Q8H     tamsulosin  0.4 mg Oral At Bedtime     topiramate  100 mg Oral Daily     venlafaxine  150 mg Oral Daily       I spent 35 minutes on this total encounter.

## 2023-01-26 NOTE — PLAN OF CARE
Goal Outcome Evaluation:    Pt is AO x 4 but slow to comprehend at times. VSS on RA. 5/10 pain to buttocks managed w/ PRN oxycodone PO. Up A2 lift. T/R Q2 but pt refuses often, compromised w/ small gentle turns. Refused wound cares despite premedication, wound dressings changed by WOC during day CDI. Tolerating mod carb diet. Purewick in place, no BM. Midline SL, no blood return. Dentures soaking in BR. Discharge pending LTC placement.

## 2023-01-27 NOTE — PLAN OF CARE
Goal Outcome Evaluation:    Pt is AO x 4 but slow to comprehend at times. VSS on RA. 5/10 pain to buttocks and mouth managed w/ PRN oxycodone PO. Up A2 lift. T/R Q2 but pt refuses often, compromised w/ small gentle turns. Wound care completed late in the day, pt refused nighttime changes. Rooke boot off overnight. Tolerating mod carb diet. BG checks ACHS, stable w/ no coverage needed. Purewick in place, no BM. Midline SL, no blood return. Discharge pending LTC placement.

## 2023-01-27 NOTE — PROGRESS NOTES
[facility]    Medicine Progress Note - Hospitalist Service        Date of Admission:  1/6/2023  2:22 PM    Assessment & Plan:   Miley Tolbert is a 77 year old female with history of hypertension, CHF, atrial fibrillation on Xarelto, history of for lower extremity DVT, coronary artery disease, chronic kidney disease stage III, COPD, hypothyroidism, poorly controlled type 2 diabetes with hyperglycemia, GERD, fecal and urinary incontinence who presented with progressively worsening sacral decubitus ulcer and inability of the family to provide care for the patient.       Progressively worsening stage IV sacral wound and stage II left calf and heel wounds.  Sepsis ruled out.  -Reportedly wound was foul smelling, soiled with urine/stool.  -Presumed sacral wound infection and started on broad spectrum abx, although does not appear infected.    -Procal and CRP mildly elevated. Lt heel XR without osteomyelitis.    -Podiatry consulted, ultrasound Doppler study shows VENUS within normal limits.  No intervention needed, signed off.  -CT abdomen pelvis and right thigh obtained to evaluate for other potential sources including diverticulitis/cholecystitis and also to look for any hematoma given ongoing drop in hemoglobin.  No source of infection, Zosyn discontinued.    -Temp of 100.5 on 1/23, ok since then  -Wound care consulted and following, appreciate assistance.   - and care coordination consulted for discharge planning, will need placement to geriatric unit as daughter unable to care for her and unsafe to be discharged back home  -Continue oxycodone at every 6 hours as needed    Poorly controlled type 2 diabetes mellitus with peripheral neuropathy and hypoglycemic spells  -HbA1C 10 on 1/6/2023  -Prior to admission on Lantus 35 units and 10 to 14 units of aspart prior to meals at home.  -Hold prior to admission Januvia  -Due to intermittent spells of hypoglycemia Lantus decreased to 18  units at bedtime on 1/17  -Also decreased mealtime NovoLog to 7 units 3 times daily AC on 1/27 due to intermittent lower BS.  -Medium intensity sliding scale  -Blood sugar adequately controlled on current regimen.    Poor fitting dentures  Epulis fissuratum  Complaints of painful mouth   -Poor fitting dentures.    -Previous hospitalist discussed with oral surgery on 01/18/23, regarding extra tissue (epulis fissuratum) that often occurs in patients with poor fitting dentures. It should be excised and she should have dentures refitted.  Care transition team consulted to help expedite referral to dentist/oral surgery following discharge.   -Lidocaine viscous as needed  -Soft diet     Normocytic anemia  Admit Hb 11.9, down to 8.9 on 1/20. No evidence of blood loss.   Baseline Hb has been in 10 - 12 range. Monitor intermittently. Iron studies ok.    Paroxysmal atrial fibrillation  -Continue PTA Xarelto      Progressive dementia with behavioral disturbance in the setting of schizoaffective disorder and intermittent delirium  -Maintain sleep-wake cycles.  -Avoid benzodiazepines.  -Continue PTA venlafaxine 150 mg daily, topiramate 100 mg daily    CAD with HFpEF  Hypertension  Hyperlipidemia  * Last echo on 5/5/2022 showed EF of 55 to 60% with normal left ventricular cavity size.   -Lisinopril held in the setting of hypotension and resumed at lower dose subsequently as BP trended up.   -Chest pain RRT 1/15. Serial troponin negative. EKG without acute ischemic findings. Symptoms resolved, unclear if with nitroglycerine or spontaneously.   -Currently asymptomatic  -If recurrent pain, will get NM stress test given underlying known CAD.  No further issues since the RRT.  -Continue atorvastatin 40 daily     History of DVT  -Continue Xarelto.     Chronic pain  -Continue tylenol, Zanaflex. As needed oxycodone as needed for pain control.  -Lt knee XR showed moderate to severe tricompartmental osteoarthritic changes.  No fracture or  "effusion.      Urinary incontinence  Urinary retention, resolved  -Continue tamsulosin 0.4 mg daily.  -Espino catheter has been discontinued, currently on external urinary catheter.     Diet: Combination Diet Moderate Consistent Carb (60 g CHO per Meal) Diet; Mechanical/Dental Soft Diet     DVT Prophylaxis: DOAC   Espino Catheter: Not present  Code Status: No CPR- Do NOT Intubate     Disposition Plan       Expected Discharge Date: 01/30/2023    Discharge Delays: Placement - LTC  *Medically Ready for Discharge    Discharge Comments: placement; was living with daughter; has multiple wounds.   SW/CC-- needs placement.  LTC      Entered: Idania Luciano MD 01/27/2023, 3:07 PM        Clinically Significant Risk Factors              # Hypoalbuminemia: Lowest albumin = 2.4 g/dL at 1/8/2023  7:01 AM, will monitor as appropriate          # DMII: A1C = 10.0 % (Ref range: 0.0 - 5.6 %) within past 3 months   # Overweight: Estimated body mass index is 29.18 kg/m  as calculated from the following:    Height as of this encounter: 1.626 m (5' 4\").    Weight as of this encounter: 77.1 kg (170 lb).            The patient's care was discussed with the Bedside Nurse and Patient.    ______________________________________________________________________    Interval History   Patient has remained stable overnight.  States she has a good appetite and is eating well.  Did not complain of any significant pain today.  No other acute issues.    Data reviewed today: I reviewed all medications, new labs and imaging results over the last 24 hours. I personally reviewed no images or EKG's today.    Physical Exam   Vital signs:  Temp: 98.7  F (37.1  C) Temp src: Oral BP: 114/61 Pulse: 80   Resp: 16 SpO2: 97 % O2 Device: None (Room air)   Height: 162.6 cm (5' 4\") Weight: 77.1 kg (170 lb) (removed pulsate power supply off bed and weighed pt; needs to be zero'd)  Estimated body mass index is 29.18 kg/m  as calculated from the following:    Height as of " "this encounter: 1.626 m (5' 4\").    Weight as of this encounter: 77.1 kg (170 lb).      Wt Readings from Last 2 Encounters:   01/21/23 77.1 kg (170 lb)       General: No acute distress, breathing comfortably on room air, alert and interacting appropriately  Chest/Lungs: No accessory respiratory muscle use. Adequate air movement throughout.  Nonlabored breathing  CV:   Abd: Soft, NTND.      Data   Recent Labs   Lab 01/27/23  1422 01/27/23  0754 01/27/23  0738 01/27/23  0151 01/24/23  0808 01/24/23  0744 01/23/23  1318 01/23/23  1244   POTASSIUM  --   --  4.2  --   --  3.9  --  3.8   CR  --   --  0.65  --   --   --   --  0.65   * 126*  --  107*   < >  --    < >  --     < > = values in this interval not displayed.       No results found for this or any previous visit (from the past 24 hour(s)).  Medications       artificial saliva  2 spray Swish & Spit 4x Daily     atorvastatin  40 mg Oral Daily     carboxymethylcellulose PF  1 drop Both Eyes 4x Daily     gabapentin  600 mg Oral TID     insulin aspart  7 Units Subcutaneous TID w/meals     insulin aspart  1-7 Units Subcutaneous TID AC     insulin aspart  1-5 Units Subcutaneous At Bedtime     insulin glargine  18 Units Subcutaneous At Bedtime     levothyroxine  175 mcg Oral QAM AC     lisinopril  20 mg Oral Daily     mirabegron  25 mg Oral At Bedtime     rivaroxaban ANTICOAGULANT  20 mg Oral Daily with breakfast     sennosides  8.6 mg Oral Every Other Day     sodium chloride (PF)  10 mL Intracatheter Q8H     tamsulosin  0.4 mg Oral At Bedtime     topiramate  100 mg Oral Daily     venlafaxine  150 mg Oral Daily       I spent 35 minutes on this total encounter.     "

## 2023-01-27 NOTE — PLAN OF CARE
Goal Outcome Evaluation:             Orientation: A/O x4, forgetful, Aggression Stop Light: green  Mobility: A2 lift, T/R, refuses at times  Pain Management: Oxy x2Diet: MOD CHO, good appetite  Bowel/Bladder: Incontinent, purewick changed. Small inc BM.  Abnormal Lab/Assessments: Drain/Device/Wound: R calf, L heel, L buttock, sacral wounds changed this evening. Rooke boots removed per pt request (to be put back on later)  Consults: WOC, SW  D/C Day/Goals/Place: LTC placement

## 2023-01-27 NOTE — PLAN OF CARE
Goal Outcome Evaluation:       A/O x4, requires rpt explanation of care. VSS RA x HTN at times. 5-6/10 pain to buttocks and mouth, oxy x2 this shift. a2 lift, t/r q2 but refuses most turns. Agrees to small tilts. Rook boot in room. Refused all wound cares this shift. Mod carb diet. BG checks. purewick in place, no BM. Midline SL, no blood return. Seeking LTC placement.

## 2023-01-28 NOTE — PLAN OF CARE
Summary: [id number] 1/27/23 sacral decubitus ulcer  Orientation: A/Ox4  Activity Level: Ax2 lift  Fall Risk: yes  Behavior & Aggression Tool Color: green  Pain Management: Oxycodone given for pain in mouth and bottom  ABNL VS/O2: VSS  ABNL Lab/BG: BG 99 and 112  Diet: combo mod carb  Bowel/Bladder: incontinent of bowel, purwick in place  Drains/Devices: R midline saline locked  Tests/Procedures for next shift: n/a  Anticipated DC date: placement pending  Other Important Info: pt refused to be T/R q2. Pt did allow for wound cares to be done. Pt refused rook boot.

## 2023-01-28 NOTE — PLAN OF CARE
Shift Note:  A/O x 4, denies pain, wound dressing on the right calf, left heel and buttocks are clean dry and intact. 2 hourly turning and reposition, 4 hourly blood glucose checks, on purewick. Patient was kept dry and monitored 2 hourly for pain. Refused 0200 turn and repo. Tolerates orally, takes pills whole, assist of 2 with lift.

## 2023-01-28 NOTE — PROGRESS NOTES
"Hospitalist hi messaged with this message\"BGM 76, Pt eating lunch, Do you want me to hold 7unit novolog insulin for lunch? please advise. fei rn \". Pending call back. Will continue to monitor patient. Fei Camejo RN on 1/28/2023 at 12:46 PM      1246 A/O x 4, denies pain, wound dressing on the right calf, left heel and buttocks are clean dry and intact.TQ2, 4 hourly blood glucose checks, on purewick. Tolerates orally, takes pills whole, assist of 2 with lift. Fei Camejo RN on 1/28/2023 at 12:48 PM      "

## 2023-01-29 NOTE — PLAN OF CARE
Pt is A&Ox4, c/o pain to her jaw and legs, prn oxy given x2 with relief. Sacral dressing is CDI. R hand dressing changed, R calf dressing changed this shift. Pt declined to be turned and repositioned by writer q2hr, stated that she can reposition herself. Writer was able to shift her weight q2hr. Purewick is in place. PICC is CDI SL.

## 2023-01-29 NOTE — PLAN OF CARE
A/O x 4, on pure wick, urine output is adequate, clear yellow, VSS on RA, denies pain, dressing on wounds are clean, dry and intact. 2 hourly tuning and reposition, assist of 2 with lift. Discharge pending.

## 2023-01-29 NOTE — PROGRESS NOTES
[facility]    Hospitalist Progress Note    Date of Service (when I saw the patient): 01/28/2023  Admit date: 1/6/2023    Interval History   Full details of events over last 24 hours outlined below.   No physical concerns, awaiting placement.   Patient just states that she wanted to footplate and they cut her grapes in half so they would have to give her so many.  Denies shortness of breath or chest pain  Discussed with nursing.  No concerns over the last 24 hours.     Assessment & Plan   Miley Tolbert is a 77 year old female with history of hypertension, CHF, atrial fibrillation on Xarelto, history of for lower extremity DVT, coronary artery disease, chronic kidney disease stage III, COPD, hypothyroidism, poorly controlled type 2 diabetes with hyperglycemia, GERD, fecal and urinary incontinence who presented with progressively worsening sacral decubitus ulcer and inability of the family to provide care for the patient.      Progressively worsening stage IV sacral wound and stage II left calf and heel wounds  Initially suspected sepsis, but no infection found  Reportedly wound was foul smelling, soiled with urine/stool. No fever, and WBC normal at presentation. Hypotensive a day after admission after receiving lisinopril.   * Presumed sacral wound infection and started on broad spectrum abx, although does not appear infected. Wound on Lt heel appeared concerning.   * Procal and CRP mildly elevated. Lt heel XR without osteomyelitis.         Podiatry consulted, ultrasound Doppler study shows VENUS within normal limits.  No intervention needed, signed off.    lood cultures negative, WBC normal.  Vancomycin discontinued.     CT abdomen pelvis and right thigh obtained to evaluate for other potential sources including diverticulitis/cholecystitis and also to look for any hematoma given ongoing drop in hemoglobin.  No source of infection, Zosyn discontinued.  No source of bleeding.    Wound  care consulted and following, appreciate assistance.      and care coordination consulted for discharge planning, will need placement to geriatric unit as daughter unable to care for her and unsafe to be discharged back home    PRN oxycodone added by prior hospitalists for pain control.  Currently using 20 to 25 mg/day.  Given her advanced age and dementia, she is at high risk for complications from this medication.  I have again decreased frequency to every 6 hours PRN and will continue to attempt to wean off.    Poorly controlled type 2 diabetes mellitus with peripheral neuropathy and hypoglycemic spells: HbA1C 10 on 1/6/2023  On Lantus 35 units with a 10 to 14 units of aspart prior to meals at home.       Continued with Lantus 28 units and 6 units of aspart with meals and sliding scale insulin aspart with carb controlled diet. Dose adjusted/reduced due to hypoglycemia.    Due to low BS in AM, decreased Lantus to 18 units at bedtime.    On 01/28/23: AM BS 96, noon BS 76    Decrease lantus to 15 units at HS.    Novolog decreased to 3 units TID AC    On 1/29/23 > Resume PTA Januvia and STOP prandial novolog on 01/28/23.      Recent Labs   Lab 01/28/23  1701 01/28/23  1223 01/28/23  0825 01/28/23  0213 01/27/23  2123 01/27/23  1719   * 76 96 112* 112* 99        Poor fitting dentures  Epulis fissuratum  Complaints of painful mouth on 1/17. Examined mouth there are no sores.  But noted her dentures are very poor fitting.  Removed dentures and she has a flap of soft tissue on the upper anterior gum line.     Discussed with oral surgery on 01/18/23 this is extra tissue (epulis fissuratum) that often occurs in patients with poor fitting dentures. It should be excised and she should have dentures refitted.  Consulted care management to help expedite referral to dentist/oral surgery following discharge.     Discussed with RN to only use dentures while eating otherwise been    Viscous lidocaine PRN  ordered.     Dental soft diet ordered on 01/18/23      Normocytic anemia  Admit Hb 11.9, down to 8.9 on 1/20. No evidence of blood loss.   Baseline Hb has been in 10 - 12 range. Monitor intermittently. Iron studies ok.    Paroxysmal atrial fibrillation  In sinus rhythm.    Continuing with PTA Xarelto     Progressive dementia with behavioral disturbance in the setting of schizoaffective disorder and intermittent delirium    Maintain sleep-wake cycles.    Avoid benzodiazepines.    Continue PTA venlafaxine 150 mg daily, topiramate 100 mg daily    Currently oriented to place and person.  Answers all the questions appropriately.     CAD with HFpEF  Hypertension  * Last echo on 5/5/2022 showed EF of 55 to 60% with normal left ventricular cavity size.     Lisinopril held in the setting of hypotension and resumed at lower dose subsequently as BP trended up.     Chest pain RRT 1/15. Serial troponin negative. EKG without acute ischemic findings. Symptoms resolved, unclear if with nitroglycerine or spontaneously.     If recurrent pain suggestive of CAD, consider NM stress test given underlying known CAD.      Hyperlipidemia    Continue atorvastatin 40 daily     History of DVT    Continue Xarelto.     Chronic pain    Continue ylenol, Zanaflex. As needed oxycodone as needed for pain control.    Lt knee XR ordered given pain with attempted movement > Showed moderate to severe tricompartmental osteoarthritic changes.  No fracture or effusion.      Urinary incontinence  Urinary retention, resolved    On tamsulosin 0.4 mg daily.    Espino catheter placed given worsening decub and contamination with urine in the setting of urinary retention.     Discontinued 1/11 AM, bladder scan as needed.  If recurrent retention, re insert Espino catheter     External catheter in place on 1/17     Chronic kidney disease, stage II, stable       Clinically Significant Risk Factors              # Hypoalbuminemia: Lowest albumin = 2.4 g/dL at 1/8/2023   "7:01 AM, will monitor as appropriate          # DMII: A1C = 10.0 % (Ref range: 0.0 - 5.6 %) within past 3 months   # Overweight: Estimated body mass index is 29.18 kg/m  as calculated from the following:    Height as of this encounter: 1.626 m (5' 4\").    Weight as of this encounter: 77.1 kg (170 lb).            Diet: Orders Placed This Encounter      Combination Diet Moderate Consistent Carb (60 g CHO per Meal) Diet; Mechanical/Dental Soft Diet     IVF: None  Espino Catheter: Not present     DVT Prophylaxis: DOAC  Code Status: No CPR- Do NOT Intubate     Disposition: Expected discharge once placement found.   Communication: Discussed with RN and patient on 01/28/23    Waleska Gar MD  Hospitalist Service  [facility]  Securely message with the Vocera Web Console (learn more here)  Text page via [employer] Paging/Directory    -Data reviewed today: I reviewed all new labs and imaging results over the last 24 hours. I personally reviewed no images or EKG's today.    Physical Exam   Temp: 98.5  F (36.9  C) Temp src: Oral BP: 114/66 Pulse: 90   Resp: 16 SpO2: 96 % O2 Device: None (Room air)    Vitals:    01/06/23 2358 01/14/23 0735 01/21/23 1049   Weight: 70.5 kg (155 lb 6.4 oz) 84 kg (185 lb 1.6 oz) 77.1 kg (170 lb)     Vital Signs with Ranges  Temp:  [97.5  F (36.4  C)-98.7  F (37.1  C)] 98.5  F (36.9  C)  Pulse:  [81-90] 90  Resp:  [16-17] 16  BP: (114-156)/(66-76) 114/66  SpO2:  [93 %-97 %] 96 %  I/O last 3 completed shifts:  In: 480 [P.O.:480]  Out: 1200 [Urine:1200]    Today's Exam  Constitutional:  NAD,   Neuropsyche:  Alert, answers questions appropriately.   Respiratory: Breathing comfortably,  Skin/Integumen: No acute rash or sign of bleeding.     Medications   All medications reviewed on 01/28/23       artificial saliva  2 spray Swish & Spit 4x Daily     atorvastatin  40 mg Oral Daily     carboxymethylcellulose PF  1 drop Both Eyes 4x Daily     gabapentin  600 mg Oral TID     insulin " aspart  1-7 Units Subcutaneous TID AC     insulin aspart  1-5 Units Subcutaneous At Bedtime     insulin glargine  15 Units Subcutaneous At Bedtime     levothyroxine  175 mcg Oral QAM AC     lisinopril  20 mg Oral Daily     mirabegron  25 mg Oral At Bedtime     rivaroxaban ANTICOAGULANT  20 mg Oral Daily with breakfast     sennosides  8.6 mg Oral Every Other Day     [START ON 1/29/2023] sitagliptin  100 mg Oral Daily     sodium chloride (PF)  10 mL Intracatheter Q8H     tamsulosin  0.4 mg Oral At Bedtime     topiramate  100 mg Oral Daily     venlafaxine  150 mg Oral Daily     PRN Meds: acetaminophen, albuterol, glucose **OR** dextrose **OR** glucagon, lidocaine 4%, lidocaine (viscous), lidocaine (buffered or not buffered), melatonin, naloxone **OR** naloxone **OR** naloxone **OR** naloxone, nitroGLYcerin, ondansetron **OR** ondansetron, oxyCODONE, prochlorperazine **OR** prochlorperazine **OR** prochlorperazine, senna-docusate **OR** senna-docusate, sodium chloride (PF), tiZANidine    Data   Recent Labs   Lab 01/28/23  1701 01/28/23  1223 01/28/23  0825 01/27/23  0754 01/27/23  0738 01/24/23  0808 01/24/23  0744 01/23/23  1318 01/23/23  1244   POTASSIUM  --   --   --   --  4.2  --  3.9  --  3.8   CR  --   --   --   --  0.65  --   --   --  0.65   * 76 96   < >  --    < >  --    < >  --     < > = values in this interval not displayed.       No results found for this or any previous visit (from the past 24 hour(s)).

## 2023-01-29 NOTE — PROGRESS NOTES
[facility]    Hospitalist Progress Note    Date of Service (when I saw the patient): 01/29/2023  Admit date: 1/6/2023    Interval History   Full details of events over last 24 hours outlined below.   Donald remains afebrile and hemodynamically stable with normal oxygen saturations over the last 24 hours.  She denies any physical complaints.  RN reports no concerns over the last 24 hours.    Assessment & Plan   Miley Tolbert is a 77 year old female with history of hypertension, CHF, atrial fibrillation on Xarelto, history of for lower extremity DVT, coronary artery disease, chronic kidney disease stage III, COPD, hypothyroidism, poorly controlled type 2 diabetes with hyperglycemia, GERD, fecal and urinary incontinence who presented with progressively worsening sacral decubitus ulcer and inability of the family to provide care for the patient.      Progressively worsening stage IV sacral wound and stage II left calf and heel wounds  Initially suspected sepsis, but no infection found  Reportedly wound was foul smelling, soiled with urine/stool. No fever, and WBC normal at presentation. Hypotensive a day after admission after receiving lisinopril.   * Presumed sacral wound infection and started on broad spectrum abx, although does not appear infected. Wound on Lt heel appeared concerning.   * Procal and CRP mildly elevated. Lt heel XR without osteomyelitis.         Podiatry consulted, ultrasound Doppler study shows VENUS within normal limits.  No intervention needed, signed off.    lood cultures negative, WBC normal.  Vancomycin discontinued.     CT abdomen pelvis and right thigh obtained to evaluate for other potential sources including diverticulitis/cholecystitis and also to look for any hematoma given ongoing drop in hemoglobin.  No source of infection, Zosyn discontinued.  No source of bleeding.    Wound care consulted and following, appreciate assistance.      and care  coordination consulted for discharge planning, will need placement to geriatric unit as daughter unable to care for her and unsafe to be discharged back home    PRN oxycodone added by prior hospitalists for pain control.  Currently using 20 to 25 mg/day.  Given her advanced age and dementia, she is at high risk for complications from this medication.  I have again decreased frequency to every 6 hours PRN and will continue to attempt to wean off.    Poorly controlled type 2 diabetes mellitus with peripheral neuropathy and hypoglycemic spells: HbA1C 10 on 1/6/2023  On Lantus 35 units with a 10 to 14 units of aspart prior to meals at home.       Continued with Lantus 28 units and 6 units of aspart with meals and sliding scale insulin aspart with carb controlled diet. Dose adjusted/reduced due to hypoglycemia.    Due to low BS in AM, decreased Lantus to 18 units at bedtime.    On 01/28/23: AM BS 96, noon BS 76    Decrease lantus to 15 units at HS on 1/28/23    Novolog decreased to 3 units TID AC > stopped on 01/29/23    On 1/29/23 > Resumed PTA Januvia > BS look good.       Recent Labs   Lab 01/29/23  1203 01/29/23  0750 01/29/23  0126 01/28/23  2205 01/28/23  1701 01/28/23  1223   * 103* 152* 174* 155* 76        Poor fitting dentures  Epulis fissuratum  Complaints of painful mouth on 1/17. Examined mouth there are no sores.  But noted her dentures are very poor fitting.  Removed dentures and she has a flap of soft tissue on the upper anterior gum line.     Discussed with oral surgery on 01/18/23 this is extra tissue (epulis fissuratum) that often occurs in patients with poor fitting dentures. It should be excised and she should have dentures refitted.  Consulted care management to help expedite referral to dentist/oral surgery following discharge.     Discussed with RN to only use dentures while eating otherwise been    Viscous lidocaine PRN ordered.     Dental soft diet ordered on 01/18/23      Normocytic  anemia  Admit Hb 11.9, down to 8.9 on 1/20. No evidence of blood loss.   Baseline Hb has been in 10 - 12 range. Monitor intermittently. Iron studies ok.    Paroxysmal atrial fibrillation  In sinus rhythm.    Continuing with PTA Xarelto     Progressive dementia with behavioral disturbance in the setting of schizoaffective disorder and intermittent delirium    Maintain sleep-wake cycles.    Avoid benzodiazepines.    Continue PTA venlafaxine 150 mg daily, topiramate 100 mg daily    Currently oriented to place and person.  Answers all the questions appropriately.     CAD with HFpEF  Hypertension  * Last echo on 5/5/2022 showed EF of 55 to 60% with normal left ventricular cavity size.     Lisinopril held in the setting of hypotension and resumed at lower dose subsequently as BP trended up.     Chest pain RRT 1/15. Serial troponin negative. EKG without acute ischemic findings. Symptoms resolved, unclear if with nitroglycerine or spontaneously.     If recurrent pain suggestive of CAD, consider NM stress test given underlying known CAD.     BP remains well controlled on 01/29/23      Hyperlipidemia    Continue atorvastatin 40 daily     History of DVT    Continue Xarelto.     Chronic pain    Continue ylenol, Zanaflex. As needed oxycodone as needed for pain control.    Lt knee XR ordered given pain with attempted movement > Showed moderate to severe tricompartmental osteoarthritic changes.  No fracture or effusion.      Urinary incontinence  Urinary retention, resolved    On tamsulosin 0.4 mg daily.    Espino catheter placed given worsening decub and contamination with urine in the setting of urinary retention.     Discontinued 1/11 AM, bladder scan as needed.  If recurrent retention, re insert Espino catheter     External catheter in place on 1/17     Chronic kidney disease, stage II, stable       Clinically Significant Risk Factors              # Hypoalbuminemia: Lowest albumin = 2.4 g/dL at 1/8/2023  7:01 AM, will monitor as  "appropriate          # DMII: A1C = 10.0 % (Ref range: 0.0 - 5.6 %) within past 3 months   # Overweight: Estimated body mass index is 29.18 kg/m  as calculated from the following:    Height as of this encounter: 1.626 m (5' 4\").    Weight as of this encounter: 77.1 kg (170 lb).            Diet: Orders Placed This Encounter      Combination Diet Moderate Consistent Carb (60 g CHO per Meal) Diet; Mechanical/Dental Soft Diet     IVF: None  Espino Catheter: Not present     DVT Prophylaxis: DOAC  Code Status: No CPR- Do NOT Intubate     Disposition: Expected discharge once placement found.   Communication: Discussed with RN and patient on 01/29/23    Waleska Gar MD  Hospitalist Service  [facility]  Securely message with the Vocera Web Console (learn more here)  Text page via [employer] Paging/Directory    -Data reviewed today: I reviewed all new labs and imaging results over the last 24 hours. I personally reviewed no images or EKG's today.    Physical Exam   Temp: 97.9  F (36.6  C) Temp src: Oral BP: 134/62 Pulse: 86   Resp: 18 SpO2: 95 % O2 Device: None (Room air)    Vitals:    01/06/23 2358 01/14/23 0735 01/21/23 1049   Weight: 70.5 kg (155 lb 6.4 oz) 84 kg (185 lb 1.6 oz) 77.1 kg (170 lb)     Vital Signs with Ranges  Temp:  [97.7  F (36.5  C)-98.5  F (36.9  C)] 97.9  F (36.6  C)  Pulse:  [77-86] 86  Resp:  [16-18] 18  BP: (118-134)/(56-62) 134/62  SpO2:  [94 %-97 %] 95 %  I/O last 3 completed shifts:  In: 300 [P.O.:300]  Out: 1200 [Urine:1200]    Today's Exam  Constitutional:  NAD,   Neuropsyche:  Alert, answers questions appropriately.   Respiratory: Breathing comfortably,  Skin/Integumen: No acute rash or sign of bleeding.     Medications   All medications reviewed on 01/29/23      artificial saliva  2 spray Swish & Spit 4x Daily     atorvastatin  40 mg Oral Daily     carboxymethylcellulose PF  1 drop Both Eyes 4x Daily     gabapentin  600 mg Oral TID     insulin aspart  1-7 Units " Subcutaneous TID AC     insulin aspart  1-5 Units Subcutaneous At Bedtime     insulin glargine  15 Units Subcutaneous At Bedtime     levothyroxine  175 mcg Oral QAM AC     lisinopril  20 mg Oral Daily     mirabegron  25 mg Oral At Bedtime     rivaroxaban ANTICOAGULANT  20 mg Oral Daily with breakfast     sennosides  8.6 mg Oral Every Other Day     sitagliptin  100 mg Oral Daily     sodium chloride (PF)  10 mL Intracatheter Q8H     tamsulosin  0.4 mg Oral At Bedtime     topiramate  100 mg Oral Daily     venlafaxine  150 mg Oral Daily     PRN Meds: acetaminophen, albuterol, glucose **OR** dextrose **OR** glucagon, lidocaine 4%, lidocaine (viscous), lidocaine (buffered or not buffered), melatonin, naloxone **OR** naloxone **OR** naloxone **OR** naloxone, nitroGLYcerin, ondansetron **OR** ondansetron, oxyCODONE, prochlorperazine **OR** prochlorperazine **OR** prochlorperazine, senna-docusate **OR** senna-docusate, sodium chloride (PF), tiZANidine    Data   Recent Labs   Lab 01/29/23  1203 01/29/23  0750 01/29/23  0126 01/27/23  0754 01/27/23  0738 01/24/23  0808 01/24/23  0744 01/23/23  1318 01/23/23  1244   POTASSIUM  --   --   --   --  4.2  --  3.9  --  3.8   CR  --   --   --   --  0.65  --   --   --  0.65   * 103* 152*   < >  --    < >  --    < >  --     < > = values in this interval not displayed.       No results found for this or any previous visit (from the past 24 hour(s)).

## 2023-01-30 NOTE — PLAN OF CARE
Shift Note:    A/O  x 4, vitals stable at room air, assist of 2 with hourly turning and reposition, pain was initially at 2-3/0-10 at rest but pain increases during turning and reposition, Oxycodone 5 mg was given when pain level at rest was 7/0-10, pain was controlled. Tolerates orally, no nausea or vomiting, had a BM during the shift, stool is formed and brownish in color, purewick is draining clear yellow urine with adequate urine output. Breath sound is clear, bowel sound active, patient was kept dry, wound dressing at the sacral and gluteal region is clean, dry and intact.

## 2023-01-30 NOTE — PROGRESS NOTES
[facility]    Hospitalist Progress Note    Date of Service (when I saw the patient): 01/30/2023  Admit date: 1/6/2023    Interval History   Full details of events over last 24 hours outlined below.   Donald has been afebrile over the last 24 hours.  Do noted that her temperature is 98.6 this morning.  She is hemodynamically stable with normal oxygen levels.  Per nursing notes have been no acute events overnight. Total amount 15 mg, down from 20-25 mg / day on previous days.   Please please see conversation below regarding her pain regimen     RN reports no concerns over the last 24 hours.    Assessment & Plan   Miley Tolbert is a 77 year old female with history of hypertension, CHF, atrial fibrillation on Xarelto, history of for lower extremity DVT, coronary artery disease, chronic kidney disease stage III, COPD, hypothyroidism, poorly controlled type 2 diabetes with hyperglycemia, GERD, fecal and urinary incontinence who presented with progressively worsening sacral decubitus ulcer and inability of the family to provide care for the patient.      Progressively worsening stage IV sacral wound and stage II left calf and heel wounds  Initially suspected sepsis, but no infection found  Reportedly wound was foul smelling, soiled with urine/stool. No fever, and WBC normal at presentation. Hypotensive a day after admission after receiving lisinopril.   * Presumed sacral wound infection and started on broad spectrum abx, although does not appear infected. Wound on Lt heel appeared concerning.   * Procal and CRP mildly elevated. Lt heel XR without osteomyelitis.         Podiatry consulted, ultrasound Doppler study shows VENUS within normal limits.  No intervention needed, signed off.    lood cultures negative, WBC normal.  Vancomycin discontinued.     CT abdomen pelvis and right thigh obtained to evaluate for other potential sources including diverticulitis/cholecystitis and also to look for any  "hematoma given ongoing drop in hemoglobin.  No source of infection, Zosyn discontinued.  No source of bleeding.    Wound care consulted and following, appreciate assistance.      and care coordination consulted for discharge planning, will need placement to geriatric unit as daughter unable to care for her and unsafe to be discharged back home.     PRN oxycodone added by prior hospitalists for pain control.  Currently using 20 to 25 mg/day.Given her advanced age and dementia, she is at high risk for complications from this medication.  I have again decreased frequency to every 6 hours PRN and will continue to attempt to wean off.    On 1/30 notice she is using less pain medications. I discussed pain control with patient.  She states that sometimes she will speak a \"heavenly language\" when she is angry and today she was in pain and they did not give her her pain medication right away.  We discussed the risks of being on chronic regular oxycodone every 4 hours.  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  She does not want to rely on the medications. She was in agreement with plan to back off medications.  This conversation demonstrates that we must guide patient regarding pain med use.  Continue to try to limit use to severe pain only and decrease dosing as you are already doing. Thank you!     Poorly controlled type 2 diabetes mellitus with peripheral neuropathy and hypoglycemic spells: HbA1C 10 on 1/6/2023  On Lantus 35 units with a 10 to 14 units of aspart prior to meals at home.       Continued with Lantus 28 units and 6 units of aspart with meals and sliding scale insulin aspart with carb controlled diet. Dose adjusted/reduced due to hypoglycemia.    Due to low BS in AM, decreased Lantus to 18 units at bedtime.    On 01/28/23: AM BS 96, noon BS 76    Decrease lantus to 15 units at HS on 1/28/23    Novolog " decreased to 3 units TID AC > stopped on 01/29/23    On 1/29/23 > Resumed PTA Januvia > BS look good again on 01/30/23.       Recent Labs   Lab 01/30/23  1219 01/30/23  0806 01/30/23  0209 01/29/23  2231 01/29/23  1653 01/29/23  1203   * 136* 160* 166* 192* 146*        Poor fitting dentures  Epulis fissuratum  Complaints of painful mouth on 1/17. Examined mouth there are no sores.  But noted her dentures are very poor fitting.  Removed dentures and she has a flap of soft tissue on the upper anterior gum line.     Discussed with oral surgery on 01/18/23 this is extra tissue (epulis fissuratum) that often occurs in patients with poor fitting dentures. It should be excised and she should have dentures refitted.  Consulted care management to help expedite referral to dentist/oral surgery following discharge.     Discussed with RN to only use dentures while eating otherwise been    Viscous lidocaine PRN ordered.     Dental soft diet ordered on 01/18/23      Normocytic anemia  Admit Hb 11.9, down to 8.9 on 1/20. No evidence of blood loss.   Baseline Hb has been in 10 - 12 range. Monitor intermittently. Iron studies ok.    Paroxysmal atrial fibrillation  In sinus rhythm.    Continuing with PTA Xarelto     Progressive dementia with behavioral disturbance in the setting of schizoaffective disorder and intermittent delirium    Maintain sleep-wake cycles.    Avoid benzodiazepines.    Continue PTA venlafaxine 150 mg daily, topiramate 100 mg daily    Currently oriented to place and person.  Answers all the questions appropriately.     CAD with HFpEF  Hypertension  * Last echo on 5/5/2022 showed EF of 55 to 60% with normal left ventricular cavity size.     Lisinopril held in the setting of hypotension and resumed at lower dose subsequently as BP trended up.     Chest pain RRT 1/15. Serial troponin negative. EKG without acute ischemic findings. Symptoms resolved, unclear if with nitroglycerine or spontaneously.     If  "recurrent pain suggestive of CAD, consider NM stress test given underlying known CAD.     BP remains adequately controlled on 01/30/23     Hyperlipidemia    Continue atorvastatin 40 daily     History of DVT    Continue Xarelto.     Chronic pain    Continue ylenol, Zanaflex. As needed oxycodone as needed for pain control.    Lt knee XR ordered given pain with attempted movement > Showed moderate to severe tricompartmental osteoarthritic changes.  No fracture or effusion.      Urinary incontinence  Urinary retention, resolved    On tamsulosin 0.4 mg daily.    Espino catheter placed given worsening decub and contamination with urine in the setting of urinary retention.     Discontinued 1/11 AM, bladder scan as needed.  If recurrent retention, re insert Espino catheter     External catheter in place on 1/17     Chronic kidney disease, stage II, stable       Clinically Significant Risk Factors              # Hypoalbuminemia: Lowest albumin = 2.4 g/dL at 1/8/2023  7:01 AM, will monitor as appropriate          # DMII: A1C = 10.0 % (Ref range: 0.0 - 5.6 %) within past 3 months   # Overweight: Estimated body mass index is 29.18 kg/m  as calculated from the following:    Height as of this encounter: 1.626 m (5' 4\").    Weight as of this encounter: 77.1 kg (170 lb).            Diet: Orders Placed This Encounter      Combination Diet Moderate Consistent Carb (60 g CHO per Meal) Diet; Mechanical/Dental Soft Diet     IVF: None  Espino Catheter: Not present     DVT Prophylaxis: DOAC  Code Status: No CPR- Do NOT Intubate     Disposition: Expected discharge once placement found.   Communication: Discussed with RN and patient on 01/30/23     Waleska Gar MD  Hospitalist Service  [facility]  Securely message with the Vocera Web Console (learn more here)  Text page via [employer] Paging/Directory    -Data reviewed today: I reviewed all new labs and imaging results over the last 24 hours. I personally reviewed no " images or EKG's today.    Physical Exam   Temp: 99.6  F (37.6  C) Temp src: Oral BP: (!) 140/62 Pulse: 84   Resp: 18 SpO2: 95 % O2 Device: None (Room air)    Vitals:    01/06/23 2358 01/14/23 0735 01/21/23 1049   Weight: 70.5 kg (155 lb 6.4 oz) 84 kg (185 lb 1.6 oz) 77.1 kg (170 lb)     Vital Signs with Ranges  Temp:  [97.1  F (36.2  C)-99.6  F (37.6  C)] 99.6  F (37.6  C)  Pulse:  [84-98] 84  Resp:  [16-18] 18  BP: (119-147)/(53-75) 140/62  SpO2:  [94 %-95 %] 95 %  I/O last 3 completed shifts:  In: 600 [P.O.:600]  Out: 1700 [Urine:1700]    Today's Exam  Constitutional:  NAD,   Neuropsyche:  Alert, answers questions appropriately.   Respiratory: Breathing comfortably,  Skin/Integumen: No acute rash or sign of bleeding.     Medications   All medications reviewed on 01/30/23      artificial saliva  2 spray Swish & Spit 4x Daily     atorvastatin  40 mg Oral Daily     carboxymethylcellulose PF  1 drop Both Eyes 4x Daily     gabapentin  600 mg Oral TID     insulin aspart  1-7 Units Subcutaneous TID AC     insulin aspart  1-5 Units Subcutaneous At Bedtime     insulin glargine  15 Units Subcutaneous At Bedtime     levothyroxine  175 mcg Oral QAM AC     lisinopril  20 mg Oral Daily     mirabegron  25 mg Oral At Bedtime     rivaroxaban ANTICOAGULANT  20 mg Oral Daily with breakfast     sennosides  8.6 mg Oral Every Other Day     sitagliptin  100 mg Oral Daily     sodium chloride (PF)  10 mL Intracatheter Q8H     tamsulosin  0.4 mg Oral At Bedtime     topiramate  100 mg Oral Daily     venlafaxine  150 mg Oral Daily     PRN Meds: acetaminophen, albuterol, glucose **OR** dextrose **OR** glucagon, lidocaine 4%, lidocaine (viscous), lidocaine (buffered or not buffered), melatonin, naloxone **OR** naloxone **OR** naloxone **OR** naloxone, nitroGLYcerin, ondansetron **OR** ondansetron, oxyCODONE, prochlorperazine **OR** prochlorperazine **OR** prochlorperazine, senna-docusate **OR** senna-docusate, sodium chloride (PF),  tiZANidine    Data   Recent Labs   Lab 01/30/23  0806 01/30/23  0209 01/29/23  2231 01/27/23  0754 01/27/23  0738 01/24/23  0808 01/24/23  0744 01/23/23  1318 01/23/23  1244   POTASSIUM  --   --   --   --  4.2  --  3.9  --  3.8   CR  --   --   --   --  0.65  --   --   --  0.65   * 160* 166*   < >  --    < >  --    < >  --     < > = values in this interval not displayed.       No results found for this or any previous visit (from the past 24 hour(s)).

## 2023-01-30 NOTE — PROGRESS NOTES
VSS. A/O. Up-lift/ total care. Coccyx, thigh, leg dressing changed. Repo Q2. LE gen weakness, very sensitive and pain to touch. Oxy given. purewick in use. +bm. Pending placement.

## 2023-01-31 NOTE — PROGRESS NOTES
[facility]    Hospitalist Progress Note    Date of Service (when I saw the patient): 01/31/2023  Admit date: 1/6/2023    Interval History   Full details of events over last 24 hours outlined below.   Donald has been afebrile hemodynamically stable with normal oxygenation over the last 24 hours.  Discussed with nursing no issues overnight.  Blood sugars adequately controlled.  Use of oxycodone decreasing each day.  Only used 10 mg in the last 24 hours, 15 mg prior to that  When I saw her she was sleeping comfortably.  RN reports no concerns over the last 24 hours.    Assessment & Plan   Miley Tolbert is a 77 year old female with history of hypertension, CHF, atrial fibrillation on Xarelto, history of for lower extremity DVT, coronary artery disease, chronic kidney disease stage III, COPD, hypothyroidism, poorly controlled type 2 diabetes with hyperglycemia, GERD, fecal and urinary incontinence who presented with progressively worsening sacral decubitus ulcer and inability of the family to provide care for the patient.      Progressively worsening stage IV sacral wound and stage II left calf and heel wounds  Initially suspected sepsis, but no infection found  Reportedly wound was foul smelling, soiled with urine/stool. No fever, and WBC normal at presentation. Hypotensive a day after admission after receiving lisinopril.   * Presumed sacral wound infection and started on broad spectrum abx, although does not appear infected. Wound on Lt heel appeared concerning.   * Procal and CRP mildly elevated. Lt heel XR without osteomyelitis.         Podiatry consulted, ultrasound Doppler study shows VENUS within normal limits.  No intervention needed, signed off.    lood cultures negative, WBC normal.  Vancomycin discontinued.     CT abdomen pelvis and right thigh obtained to evaluate for other potential sources including diverticulitis/cholecystitis and also to look for any hematoma given ongoing  "drop in hemoglobin.  No source of infection, Zosyn discontinued.  No source of bleeding.    Wound care consulted and following, appreciate assistance.      and care coordination consulted for discharge planning, will need placement to geriatric unit as daughter unable to care for her and unsafe to be discharged back home.     PRN oxycodone added by prior hospitalists for pain control, using 20 to 25 mg/day.Given her advanced age dementia, schizophrenia, she is at high risk for complications from this medication.  I have again decreased frequency to every 6 hours PRN and will continue to attempt to wean off.    On 1/30 notice she is using less pain medications. I discussed pain control with patient.  She states that sometimes she will speak a \"heavenly language\" when she is angry and today she was in pain and they did not give her her pain medication right away.  We discussed the risks of being on chronic regular oxycodone every 4 hours.  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  She does not want to rely on the medications. She was in agreement with plan to back off medications.  This conversation demonstrates that we must guide patient regarding pain med use.  Continue to try to limit use to severe pain only and decrease dosing as you are already doing. Thank you! This is noticed and very much appreciated.     Poorly controlled type 2 diabetes mellitus with peripheral neuropathy and hypoglycemic spells: HbA1C 10 on 1/6/2023  On Lantus 35 units with a 10 to 14 units of aspart prior to meals at home.       Continued with Lantus 28 units and 6 units of aspart with meals and sliding scale insulin aspart with carb controlled diet. Dose adjusted/reduced due to hypoglycemia.    Due to low BS,     Decreased Lantus to 15 units on .1/28    Stopped prandial novolog on 1/28.     Resumed PTA Januvia on 1/29    BS adequately " controlled on 01/31/23/       Recent Labs   Lab 01/31/23  1655 01/31/23  1210 01/31/23  0735 01/31/23  0218 01/30/23  2204 01/30/23  1724   * 150* 100* 132* 213* 166*        Poor fitting dentures  Epulis fissuratum  Complaints of painful mouth on 1/17. Examined mouth there are no sores.  But noted her dentures are very poor fitting.  Removed dentures and she has a flap of soft tissue on the upper anterior gum line.     Discussed with oral surgery on 01/18/23 this is extra tissue (epulis fissuratum) that often occurs in patients with poor fitting dentures. It should be excised and she should have dentures refitted.  Consulted care management to help expedite referral to dentist/oral surgery following discharge.     Discussed with RN to only use dentures while eating otherwise been    Viscous lidocaine PRN ordered.     Dental soft diet ordered on 01/18/23    Normocytic anemia  Admit Hb 11.9, down to 8.9 on 1/20. No evidence of blood loss  Baseline Hb has been in 10 - 12 range. Monitor intermittently. Iron studies ok.    Paroxysmal atrial fibrillation  In sinus rhythm.    Continuing with PTA Xarelto     Progressive dementia with behavioral disturbance in the setting of schizoaffective disorder and intermittent delirium    Maintain sleep-wake cycles.    Avoid benzodiazepines.    Continue PTA venlafaxine 150 mg daily, topiramate 100 mg daily    Currently oriented to place and person.  Answers all the questions appropriately.     CAD with HFpEF  Hypertension  * Last echo on 5/5/2022 showed EF of 55 to 60% with normal left ventricular cavity size.     Lisinopril held in the setting of hypotension and resumed at lower dose subsequently as BP trended up.     Chest pain RRT 1/15. Serial troponin negative. EKG without acute ischemic findings. Symptoms resolved, unclear if with nitroglycerine or spontaneously.     If recurrent pain suggestive of CAD, consider NM stress test given underlying known CAD.   BP remains  "adequately controlled on 01/31/23.    Hyperlipidemia    Continue atorvastatin 40 daily     History of DVT    Continue Xarelto.     Chronic pain    Continue ylenol, Zanaflex. As needed oxycodone as needed for pain control.    Lt knee XR ordered given pain with attempted movement > Showed moderate to severe tricompartmental osteoarthritic changes.  No fracture or effusion.      Urinary incontinence  Urinary retention, resolved    On tamsulosin 0.4 mg daily.    Espino catheter placed given worsening decub and contamination with urine in the setting of urinary retention.     Discontinued 1/11 AM, bladder scan as needed.  If recurrent retention, re insert Espino catheter     External catheter in place on 1/17     Chronic kidney disease, stage II, stable       Clinically Significant Risk Factors              # Hypoalbuminemia: Lowest albumin = 2.4 g/dL at 1/8/2023  7:01 AM, will monitor as appropriate          # DMII: A1C = 10.0 % (Ref range: 0.0 - 5.6 %) within past 3 months   # Overweight: Estimated body mass index is 29.18 kg/m  as calculated from the following:    Height as of this encounter: 1.626 m (5' 4\").    Weight as of this encounter: 77.1 kg (170 lb).            Diet: Orders Placed This Encounter      Combination Diet Moderate Consistent Carb (60 g CHO per Meal) Diet; Mechanical/Dental Soft Diet     IVF: None  Espino Catheter: Not present     DVT Prophylaxis: DOAC  Code Status: No CPR- Do NOT Intubate     Disposition: Expected discharge once placement found.   Communication: Discussed with RN on 01/31/23     Waleska Gar MD  Hospitalist Service  [facility]  Securely message with the Vocera Web Console (learn more here)  Text page via [employer] Paging/Directory    -Data reviewed today: I reviewed all new labs and imaging results over the last 24 hours. I personally reviewed no images or EKG's today.    Physical Exam   Temp: 98.6  F (37  C) Temp src: Oral BP: 124/59 Pulse: 90   Resp: 16 " SpO2: 96 % O2 Device: None (Room air)    Vitals:    01/06/23 2358 01/14/23 0735 01/21/23 1049   Weight: 70.5 kg (155 lb 6.4 oz) 84 kg (185 lb 1.6 oz) 77.1 kg (170 lb)     Vital Signs with Ranges  Temp:  [98.2  F (36.8  C)-98.7  F (37.1  C)] 98.6  F (37  C)  Pulse:  [80-90] 90  Resp:  [16] 16  BP: (124-137)/(59-67) 124/59  SpO2:  [94 %-97 %] 96 %  I/O last 3 completed shifts:  In: 720 [P.O.:720]  Out: 1850 [Urine:1850]    Today's Exam  Constitutional:  NAD, sleeping soundly.   Respiratory: Breathing comfortably,  Skin/Integumen: No acute rash or sign of bleeding.     Medications   All medications reviewed on 01/31/23      artificial saliva  2 spray Swish & Spit 4x Daily     atorvastatin  40 mg Oral Daily     carboxymethylcellulose PF  1 drop Both Eyes 4x Daily     gabapentin  600 mg Oral TID     insulin aspart  1-7 Units Subcutaneous TID AC     insulin aspart  1-5 Units Subcutaneous At Bedtime     insulin glargine  15 Units Subcutaneous At Bedtime     levothyroxine  175 mcg Oral QAM AC     lisinopril  20 mg Oral Daily     mirabegron  25 mg Oral At Bedtime     rivaroxaban ANTICOAGULANT  20 mg Oral Daily with breakfast     sennosides  8.6 mg Oral Every Other Day     sitagliptin  100 mg Oral Daily     sodium chloride (PF)  10 mL Intracatheter Q8H     tamsulosin  0.4 mg Oral At Bedtime     topiramate  100 mg Oral Daily     venlafaxine  150 mg Oral Daily     PRN Meds: acetaminophen, albuterol, glucose **OR** dextrose **OR** glucagon, lidocaine 4%, lidocaine (viscous), lidocaine (buffered or not buffered), melatonin, naloxone **OR** naloxone **OR** naloxone **OR** naloxone, nitroGLYcerin, ondansetron **OR** ondansetron, oxyCODONE, prochlorperazine **OR** prochlorperazine **OR** prochlorperazine, senna-docusate **OR** senna-docusate, sodium chloride (PF), tiZANidine    Data   Recent Labs   Lab 01/31/23  1655 01/31/23  1210 01/31/23  0735 01/31/23  0734 01/30/23  1219 01/30/23  1156 01/27/23  0754 01/27/23  0738   POTASSIUM   --   --   --  4.0  --  4.0  --  4.2   CR  --   --   --   --   --   --   --  0.65   * 150* 100*  --    < >  --    < >  --     < > = values in this interval not displayed.       No results found for this or any previous visit (from the past 24 hour(s)).

## 2023-01-31 NOTE — PLAN OF CARE
Goal Outcome Evaluation:       Patient remains stable with vitals in the normal range. Alert and oriented. Denies pain except when turned. 5 mg of oxycodone give. Pure wick in place. Good urine output. Refused turn and reposition. Blood sugar was 132 at 2 am. Lift to transfer out of bed.

## 2023-01-31 NOTE — PLAN OF CARE
Goal Outcome Evaluation:    A/Ox 4. VSS on RA. Denies pain/SOB. Tolerated regular diet. B/B incontinent. Female external cath in place with good output. BM x 1 this shift. A/2 with lift. T/R q 2 hrs. Dressing on sacral changed. BG checked.  Discharge plan pending.

## 2023-01-31 NOTE — PLAN OF CARE
Nursing Shift Note:   [id number] A&O but uncooperative with some care of her care. Did cooperate with her am meds taking them all at once in a med cup with sips of water  Refused majority of repositioning offers and refused am wound care.   Able to feed self when set-up. Appetite good.  Has chronic generalized discomfort which she states is tolerable at rest. Her pain reportedly increases during turning and reposition,   Anticipate TCU vrs LTC when discharged SW following

## 2023-02-01 NOTE — PLAN OF CARE
Goal Outcome Evaluation:    Patient is alert and oriented x 4. Vital signs stable.  Denies pain/ nausea or vomiting. Tolerated mod carb diet. Turn/Repo done. Sacral dressing changed. Brown formed BM x 1 this shift. External manrique in place with good output. A/2 with lift. PIV SL.. Lung sound clear, no cough. BG checked and inulin per sliding scall. Discharge plan pending.

## 2023-02-01 NOTE — PROGRESS NOTES
[facility]    Hospitalist Progress Note    Date of Service (when I saw the patient): 02/01/2023  Admit date: 1/6/2023    Interval History   Full details of events over last 24 hours outlined below.   Donald has been afebrile hemodynamically stable with good oxygenation.  She is smiling when I come in.  She offers no concerns.    Assessment & Plan   Miley Tolbert is a 77 year old female with history of hypertension, CHF, atrial fibrillation on Xarelto, history of for lower extremity DVT, coronary artery disease, chronic kidney disease stage III, COPD, hypothyroidism, poorly controlled type 2 diabetes with hyperglycemia, GERD, fecal and urinary incontinence who presented with progressively worsening sacral decubitus ulcer and inability of the family to provide care for the patient.      Progressively worsening stage IV sacral wound and stage II left calf and heel wounds  Initially suspected sepsis, but no infection found  Reportedly wound was foul smelling, soiled with urine/stool. No fever, and WBC normal at presentation. Hypotensive a day after admission after receiving lisinopril.   * Presumed sacral wound infection and started on broad spectrum abx, although does not appear infected. Wound on Lt heel appeared concerning.   * Procal and CRP mildly elevated. Lt heel XR without osteomyelitis.         Podiatry consulted, ultrasound Doppler study shows VENUS within normal limits.  No intervention needed, signed off.    lood cultures negative, WBC normal.  Vancomycin discontinued.     CT abdomen pelvis and right thigh obtained to evaluate for other potential sources including diverticulitis/cholecystitis and also to look for any hematoma given ongoing drop in hemoglobin.  No source of infection, Zosyn discontinued.  No source of bleeding.    Wound care consulted and following, appreciate assistance.      and care coordination consulted for discharge planning, will need  "placement to geriatric unit as daughter unable to care for her and unsafe to be discharged back home.     PRN oxycodone started this stay for pain control, using 20 to 25 mg/day.Given her advanced age dementia, schizophrenia, she is at high risk for complications from this medication and I would not use it at these doses long-term. I have decreased frequency to every 6 hours PRN and will continue to attempt to wean off.    On 1/30:  I discussed pain control with patient.  She states that sometimes she will speak a \"heavenly language\" when she is angry and today she was in pain and they did not give her her pain medication right away.  We discussed the risks of being on chronic regular oxycodone every 4 hours.  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  She does not want to rely on the medications. She was in agreement with plan to back off medications.  This conversation demonstrates that we must guide patient regarding pain med use.  Continue to try to limit use to severe pain only and decrease dosing as you are already doing. Thank you! This is noticed and very much appreciated.     Note oxycodone use has consistently gone down 25 mg / day > 15 mg /day to 10 mg / day to 5 mg / day on 1/31/23. Patient is happy and offers no complaints during my visits. Nursing offers no concerns.     Poorly controlled type 2 diabetes mellitus with peripheral neuropathy and hypoglycemic spells: HbA1C 10 on 1/6/2023  On Lantus 35 units with a 10 to 14 units of aspart prior to meals at home.       Continued with Lantus 28 units and 6 units of aspart with meals and sliding scale insulin aspart with carb controlled diet. Dose adjusted/reduced due to hypoglycemia.    Due to low BS,     Decreased Lantus to 15 units on .1/28    Stopped prandial novolog on 1/28.     Resumed PTA Januvia on 1/29    BS adequately controlled on 01/31/23      Recent Labs "   Lab 02/01/23  1620 02/01/23  1319 02/01/23  0744 02/01/23  0203 01/31/23  2115 01/31/23  1655   * 122* 89 150* 216* 195*        Poor fitting dentures  Epulis fissuratum  Complaints of painful mouth on 1/17. Examined mouth there are no sores.  But noted her dentures are very poor fitting.  Removed dentures and she has a flap of soft tissue on the upper anterior gum line.     Discussed with oral surgery on 01/18/23 this is extra tissue (epulis fissuratum) that often occurs in patients with poor fitting dentures. It should be excised and she should have dentures refitted.  Consulted care management to help expedite referral to dentist/oral surgery following discharge.     Discussed with RN to only use dentures while eating otherwise been    Viscous lidocaine PRN ordered.     Dental soft diet ordered on 01/18/23    Normocytic anemia  Admit Hb 11.9, down to 8.9 on 1/20. No evidence of blood loss  Baseline Hb has been in 10 - 12 range. Monitor intermittently. Iron studies ok.    Paroxysmal atrial fibrillation  In sinus rhythm.    Continuing with PTA Xarelto     Progressive dementia with behavioral disturbance in the setting of schizoaffective disorder and intermittent delirium    Maintain sleep-wake cycles.    Avoid benzodiazepines.    Continue PTA venlafaxine 150 mg daily, topiramate 100 mg daily    Currently oriented to place and person.  Answers all the questions appropriately.     CAD with HFpEF  Hypertension  * Last echo on 5/5/2022 showed EF of 55 to 60% with normal left ventricular cavity size.     Lisinopril held in the setting of hypotension, and added back at 20 mg (down from 40 mg PTA)     Chest pain RRT 1/15. Serial troponin negative. EKG without acute ischemic findings. Symptoms resolved, unclear if with nitroglycerine or spontaneously.     If recurrent pain suggestive of CAD, consider NM stress test given underlying known CAD.     BP remains adequately controlled on  "02/01/23    Hyperlipidemia    Continue atorvastatin 40 daily     History of DVT    Continue Xarelto.     Chronic pain    Continue ylenol, Zanaflex. As needed oxycodone as needed for pain control.    Lt knee XR ordered given pain with attempted movement > Showed moderate to severe tricompartmental osteoarthritic changes.  No fracture or effusion.      Urinary incontinence  Urinary retention, resolved    On tamsulosin 0.4 mg daily.    Espino catheter placed given worsening decub and contamination with urine in the setting of urinary retention.     Discontinued 1/11 AM, bladder scan as needed.  If recurrent retention, re insert Espino catheter     External catheter in place since 1/17     Chronic kidney disease, stage II, stable       Clinically Significant Risk Factors              # Hypoalbuminemia: Lowest albumin = 2.4 g/dL at 1/8/2023  7:01 AM, will monitor as appropriate          # DMII: A1C = 10.0 % (Ref range: 0.0 - 5.6 %) within past 3 months   # Overweight: Estimated body mass index is 29.18 kg/m  as calculated from the following:    Height as of this encounter: 1.626 m (5' 4\").    Weight as of this encounter: 77.1 kg (170 lb).            Diet: Orders Placed This Encounter      Combination Diet Moderate Consistent Carb (60 g CHO per Meal) Diet; Mechanical/Dental Soft Diet     IVF: None  Espino Catheter: Not present     DVT Prophylaxis: DOAC  Code Status: No CPR- Do NOT Intubate     Disposition: Expected discharge once placement found.   Communication: Discussed with RN and patient on 02/01/23    Waleska Gar MD  Hospitalist Service  [facility]  Securely message with the Vocera Web Console (learn more here)  Text page via [employer] Paging/Directory    -Data reviewed today: I reviewed all new labs and imaging results over the last 24 hours. I personally reviewed no images or EKG's today.    Physical Exam   Temp: 98.3  F (36.8  C) Temp src: Oral BP: (!) 140/66 Pulse: 87   Resp: 18 SpO2: 97 " % O2 Device: None (Room air)    Vitals:    01/06/23 2358 01/14/23 0735 01/21/23 1049   Weight: 70.5 kg (155 lb 6.4 oz) 84 kg (185 lb 1.6 oz) 77.1 kg (170 lb)     Vital Signs with Ranges  Temp:  [98.3  F (36.8  C)-99.3  F (37.4  C)] 98.3  F (36.8  C)  Pulse:  [80-87] 87  Resp:  [16-18] 18  BP: (106-147)/(51-77) 140/66  SpO2:  [96 %-98 %] 97 %  I/O last 3 completed shifts:  In: 200 [P.O.:200]  Out: 1675 [Urine:1675]    Today's Exam  Constitutional:  NAD, smiling.  Respiratory: Breathing comfortably,  Cardiovascular 2+ edema  Skin/Integumen: No acute rash or sign of bleeding. LE wound stable.     Medications   All medications reviewed on 02/01/23      artificial saliva  2 spray Swish & Spit 4x Daily     atorvastatin  40 mg Oral Daily     carboxymethylcellulose PF  1 drop Both Eyes 4x Daily     gabapentin  600 mg Oral TID     insulin aspart  1-7 Units Subcutaneous TID AC     insulin aspart  1-5 Units Subcutaneous At Bedtime     insulin glargine  15 Units Subcutaneous At Bedtime     levothyroxine  175 mcg Oral QAM AC     lisinopril  20 mg Oral Daily     mirabegron  25 mg Oral At Bedtime     rivaroxaban ANTICOAGULANT  20 mg Oral Daily with breakfast     sennosides  8.6 mg Oral Every Other Day     sitagliptin  100 mg Oral Daily     sodium chloride (PF)  10 mL Intracatheter Q8H     tamsulosin  0.4 mg Oral At Bedtime     topiramate  100 mg Oral Daily     venlafaxine  150 mg Oral Daily     PRN Meds: acetaminophen, albuterol, glucose **OR** dextrose **OR** glucagon, lidocaine 4%, lidocaine (viscous), lidocaine (buffered or not buffered), melatonin, naloxone **OR** naloxone **OR** naloxone **OR** naloxone, nitroGLYcerin, ondansetron **OR** ondansetron, oxyCODONE, prochlorperazine **OR** prochlorperazine **OR** prochlorperazine, senna-docusate **OR** senna-docusate, sodium chloride (PF), tiZANidine    Data   Recent Labs   Lab 02/01/23  1620 02/01/23  1319 02/01/23  1019 02/01/23  0744 01/31/23  0735 01/31/23  0734 01/30/23  1219  01/30/23  1156 01/27/23  0754 01/27/23  0738   POTASSIUM  --   --  4.1  --   --  4.0  --  4.0  --  4.2   CR  --   --   --   --   --   --   --   --   --  0.65   * 122*  --  89   < >  --    < >  --    < >  --     < > = values in this interval not displayed.       No results found for this or any previous visit (from the past 24 hour(s)).

## 2023-02-01 NOTE — PROGRESS NOTES
[facility] Nurse Inpatient Assessment     Consulted for: Sacral wound    Patient History (according to provider note(s):         Miley Tolbert is a 77 year old female with history of hypertension, CHF, atrial fibrillation on Xarelto, history of for lower extremity DVT, coronary artery disease, chronic kidney disease stage III, COPD, hypothyroidism, poorly controlled type 2 diabetes with hyperglycemia, GERD, fecal and urinary incontinence who presents with progressively worsening sacral decubitus ulcer and inability of the family to provide care for the patient    Areas Assessed:      Areas visualized during today's visit: Perineal area, Heels , Sacrum/coccyx, Lower extremities  and heels    Wound location: Left Heel      Last photo: 2/1/23  Wound due to: Pressure Injury  Wound history/plan of care: [place] and family brought patient home with concerns of not being able to care for her. Mepilex in place on assessment today 2/1  Wound base: unable to clearly visualize - appears to be slow to rahul, purple with scaly, cracked epidermis on top     Palpation of the wound bed: normal      Drainage: small     Description of drainage: serous     Measurements (length x width x depth, in cm): not measured 2/1/23 d/t pt's report of pain in L knee     Tunneling: N/A     Undermining: N/A  Periwound skin: non blanchable erythema purple/pink erythema in dark skin tone vascular vs. pressure, dry scale and callus      Color: pink and purple      Temperature: normal   Odor: none  Pain: moderate, shooting and intermittent  Pain interventions prior to dressing change: See MAR, slow and gentle cares  Treatment goal: Heal  and Increase granulation  STATUS: improving  Supplies ordered: at bedside     Wound location: Right Calf    Last photo: 2/1/23  Wound due to: suspect trauma vs pressure  Wound history/plan of care: pt reports she all of a sudden developed  several wounds a few months ago, however unclear of true etiology. Dressing gently removed and significant amt of bleeding at left lateral edge required 15mins of constant pressure to stop  Wound base: tendon, moist, red tissue     Palpation of the wound bed: normal     Drainage: moderate     Description of drainage: serosanguinous, green-yellow, creamy     Measurements (length x width x depth, in cm): 2.5 x 2 x 0.2 cm      Tunneling: N/A     Undermining: N/A  Periwound skin: Intact      Color: hyperpigmented      Temperature: normal   Odor: mild, sweet  Pain: pt occasionally vocalized pain with pressure to wound bed  Pain interventions prior to dressing change: slow and gentle cares  Treatment goal: Drainage control, Infection control/prevention, Decrease moisture, Protection and protect tendon  STATUS: improving  Supplies ordered: Vashe, Adaptic, and Triad paste at bedside    Wound location: Coccyx/right coccyx        Last photo: 2/1//23  Wound due to: Pressure Injury  Stage 4 pressure injury Present on admission  Wound history/plan of care: pt reports she all of a sudden developed several wounds a few months ago. On assessment 2/1, sacral Mepilex in place on pt's sacrum not actually covering wounded skin. Pt is incontinent of bowel. Purewick in use but Dri Jayme pad under patient was saturated with urine.  Wound base:  Red, moist tissue c/w dermis, some pale pink tissue     Palpation of the wound bed: normal      Drainage: moderate     Description of drainage: serosanguinous and bloody     Measurements (length x width x depth, in cm): 2 main wounds Coccyx 2.5 x 2 x 1.3 cm and Right coccyx 2.7 x 2 x 0.1 cm, one small ulcerated area on R upper thigh still open, all other areas are epithelialized   Tunneling: N/A     Undermining: N/A  Periwound skin: macerated, Scar tissue and hyperpigmentation      Color: pale tissue due to scarring and superior to wound hyperpigmentation      Temperature: normal   Odor: none  Pain:  "pt voiced pain rarely during cares  Pain interventions prior to dressing change: See MAR, slow and gentle cares  Treatment goal: Infection control/prevention, Increase granulation, decrease moisture  STATUS: improving   Supplies ordered: supplies at bedside    Skin Injury Location: pannus- not assessed 2/1    Last photo: none taken  Skin injury due to: Intertrigo  Skin history and plan of care:   Pt with deep creases and few scattered open areas   Affected area:      Skin assessment: Superficial Erosion of epidermis     Measurements (length x width x depth, in cm) 4 open areas to pannus crease all less than 0.5 x 0.5 x 0.1cm     Color: pink moist dermis     Temperature  normal      Drainage: scant .      Color: serous      Odor: none  Pain: denies , none  Pain interventions prior to dressing change: N/A  Treatment goal: Heal  and Decrease moisture  STATUS: initial assessment  Supplies ordered: ordered Triad paste     Skin Injury Location: right breast - not assessed 2/1    Last photo: none taken   Skin injury due to: Intertrigo  Skin history and plan of care:   Pt with deep breast creases and 1 open area  Affected area:      Skin assessment: Superficial Erosion of epidermis     Measurements (length x width x depth, in cm) 0.3  x 0.5  x  0.1 cm      Color: pink     Temperature  normal      Drainage: scant .      Color: serous      Odor: none  Pain: denies , none  Pain interventions prior to dressing change: N/A  Treatment goal: Heal  and Decrease moisture  STATUS: initial assessment  Supplies ordered: at bedside       Treatment Plan:     Location: right breast, pannus, right buttocks, perianal, coccyx, right lateral calf   Care: provided twice daily by primary RN   1. Cleanse BID skin and buttocks with kamala spray cleanser and francois soft dry wipes, cleanse wounds with depth with gauze 4x4\" and wound cleanser (vashe or microkelz). Dry gently.   2. Apply Triad paste ( #[id number]) under right breast, to pannus, to " "medial thighs and perianal, to buttock wounds, to right lateral calf wound   3. Cover with adaptic gauze ( #[id number]) over right lateral calf and buttocks wounds. Do not use Mepilex to coccyx/sacrum. Pt is incontinent of B&B which can saturate the dressing and lead to moisture trapping.   4. Wrap right lateral calf with kerlix   5. Apply underpad, no diaper, continue purewick external catheter  NOTE  -Reposition pt side to side jhoan when in bed, every 2 hours-get the pt way over on side to completely offload pressure. This will benefit skin and respiratory function   -Keep heels elevated and floating on pillows at all times. Try using at least 2 pillows under each calf.  -When up to the chair pt needs to fully offload every 2 hours and use a chair cushion if needed     Left Heel: Daily  Care: provided daily by primary RN   1. Cleanse daily with commercial wound cleanser and 4x4\" gauze, pat dry   2. Apply mepilex 4x4\", ok to use each up to 5 days if not soiled   3. Apply heel boots when in bed ( #[id number]) or elevate heels off bed with pillows    Pannus and Right Breast: Daily  1.Cleanse the area with Francine cleanse and protect and francois dry wipes/soft cloth.   2. Apply nickel thick layer of Triad paste (#[id number]) to wound bed and thin layer over reddened areas   **No need to remove all paste after each incontinent episode, remove soiled paste and reapply as needed.  **If complete removal of paste is necessary use baby oil/mineral oil (Located in Pharmacy) and soft wash cloth.   Leave the brief off in bed to let the area dry as much as possible.   Use only one Covidien pad in between mattress and pt. No brief while in bed.         Orders: Written    RECOMMEND PRIMARY TEAM ORDER: None, at this time  Education provided: importance of repositioning, plan of care, wound progress, Moisture management and Off-loading pressure  Discussed plan of care with: Patient and Nurse  WOC nurse follow-up plan: weekly  Notify WOC if " "wound(s) deteriorate.  Nursing to notify the Provider(s) and re-consult the [facility] Nurse if new skin concern.    DATA:     Current support surface: Standard  Low air loss (PETE pump, Isolibrium, Pulsate, skin guard, etc)  Containment of urine/stool: Incontinence Protocol, Incontinent pad in bed and Purewick external catheter   BMI: Body mass index is 29.18 kg/m .   Active diet order: Orders Placed This Encounter      Combination Diet Moderate Consistent Carb (60 g CHO per Meal) Diet; Mechanical/Dental Soft Diet     Output: I/O last 3 completed shifts:  In: 680 [P.O.:680]  Out: 1475 [Urine:1475]     Labs: No lab results found in last 7 days.    Invalid input(s): GLUCOMBO  Pressure injury risk assessment:   Sensory Perception: 4-->no impairment  Moisture: 4-->rarely moist  Activity: 1-->bedfast  Mobility: 2-->very limited  Nutrition: 4-->excellent  Friction and Shear: 1-->problem  Naif Score: 16    Marli Noble RN, CWCN  Contact via octoScope at name or \"[facility] nurse\"  Dept. Office Number: [phone]    "

## 2023-02-02 NOTE — PROGRESS NOTES
[facility] [place]    Medicine Progress Note - Hospitalist Service       Date of Admission:  1/6/2023    Assessment & Plan   Miley Tolbert is a 77 year old female with complex PMHx including paroxysmal a-fib and prior DVT on chronic AC with rivaroaban, CHF, CAD, CKD, COPD, poorly controlled DM2, fecal/urinary incontinence, and chronic wounds who was admitted on 1/6/2023 for worsening sacral decubitus ulcer and need for placement due to inability of the family to provide adequate care for the patient. She is now waiting for long term care placement     Stage IV sacral wound   Stage II left calf and heel wounds  Initial concern for sepsis, Resolved  * Presented with worsening sacral decubitus ulcer that was reportedly foul smelling and soiled with urine/stool. Untreated wounds on left calf and heel also noted on admission. Afebrile with normal WBC at presentation.   * In the ED, she was empirically initiated on IV vancomycin and pip-tazo for presumed sacral wound infection though ultimately not felt to be infected.   * CT abd/pelvis and right thigh (1/11) ordered to evaluate for other potential sources of infection and to evaluate for hematoma given ongoing drop in hemoglobin. No source of infection or hematoma noted.  * Blood cultures remained negative.   * Received 2 doses of IV vancomycin and 5-day course of IV pip-tazo this admission  * Podiatry consulted this admission. ABIs normal. No surgical intervention recommended  - Wound cares in place per WOCN  - SW following for placement. Patient's daughter is unable to care for her    Chronic pain syndrome  * PRN oxycodone started this admission for pain control. Patient was using 20 to 25 mg/day. Given her advanced age dementia, schizophrenia, she is at high risk for complications. Hospitalist had long conversation with patient on 1/30 regarding opioid use, and she eventually agreed to decrease her use of opioids. This conversation  demonstrates that we must guide patient regarding pain med use  - Now on oxycodone 5 mg q6h prn for pain control; using 5-15 mg per day    DM2 with peripheral neuropathy, long term insulin use  Hypoglycemia, Resolved  * A1c 10  * PTA regimen: sitagliptin 100 mg daily, Lantus 35 units, apart 10-14 units TID AC  * Episodes of hypoglycemia noted this admission; insulin regimen adjusted with improvement  - Currently on Lantus 15 units qhs  - Continues on PTA sitagliptin  - Medium SSI available  Recent Labs   Lab 02/02/23  0829 02/02/23  0151 02/01/23  2112 02/01/23  1620 02/01/23  1319 02/01/23  0744   * 177* 206* 169* 122* 89          Poor fitting dentures with associated pain  Epulis fissuratum  * Oral pain noted during admission. No oral lesions noted, but her dentures are very poor fitting.  Removed dentures and she has a flap of soft tissue on the upper anterior gum line. .Case discussed with oral surgery who explained this is extra tissue (epulis fissuratum) that often occurs in patients with poor fitting dentures. It should be excised and she should have dentures refitted.    - RN to use dentures only while eating.   - Dental soft diet, viscous lidocaine PRN ordered.   - Needs expedited oral surgery referral at discharge     Chronic anemia  * Baseline Hgb 10-11. Decreased to 8.9 on 1/20 with no s/sx of bleeding. Iron studies wnl  - Repeat Hgb in AM     Paroxysmal atrial fibrillation  Hx of DVT  - Not on AV veronica agents prior to admission  - Continues on PTA rivaroxaban      CAD with HFpEF  Hypertension  Hyperlipidemia  * Last echo on 5/5/2022 showed EF of 55 to 60% with normal left ventricular cavity size.   * PTA lisinopril decreased from 40 to 20 mg daily this admission due to hypotension  * RRT activated 1/15 for chest pain. EKG negative for ischemia. Serial troponins negative.  - Continues on lisinopril 20 mg daily  - Continues on PTA atorvastatin 40 mg daily  - If recurrent pain suggestive of CAD,  consider NM stress test given underlying known CAD.       Urinary incontinence  Urinary retention, Resolved  * Espino catheter initially placed this admission given worsening decub and contamination with urine. Removed 1/11.   - Has been utilizing external catheter since 1/17  - Continues on PTA tamsulosin     Dementia with behavioral disturbance, intermittent delirium  Schizoaffective disorder   * PTA regimen: venlafaxine 150 mg daily, topiramate 100 mg daily  - Continues on PTA meds  - Delirium precautions in place     Diet: Combination Diet Moderate Consistent Carb (60 g CHO per Meal) Diet; Mechanical/Dental Soft Diet    DVT Prophylaxis: DOAC  Espino Catheter: Not present  Code Status: No CPR- Do NOT Intubate         Disposition: Expected discharge to long term care once placement found    The patient's care was discussed with the Patient.    Yi Saenz MD  Hospitalist Service  [facility]  Contact information available via [facility] Paging/Directory    ______________________________________________________________________    Interval History   No acute events overnight. Main complaint is ongoing mouth pain. Explained complications from ill-fitting dentures. Otherwise offers no complaints    Data reviewed today: I reviewed all medications, new labs and imaging results over the last 24 hours. I personally reviewed no images or EKG's today.    Physical Exam   Vital Signs: Temp: 98.6  F (37  C) Temp src: Oral BP: 113/57 Pulse: 84   Resp: 18 SpO2: 96 % O2 Device: None (Room air)    Weight: 170 lbs 0 oz  Constitutional: Resting in bed, NAD  HEENT: Sclera white, MMM  Respiratory: Breathing non-labored. Lungs CTAB - no wheezes, crackles, or rhonchi  Cardiovascular: Heart RRR, no m/r/g. 1+ BLE edema  GI: +BS, abd soft/NT  Skin/Integument: LLE dressing intact  Musculoskeletal: Normal muscle bulk and tone  Neuro: Alert and appropriate, speech delayed, forgetful. +oral tardive dyskinesia  Psych:  [facility] and cooperative    Data   Recent Labs   Lab 02/02/23  0829 02/02/23  0753 02/02/23  0151 02/01/23  2112 02/01/23  1319 02/01/23  1019 01/31/23  0735 01/31/23  0734 01/27/23  0754 01/27/23  0738   POTASSIUM  --  3.9  --   --   --  4.1  --  4.0   < > 4.2   CR  --   --   --   --   --   --   --   --   --  0.65   *  --  177* 206*   < >  --    < >  --    < >  --     < > = values in this interval not displayed.         No results found for this or any previous visit (from the past 24 hour(s)).    Medications       artificial saliva  2 spray Swish & Spit 4x Daily     atorvastatin  40 mg Oral Daily     carboxymethylcellulose PF  1 drop Both Eyes 4x Daily     gabapentin  600 mg Oral TID     insulin aspart  1-7 Units Subcutaneous TID AC     insulin aspart  1-5 Units Subcutaneous At Bedtime     insulin glargine  15 Units Subcutaneous At Bedtime     levothyroxine  175 mcg Oral QAM AC     lisinopril  20 mg Oral Daily     mirabegron  25 mg Oral At Bedtime     rivaroxaban ANTICOAGULANT  20 mg Oral Daily with breakfast     sennosides  8.6 mg Oral Every Other Day     sitagliptin  100 mg Oral Daily     sodium chloride (PF)  10 mL Intracatheter Q8H     tamsulosin  0.4 mg Oral At Bedtime     topiramate  100 mg Oral Daily     venlafaxine  150 mg Oral Daily

## 2023-02-02 NOTE — PLAN OF CARE
A&Ox3, disoriented to situation. VSS on RA. C/o pain to coccyx, gave PRN oxy x, effective. Incontinent of B&B, x1 BM. Purewick in place, good UOP. R midline, SL, capped changed, good BR. Wound cares completed per POC. Up A2 with lift, T/R q2h. On a mechanical/dental soft diet, tolerating well, denies N/V. Blood glucose checks with meals, covered as needed. WOC following. Discharge pending placement.

## 2023-02-02 NOTE — PROGRESS NOTES
Care Management Follow Up    Length of Stay (days): 26    Expected Discharge Date: 02/08/2023     Concerns to be Addressed:       Patient plan of care discussed at interdisciplinary rounds: Yes    Anticipated Discharge Disposition: Long Term Care     Anticipated Discharge Services: None  Anticipated Discharge DME: None    Patient/family educated on Medicare website which has current facility and service quality ratings: yes  Education Provided on the Discharge Plan:    Patient/Family in Agreement with the Plan: yes    Referrals Placed by CM/SW: Post Acute Facilities  Private pay costs discussed: Not applicable    Additional Information:  Writer resent LTC referrals to facilities that didn't have beds available. Writer sent referrals via DOD to [place], [facility], [facility], [facility], [facility], and [facility].    Will continue to follow.    SUSANNA Jackson

## 2023-02-02 NOTE — PLAN OF CARE
Goal Outcome Evaluation:       Shift Note: [id number]  A&Ox3, disoriented to situation. VSS on RA. Complains of  pain to coccyx, and back during reposition, crying and yelling. Refuses PRN pain meds at this moment. Incontinent of B&B, x2 BM. Purewick in place with good UOP. R midline unable to flush, no blood return noted.  Up A2 with lift, T/R q2h as pt allows. On mechanical/dental soft diet, tolerating well. Pt  denies N/V. Blood sugar checks (206,177) Insulin given per sliding scale. Discharge pending LTC placement..

## 2023-02-03 NOTE — PLAN OF CARE
Goal Outcome Evaluation:       Shift Note: [id number]  A&Ox3, disoriented to situation. VSS on RA ex HTN. C/o mouth pain, gave PRN Oxycodone x1. Also endorses back and buttock pain during repositioning. Incontinent of B&B, 1 BM during shift. Purewick in place, good UOP. R midline, no blood return noted. Up A2 with lift, T/R q2h. Pt on a mechanical/dental soft diet, tolerating well, denies N/V. Blood sugar check , Insulin Aspart held per orders. WOC following. Discharge pending placement.

## 2023-02-03 NOTE — PROGRESS NOTES
[facility]    Medicine Progress Note - Hospitalist Service       Date of Admission:  1/6/2023    Assessment & Plan   Miley Tolbert is a 77 year old female with complex PMHx including paroxysmal a-fib and prior DVT on chronic AC with rivaroaban, CHF, CAD, CKD, COPD, poorly controlled DM2, fecal/urinary incontinence, and chronic wounds who was admitted on 1/6/2023 for worsening sacral decubitus ulcer and need for placement due to inability of the family to provide adequate care for the patient. She is now waiting for long term care placement     Stage IV sacral wound   Stage II left calf and heel wounds  Initial concern for sepsis, Resolved  * Presented with worsening sacral decubitus ulcer that was reportedly foul smelling and soiled with urine/stool. Untreated wounds on left calf and heel also noted on admission. Afebrile with normal WBC at presentation.   * In the ED, she was empirically initiated on IV vancomycin and pip-tazo for presumed sacral wound infection though ultimately not felt to be infected.   * CT abd/pelvis and right thigh (1/11) ordered to evaluate for other potential sources of infection and to evaluate for hematoma given ongoing drop in hemoglobin. No source of infection or hematoma noted.  * Blood cultures remained negative.   * Received 2 doses of IV vancomycin and 5-day course of IV pip-tazo this admission  * Podiatry consulted this admission. ABIs normal. No surgical intervention recommended  - Wound cares in place per WOCN  - SW following for placement. Patient's daughter is unable to care for her    Chronic pain syndrome  * PRN oxycodone started this admission for pain control. Patient was using 20 to 25 mg/day. Given her advanced age dementia, schizophrenia, she is at high risk for complications. Hospitalist had long conversation with patient on 1/30 regarding opioid use, and she eventually agreed to decrease her use of opioids. This conversation  demonstrates that we must guide patient regarding pain med use  - Now on oxycodone 5 mg q6h prn for pain control; using 5-15 mg per day    DM2 with peripheral neuropathy, long term insulin use  Hypoglycemia, Resolved  * A1c 10  * PTA regimen: sitagliptin 100 mg daily, Lantus 35 units, apart 10-14 units TID AC  * Episodes of hypoglycemia noted this admission; insulin regimen adjusted with improvement  - Currently on Lantus 15 units qhs  - Continues on PTA sitagliptin  - Medium SSI available  Recent Labs   Lab 02/03/23  0729 02/03/23  0145 02/02/23  2141 02/02/23  1709 02/02/23  1335 02/02/23  0829   GLC 86 118* 161* 151* 164* 132*          Poor fitting dentures with associated pain  Epulis fissuratum  * Oral pain noted during admission. No oral lesions noted, but her dentures are very poor fitting.  Removed dentures and she has a flap of soft tissue on the upper anterior gum line. .Case discussed with oral surgery who explained this is extra tissue (epulis fissuratum) that often occurs in patients with poor fitting dentures. It should be excised and she should have dentures refitted.    - RN to use dentures only while eating.   - Dental soft diet, viscous lidocaine PRN ordered.   - Needs expedited oral surgery referral at discharge     Chronic anemia  * Baseline Hgb 10-11. Decreased to 8.9 on 1/20 with no s/sx of bleeding. Iron studies wnl  - Repeat Hgb in AM     Paroxysmal atrial fibrillation  Hx of DVT  - Not on AV veronica agents prior to admission  - Continues on PTA rivaroxaban      CAD with HFpEF  Hypertension  Hyperlipidemia  * Last echo on 5/5/2022 showed EF of 55 to 60% with normal left ventricular cavity size.   * PTA lisinopril decreased from 40 to 20 mg daily this admission due to hypotension  * RRT activated 1/15 for chest pain. EKG negative for ischemia. Serial troponins negative.  - Continues on lisinopril 20 mg daily  - Continues on PTA atorvastatin 40 mg daily  - If recurrent pain suggestive of CAD,  consider NM stress test given underlying known CAD.       Urinary incontinence  Urinary retention, Resolved  * Espino catheter initially placed this admission given worsening decub and contamination with urine. Removed 1/11.   - Has been utilizing external catheter since 1/17  - Continues on PTA tamsulosin     Dementia with behavioral disturbance, intermittent delirium  Schizoaffective disorder   * PTA regimen: venlafaxine 150 mg daily, topiramate 100 mg daily  - Continues on PTA meds  - Delirium precautions in place     Diet: Combination Diet Moderate Consistent Carb (60 g CHO per Meal) Diet; Mechanical/Dental Soft Diet    DVT Prophylaxis: DOAC  Espino Catheter: Not present  Code Status: No CPR- Do NOT Intubate         Disposition: Expected discharge to long term care once placement found    The patient's care was discussed with the Patient.    Yi Saenz MD  Hospitalist Service  [facility]  Contact information available via [facility] Paging/Directory    ______________________________________________________________________    Interval History   No acute events overnight. Chronic BLE pain stable. Ongoing jaw pain stable. Explained complications from ill-fitting dentures.     Data reviewed today: I reviewed all medications, new labs and imaging results over the last 24 hours. I personally reviewed no images or EKG's today.    Physical Exam   Vital Signs: Temp: 98.9  F (37.2  C) Temp src: Oral BP: 111/56 Pulse: 80   Resp: 17 SpO2: 95 % O2 Device: None (Room air)    Weight: 170 lbs 0 oz  Constitutional: Resting in bed, NAD  HEENT: Sclera white, MMM  Respiratory: Breathing non-labored. Lungs CTAB - no wheezes, crackles, or rhonchi  Cardiovascular: Heart RRR, no m/r/g. No BLE edema  GI: +BS, abd soft/NT  Skin/Integument: LLE heel bandage intact; RLE dressing intact  Musculoskeletal: Normal muscle bulk and tone  Neuro: Alert and appropriate, speech delayed, forgetful. +oral tardive  dyskinesia  Psych: Calm and cooperative    Data   Recent Labs   Lab 02/03/23  0729 02/03/23  0604 02/03/23  0145 02/02/23  2141 02/02/23  0829 02/02/23  0753 02/01/23  1319 02/01/23  1019   HGB  --  10.6*  --   --   --   --   --   --    NA  --   --   --   --   --  140  --   --    POTASSIUM  --  3.9  --   --   --  3.9  3.9  --  4.1   CHLORIDE  --   --   --   --   --  110*  --   --    CO2  --   --   --   --   --  22  --   --    BUN  --   --   --   --   --  19.3  --   --    CR  --   --   --   --   --  0.70  --   --    ANIONGAP  --   --   --   --   --  8  --   --    MELISSA  --   --   --   --   --  9.0  --   --    GLC 86  --  118* 161*   < > 140*   < >  --     < > = values in this interval not displayed.         No results found for this or any previous visit (from the past 24 hour(s)).    Medications       artificial saliva  2 spray Swish & Spit 4x Daily     atorvastatin  40 mg Oral Daily     carboxymethylcellulose PF  1 drop Both Eyes 4x Daily     gabapentin  600 mg Oral TID     insulin aspart  1-7 Units Subcutaneous TID AC     insulin aspart  1-5 Units Subcutaneous At Bedtime     insulin glargine  15 Units Subcutaneous At Bedtime     levothyroxine  175 mcg Oral QAM AC     lisinopril  20 mg Oral Daily     mirabegron  25 mg Oral At Bedtime     rivaroxaban ANTICOAGULANT  20 mg Oral Daily with breakfast     sennosides  8.6 mg Oral Every Other Day     sitagliptin  100 mg Oral Daily     sodium chloride (PF)  10 mL Intracatheter Q8H     tamsulosin  0.4 mg Oral At Bedtime     topiramate  100 mg Oral Daily     venlafaxine  150 mg Oral Daily      Home

## 2023-02-03 NOTE — PLAN OF CARE
A&Ox3, disoriented to situation. VSS on RA. C/o leg pain, gave PRN oxy, effective. Incontinent of B&B, x1 BM. Purewick in place, good UOP. R midline, SL, no BR. Wound cares completed per POC. Up A2 with lift, T/R q2h. On a mechanical/dental soft diet, tolerating well, denies N/V. Blood glucose checks with meals, covered as needed. WOC following. Discharge pending placement.

## 2023-02-04 NOTE — PROGRESS NOTES
[facility]    Medicine Progress Note - Hospitalist Service       Date of Admission:  1/6/2023    Assessment & Plan   Miley Tolbert is a 77 year old female with complex PMHx including paroxysmal a-fib and prior DVT on chronic AC with rivaroaban, CHF, CAD, CKD, COPD, poorly controlled DM2, fecal/urinary incontinence, and chronic wounds who was admitted on 1/6/2023 for worsening sacral decubitus ulcer and need for placement due to inability of the family to provide adequate care for the patient. She is now waiting for long term care placement     Stage IV sacral wound   Stage II left calf and heel wounds  Initial concern for sepsis, Resolved  * Presented with worsening sacral decubitus ulcer that was reportedly foul smelling and soiled with urine/stool. Untreated wounds on left calf and heel also noted on admission. Afebrile with normal WBC at presentation.   * In the ED, she was empirically initiated on IV vancomycin and pip-tazo for presumed sacral wound infection though ultimately not felt to be infected.   * CT abd/pelvis and right thigh (1/11) ordered to evaluate for other potential sources of infection and to evaluate for hematoma given ongoing drop in hemoglobin. No source of infection or hematoma noted.  * Blood cultures remained negative.   * Received 2 doses of IV vancomycin and 5-day course of IV pip-tazo this admission  * Podiatry consulted this admission. ABIs normal. No surgical intervention recommended  - Wound cares in place per WOCN    Chronic pain syndrome  * PRN oxycodone started this admission for pain control. Patient was using 20 to 25 mg/day. Given her advanced age dementia, schizophrenia, she is at high risk for complications. Hospitalist had long conversation with patient on 1/30 regarding opioid use, and she eventually agreed to decrease her use of opioids. This conversation demonstrates that we must guide patient regarding pain med use  - Now on oxycodone 5 mg  q6h prn for pain control; using 10-15 mg per day    DM2 with peripheral neuropathy, long term insulin use  Hypoglycemia, Resolved  * A1c 10  * PTA regimen: sitagliptin 100 mg daily, Lantus 35 units, apart 10-14 units TID AC  * Insulin adjusted this admission due to intermittent hypoglycemia  - Currently on Lantus 15 units qhs  - Continues on PTA sitagliptin  - Medium SSI available  Recent Labs   Lab 02/04/23  0739 02/04/23  0143 02/03/23  2241 02/03/23  1655 02/03/23  1259 02/03/23  0729   GLC 74 167* 206* 175* 119* 86          Poor fitting dentures with associated pain  Epulis fissuratum  * Oral pain noted during admission. No oral lesions noted, but her dentures are very poor fitting.  Removed dentures and she has a flap of soft tissue on the upper anterior gum line. .Case discussed with oral surgery who explained this is extra tissue (epulis fissuratum) that often occurs in patients with poor fitting dentures. It should be excised and she should have dentures refitted.    - RN to use dentures only while eating.   - Dental soft diet, viscous lidocaine PRN ordered.   - Needs expedited oral surgery referral at discharge     Chronic anemia  * Baseline Hgb 10-11. Decreased to 8.9 on 1/20 with no s/sx of bleeding. Iron studies normal. Hgb improved to baseline 10.6 within 2 weeks (2/3)  - Check Hgb periodically     Paroxysmal atrial fibrillation  Hx of DVT  - Not on AV veronica agents prior to admission  - Continues on PTA rivaroxaban      CAD with HFpEF  Hypertension  Hyperlipidemia  * Last echo on 5/5/2022 showed EF of 55 to 60% with normal left ventricular cavity size.   * PTA lisinopril decreased from 40 to 20 mg daily this admission due to hypotension  * RRT activated 1/15 for chest pain. EKG negative for ischemia. Serial troponins negative.  - Continues on lisinopril 20 mg daily  - Continues on PTA atorvastatin 40 mg daily  - If recurrent pain suggestive of CAD, consider NM stress test given underlying known  CAD.       Urinary incontinence  Urinary retention, Resolved  * Espino catheter initially placed this admission given worsening decub and contamination with urine. Removed 1/11.   - Has been utilizing external catheter since 1/17  - Continues on PTA tamsulosin     Dementia with behavioral disturbance, intermittent delirium  Schizoaffective disorder   * PTA regimen: venlafaxine 150 mg daily, topiramate 100 mg daily  - Continues on PTA meds  - Delirium precautions in place     Diet: Combination Diet Moderate Consistent Carb (60 g CHO per Meal) Diet; Mechanical/Dental Soft Diet    DVT Prophylaxis: DOAC  Espino Catheter: Not present  Code Status: No CPR- Do NOT Intubate         Disposition: Expected discharge to long term care once placement found    The patient's care was discussed with the Patient.    Yi Saenz MD  Hospitalist Service  [facility]  Contact information available via [facility] Paging/Directory    ______________________________________________________________________    Interval History   No acute events overnight. Chronic BLE pain stable. Ongoing jaw pain stable.     Data reviewed today: I reviewed all medications, new labs and imaging results over the last 24 hours. I personally reviewed no images or EKG's today.    Physical Exam   Vital Signs: Temp: 98.2  F (36.8  C) Temp src: Oral BP: 127/56 Pulse: 78   Resp: 16 SpO2: 96 % O2 Device: None (Room air)    Weight: 170 lbs 0 oz  Constitutional: Resting in bed, NAD  Respiratory: Breathing non-labored.   Skin/Integument: LLE heel bandage intact; RLE dressing intact  Neuro: Alert and appropriate, speech delayed, forgetful. +oral tardive dyskinesia  Psych: Calm and cooperative    Data   Recent Labs   Lab 02/04/23  0739 02/04/23  0143 02/03/23  2241 02/03/23  0729 02/03/23  0604 02/02/23  0829 02/02/23  0753 02/01/23  1319 02/01/23  1019   HGB  --   --   --   --  10.6*  --   --   --   --    NA  --   --   --   --   --   --  140  --    --    POTASSIUM  --   --   --   --  3.9  --  3.9  3.9  --  4.1   CHLORIDE  --   --   --   --   --   --  110*  --   --    CO2  --   --   --   --   --   --  22  --   --    BUN  --   --   --   --   --   --  19.3  --   --    CR  --   --   --   --   --   --  0.70  --   --    ANIONGAP  --   --   --   --   --   --  8  --   --    MELISSA  --   --   --   --   --   --  9.0  --   --    GLC 74 167* 206*   < >  --    < > 140*   < >  --     < > = values in this interval not displayed.         No results found for this or any previous visit (from the past 24 hour(s)).    Medications       artificial saliva  2 spray Swish & Spit 4x Daily     atorvastatin  40 mg Oral Daily     carboxymethylcellulose PF  1 drop Both Eyes 4x Daily     gabapentin  600 mg Oral TID     insulin aspart  1-7 Units Subcutaneous TID AC     insulin aspart  1-5 Units Subcutaneous At Bedtime     insulin glargine  15 Units Subcutaneous At Bedtime     levothyroxine  175 mcg Oral QAM AC     lisinopril  20 mg Oral Daily     mirabegron  25 mg Oral At Bedtime     rivaroxaban ANTICOAGULANT  20 mg Oral Daily with breakfast     sennosides  8.6 mg Oral Every Other Day     sitagliptin  100 mg Oral Daily     sodium chloride (PF)  10 mL Intracatheter Q8H     tamsulosin  0.4 mg Oral At Bedtime     topiramate  100 mg Oral Daily     venlafaxine  150 mg Oral Daily

## 2023-02-04 NOTE — PLAN OF CARE
A&Ox3, disoriented to situation. VSS on RA. C/o leg and jaw pain, gave PRN oxy, effective. Incontinent of B&B, x1 BM. Purewick in place, good UOP. R midline, SL, no BR. Wound cares completed per POC. Up A2 with lift, T/R q2h. On a mechanical/dental soft diet, tolerating well, denies N/V. Blood glucose checks with meals, covered as needed. WOC following. Discharge pending placement.

## 2023-02-04 NOTE — PLAN OF CARE
1900 - 6030    A&Ox3. VSS on RA. C/o 8/10 back pain, PRN oxy given x1 - effective. Denies N/V. Up A2 w/lift. T/R q 2 hrs. Mod carb, mech/soft diet, good appetite. BG checks, corrected per orders. K+ protocols. R midline SL. Incontinent of B/B, purewick in place with good UOP, 1 BM this shift. +2 edema to BLE. WOC and SW following. Discharge pending placement.

## 2023-02-05 NOTE — PROGRESS NOTES
[facility]    Medicine Progress Note - Hospitalist Service       Date of Admission:  1/6/2023    Assessment & Plan   Miley Tolbert is a 77 year old female with complex PMHx including paroxysmal a-fib and prior DVT on chronic AC with rivaroaban, CHF, CAD, CKD, COPD, poorly controlled DM2, fecal/urinary incontinence, and chronic wounds who was admitted on 1/6/2023 for worsening sacral decubitus ulcer and need for placement due to inability of the family to provide adequate care for the patient. She is now waiting for long term care placement     Stage IV sacral wound   Stage II left calf and heel wounds  Initial concern for sepsis, Resolved  * Presented with worsening sacral decubitus ulcer that was reportedly foul smelling and soiled with urine/stool. Untreated wounds on left calf and heel also noted on admission. Afebrile with normal WBC at presentation.   * In the ED, she was empirically initiated on IV vancomycin and pip-tazo for presumed sacral wound infection though ultimately not felt to be infected.   * CT abd/pelvis and right thigh (1/11) ordered to evaluate for other potential sources of infection and to evaluate for hematoma given ongoing drop in hemoglobin. No source of infection or hematoma noted.  * Blood cultures remained negative.   * Received 2 doses of IV vancomycin and 5-day course of IV pip-tazo this admission  * Podiatry consulted this admission. ABIs normal. No surgical intervention recommended  - Wound cares in place per WOCN    Chronic pain syndrome  * PRN oxycodone started this admission for pain control. Patient was using 20 to 25 mg/day. Given her advanced age dementia, schizophrenia, she is at high risk for complications. Hospitalist had long conversation with patient on 1/30 regarding opioid use, and she eventually agreed to decrease her use of opioids. This conversation demonstrates that we must guide patient regarding pain med use  - Now on oxycodone 5 mg  q6h prn for pain control; using 5-15 mg per day    DM2 with peripheral neuropathy, long term insulin use  Hypoglycemia, Resolved  * A1c 10  * PTA regimen: sitagliptin 100 mg daily, Lantus 35 units, apart 10-14 units TID AC  * Insulin adjusted this admission due to intermittent hypoglycemia  - Currently on Lantus 15 units qhs  - Continues on PTA sitagliptin  - Medium SSI available  Recent Labs   Lab 02/05/23  0810 02/05/23  0136 02/04/23  2219 02/04/23  1718 02/04/23  1222 02/04/23  0739   * 181* 242* 169* 138* 74          Poor fitting dentures with associated pain  Epulis fissuratum  * Oral pain noted during admission. No oral lesions noted, but her dentures are very poor fitting.  Removed dentures and she has a flap of soft tissue on the upper anterior gum line. .Case discussed with oral surgery who explained this is extra tissue (epulis fissuratum) that often occurs in patients with poor fitting dentures. It should be excised and she should have dentures refitted.    - RN to use dentures only while eating.   - Dental soft diet, viscous lidocaine PRN ordered.   - Needs expedited oral surgery referral at discharge     Chronic anemia  * Baseline Hgb 10-11. Decreased to 8.9 on 1/20 with no s/sx of bleeding. Iron studies normal. Hgb improved to baseline 10.6 within 2 weeks (2/3)  - Check Hgb periodically     Paroxysmal atrial fibrillation  Hx of DVT  - Not on AV veronica agents prior to admission  - Continues on PTA rivaroxaban      CAD with HFpEF  Hypertension  Hyperlipidemia  * Last echo on 5/5/2022 showed EF of 55 to 60% with normal left ventricular cavity size.   * PTA lisinopril decreased from 40 to 20 mg daily this admission due to hypotension  * RRT activated 1/15 for chest pain. EKG negative for ischemia. Serial troponins negative.  - Continues on lisinopril 20 mg daily  - Continues on PTA atorvastatin 40 mg daily  - If recurrent pain suggestive of CAD, consider NM stress test given underlying known  CAD.       Urinary incontinence  Urinary retention, Resolved  * Espino catheter initially placed this admission given worsening decub and contamination with urine. Removed 1/11.   - Has been utilizing external catheter since 1/17  - Continues on PTA tamsulosin     Dementia with behavioral disturbance, intermittent delirium  Schizoaffective disorder   * PTA regimen: venlafaxine 150 mg daily, topiramate 100 mg daily  - Continues on PTA meds  - Delirium precautions in place     Diet: Combination Diet Moderate Consistent Carb (60 g CHO per Meal) Diet; Mechanical/Dental Soft Diet    DVT Prophylaxis: DOAC  Espino Catheter: Not present  Code Status: No CPR- Do NOT Intubate         Disposition: Expected discharge to long term care once placement found    The patient's care was discussed with the Bedside Nurse, Patient and Patient's Family.    Yi Saenz MD  Hospitalist Service  [facility]  Contact information available via [facility] Paging/Directory    ______________________________________________________________________    Interval History   No acute events overnight. Offers no complaints. States physically she is doing fine, but she emotionally she is struggling today. Misses her daughter who has promised to come see her, but it's been a week, and she still hasn't arrived. I spoke with her daughter over the phone and she plans to visit this afternoon. She also requested that referral also be sent to [facility] - message relayed to     Data reviewed today: I reviewed all medications, new labs and imaging results over the last 24 hours. I personally reviewed no images or EKG's today.    Physical Exam   Vital Signs: Temp: 97.5  F (36.4  C) Temp src: Axillary BP: 114/46 Pulse: 73   Resp: 18 SpO2: 97 % O2 Device: None (Room air)    Weight: 170 lbs 0 oz  Constitutional: Resting in bed, NAD  Respiratory: Breathing non-labored.   Skin/Integument: LLE heel bandage intact; RLE dressing  intact  Neuro: Alert and appropriate, speech delayed, forgetful. +oral tardive dyskinesia  Psych: Calm and cooperative    Data   Recent Labs   Lab 02/05/23  0810 02/05/23  0136 02/04/23  2219 02/04/23  1222 02/04/23  0913 02/03/23  0729 02/03/23  0604 02/02/23  0829 02/02/23  0753   HGB  --   --   --   --   --   --  10.6*  --   --    NA  --   --   --   --   --   --   --   --  140   POTASSIUM  --   --   --   --  4.0  --  3.9  --  3.9  3.9   CHLORIDE  --   --   --   --   --   --   --   --  110*   CO2  --   --   --   --   --   --   --   --  22   BUN  --   --   --   --   --   --   --   --  19.3   CR  --   --   --   --   --   --   --   --  0.70   ANIONGAP  --   --   --   --   --   --   --   --  8   MELISSA  --   --   --   --   --   --   --   --  9.0   * 181* 242*   < >  --    < >  --    < > 140*    < > = values in this interval not displayed.         No results found for this or any previous visit (from the past 24 hour(s)).    Medications       artificial saliva  2 spray Swish & Spit 4x Daily     atorvastatin  40 mg Oral Daily     carboxymethylcellulose PF  1 drop Both Eyes 4x Daily     gabapentin  600 mg Oral TID     insulin aspart  1-7 Units Subcutaneous TID AC     insulin aspart  1-5 Units Subcutaneous At Bedtime     insulin glargine  15 Units Subcutaneous At Bedtime     levothyroxine  175 mcg Oral QAM AC     lisinopril  20 mg Oral Daily     mirabegron  25 mg Oral At Bedtime     rivaroxaban ANTICOAGULANT  20 mg Oral Daily with breakfast     sennosides  8.6 mg Oral Every Other Day     sitagliptin  100 mg Oral Daily     sodium chloride (PF)  10 mL Intracatheter Q8H     tamsulosin  0.4 mg Oral At Bedtime     topiramate  100 mg Oral Daily     venlafaxine  150 mg Oral Daily

## 2023-02-05 NOTE — PROGRESS NOTES
Alert to self. VSS on Rm Air. C/o pain to BLE &  Coccyx. Scheduled meds given w/ good effect. Open area to coccyx present; scant amount of drainage, tx completed per orders. Drsg CDI to R shin & L heel. Asstx2 w/ lift; turn & repo q2hrs. Purewick in place. Incontinent of medium size hard BM. BG AC & HS. Midline to RUE; no blood return noted, does not flush, SL at this time. Site WDL. Mod carb, soft mechanical diet. Discharge when placement available at LTC facility.

## 2023-02-05 NOTE — PLAN OF CARE
1900 - 0730    A&Ox3, disoriented to situation, forgetful. C/o leg pain - managed with PRN oxy x1. Denies nausea. Mod carb, mech soft diet. BG checks, corrected per orders. Up A2 w/lift. T/R q 2 hrs as pt allows. Incontinent, purewick in place with good UOP, 1 BM this shift. R midline SL, no blood return. Wound cares completed per orders. SW and WOC following. Discharge pending placement plan.

## 2023-02-05 NOTE — PLAN OF CARE
Patient is alert, has episodes of confusion. Dressing changes were completed as ordered.   Pain is severe with movement. BG was well controlled today.  She denies nausea, vomiting, SOB, numbness and tingling.   She refuses to have Prevalon boots on.

## 2023-02-06 NOTE — PROVIDER NOTIFICATION
[id number]    VSS on RA. Disoriented to situation. Pt on k Replacement. BS check, insulin given per sliding scale. R palm dressing CDI, wounds on buttocks, left heel, sacrum CDI. Turn and repo pt. Denies pain and nausea. PW with good UOP .Midline with no blood return. Discharge pending LTC placement.

## 2023-02-06 NOTE — PROGRESS NOTES
[facility]    Hospitalist Progress Note    Date of Service (when I saw the patient): 02/06/2023  Admit date: 1/6/2023    Interval History   Full details of events over last 24 hours outlined below.   Donald offers no concerns today.  She does tell me that she had to use a pain med..  She tells me about her daughter visiting last night and she understands that her daughter cannot take her home.  She continues to express her deandre of God.  She is very thankful that he has helped her take away her pain.  Denies any SOB, CP, abdominal pain, N/V/D.  Only 5 mg of oxycodone / 24 hours has been used in the last 2 days.     Assessment & Plan   Miley Tolbert is a 77 year old female with complex PMHx including paroxysmal a-fib and prior DVT on chronic AC with rivaroaban, CHF, CAD, CKD, COPD, poorly controlled DM2, fecal/urinary incontinence, and chronic wounds who was admitted on 1/6/2023 for worsening sacral decubitus ulcer and need for placement due to inability of the family to provide adequate care for the patient. She is now waiting for long term care placement     Stage IV sacral wound   Stage II left calf and heel wounds  Initial concern for sepsis, Resolved  * Presented with worsening sacral decubitus ulcer that was reportedly foul smelling and soiled with urine/stool. Untreated wounds on left calf and heel also noted on admission. Afebrile with normal WBC at presentation.   * In the ED, she was empirically initiated on IV vancomycin and pip-tazo for presumed sacral wound infection though ultimately not felt to be infected.   * CT abd/pelvis and right thigh (1/11) ordered to evaluate for other potential sources of infection and to evaluate for hematoma given ongoing drop in hemoglobin. No source of infection or hematoma noted.  * Blood cultures remained negative.   * Received 2 doses of IV vancomycin and 5-day course of IV pip-tazo this admission  * Podiatry consulted this admission. ABIs  normal. No surgical intervention recommended  - Wound cares in place per [facility]     Chronic pain syndrome  * PRN oxycodone started this admission for pain control. Patient was using 20 to 25 mg/day. Given her advanced age dementia, schizophrenia, she is at high risk for complications. Hospitalist had long conversation with patient on 1/30 regarding opioid use, and she eventually agreed to decrease her use of opioids. This conversation demonstrates that we must guide patient regarding pain med use  - Now on oxycodone 5 mg q6h prn for pain control; using 5-15 mg per day.   - Continue to limit for severe pain only.      DM2 with peripheral neuropathy, long term insulin use  Hypoglycemia, Resolved  * A1c 10  * PTA regimen: sitagliptin 100 mg daily, Lantus 35 units, apart 10-14 units TID AC  * Insulin adjusted this admission due to intermittent hypoglycemia  - Currently on Lantus 15 units qhs  - Continues on PTA sitagliptin  - Medium SSI available    Recent Labs   Lab 02/06/23  1653 02/06/23  1158 02/06/23  0725 02/06/23  0241 02/05/23  2223 02/05/23  1759   * 137* 101* 161* 184* 147*            Poor fitting dentures with associated pain  Epulis fissuratum  * Oral pain noted during admission. No oral lesions noted, but her dentures are very poor fitting.  Removed dentures and she has a flap of soft tissue on the upper anterior gum line. .Case discussed with oral surgery who explained this is extra tissue (epulis fissuratum) that often occurs in patients with poor fitting dentures. It should be excised and she should have dentures refitted.    - RN to use dentures only while eating.   - Dental soft diet, viscous lidocaine PRN ordered.   - Needs expedited oral surgery referral at discharge     Chronic anemia  * Baseline Hgb 10-11. Decreased to 8.9 on 1/20 with no s/sx of bleeding. Iron studies normal. Hgb improved to baseline 10.6 within 2 weeks (2/3)  - Check Hgb periodically    Recent Labs   Lab 02/03/23  0604   HGB  "10.6*         Paroxysmal atrial fibrillation  Hx of DVT  - Not on AV veronica agents prior to admission  - Continues on PTA rivaroxaban      CAD with HFpEF  Hypertension  Hyperlipidemia  * Last echo on 5/5/2022 showed EF of 55 to 60% with normal left ventricular cavity size.   * PTA lisinopril decreased from 40 to 20 mg daily this admission due to hypotension  * RRT activated 1/15 for chest pain. EKG negative for ischemia. Serial troponins negative.  - Continues on lisinopril 20 mg daily  - Continues on PTA atorvastatin 40 mg daily  - If recurrent pain suggestive of CAD, consider NM stress test given underlying known CAD.       Urinary incontinence  Urinary retention, Resolved  * Espino catheter initially placed this admission given worsening decub and contamination with urine. Removed 1/11.   - Has been utilizing external catheter since 1/17  - Continues on PTA tamsulosin     Dementia with behavioral disturbance, intermittent delirium  Schizoaffective disorder   * PTA regimen: venlafaxine 150 mg daily, topiramate 100 mg daily  - Continues on PTA meds  - Delirium precautions in place    Clinically Significant Risk Factors              # Hypoalbuminemia: Lowest albumin = 2.4 g/dL at 1/8/2023  7:01 AM, will monitor as appropriate          # DMII: A1C = 10.0 % (Ref range: 0.0 - 5.6 %) within past 3 months   # Overweight: Estimated body mass index is 29.18 kg/m  as calculated from the following:    Height as of this encounter: 1.626 m (5' 4\").    Weight as of this encounter: 77.1 kg (170 lb).            Diet: Orders Placed This Encounter      Combination Diet Moderate Consistent Carb (60 g CHO per Meal) Diet; Mechanical/Dental Soft Diet     IVF: None   Espino Catheter: Not present     DVT Prophylaxis: DOAC  Code Status: No CPR- Do NOT Intubate     Disposition: Expected discharge: Medically stable when placement is found  Communication: Discussed with patient and RN on 02/06/23    Waleska Gar MD  Hospitalist Service  M " [facility]  Securely message with the [employer] Web Console (learn more here)  Text page via [employer] Paging/Directory    -Data reviewed today: I reviewed all new labs and imaging results over the last 24 hours. I personally reviewed no images or EKG's today.    Physical Exam   Temp: 98.2  F (36.8  C) Temp src: Tympanic BP: 130/57 Pulse: 88   Resp: 18 SpO2: 95 % O2 Device: None (Room air)    Vitals:    01/06/23 2358 01/14/23 0735 01/21/23 1049   Weight: 70.5 kg (155 lb 6.4 oz) 84 kg (185 lb 1.6 oz) 77.1 kg (170 lb)     Vital Signs with Ranges  Temp:  [98.2  F (36.8  C)-98.7  F (37.1  C)] 98.2  F (36.8  C)  Pulse:  [73-88] 88  Resp:  [18] 18  BP: (123-150)/(48-77) 130/57  SpO2:  [95 %-98 %] 95 %  I/O last 3 completed shifts:  In: -   Out: 1050 [Urine:1050]    Today's Exam  Constitutional:  NAD,   Neuropsyche:  Alert,  answers questions appropriately.   Respiratory:  Breathing comfortably,   Skin/Integumen: Wounds on LE healing.  No acute rash or sign of bleeding.     Medications   All medications reviewed on 02/06/23       artificial saliva  2 spray Swish & Spit 4x Daily     atorvastatin  40 mg Oral Daily     carboxymethylcellulose PF  1 drop Both Eyes 4x Daily     gabapentin  600 mg Oral TID     insulin aspart  1-7 Units Subcutaneous TID AC     insulin aspart  1-5 Units Subcutaneous At Bedtime     insulin glargine  15 Units Subcutaneous At Bedtime     levothyroxine  175 mcg Oral QAM AC     lisinopril  20 mg Oral Daily     mirabegron  25 mg Oral At Bedtime     rivaroxaban ANTICOAGULANT  20 mg Oral Daily with breakfast     sennosides  8.6 mg Oral Every Other Day     sitagliptin  100 mg Oral Daily     sodium chloride (PF)  10 mL Intracatheter Q8H     tamsulosin  0.4 mg Oral At Bedtime     topiramate  100 mg Oral Daily     venlafaxine  150 mg Oral Daily     PRN Meds: acetaminophen, albuterol, glucose **OR** dextrose **OR** glucagon, lidocaine 4%, lidocaine (viscous), lidocaine (buffered or not  buffered), melatonin, naloxone **OR** naloxone **OR** naloxone **OR** naloxone, nitroGLYcerin, ondansetron **OR** ondansetron, oxyCODONE, prochlorperazine **OR** prochlorperazine **OR** prochlorperazine, senna-docusate **OR** senna-docusate, sodium chloride (PF), tiZANidine    Data   Recent Labs   Lab 02/06/23  1653 02/06/23  1158 02/06/23  0725 02/04/23  1222 02/04/23  0913 02/03/23  0729 02/03/23  0604 02/02/23  0829 02/02/23  0753   HGB  --   --   --   --   --   --  10.6*  --   --    NA  --   --   --   --   --   --   --   --  140   POTASSIUM  --   --   --   --  4.0  --  3.9  --  3.9  3.9   CHLORIDE  --   --   --   --   --   --   --   --  110*   CO2  --   --   --   --   --   --   --   --  22   BUN  --   --   --   --   --   --   --   --  19.3   CR  --   --   --   --   --   --   --   --  0.70   ANIONGAP  --   --   --   --   --   --   --   --  8   MELISSA  --   --   --   --   --   --   --   --  9.0   * 137* 101*   < >  --    < >  --    < > 140*    < > = values in this interval not displayed.       No results found for this or any previous visit (from the past 24 hour(s)).

## 2023-02-06 NOTE — PLAN OF CARE
0700-1930: Pt A/Ox3, D/O to sit. VSS on RA. C/o pain to back, coccyx and with movement, PRN oxycodone given. A2, with lift, T/R q2h, pt refuses T/R at times. Wound cares completed to coccyx, buttocks, pannus, emily-area, R/L foot. Incontinent of B/B, purewick in place. Discharge pending placement.

## 2023-02-06 NOTE — PLAN OF CARE
1900 - 4030    A&Ox3 - disoriented to situation. VSS on RA. C/o leg and jaw pain - PRN oxy given x1 - effective. Denies N/V. Up A2 w/lift. T/R q 2 hrs. Mod carb, mech/soft diet - good appetite. BG checks, no corrections needed overnight. Wound cares completed per POC. Incontinent of B/B, purewick in place with good UOP, 1 BM overnight. R midline SL. WOC and SW following. Discharge pending placement.

## 2023-02-07 NOTE — PLAN OF CARE
Goal Outcome Evaluation:       Shift Note: 2-7-23, 8533-8539:  Pt A/O x4. Can get anxious irritable and distrustful. VSS on RA. C/o pain in mouth, declines intervention has PRN oxycodone. Mechanical soft mod carb diet. BG checks 159 and 156. A x 2, with lift, T/R q2h, pt refuses T/R at times. Wound cares completed during day shift to coccyx, buttocks, pannus, emily-area, R/L foot pt refused them to be touched. No IV MD aware. Incontinent of B/B, purewick in place. Discharge pending placement.

## 2023-02-07 NOTE — PROGRESS NOTES
[facility]    Hospitalist Progress Note    Date of Service (when I saw the patient): 02/07/2023  Admit date: 1/6/2023    Interval History   Full details of events over last 24 hours outlined below.   Patient remains afebrile high temp of 99.9.  Blood pressures have been mostly controlled.  Sugars look good except for a few elevated sugars in the 200s.  Nursing notes reveal that she can be irritable and distrustful at times had some pain in her mouth but declined as needed oxycodone.  No acute events noted.  She was sleeping when I visited.  Only using 5 mg of oxycodone per 24 hours.     Assessment & Plan   Miley Tolbert is a 77 year old female with complex PMHx including paroxysmal a-fib and prior DVT on chronic AC with rivaroaban, CHF, CAD, CKD, COPD, poorly controlled DM2, fecal/urinary incontinence, and chronic wounds who was admitted on 1/6/2023 for worsening sacral decubitus ulcer and need for placement due to inability of the family to provide adequate care for the patient. She is now waiting for long term care placement     Stage IV sacral wound   Stage II left calf and heel wounds  Initial concern for sepsis, Resolved  * Presented with worsening sacral decubitus ulcer that was reportedly foul smelling and soiled with urine/stool. Untreated wounds on left calf and heel also noted on admission. Afebrile with normal WBC at presentation.   * In the ED, she was empirically initiated on IV vancomycin and pip-tazo for presumed sacral wound infection though ultimately not felt to be infected.   * CT abd/pelvis and right thigh (1/11) ordered to evaluate for other potential sources of infection and to evaluate for hematoma given ongoing drop in hemoglobin. No source of infection or hematoma noted.  * Blood cultures remained negative.   * Received 2 doses of IV vancomycin and 5-day course of IV pip-tazo this admission  * Podiatry consulted this admission. ABIs normal. No surgical  intervention recommended  - Wound cares in place per [facility]     Chronic pain syndrome  * PRN oxycodone started this admission for pain control. Patient was using 20 to 25 mg/day. Given her advanced age dementia, schizophrenia, she is at high risk for complications. Hospitalist had long conversation with patient on 1/30 regarding opioid use, and she eventually agreed to decrease her use of opioids. This conversation demonstrates that we must guide patient regarding pain med use  - Now on oxycodone 5 mg q6h prn for pain control; using 5-15 mg per day.   - Continue to limit for severe pain only.      DM2 with peripheral neuropathy, long term insulin use  Hypoglycemia, Resolved  * A1c 10  * PTA regimen: sitagliptin 100 mg daily, Lantus 35 units, apart 10-14 units TID AC  * Insulin adjusted this admission due to intermittent hypoglycemia  - Currently on Lantus 15 units qhs  - Continues on PTA sitagliptin  - Medium SSI available     Recent Labs   Lab 02/07/23  1300 02/07/23  0826 02/07/23  0143 02/06/23  2149 02/06/23  1653 02/06/23  1158   * 138* 156* 159* 174* 137*            Poor fitting dentures with associated pain  Epulis fissuratum  * Oral pain noted during admission. No oral lesions noted, but her dentures are very poor fitting.  Removed dentures and she has a flap of soft tissue on the upper anterior gum line. .Case discussed with oral surgery who explained this is extra tissue (epulis fissuratum) that often occurs in patients with poor fitting dentures. It should be excised and she should have dentures refitted.    - RN to use dentures only while eating.   - Dental soft diet, viscous lidocaine PRN ordered.   - Needs expedited oral surgery referral at discharge     Chronic anemia  * Baseline Hgb 10-11. Decreased to 8.9 on 1/20 with no s/sx of bleeding. Iron studies normal. Hgb improved to baseline 10.6 within 2 weeks (2/3)  - Check Hgb periodically    Recent Labs   Lab 02/03/23  0604   HGB 10.6*     "     Paroxysmal atrial fibrillation  Hx of DVT  - Not on AV veronica agents prior to admission  - Continues on PTA rivaroxaban      CAD with HFpEF  Hypertension  Hyperlipidemia  * Last echo on 5/5/2022 showed EF of 55 to 60% with normal left ventricular cavity size.   * PTA lisinopril decreased from 40 to 20 mg daily this admission due to hypotension  * RRT activated 1/15 for chest pain. EKG negative for ischemia. Serial troponins negative.  - Continues on lisinopril 20 mg daily  - Continues on PTA atorvastatin 40 mg daily  - If recurrent pain suggestive of CAD, consider NM stress test given underlying known CAD.       Urinary incontinence  Urinary retention, Resolved  * Espino catheter initially placed this admission given worsening decub and contamination with urine. Removed 1/11.   - Has been utilizing external catheter since 1/17  - Continues on PTA tamsulosin     Dementia with behavioral disturbance, intermittent delirium  Schizoaffective disorder   * PTA regimen: venlafaxine 150 mg daily, topiramate 100 mg daily  - Continues on PTA meds  - Delirium precautions in place    Clinically Significant Risk Factors              # Hypoalbuminemia: Lowest albumin = 2.4 g/dL at 1/8/2023  7:01 AM, will monitor as appropriate          # DMII: A1C = 10.0 % (Ref range: 0.0 - 5.6 %) within past 3 months   # Overweight: Estimated body mass index is 29.18 kg/m  as calculated from the following:    Height as of this encounter: 1.626 m (5' 4\").    Weight as of this encounter: 77.1 kg (170 lb).            Diet: Orders Placed This Encounter      Combination Diet Moderate Consistent Carb (60 g CHO per Meal) Diet; Mechanical/Dental Soft Diet     IVF: None   Espino Catheter: Not present     DVT Prophylaxis: DOAC  Code Status: No CPR- Do NOT Intubate     Disposition: Expected discharge: Medically stable when placement is found  Communication: Discussed with patient and RN on 02/06/23. Did not discuss with staff on 02/07/23. Reviewed notes " as above.     Waleska Gar MD  Hospitalist Service  [facility]  Securely message with the [employer] Web Console (learn more here)  Text page via [employer] Paging/Directory    -Data reviewed today: I reviewed all new labs and imaging results over the last 24 hours. I personally reviewed no images or EKG's today.    Physical Exam   Temp: 99.9  F (37.7  C) Temp src: Oral BP: (!) 140/72 Pulse: 82   Resp: 18 SpO2: 97 % O2 Device: None (Room air)    Vitals:    01/06/23 2358 01/14/23 0735 01/21/23 1049   Weight: 70.5 kg (155 lb 6.4 oz) 84 kg (185 lb 1.6 oz) 77.1 kg (170 lb)     Vital Signs with Ranges  Temp:  [97.4  F (36.3  C)-99.9  F (37.7  C)] 99.9  F (37.7  C)  Pulse:  [69-82] 82  Resp:  [18] 18  BP: (113-149)/(63-72) 140/72  SpO2:  [96 %-98 %] 97 %  I/O last 3 completed shifts:  In: 960 [P.O.:960]  Out: 1650 [Urine:1650]    Today's Exam  Constitutional:  NAD, sleeping soundly.     Medications   All medications reviewed on 02/07/23        artificial saliva  2 spray Swish & Spit 4x Daily     atorvastatin  40 mg Oral Daily     carboxymethylcellulose PF  1 drop Both Eyes 4x Daily     gabapentin  600 mg Oral TID     insulin aspart  1-7 Units Subcutaneous TID AC     insulin aspart  1-5 Units Subcutaneous At Bedtime     insulin glargine  15 Units Subcutaneous At Bedtime     levothyroxine  175 mcg Oral QAM AC     lisinopril  20 mg Oral Daily     mirabegron  25 mg Oral At Bedtime     rivaroxaban ANTICOAGULANT  20 mg Oral Daily with breakfast     sennosides  8.6 mg Oral Every Other Day     sitagliptin  100 mg Oral Daily     sodium chloride (PF)  10 mL Intracatheter Q8H     tamsulosin  0.4 mg Oral At Bedtime     topiramate  100 mg Oral Daily     venlafaxine  150 mg Oral Daily     PRN Meds: acetaminophen, albuterol, glucose **OR** dextrose **OR** glucagon, lidocaine 4%, lidocaine (viscous), lidocaine (buffered or not buffered), melatonin, naloxone **OR** naloxone **OR** naloxone **OR** naloxone,  nitroGLYcerin, ondansetron **OR** ondansetron, oxyCODONE, prochlorperazine **OR** prochlorperazine **OR** prochlorperazine, senna-docusate **OR** senna-docusate, sodium chloride (PF), tiZANidine    Data   Recent Labs   Lab 02/07/23  1300 02/07/23  0826 02/07/23  0143 02/04/23  1222 02/04/23  0913 02/03/23  0729 02/03/23  0604 02/02/23  0829 02/02/23  0753   HGB  --   --   --   --   --   --  10.6*  --   --    NA  --   --   --   --   --   --   --   --  140   POTASSIUM  --   --   --   --  4.0  --  3.9  --  3.9  3.9   CHLORIDE  --   --   --   --   --   --   --   --  110*   CO2  --   --   --   --   --   --   --   --  22   BUN  --   --   --   --   --   --   --   --  19.3   CR  --   --   --   --   --   --   --   --  0.70   ANIONGAP  --   --   --   --   --   --   --   --  8   MELISSA  --   --   --   --   --   --   --   --  9.0   * 138* 156*   < >  --    < >  --    < > 140*    < > = values in this interval not displayed.       No results found for this or any previous visit (from the past 24 hour(s)).

## 2023-02-08 NOTE — PROGRESS NOTES
[facility]    Hospitalist Progress Note    Date of Service (when I saw the patient): 02/08/2023  Admit date: 1/6/2023    Interval History   Full details of events over last 24 hours outlined below.   Donald remains afebrile.  Hemodynamically stable.  Oxygenation is 97% on room air.  He is again sleeping soundly when I visited her today so I touch base with nursing has been no issues over the last 24 hours.  Stopped artificial saliva as ordered by prior physician no longer needed.  No oxycodone use since 2/6 and on that they only used 5 mg in 24 hours.     Assessment & Plan   Miley Tolbert is a 77 year old female with complex PMHx including paroxysmal a-fib and prior DVT on chronic AC with rivaroaban, CHF, CAD, CKD, COPD, poorly controlled DM2, fecal/urinary incontinence, and chronic wounds who was admitted on 1/6/2023 for worsening sacral decubitus ulcer and need for placement due to inability of the family to provide adequate care for the patient. She is now waiting for long term care placement     Stage IV sacral wound   Stage II left calf and heel wounds  Initial concern for sepsis, Resolved  * Presented with worsening sacral decubitus ulcer that was reportedly foul smelling and soiled with urine/stool. Untreated wounds on left calf and heel also noted on admission. Afebrile with normal WBC at presentation.   * In the ED, she was empirically initiated on IV vancomycin and pip-tazo for presumed sacral wound infection though ultimately not felt to be infected.   * CT abd/pelvis and right thigh (1/11) ordered to evaluate for other potential sources of infection and to evaluate for hematoma given ongoing drop in hemoglobin. No source of infection or hematoma noted.  * Blood cultures remained negative.   * Received 2 doses of IV vancomycin and 5-day course of IV pip-tazo this admission  * Podiatry consulted this admission. ABIs normal. No surgical intervention recommended  - Wound cares  in place per WOCN     Chronic pain syndrome  * PRN oxycodone started this admission for pain control. Patient was using 20 to 25 mg/day. Given her advanced age dementia, schizophrenia, she is at high risk for complications. Hospitalist had long conversation with patient on 1/30 regarding opioid use, and she eventually agreed to decrease her use of opioids. This conversation demonstrates that we must guide patient regarding pain med use  - Now on oxycodone 5 mg q6h prn for pain control;   - On 2/8 she has only been using 0 to 5 mg a day and has actually been declining oxycodone.   - Continue to limit for severe pain only.      DM2 with peripheral neuropathy, long term insulin use  Hypoglycemia, Resolved  * A1c 10  * PTA regimen: sitagliptin 100 mg daily, Lantus 35 units, apart 10-14 units TID AC  * Insulin adjusted this admission due to intermittent hypoglycemia  - Currently on Lantus 15 units qhs  - Continues on PTA sitagliptin  - Medium SSI available   - Add 3 units aspart with meals on 02/08/23 for better BS control during the day.    Recent Labs   Lab 02/08/23  1132 02/08/23  0838 02/08/23  0612 02/08/23  0131 02/07/23  2133 02/07/23  1732   * 130* 128* 142* 188* 185*            Poor fitting dentures with associated pain  Epulis fissuratum  * Oral pain noted during admission. No oral lesions noted, but her dentures are very poor fitting.  Removed dentures and she has a flap of soft tissue on the upper anterior gum line. .Case discussed with oral surgery who explained this is extra tissue (epulis fissuratum) that often occurs in patients with poor fitting dentures. It should be excised and she should have dentures refitted.    - RN to use dentures only while eating.   - Dental soft diet, viscous lidocaine PRN ordered.   - Needs expedited oral surgery referral at discharge     Chronic anemia  * Baseline Hgb 10-11. Decreased to 8.9 on 1/20 with no s/sx of bleeding. Iron studies normal. Hgb improved to  "baseline 10.6 within 2 weeks (2/3)  - Check Hgb periodically    Recent Labs   Lab 02/03/23  0604   HGB 10.6*         Paroxysmal atrial fibrillation  Hx of DVT  - Not on AV veronica agents prior to admission  - Continues on PTA rivaroxaban      CAD with HFpEF  Hypertension  Hyperlipidemia  * Last echo on 5/5/2022 showed EF of 55 to 60% with normal left ventricular cavity size.   * PTA lisinopril decreased from 40 to 20 mg daily this admission due to hypotension  * RRT activated 1/15 for chest pain. EKG negative for ischemia. Serial troponins negative.  - Continues on lisinopril 20 mg daily  - Continues on PTA atorvastatin 40 mg daily  - If recurrent pain suggestive of CAD, consider NM stress test given underlying known CAD.       Urinary incontinence  Urinary retention, Resolved  * Espino catheter initially placed this admission given worsening decub and contamination with urine. Removed 1/11.   - Has been utilizing external catheter since 1/17  - Continues on PTA tamsulosin     Dementia with behavioral disturbance, intermittent delirium  Schizoaffective disorder   * PTA regimen: venlafaxine 150 mg daily, topiramate 100 mg daily  - Continues on PTA meds  - Delirium precautions in place    Clinically Significant Risk Factors              # Hypoalbuminemia: Lowest albumin = 2.4 g/dL at 1/8/2023  7:01 AM, will monitor as appropriate          # DMII: A1C = 10.0 % (Ref range: 0.0 - 5.6 %) within past 3 months   # Overweight: Estimated body mass index is 29.18 kg/m  as calculated from the following:    Height as of this encounter: 1.626 m (5' 4\").    Weight as of this encounter: 77.1 kg (170 lb).            Diet: Orders Placed This Encounter      Combination Diet Moderate Consistent Carb (60 g CHO per Meal) Diet; Mechanical/Dental Soft Diet     IVF: None   Espino Catheter: Not present     DVT Prophylaxis: DOAC  Code Status: No CPR- Do NOT Intubate     Disposition: Expected discharge: Medically stable when placement is " found  Communication: Discussed with RN on 2/8    Waleska Gar MD  Hospitalist Service  [facility]  Securely message with the [employer] Web Console (learn more here)  Text page via [employer] Paging/Directory    -Data reviewed today: I reviewed all new labs and imaging results over the last 24 hours. I personally reviewed no images or EKG's today.    Physical Exam   Temp: 98.2  F (36.8  C) Temp src: Oral BP: 129/61 Pulse: 72   Resp: 18 SpO2: 96 % O2 Device: None (Room air)    Vitals:    01/06/23 2358 01/14/23 0735 01/21/23 1049   Weight: 70.5 kg (155 lb 6.4 oz) 84 kg (185 lb 1.6 oz) 77.1 kg (170 lb)     Vital Signs with Ranges  Temp:  [97.8  F (36.6  C)-99.7  F (37.6  C)] 98.2  F (36.8  C)  Pulse:  [72-81] 72  Resp:  [16-18] 18  BP: (113-147)/(61-68) 129/61  SpO2:  [96 %-97 %] 96 %  I/O last 3 completed shifts:  In: 240 [P.O.:240]  Out: 950 [Urine:950]    Today's Exam  Constitutional:  NAD, sleeping soundly.     Medications   All medications reviewed on 02/08/23      atorvastatin  40 mg Oral Daily     carboxymethylcellulose PF  1 drop Both Eyes 4x Daily     gabapentin  600 mg Oral TID     insulin aspart  3 Units Subcutaneous TID w/meals     insulin aspart  1-7 Units Subcutaneous TID AC     insulin aspart  1-5 Units Subcutaneous At Bedtime     insulin glargine  15 Units Subcutaneous At Bedtime     levothyroxine  175 mcg Oral QAM AC     lisinopril  20 mg Oral Daily     mirabegron  25 mg Oral At Bedtime     rivaroxaban ANTICOAGULANT  20 mg Oral Daily with breakfast     sennosides  8.6 mg Oral Every Other Day     sitagliptin  100 mg Oral Daily     sodium chloride (PF)  10 mL Intracatheter Q8H     tamsulosin  0.4 mg Oral At Bedtime     topiramate  100 mg Oral Daily     venlafaxine  150 mg Oral Daily     PRN Meds: acetaminophen, albuterol, glucose **OR** dextrose **OR** glucagon, lidocaine 4%, lidocaine (viscous), lidocaine (buffered or not buffered), melatonin, naloxone **OR** naloxone **OR**  naloxone **OR** naloxone, nitroGLYcerin, ondansetron **OR** ondansetron, oxyCODONE, prochlorperazine **OR** prochlorperazine **OR** prochlorperazine, senna-docusate **OR** senna-docusate, sodium chloride (PF), tiZANidine    Data   Recent Labs   Lab 02/08/23  1132 02/08/23  0838 02/08/23  0612 02/04/23  1222 02/04/23  0913 02/03/23  0729 02/03/23  0604 02/02/23  0829 02/02/23  0753   HGB  --   --   --   --   --   --  10.6*  --   --    NA  --   --   --   --   --   --   --   --  140   POTASSIUM  --   --   --   --  4.0  --  3.9  --  3.9  3.9   CHLORIDE  --   --   --   --   --   --   --   --  110*   CO2  --   --   --   --   --   --   --   --  22   BUN  --   --   --   --   --   --   --   --  19.3   CR  --   --   --   --   --   --   --   --  0.70   ANIONGAP  --   --   --   --   --   --   --   --  8   MELISSA  --   --   --   --   --   --   --   --  9.0   * 130* 128*   < >  --    < >  --    < > 140*    < > = values in this interval not displayed.       No results found for this or any previous visit (from the past 24 hour(s)).

## 2023-02-08 NOTE — PLAN OF CARE
A/Ox4. VSS on RA. Pt  declined PRN pain medication. A2 plus lift. Dressing was changed. Pt is T/R q 2 hours, refused twice. On soft mechanical mod CHO diet. Incontinent of B/B, purewick in place. Discharge pending TCU/ long term care placement.

## 2023-02-08 NOTE — PLAN OF CARE
Goal Outcome Evaluation:    Shift note: 7024-1088    A/Ox4. VSS on RA. Complained of pain to her jaw, rating 7/10 and to her left knee; declined PRN pain medication. A2 plus lift. T/R q 2 hours, refuses at times. On soft mechanical mod CHO diet. Incontinent of B/B, purewick in place. WOC completed on shift per orders, plan for additional wound care to be completed this evening. Discharge pending pending placement.

## 2023-02-08 NOTE — PROGRESS NOTES
[facility] Nurse Inpatient Assessment     Consulted for: Sacral wound    Patient History (according to provider note(s):         Miley Tolbert is a 77 year old female with history of hypertension, CHF, atrial fibrillation on Xarelto, history of for lower extremity DVT, coronary artery disease, chronic kidney disease stage III, COPD, hypothyroidism, poorly controlled type 2 diabetes with hyperglycemia, GERD, fecal and urinary incontinence who presents with progressively worsening sacral decubitus ulcer and inability of the family to provide care for the patient    Areas Assessed:      Areas visualized during today's visit: Perineal area, Heels , Sacrum/coccyx, Lower extremities  and heels    Wound location: Left Heel      Last photo: 2/1/23  Wound due to: Pressure Injury  Wound history/plan of care: [place] and family brought patient home with concerns of not being able to care for her. Mepilex in place on assessment today 2/1  Wound base: unable to clearly visualize - appears to be slow to rahul, purple with scaly, cracked epidermis on top     Palpation of the wound bed: resurfaced      Drainage: none      Description of drainage: none      Measurements (length x width x depth, in cm): not measured 2/1/23 d/t pt's report of pain in L knee     Tunneling: N/A     Undermining: N/A  Periwound skin:       Color: pink and brown/waxy yellow build up dead skin       Temperature: normal   Odor: none  Pain: when moving her hips   Pain interventions prior to dressing change: slow and gentle cares   Treatment goal: protect, offload   STATUS: resurfaced   Supplies ordered: at bedside     Wound location: Right Calf    Last photo: 2/1/23  Wound due to: suspect trauma vs pressure  Wound history/plan of care:   Wound base: 50/50 yellow stringy tendon and red kun granulation tissue      Palpation of the wound bed: normal     Drainage: small       Description of drainage: yellow/serosanguinous      Measurements (length x width x depth, in cm): 1 x 0.8 x 0.5 cm     Tunneling: N/A     Undermining: N/A  Periwound skin: Intact      Color: scar tissue       Temperature: normal   Odor: none   Pain: pt occasionally vocalized pain with pressure to wound bed  Pain interventions prior to dressing change: slow and gentle cares  Treatment goal: Drainage control, Infection control/prevention, Decrease moisture, Protection and protect tendon  STATUS: decrease in size and drainge   Supplies ordered: aquacel at bedside     Wound location: Coccyx/right buttock         Last photo: 2/1//23  Wound due to: Pressure Injury  Stage 4 pressure injury Present on admission  Wound history/plan of care: pt reports she all of a sudden developed several wounds a few months ago. On assessment 2/1, sacral Mepilex in place on pt's sacrum not actually covering wounded skin. Pt is incontinent of bowel. Purewick in use but Dri Jayme pad under patient was saturated with urine.  Wound base: kun red      Palpation of the wound bed: normal      Drainage: moderate     Description of drainage: serosanguinous and bloody     Measurements (length x width x depth, in cm):   Coccyx 2.5 x 2 x 1.3 cm and   Right buttock 2 x 1.5 x 0.1 cm,   R upper thigh still open, all other areas are epithelialized   Tunneling: N/A     Undermining: from 12 to 6 to 3 cm   Periwound skin: macerated, Scar tissue and hyperpigmentation      Color: pale tissue due to scarring and superior to wound hyperpigmentation      Temperature: normal   Odor: none  Pain: intermittently during dressing change   Pain interventions prior to dressing change: slow and gentle cares  Treatment goal: Infection control/prevention, Increase granulation, decrease moisture  STATUS: improving   Supplies ordered: supplies at bedside    Skin Injury Location: pannus- improved   Last photo: none taken  Skin injury due to: Intertrigo  Skin history and plan of  care:   Pt with deep creases and few scattered open areas   Affected area:      Skin assessment: Superficial Erosion of epidermis     Measurements (length x width x depth, in cm) now red but intact        Temperature  normal      Drainage: none      Color: none      Odor: none  Pain: denies , none  Pain interventions prior to dressing change: N/A  Treatment goal: Heal  and Decrease moisture  STATUS: improving   Supplies ordered: none     Skin Injury Location: right breast - improved   Last photo: none taken   Skin injury due to: Intertrigo  Skin history and plan of care:  Pt with deep breast creases and 1 open area  Affected area:      Skin assessment: Superficial Erosion of epidermis     Measurements (length x width x depth, in cm) now pink      Color: pink     Temperature  normal      Drainage: scant .      Color: serous      Odor: none  Pain: denies , none  Pain interventions prior to dressing change: N/A  Treatment goal: Heal  and Decrease moisture  STATUS: improving now interdry ag is appropriate   Supplies ordered: at bedside       Treatment Plan:   Pannus and Right Breast: Daily  1. DO not use interdry (#890854) with any other creams or powder.  2. Wash skin gently. Pat dry, do not rub.  3. Cut the appropriate size of interdry (#120555) with clean scissors, allowing a minimum of 2 inches of the fabric exposed outside the skin fold for moisture evaporation.  4. Lay a single layer of fabric in the skin fold, placing one edge of the fabric into the base of the fold. Gently smooth the rest of the fabric over the skin, keeping it flat. Leave at least 2 inches of the fabric exposed outside the skin fold.  5. Secure the fabric in one of several ways; with the skin fold, with a small amount of tape, or tucked under clothing.    When to Dispose: Each piece of InterDry may be used up to 5 days, depending on fabric soiling, odor, amount of moisture and general skin condition. May label with skin marker to  date      Removal: Remove the fabric before bathing and reuse when finished. When removing the fabric from skin folds, gently separate the skin fold and lift away fabric.      Coccyx and right buttock wound - every other day and prn undermining with stool    Apply Vashe #[id number] moistened kerlix to wounds and periwound skin x 5 minutes   Remove gauze and do not rinse  Fill deep wound with Aquacel Ag #[id number] making sure to fill undermining first then fill the base  Apply Aquacel Ag to right buttock wound  Apply sacral foam dressing     Right lateral lower leg - every 3 days and prn soak through   Apply vashe #[id number] moistened kerlix to wounds and periwound skin x 5 minutes   Remove and do not rise  Apply a small Aquacel Ag #[id number] to wound bed only   Apply 4x4 Optifoam gentle #[id number] due to discomfort with Mepilex foam     BLE and feet - every other day until all the dry build up is gone then discontinue    Wash them with danielle bath wipes   Apply kamala perineal cleanser liberally and rub it in   Then apply sween cream - avoid between toes    Orders: Updated     RECOMMEND PRIMARY TEAM ORDER: None, at this time  Education provided: Change the POC the FOC   Discussed plan of care with: Patient and Nurse  WOC nurse follow-up plan: weekly  Notify WOC if wound(s) deteriorate.  Nursing to notify the Provider(s) and re-consult the WOC Nurse if new skin concern.    DATA:     Current support surface: Standard  Low air loss (PETE pump, Isolibrium, Pulsate, skin guard, etc)  Containment of urine/stool: Incontinence Protocol, Incontinent pad in bed and Purewick external catheter   BMI: Body mass index is 29.18 kg/m .   Active diet order: Orders Placed This Encounter      Combination Diet Moderate Consistent Carb (60 g CHO per Meal) Diet; Mechanical/Dental Soft Diet     Output: I/O last 3 completed shifts:  In: 480 [P.O.:480]  Out: 650 [Urine:650]     Labs:   Recent Labs   Lab 02/03/23  0604   HGB 10.6*     Pressure injury risk  "assessment:   Sensory Perception: 3-->slightly limited  Moisture: 3-->occasionally moist  Activity: 1-->bedfast  Mobility: 1-->completely immobile  Nutrition: 3-->adequate  Friction and Shear: 1-->problem  Naif Score: 12    Nita Siegel RN, CWOCN  Contact via Vocera at name or \"[facility] nurse\"  Dept. Office Number: [phone]    "

## 2023-02-09 NOTE — PLAN OF CARE
Primary Diagnosis: Admitted 1/6 for wounds & LTC placement  Orientation: A/O x4, forgetful   Aggression Stop Light: green  Vitals: VSS on RA  Mobility: total care, lift. Refuses repositioning in bed, educated but gets upset when you even ask  Pain Management: declines this shift, pain in mouth and sacrum  Diet: Mod CHO/dental soft, good appetite- dentures only to be worn when eating  Bowel/Bladder: Incontinent, purewick in place/changed.   Abnormal Lab/Assessments: BGM 92, 116  Drain/Device/Wound: No IV access- MD aware. R calf, L heel (KING), L buttock, sacral wounds changed 2/8 (next changes tomorrow and 2/10) Refused rooke boots. Refuses cleaning of legs and under breast/abdominal fold per orders. States it was done already yesterday. Educated it should be completed daily.   Consults: WOC, DEANNA  D/C Day/Goals/Place: pending LTC placement  Other:

## 2023-02-09 NOTE — PLAN OF CARE
Goal Outcome Evaluation:    Shift note: [id number]    A/Ox4. VSS on RA. Complained of pain to her jaw, rating 6/10. Declined PRN pain medication and did not want ice pack. A2 plus lift. T/R q 2 hours, refuses at times. On soft mechanical mod CHO diet. BG elevated, MD added orders for 3 units insulin with meals. Incontinent of B/B, purewick in place. No IV, MD okay. Wound cares completed on shift. WOC updated orders today. Discharge pending pending placement.

## 2023-02-09 NOTE — PLAN OF CARE
1900 - 6930    A&Ox3, disoriented to situation. VSS on RA. Denies N/V. C/o leg and jaw pain, refused PRN pain meds. No IV access - MD aware. Up A2 w/lift. T/R q 2 hrs as pt allows. Mod carb, mech/soft diet. BG checks, no corrections needed. Incontinent of B/B, purewick in place with good UOP, 1 BM overnight. Dressings to coccyx and R calf - CDI. WOC and SW following. Discharge pending placement.

## 2023-02-10 NOTE — PLAN OF CARE
Goal Outcome Evaluation:    Patient is alert and oriented x 4, forgetful. Denies pain/N/V and SOB. Tolerating mod carb. Vital signs stable with on room air. Up A/2 with lift. Turn/reposition q 2 hrs x 1 this shift, pt refused most of time.  B/B incontinent, purewick in place with adequate UOP.  Dressing on sacrum changed. BM x 1. Discharge plan pending.

## 2023-02-10 NOTE — PROGRESS NOTES
VSS. A/O. Repo Q2. Coccyx wound dressing changed. R leg dressing CDI. mapilex applied on L heel. Very scaly/tender/sensative/painful to touch. Oxy given once. incont B/B. purewick in use.

## 2023-02-10 NOTE — PROGRESS NOTES
[facility]    Hospitalist Progress Note    Date of Service (when I saw the patient): 02/09/2023  Admit date: 1/6/2023    Interval History   Full details of events over last 24 hours outlined below.   Donald remains afebrile.  Hemodynamically stable.  Oxygenation is 96% on room air  No oxycodone use in the last 3 days.  Patient smiles and greets me.  She states she actually got to sleep today.  She is very happy about that.  Otherwise no concerns.    Assessment & Plan   Miley Tolbert is a 77 year old female with complex PMHx including paroxysmal a-fib and prior DVT on chronic AC with rivaroaban, CHF, CAD, CKD, COPD, poorly controlled DM2, fecal/urinary incontinence, and chronic wounds who was admitted on 1/6/2023 for worsening sacral decubitus ulcer and need for placement due to inability of the family to provide adequate care for the patient. She is now waiting for long term care placement     Stage IV sacral wound   Stage II left calf and heel wounds  Initial concern for sepsis, Resolved  * Presented with worsening sacral decubitus ulcer that was reportedly foul smelling and soiled with urine/stool. Untreated wounds on left calf and heel also noted on admission. Afebrile with normal WBC at presentation.   * In the ED, she was empirically initiated on IV vancomycin and pip-tazo for presumed sacral wound infection though ultimately not felt to be infected.   * CT abd/pelvis and right thigh (1/11) ordered to evaluate for other potential sources of infection and to evaluate for hematoma given ongoing drop in hemoglobin. No source of infection or hematoma noted.  * Blood cultures remained negative.   * Received 2 doses of IV vancomycin and 5-day course of IV pip-tazo this admission  * Podiatry consulted this admission. ABIs normal. No surgical intervention recommended  - Wound cares in place per [facility]     Chronic pain syndrome  * PRN oxycodone started this admission for pain control.  Patient was using 20 to 25 mg/day. Given her advanced age dementia, schizophrenia, she is at high risk for complications. Hospitalist had long conversation with patient on 1/30 regarding opioid use, and she eventually agreed to decrease her use of opioids. This conversation demonstrates that we must guide patient regarding pain med use  - Now on oxycodone 5 mg q6h prn for pain control;   - On 2/8 she has only been using 0 to 5 mg a day and has actually been declining oxycodone.   - Continue to limit for severe pain only.      DM2 with peripheral neuropathy, long term insulin use  Hypoglycemia, Resolved  * A1c 10  * PTA regimen: sitagliptin 100 mg daily, Lantus 35 units, apart 10-14 units TID AC  * Insulin adjusted this admission due to intermittent hypoglycemia  - Currently on Lantus 15 units qhs  - Continues on PTA sitagliptin  - Medium SSI available   - Add 3 units aspart with meals on 02/08/23 for better BS control during the day.    Recent Labs   Lab 02/09/23  1641 02/09/23  1216 02/09/23  1155 02/09/23  0800 02/09/23  0204 02/08/23  2121   * 122* 119* 92 114* 126*       Poor fitting dentures with associated pain  Epulis fissuratum  * Oral pain noted during admission. No oral lesions noted, but her dentures are very poor fitting.  Removed dentures and she has a flap of soft tissue on the upper anterior gum line. .Case discussed with oral surgery who explained this is extra tissue (epulis fissuratum) that often occurs in patients with poor fitting dentures. It should be excised and she should have dentures refitted.    - RN to use dentures only while eating.   - Dental soft diet, viscous lidocaine PRN ordered.   - Needs expedited oral surgery referral at discharge     Chronic anemia  * Baseline Hgb 10-11. Decreased to 8.9 on 1/20 with no s/sx of bleeding. Iron studies normal. Hgb improved to baseline 10.6 within 2 weeks (2/3)  - Check Hgb periodically    Recent Labs   Lab 02/03/23  0604   HGB 10.6*     "     Paroxysmal atrial fibrillation  Hx of DVT  - Not on AV veronica agents prior to admission  - Continues on PTA rivaroxaban      CAD with HFpEF  Hypertension  Hyperlipidemia  * Last echo on 5/5/2022 showed EF of 55 to 60% with normal left ventricular cavity size.   * PTA lisinopril decreased from 40 to 20 mg daily this admission due to hypotension  * RRT activated 1/15 for chest pain. EKG negative for ischemia. Serial troponins negative.  - Continues on lisinopril 20 mg daily  - Continues on PTA atorvastatin 40 mg daily  - If recurrent pain suggestive of CAD, consider NM stress test given underlying known CAD.       Urinary incontinence  Urinary retention, Resolved  * Espino catheter initially placed this admission given worsening decub and contamination with urine. Removed 1/11.   - Has been utilizing external catheter since 1/17  - Continues on PTA tamsulosin     Dementia with behavioral disturbance, intermittent delirium  Schizoaffective disorder   * PTA regimen: venlafaxine 150 mg daily, topiramate 100 mg daily  - Continues on PTA meds  - Delirium precautions in place    Clinically Significant Risk Factors              # Hypoalbuminemia: Lowest albumin = 2.4 g/dL at 1/8/2023  7:01 AM, will monitor as appropriate          # DMII: A1C = 10.0 % (Ref range: 0.0 - 5.6 %) within past 3 months   # Overweight: Estimated body mass index is 29.18 kg/m  as calculated from the following:    Height as of this encounter: 1.626 m (5' 4\").    Weight as of this encounter: 77.1 kg (170 lb).            Diet: Orders Placed This Encounter      Combination Diet Moderate Consistent Carb (60 g CHO per Meal) Diet; Mechanical/Dental Soft Diet     IVF: None   Espino Catheter: Not present     DVT Prophylaxis: DOAC  Code Status: No CPR- Do NOT Intubate     Disposition: Expected discharge: Medically stable when placement is found  Communication: Discussed with patient on 02/09/23    Waleska Gar MD  Hospitalist Service  [facility] " [facility]  Securely message with the Vocera Web Console (learn more here)  Text page via [employer] Paging/Directory    -Data reviewed today: I reviewed all new labs and imaging results over the last 24 hours. I personally reviewed no images or EKG's today.    Physical Exam   Temp: 98.5  F (36.9  C) Temp src: Oral BP: 119/58 Pulse: 84   Resp: 18 SpO2: 96 % O2 Device: None (Room air)    Vitals:    01/06/23 2358 01/14/23 0735 01/21/23 1049   Weight: 70.5 kg (155 lb 6.4 oz) 84 kg (185 lb 1.6 oz) 77.1 kg (170 lb)     Vital Signs with Ranges  Temp:  [98.5  F (36.9  C)-99.5  F (37.5  C)] 98.5  F (36.9  C)  Pulse:  [71-84] 84  Resp:  [18] 18  BP: (119-154)/(57-70) 119/58  SpO2:  [96 %] 96 %  I/O last 3 completed shifts:  In: 480 [P.O.:480]  Out: 1650 [Urine:1650]    Today's Exam  Constitutional:  NAD,   Neuropsyche: Smiling, alert, interactive answers questions appropriately. Speech normal, face symmetric and moving all 4 extremities without gross focal neurological deficit.  Respiratory: Breathing comfortably,  GI: soft, NT/ND, BS normal  Skin/Integumen:  No acute rash or sign of bleeding.         Medications   All medications reviewed on 02/09/23      atorvastatin  40 mg Oral Daily     carboxymethylcellulose PF  1 drop Both Eyes 4x Daily     gabapentin  600 mg Oral TID     insulin aspart  3 Units Subcutaneous TID w/meals     insulin aspart  1-7 Units Subcutaneous TID AC     insulin aspart  1-5 Units Subcutaneous At Bedtime     insulin glargine  15 Units Subcutaneous At Bedtime     levothyroxine  175 mcg Oral QAM AC     lisinopril  20 mg Oral Daily     mirabegron  25 mg Oral At Bedtime     rivaroxaban ANTICOAGULANT  20 mg Oral Daily with breakfast     sennosides  8.6 mg Oral Every Other Day     sitagliptin  100 mg Oral Daily     tamsulosin  0.4 mg Oral At Bedtime     topiramate  100 mg Oral Daily     venlafaxine  150 mg Oral Daily     PRN Meds: acetaminophen, albuterol, glucose **OR** dextrose **OR** glucagon,  lidocaine 4%, lidocaine (viscous), lidocaine (buffered or not buffered), melatonin, naloxone **OR** naloxone **OR** naloxone **OR** naloxone, nitroGLYcerin, ondansetron **OR** ondansetron, oxyCODONE, prochlorperazine **OR** prochlorperazine **OR** prochlorperazine, senna-docusate **OR** senna-docusate, tiZANidine    Data   Recent Labs   Lab 02/09/23  1641 02/09/23  1216 02/09/23  1155 02/04/23  1222 02/04/23  0913 02/03/23  0729 02/03/23  0604   HGB  --   --   --   --   --   --  10.6*   POTASSIUM  --   --   --   --  4.0  --  3.9   * 122* 119*   < >  --    < >  --     < > = values in this interval not displayed.       No results found for this or any previous visit (from the past 24 hour(s)).

## 2023-02-10 NOTE — PROGRESS NOTES
"SPIRITUAL HEALTH SERVICES Progress Note  [facility] [address]    Saw pt Miley (\"Donald\") M Tomasz per follow-up. Assessed for SH needs and offered emotional support as Donald reflected.      Patient/Family Understanding of Illness and Goals of Care - Donald tearfully reported, \"This position is painful,\" and requested support from her nurse. Bedside nurse checked-in during our visit.      Distress and Loss - Donald expressed pain and distress regarding her current \"position on my back\" in bed.       Strengths, Coping, and Resources -  o Donald welcomed prayer to \"relieve my pain and heal me.\" I provided a prayer, incorporating this request.   o At the end of our visit, Donald named that \"talking helped get my mind off the pain for a bit.\"      Meaning, Beliefs, and Spirituality -   o Donald reflected on aging and her family lineage.  o Donald shared that she has been \"talking with God... I asked God to bring me to a better place.\"      Plan of Care - I will continue to follow as appropriate. [facility] remains available.    Giselle Vargas MDiv  Chaplain Resident  Pager: [phone]     [facility]S available 24/7 for emergent requests/referrals, either by having the on-call  paged or by entering an ASAP/STAT consult in Epic (this will also page the on-call ).  "

## 2023-02-10 NOTE — PLAN OF CARE
Goal Outcome Evaluation:         Shift note: 2300 - 0730    A&Ox4, forgetful. VSS on RA. Pain/tenderness to BLE, exacerbated with touch - declined interventions. Tolerating mod carb diet,  overnight. A2 lift, tolerated q2h repositioning. Incontinent - purewick in place, no BM this shift. Sacral dressing changed at prior shift, WOC following. Discharge pending placement.

## 2023-02-11 NOTE — PLAN OF CARE
Pt is A&Ox4, VSS on RA. She declined to be repositioned at times. Dressings are all CDI. Pt c/o pain in her legs but declined any PRNs. Purewick is in place. Pending LTC placement.

## 2023-02-11 NOTE — PLAN OF CARE
Care plan note:      Recent Vitals:  Temp: 99  F (37.2  C) Temp src: Oral BP: 127/65 Pulse: 89   Resp: 18 SpO2: 98 % O2 Device: None (Room air)      Orientation/Neuro: Alert and Oriented x 4  Pain: Pain is controlled without any medications.   Tele: NA.   IV medications: None   Mobility: Assist of 2 and Total w/ lift   Skin: Wounds: Coccyx, right lateral leg and  left heel.   GI:  Stool incontinence.  : Using Purewick     Diet: Tolerating diet:   Well  Orders Placed This Encounter      Combination Diet Moderate Consistent Carb (60 g CHO per Meal) Diet; Mechanical/Dental Soft Diet      Safety/Concerns:  Fall Risk and Naif Risk  Aggression Color: Green    Plan: Wound care completed, pt denies pain at rest however severe with activities. Awaiting placement.    Continue to monitor.      Cathy Howell RN

## 2023-02-11 NOTE — PROGRESS NOTES
[facility]    Hospitalist Progress Note    Date of Service (when I saw the patient): 02/10/2023  Admit date: 1/6/2023    Interval History   Full details of events over last 24 hours outlined below.   Donald remains afebrile.  Hemodynamically stable.  Oxygenation is 96% on room air  No oxycodone use in the last 3 days.  Patient smiling and states she had a very light dinner today and then goes on to tell me what she ate.  She denies any discomfort or concerns.    Assessment & Plan   Miley Tolbert is a 77 year old female with complex PMHx including paroxysmal a-fib and prior DVT on chronic AC with rivaroaban, CHF, CAD, CKD, COPD, poorly controlled DM2, fecal/urinary incontinence, and chronic wounds who was admitted on 1/6/2023 for worsening sacral decubitus ulcer and need for placement due to inability of the family to provide adequate care for the patient. She is now waiting for long term care placement     Stage IV sacral wound   Stage II left calf and heel wounds  Initial concern for sepsis, Resolved  * Presented with worsening sacral decubitus ulcer that was reportedly foul smelling and soiled with urine/stool. Untreated wounds on left calf and heel also noted on admission. Afebrile with normal WBC at presentation.   * In the ED, she was empirically initiated on IV vancomycin and pip-tazo for presumed sacral wound infection though ultimately not felt to be infected.   * CT abd/pelvis and right thigh (1/11) ordered to evaluate for other potential sources of infection and to evaluate for hematoma given ongoing drop in hemoglobin. No source of infection or hematoma noted.  * Blood cultures remained negative.   * Received 2 doses of IV vancomycin and 5-day course of IV pip-tazo this admission  * Podiatry consulted this admission. ABIs normal. No surgical intervention recommended  - Wound cares in place per [facility]     Chronic pain syndrome  * PRN oxycodone started this admission for pain  control. Patient was using 20 to 25 mg/day. Given her advanced age dementia, schizophrenia, she is at high risk for complications. Hospitalist had long conversation with patient on 1/30 regarding opioid use, and she eventually agreed to decrease her use of opioids. This conversation demonstrates that we must guide patient regarding pain med use  - Now on oxycodone 5 mg q6h prn for pain control   - As of 2/10 using only 0 - 5 mg of oxycodone/day for the last week  - Continue to limit for severe pain only.      DM2 with peripheral neuropathy, long term insulin use  Hypoglycemia, Resolved  * A1c 10  * PTA regimen: sitagliptin 100 mg daily, Lantus 35 units, apart 10-14 units TID AC  * Insulin adjusted this admission due to intermittent hypoglycemia  - Currently on Lantus 15 units qhs  - Continues on PTA sitagliptin  - Medium SSI available   - Added 3 units aspart with meals on 02/08/23 for better BS control during the day.    Recent Labs   Lab 02/10/23  1657 02/10/23  1203 02/10/23  0807 02/10/23  0132 02/09/23  2152 02/09/23  1641   * 137* 120* 127* 155* 158*       Poor fitting dentures with associated pain  Epulis fissuratum  * Oral pain noted during admission. No oral lesions noted, but her dentures are very poor fitting.  Removed dentures and she has a flap of soft tissue on the upper anterior gum line. .Case discussed with oral surgery who explained this is extra tissue (epulis fissuratum) that often occurs in patients with poor fitting dentures. It should be excised and she should have dentures refitted.    - RN to use dentures only while eating.   - Dental soft diet, viscous lidocaine PRN ordered.   - Needs expedited oral surgery referral at discharge     Chronic anemia  * Baseline Hgb 10-11. Decreased to 8.9 on 1/20 with no s/sx of bleeding. Iron studies normal. Hgb improved to baseline 10.6 within 2 weeks (2/3)  - Check Hgb periodically    No results for input(s): HGB in the last 168 hours.  "     Paroxysmal atrial fibrillation  Hx of DVT  - Not on AV veronica agents prior to admission  - Continues on PTA rivaroxaban      CAD with HFpEF  Hypertension  Hyperlipidemia  * Last echo on 5/5/2022 showed EF of 55 to 60% with normal left ventricular cavity size.   * PTA lisinopril decreased from 40 to 20 mg daily this admission due to hypotension  * RRT activated 1/15 for chest pain. EKG negative for ischemia. Serial troponins negative.  - Continues on lisinopril 20 mg daily  - Continues on PTA atorvastatin 40 mg daily  - If recurrent pain suggestive of CAD, consider NM stress test given underlying known CAD.       Urinary incontinence  Urinary retention, Resolved  * Espino catheter initially placed this admission given worsening decub and contamination with urine. Removed 1/11.   - Has been utilizing external catheter since 1/17  - Continues on PTA tamsulosin     Dementia with behavioral disturbance, intermittent delirium  Schizoaffective disorder   * PTA regimen: venlafaxine 150 mg daily, topiramate 100 mg daily  - Continues on PTA meds  - Delirium precautions in place    Clinically Significant Risk Factors              # Hypoalbuminemia: Lowest albumin = 2.4 g/dL at 1/8/2023  7:01 AM, will monitor as appropriate          # DMII: A1C = 10.0 % (Ref range: 0.0 - 5.6 %) within past 3 months   # Overweight: Estimated body mass index is 29.18 kg/m  as calculated from the following:    Height as of this encounter: 1.626 m (5' 4\").    Weight as of this encounter: 77.1 kg (170 lb).            Diet: Orders Placed This Encounter      Combination Diet Moderate Consistent Carb (60 g CHO per Meal) Diet; Mechanical/Dental Soft Diet     IVF: None   Espino Catheter: Not present     DVT Prophylaxis: DOAC  Code Status: No CPR- Do NOT Intubate     Disposition: Expected discharge: Medically stable when placement is found  Communication: Discussed with patient on 02/10/23    Waleska Gar MD  Hospitalist Service  [facility] " [facility]  Securely message with the Vocera Web Console (learn more here)  Text page via [employer] Paging/Directory    -Data reviewed today: I reviewed all new labs and imaging results over the last 24 hours. I personally reviewed no images or EKG's today.    Physical Exam   Temp: 98.3  F (36.8  C) Temp src: Oral BP: 114/67 Pulse: 88   Resp: 18 SpO2: 98 % O2 Device: None (Room air)    Vitals:    01/06/23 2358 01/14/23 0735 01/21/23 1049   Weight: 70.5 kg (155 lb 6.4 oz) 84 kg (185 lb 1.6 oz) 77.1 kg (170 lb)     Vital Signs with Ranges  Temp:  [98.2  F (36.8  C)-98.4  F (36.9  C)] 98.3  F (36.8  C)  Pulse:  [74-88] 88  Resp:  [16-18] 18  BP: (114-139)/(67-70) 114/67  SpO2:  [98 %-99 %] 98 %  I/O last 3 completed shifts:  In: 300 [P.O.:300]  Out: 800 [Urine:800]    Today's Exam  Constitutional:  NAD,   Neuropsyche: Smiling, alert, interactive answers questions appropriately. Speech normal, face symmetric and moving all 4 extremities without gross focal neurological deficit.  Respiratory: Breathing comfortably,  GI: soft, NT/ND, BS normal  Skin/Integumen:  No acute rash or sign of bleeding.         Medications   All medications reviewed on 02/10/23      atorvastatin  40 mg Oral Daily     carboxymethylcellulose PF  1 drop Both Eyes 4x Daily     gabapentin  600 mg Oral TID     insulin aspart  3 Units Subcutaneous TID w/meals     insulin aspart  1-7 Units Subcutaneous TID AC     insulin aspart  1-5 Units Subcutaneous At Bedtime     insulin glargine  15 Units Subcutaneous At Bedtime     levothyroxine  175 mcg Oral QAM AC     lisinopril  20 mg Oral Daily     mirabegron  25 mg Oral At Bedtime     multivitamin, therapeutic  1 tablet Oral Daily     rivaroxaban ANTICOAGULANT  20 mg Oral Daily with breakfast     sennosides  8.6 mg Oral Every Other Day     sitagliptin  100 mg Oral Daily     tamsulosin  0.4 mg Oral At Bedtime     topiramate  100 mg Oral Daily     venlafaxine  150 mg Oral Daily     PRN Meds: acetaminophen,  albuterol, glucose **OR** dextrose **OR** glucagon, lidocaine 4%, lidocaine (viscous), lidocaine (buffered or not buffered), melatonin, naloxone **OR** naloxone **OR** naloxone **OR** naloxone, nitroGLYcerin, ondansetron **OR** ondansetron, oxyCODONE, prochlorperazine **OR** prochlorperazine **OR** prochlorperazine, senna-docusate **OR** senna-docusate, tiZANidine    Data   Recent Labs   Lab 02/10/23  1657 02/10/23  1203 02/10/23  0807 02/10/23  0655 02/04/23  1222 02/04/23  0913   POTASSIUM  --   --   --  3.9  --  4.0   * 137* 120*  --    < >  --     < > = values in this interval not displayed.       No results found for this or any previous visit (from the past 24 hour(s)).

## 2023-02-11 NOTE — PROGRESS NOTES
[facility]    Hospitalist Progress Note    Date of Service (when I saw the patient): 02/11/2023  Admit date: 1/6/2023    Assessment & Plan   Miley Tolbert is a 77 year old female with complex PMHx including paroxysmal a-fib and prior DVT on chronic AC with rivaroaban, CHF, CAD, CKD, COPD, poorly controlled DM2, fecal/urinary incontinence, and chronic wounds who was admitted on 1/6/2023 for worsening sacral decubitus ulcer and need for placement due to inability of the family to provide adequate care for the patient. She is now waiting for long term care placement     Stage IV sacral wound   Stage II left calf and heel wounds  Initial concern for sepsis, Resolved  * Presented with worsening sacral decubitus ulcer that was reportedly foul smelling and soiled with urine/stool. Untreated wounds on left calf and heel also noted on admission. Afebrile with normal WBC at presentation.   * In the ED, she was empirically initiated on IV vancomycin and pip-tazo for presumed sacral wound infection though ultimately not felt to be infected.   * CT abd/pelvis and right thigh (1/11) ordered to evaluate for other potential sources of infection and to evaluate for hematoma given ongoing drop in hemoglobin. No source of infection or hematoma noted.  * Blood cultures remained negative.   * Received 2 doses of IV vancomycin and 5-day course of IV pip-tazo this admission  * Podiatry consulted this admission. ABIs normal. No surgical intervention recommended    Wound cares in place per WOCN     Chronic pain syndrome  * PRN oxycodone started this admission for pain control. Patient was using 20 to 25 mg/day. Given her advanced age dementia, schizophrenia, she is at high risk for complications. Hospitalist had long conversation with patient on 1/30 regarding opioid use, and she eventually agreed to decrease her use of opioids. This conversation demonstrates that we must guide patient regarding pain med  use    Now on oxycodone 5 mg q6h prn for pain control     As of 2/10 using only 0 - 5 mg of oxycodone/day for the last week    Continue to limit for severe pain only.      DM2 with peripheral neuropathy, long term insulin use  Hypoglycemia, Resolved  * A1c 10  * PTA regimen: sitagliptin 100 mg daily, Lantus 35 units, apart 10-14 units TID AC  * Insulin adjusted this admission due to intermittent hypoglycemia    Currently on Lantus 15 units at bedtime    Continues on PTA sitagliptin    Medium SSI available     Added 3 units aspart with meals on 02/08/23 for better BS control during the day.    Most blood sugar readings are at goal    Recent Labs   Lab 02/11/23  0807 02/11/23  0157 02/10/23  2142 02/10/23  1657 02/10/23  1203 02/10/23  0807   * 125* 195* 171* 137* 120*       Poor fitting dentures with associated pain  Epulis fissuratum  * Oral pain noted during admission. No oral lesions noted, but her dentures are very poor fitting.  Removed dentures and she has a flap of soft tissue on the upper anterior gum line. .Case discussed with oral surgery who explained this is extra tissue (epulis fissuratum) that often occurs in patients with poor fitting dentures. It should be excised and she should have dentures refitted.      RN to use dentures only while eating    Dental soft diet, viscous lidocaine PRN ordered.     Needs expedited oral surgery referral at discharge     Chronic anemia  * Baseline Hgb 10-11. Decreased to 8.9 on 1/20 with no s/sx of bleeding. Iron studies normal. Hgb improved to baseline 10.6 within 2 weeks (2/3)    Check Hgb periodically    No results for input(s): HGB in the last 168 hours.      Paroxysmal atrial fibrillation  Hx of DVT    Not on AV veronica agents prior to admission    Continues on PTA rivaroxaban      CAD with HFpEF  Hypertension  Hyperlipidemia  * Last echo on 5/5/2022 showed EF of 55 to 60% with normal left ventricular cavity size.   * PTA lisinopril decreased from 40 to 20 mg  "daily this admission due to hypotension  * RRT activated 1/15 for chest pain. EKG negative for ischemia. Serial troponins negative.  - Continues on lisinopril 20 mg daily  - Continues on PTA atorvastatin 40 mg daily  - If recurrent pain suggestive of CAD, consider NM stress test given underlying known CAD.       Urinary incontinence  Urinary retention, Resolved  * Espino catheter initially placed this admission given worsening decub and contamination with urine. Removed 1/11.     Has been utilizing external catheter since 1/17    Continues on PTA tamsulosin     Dementia with behavioral disturbance, intermittent delirium  Schizoaffective disorder   * PTA regimen: venlafaxine 150 mg daily, topiramate 100 mg daily    Continues on PTA meds    Delirium precautions in place    Clinically Significant Risk Factors              # Hypoalbuminemia: Lowest albumin = 2.4 g/dL at 1/8/2023  7:01 AM, will monitor as appropriate          # DMII: A1C = 10.0 % (Ref range: 0.0 - 5.6 %) within past 3 months   # Overweight: Estimated body mass index is 29.18 kg/m  as calculated from the following:    Height as of this encounter: 1.626 m (5' 4\").    Weight as of this encounter: 77.1 kg (170 lb).            Diet: Orders Placed This Encounter      Combination Diet Moderate Consistent Carb (60 g CHO per Meal) Diet; Mechanical/Dental Soft Diet     IVF: None   Espino Catheter: Not present     DVT Prophylaxis: DOAC  Code Status: No CPR- Do NOT Intubate     Disposition: Expected discharge: Medically stable when placement is found  Communication: Discussed with patient on 02/10/23    Jenni Edwards MD  Hospitalist Service  [facility]  Securely message with the Vocera Web Console (learn more here)  Text page via [email or web address] Paging/Directory    -Data reviewed today: I reviewed all new labs and imaging results over the last 24 hours. I personally reviewed no images or EKG's today.    Interval History   \"Everybody wants to look at " "my butt.  I wish they would treat me with respect.\"  Donald says she is tired of sitting in her bed but, \"they want to get me off my butt.\"  She has no new respiratory or GI complaints.     Physical Exam   Temp: 98.2  F (36.8  C) Temp src: Oral BP: 122/50 Pulse: 74   Resp: 18 SpO2: 96 % O2 Device: None (Room air)    Vitals:    01/06/23 2358 01/14/23 0735 01/21/23 1049   Weight: 70.5 kg (155 lb 6.4 oz) 84 kg (185 lb 1.6 oz) 77.1 kg (170 lb)     Vital Signs with Ranges  Temp:  [98.2  F (36.8  C)-98.3  F (36.8  C)] 98.2  F (36.8  C)  Pulse:  [74-88] 74  Resp:  [16-18] 18  BP: (101-122)/(50-67) 122/50  SpO2:  [96 %-98 %] 96 %  I/O last 3 completed shifts:  In: 300 [P.O.:300]  Out: 750 [Urine:750]    Today's Exam  Constitutional: Awake, alert  Respiratory: Clear to auscultation bilaterally, no crackles or wheezing  Cardiovascular: Regular rate and rhythm  GI: Normal bowel sounds, soft, non-distended, non-tender  Skin/Integumen:  No lower extremity edema  Other: Mood is upset      Medications   All medications reviewed on 02/10/23      atorvastatin  40 mg Oral Daily     carboxymethylcellulose PF  1 drop Both Eyes 4x Daily     gabapentin  600 mg Oral TID     insulin aspart  3 Units Subcutaneous TID w/meals     insulin aspart  1-7 Units Subcutaneous TID AC     insulin aspart  1-5 Units Subcutaneous At Bedtime     insulin glargine  15 Units Subcutaneous At Bedtime     levothyroxine  175 mcg Oral QAM AC     lisinopril  20 mg Oral Daily     mirabegron  25 mg Oral At Bedtime     multivitamin, therapeutic  1 tablet Oral Daily     rivaroxaban ANTICOAGULANT  20 mg Oral Daily with breakfast     sennosides  8.6 mg Oral Every Other Day     sitagliptin  100 mg Oral Daily     tamsulosin  0.4 mg Oral At Bedtime     topiramate  100 mg Oral Daily     venlafaxine  150 mg Oral Daily     PRN Meds: acetaminophen, albuterol, glucose **OR** dextrose **OR** glucagon, lidocaine 4%, lidocaine (viscous), lidocaine (buffered or not buffered), " melatonin, naloxone **OR** naloxone **OR** naloxone **OR** naloxone, nitroGLYcerin, ondansetron **OR** ondansetron, oxyCODONE, prochlorperazine **OR** prochlorperazine **OR** prochlorperazine, senna-docusate **OR** senna-docusate, tiZANidine    Data   Recent Labs   Lab 02/11/23  0807 02/11/23  0157 02/10/23  2142 02/10/23  0807 02/10/23  0655   POTASSIUM  --   --   --   --  3.9   * 125* 195*   < >  --     < > = values in this interval not displayed.       No results found for this or any previous visit (from the past 24 hour(s)).

## 2023-02-11 NOTE — PLAN OF CARE
Goal Outcome Evaluation:         Orientation: A+Ox4    Vitals/Tele: VSS, on RA    IV Access/drains: No IV access    Diet: Mod carbs     Mobility; A2 with lift    GI/; Incontinent of B+B, has external catheter    Wound/Skin: Pressure wounds on R calf, L heel, buttocks     Consults: WOC    Discharge Plan: Pending       See Flow sheets for assessment

## 2023-02-12 NOTE — PLAN OF CARE
Pt A&Ox4, VSS on RA. PRN oxy given x1, pt c/o pain everywhere. Stated relief. T&Rq2hr. Pt declined having supper this shift, bedtime BG was 99. Coccyx and right calf dressing CDI. Discharge pending placement.

## 2023-02-12 NOTE — PLAN OF CARE
"A&Ox4, forgetful, makes needs known. /51 (BP Location: Left arm, Cuff Size: Adult Regular)   Pulse 86   Temp 98.5  F (36.9  C) (Oral)   Resp 18   Ht 1.626 m (5' 4\")   Wt 77.1 kg (170 lb)   SpO2 96%   BMI 29.18 kg/m   on RA. Pulsate mattress in place. Takes pills whole with water. Refusing repositioning, offering [id number]. Purewick in place. On assessment of bilateral lower extremities, painful to touch, refused intervention. Bilateral legs elevated on pillows. Care rounding in place.     Goal Outcome Evaluation:  Problem: Plan of Care - These are the overarching goals to be used throughout the patient stay.    Goal: Absence of Hospital-Acquired Illness or Injury  Intervention: Identify and Manage Fall Risk  Recent Flowsheet Documentation  Taken 2/11/2023 2307 by Yoselin Vasquez RN  Safety Promotion/Fall Prevention: activity supervised  Intervention: Prevent Skin Injury  Recent Flowsheet Documentation  Taken 2/12/2023 0016 by Yoselin Vasquez RN  Body Position:    refuses positioning    heels elevated    legs elevated     Problem: Risk for Delirium  Goal: Improved Behavioral Control  Intervention: Prevent and Manage Agitation  Recent Flowsheet Documentation  Taken 2/11/2023 2307 by Yoselin Vasquez RN  Environment Familiarity/Consistency: daily routine followed  Intervention: Minimize Safety Risk  Recent Flowsheet Documentation  Taken 2/11/2023 2307 by Yoselin Vasquez RN  Enhanced Safety Measures: bed alarm set  Goal: Improved Attention and Thought Clarity  Intervention: Maximize Cognitive Function  Recent Flowsheet Documentation  Taken 2/11/2023 2307 by Yoselin Vasquez RN  Sensory Stimulation Regulation: care clustered  Reorientation Measures:    calendar in view    clock in view     Problem: Pain Chronic (Persistent)  Goal: Optimal Pain Control and Function  Intervention: Manage Persistent Pain  Recent Flowsheet Documentation  Taken 2/11/2023 2307 by Yoselin Vasquez RN  Medication Review/Management: medications " reviewed     Problem: Skin Injury Risk Increased  Goal: Skin Health and Integrity  Intervention: Optimize Skin Protection  Recent Flowsheet Documentation  Taken 2/11/2023 2307 by Yoselin Vasquez RN  Activity Management: activity adjusted per tolerance     Problem: Wound Healing Progression  Goal: Optimal Wound Healing  Intervention: Promote Wound Healing  Recent Flowsheet Documentation  Taken 2/11/2023 2307 by Yoselin Vasquez RN  Activity Management: activity adjusted per tolerance     Problem: COPD (Chronic Obstructive Pulmonary Disease) Comorbidity  Goal: Maintenance of COPD Symptom Control  Intervention: Maintain COPD-Symptom Control  Recent Flowsheet Documentation  Taken 2/11/2023 2307 by Yoselin Vasquez RN  Medication Review/Management: medications reviewed     Problem: Hypertension Comorbidity  Goal: Blood Pressure in Desired Range  Intervention: Maintain Blood Pressure Management  Recent Flowsheet Documentation  Taken 2/11/2023 2307 by Yoselin Vasquez RN  Medication Review/Management: medications reviewed     Problem: Pain Chronic (Persistent) (Comorbidity Management)  Goal: Acceptable Pain Control and Functional Ability  Intervention: Manage Persistent Pain  Recent Flowsheet Documentation  Taken 2/11/2023 2307 by Yoselin Vasquez RN  Medication Review/Management: medications reviewed     Problem: Fall Injury Risk  Goal: Absence of Fall and Fall-Related Injury  Intervention: Identify and Manage Contributors  Recent Flowsheet Documentation  Taken 2/11/2023 2307 by Yoselin Vasquez RN  Medication Review/Management: medications reviewed  Intervention: Promote Injury-Free Environment  Recent Flowsheet Documentation  Taken 2/11/2023 2307 by Yoselin Vasquez RN  Safety Promotion/Fall Prevention: activity supervised     Problem: Plan of Care - These are the overarching goals to be used throughout the patient stay.    Goal: Absence of Hospital-Acquired Illness or Injury  Intervention: Identify and Manage Fall Risk  Recent Flowsheet  Documentation  Taken 2/11/2023 2307 by Yoselin Vasquez, RN  Safety Promotion/Fall Prevention: activity supervised  Intervention: Prevent Skin Injury  Recent Flowsheet Documentation  Taken 2/12/2023 0016 by Yoselin Vasquez, RN  Body Position:    refuses positioning    heels elevated    legs elevated

## 2023-02-12 NOTE — PROGRESS NOTES
"Date/Time [id number]    Primary Diagnosis: Admitted on1/6 for wounds & LTC placement, family unable to care for the pt at home.  Orientation: A/O x4, forgetful   Aggression Stop Light: green  Vitals: VSS on RA  Mobility: total care, lift. Refuses repositioning in bed at times, educated but gets upset when you even try to educate her.  Pain Management: denies  Diet: Mod CHO/dental soft, good appetite- dentures only to be worn when eating  Bowel/Bladder: Incontinent, purewick in place/changed once on this shift.  Abnormal Lab/Assessments: BGM 92, 116  Drain/Device/Wound: No IV access- MD aware. R calf, L heel (KING), L buttock, sacral wounds changed 2/11 (next changes tomorrow and 2/13) Refused rooke boots. Refuses cleaning of legs and under breast/abdominal fold per orders, stated \"I was cleaned up yesterday, am not dirty\" . Educated it should be completed daily.   Consults: WODANIELLA, DEANNA  D/C Day/Goals/Place: pending LTC placement. Will continue to monitor.Jg Belle RN  "

## 2023-02-12 NOTE — PROGRESS NOTES
[facility]    Hospitalist Progress Note    Date of Service (when I saw the patient): 02/12/2023  Admit date: 1/6/2023    Assessment & Plan   Miley Tolbert is a 77 year old female with complex PMHx including paroxysmal a-fib and prior DVT on chronic AC with rivaroaban, CHF, CAD, CKD, COPD, poorly controlled DM2, fecal/urinary incontinence, and chronic wounds who was admitted on 1/6/2023 for worsening sacral decubitus ulcer and need for placement due to inability of the family to provide adequate care for the patient. She is now waiting for long term care placement     Stage IV sacral wound   Stage II left calf and heel wounds  Initial concern for sepsis, Resolved  * Presented with worsening sacral decubitus ulcer that was reportedly foul smelling and soiled with urine/stool. Untreated wounds on left calf and heel also noted on admission. Afebrile with normal WBC at presentation.   * In the ED, she was empirically initiated on IV vancomycin and pip-tazo for presumed sacral wound infection though ultimately not felt to be infected.   * CT abd/pelvis and right thigh (1/11) ordered to evaluate for other potential sources of infection and to evaluate for hematoma given ongoing drop in hemoglobin. No source of infection or hematoma noted.  * Blood cultures remained negative.   * Received 2 doses of IV vancomycin and 5-day course of IV pip-tazo this admission  * Podiatry consulted this admission. ABIs normal. No surgical intervention recommended    Wound cares in place per WOCN     Chronic pain syndrome  * PRN oxycodone started this admission for pain control. Patient was using 20 to 25 mg/day. Given her advanced age dementia, schizophrenia, she is at high risk for complications. Hospitalist had long conversation with patient on 1/30 regarding opioid use, and she eventually agreed to decrease her use of opioids. This conversation demonstrates that we must guide patient regarding pain med  use    Now on oxycodone 5 mg q6h prn for pain control     As of 2/10 using only 0 - 5 mg of oxycodone/day for the last week    Continue to limit for severe pain only.      DM2 with peripheral neuropathy, long term insulin use  Hypoglycemia, Resolved  * A1c 10  * PTA regimen: sitagliptin 100 mg daily, Lantus 35 units, apart 10-14 units TID AC  * Insulin adjusted this admission due to intermittent hypoglycemia    Currently on Lantus 15 units at bedtime    Continues on PTA sitagliptin    Medium SSI available     Added 3 units aspart with meals on 02/08/23 for better BS control during the day.    Most blood sugar readings are at goal    Recent Labs   Lab 02/12/23  0737 02/11/23  2146 02/11/23  1705 02/11/23  1234 02/11/23  0807 02/11/23  0157   GLC 95 99 137* 156* 126* 125*       Poor fitting dentures with associated pain  Epulis fissuratum  * Oral pain noted during admission. No oral lesions noted, but her dentures fit poorly.  Removed dentures and she has a flap of soft tissue on the upper anterior gum line. .Case discussed with oral surgery who explained this is extra tissue (epulis fissuratum) that often occurs in patients with poor fitting dentures. It should be excised and she should have dentures refitted.      RN to use dentures only while eating    Dental soft diet, viscous lidocaine PRN ordered.     Needs expedited oral surgery referral at discharge     Chronic anemia  * Baseline Hgb 10-11. Decreased to 8.9 on 1/20 with no s/sx of bleeding. Iron studies normal. Hgb improved to baseline 10.6 within 2 weeks (2/3)    Check Hgb periodically    No results for input(s): HGB in the last 168 hours.      Paroxysmal atrial fibrillation  Hx of DVT    Not on AV veronica agents prior to admission    Continues on PTA rivaroxaban      CAD with HFpEF  Hypertension  Hyperlipidemia  * Last echo on 5/5/2022 showed EF of 55 to 60% with normal left ventricular cavity size.   * PTA lisinopril decreased from 40 to 20 mg daily this  "admission due to hypotension  * RRT activated 1/15 for chest pain. EKG negative for ischemia. Serial troponins negative.    Continues on lisinopril 20 mg daily    Continues on PTA atorvastatin 40 mg daily    If recurrent pain suggestive of CAD, consider NM stress test given underlying known CAD.       Urinary incontinence  Urinary retention, Resolved  * Espino catheter initially placed this admission given worsening decub and contamination with urine. Removed 1/11.     Has been utilizing external catheter since 1/17    Continues on PTA tamsulosin     Dementia with behavioral disturbance, intermittent delirium  Schizoaffective disorder   * PTA regimen: venlafaxine 150 mg daily, topiramate 100 mg daily    Continues on PTA meds    Delirium precautions in place    Clinically Significant Risk Factors              # Hypoalbuminemia: Lowest albumin = 2.4 g/dL at 1/8/2023  7:01 AM, will monitor as appropriate          # DMII: A1C = 10.0 % (Ref range: 0.0 - 5.6 %) within past 3 months   # Overweight: Estimated body mass index is 29.18 kg/m  as calculated from the following:    Height as of this encounter: 1.626 m (5' 4\").    Weight as of this encounter: 77.1 kg (170 lb).          Diet: Orders Placed This Encounter      Combination Diet Moderate Consistent Carb (60 g CHO per Meal) Diet; Mechanical/Dental Soft Diet     IVF: None   Espino Catheter: Not present     DVT Prophylaxis: DOAC  Code Status: No CPR- Do NOT Intubate     Disposition: Expected discharge: Medically stable when placement is found  Communication: Discussed with patient on 02/12/23    Jenni Edwards MD  Hospitalist Service  [facility]  Securely message with the Vocera Web Console (learn more here)  Text page via [employer] Paging/Directory    -Data reviewed today: I reviewed all new labs and imaging results over the last 24 hours. I personally reviewed no images or EKG's today.    Interval History   \"I just woke up.\"  Patient says she " slept until the mid morning.  She offers no new respiratory or GI complaints.  Discussed with RN, no new medical issues noted.  Staff are available to help patient get cleaned up today.     Physical Exam   Temp: 97.7  F (36.5  C) Temp src: Oral BP: 112/52 Pulse: 83   Resp: 18 SpO2: 96 % O2 Device: None (Room air)    Vitals:    01/06/23 2358 01/14/23 0735 01/21/23 1049   Weight: 70.5 kg (155 lb 6.4 oz) 84 kg (185 lb 1.6 oz) 77.1 kg (170 lb)     Vital Signs with Ranges  Temp:  [97.7  F (36.5  C)-99  F (37.2  C)] 97.7  F (36.5  C)  Pulse:  [83-89] 83  Resp:  [18] 18  BP: (107-127)/(51-65) 112/52  SpO2:  [96 %-98 %] 96 %  I/O last 3 completed shifts:  In: 480 [P.O.:480]  Out: 650 [Urine:650]    Today's Exam  Constitutional: Awake, alert.  Has almost continuous movements of jaw and lips, consistent with tardive dyskinesia  Respiratory: Clear to auscultation bilaterally, no crackles or wheezing  Cardiovascular: Regular rate and rhythm  GI: Normal bowel sounds, soft, non-distended, non-tender  Skin/Integumen:  No lower extremity edema  Other: Mood is calm      Medications   All medications reviewed on 02/10/23      atorvastatin  40 mg Oral Daily     carboxymethylcellulose PF  1 drop Both Eyes 4x Daily     gabapentin  600 mg Oral TID     insulin aspart  3 Units Subcutaneous TID w/meals     insulin aspart  1-7 Units Subcutaneous TID AC     insulin aspart  1-5 Units Subcutaneous At Bedtime     insulin glargine  15 Units Subcutaneous At Bedtime     levothyroxine  175 mcg Oral QAM AC     lisinopril  20 mg Oral Daily     mirabegron  25 mg Oral At Bedtime     multivitamin, therapeutic  1 tablet Oral Daily     rivaroxaban ANTICOAGULANT  20 mg Oral Daily with breakfast     sennosides  8.6 mg Oral Every Other Day     sitagliptin  100 mg Oral Daily     tamsulosin  0.4 mg Oral At Bedtime     topiramate  100 mg Oral Daily     venlafaxine  150 mg Oral Daily     PRN Meds: acetaminophen, albuterol, glucose **OR** dextrose **OR** glucagon,  lidocaine 4%, lidocaine (viscous), lidocaine (buffered or not buffered), melatonin, naloxone **OR** naloxone **OR** naloxone **OR** naloxone, nitroGLYcerin, ondansetron **OR** ondansetron, oxyCODONE, prochlorperazine **OR** prochlorperazine **OR** prochlorperazine, senna-docusate **OR** senna-docusate, tiZANidine    Data   Recent Labs   Lab 02/12/23  0737 02/11/23  2146 02/11/23  1705 02/10/23  0807 02/10/23  0655   POTASSIUM  --   --   --   --  3.9   GLC 95 99 137*   < >  --     < > = values in this interval not displayed.       No results found for this or any previous visit (from the past 24 hour(s)).

## 2023-02-13 NOTE — PLAN OF CARE
Goal Outcome Evaluation:             Date/Time 02/-1930     Primary Diagnosis: Admitted on1/6 for wounds & LTC placement, family unable to care for the pt at home.  Orientation: A/O x4, forgetful   Aggression Stop Light: green  Vitals: VSS on RA  Mobility: total care, lift. Refuses repositioning in bed at times, educated but gets upset when you even try to educate her.  Pain Management: denies  Diet: Mod CHO/dental soft, good appetite- dentures only to be worn when eating  Bowel/Bladder: Incontinent, purewick in place/changed once on this shift.  Abnormal Lab/Assessments: blood sugar monitoring.   Drain/Device/Wound: No IV access- MD aware. R calf, L heel (KING), L buttock, sacral wounds changed 2/11 (next changes tomorrow and 2/13) Refused rooke boots. Refuses cleaning of legs and under breast/abdominal folds.  Refused to have this writer assess her skin.  Agreeable to having skin checked tomorrow.  Educated it should be completed daily.   Consults: WOC, DEANNA  D/C Day/Goals/Place:  discharge pending LTC placement.

## 2023-02-13 NOTE — PROGRESS NOTES
[facility]  Hospitalist Progress Note   02/13/2023          Assessment and Plan:       Miley Tolbert is a 77 year old female with paroxysmal a-fib and prior DVT on chronic AC with rivaroaban, CHF, CAD, CKD, COPD, poorly controlled DM2, fecal/urinary incontinence, and chronic wounds admitted on 1/6/2023 for worsening sacral decubitus ulcer and need for placement due to inability of the family to provide adequate care for the patient. She is now waiting for long term care placement     Stage IV sacral wound   Stage II left calf and heel wounds  Initial concern for sepsis, Resolved  * Presented with worsening sacral decubitus ulcer that was reportedly foul smelling and soiled with urine/stool. Untreated wounds on left calf and heel also noted on admission. Afebrile with normal WBC at presentation.   * was empirically initiated on IV vancomycin and pip-tazo for presumed sacral wound infection though ultimately not felt to be infected.   * CT abd/pelvis and right thigh (1/11) ordered to evaluate for other potential sources of infection and to evaluate for hematoma given ongoing drop in hemoglobin. No source of infection or hematoma noted.  * Blood cultures remained negative.   * Received 2 doses of IV vancomycin and 5-day course of IV pip-tazo this admission  * Podiatry consulted this admission. ABIs normal. No surgical intervention recommended  ? Wound cares in place per [facility]OCN     Chronic pain syndrome  * PRN oxycodone started this admission for pain control. Patient was using 20 to 25 mg/day. Given her advanced age dementia, schizophrenia, she is at high risk for complications. Hospitalist had long conversation with patient on 1/30 regarding opioid use, and she eventually agreed to decrease her use of opioids.  ? Now on oxycodone 5 mg q6h prn for pain control. Continue to limit for severe pain only.      DM2 with peripheral neuropathy, long term insulin use  Hypoglycemia, Resolved  * A1c 10  *  PTA regimen: sitagliptin 100 mg daily, Lantus 35 units, apart 10-14 units TID AC  * Insulin adjusted this admission due to intermittent hypoglycemia  ? Currently on Lantus 15 units at bedtime  ? Continues on PTA sitagliptin  ? Medium SSI available   ? Continue 3 units aspart with meals on 02/08/23 for better BS control during the day.  ? Most blood sugar readings are at goal     Poor fitting dentures with associated pain  Epulis fissuratum  * Oral pain noted during admission. No oral lesions noted, but her dentures fit poorly.  Removed dentures and she has a flap of soft tissue on the upper anterior gum line. .Case discussed with oral surgery who explained this is extra tissue (epulis fissuratum) that often occurs in patients with poor fitting dentures. It should be excised and she should have dentures refitted.    ? RN to use dentures only while eating  ? Dental soft diet, viscous lidocaine PRN ordered.   ? Needs expedited oral surgery referral at discharge     Chronic anemia  * Baseline Hgb 10-11. Decreased to 8.9 on 1/20 with no s/sx of bleeding. Iron studies normal. Hgb improved to baseline 10.6 within 2 weeks (2/3)  ? Check Hgb periodically ( AM lab draw)     Paroxysmal atrial fibrillation  Hx of DVT  ? Not on AV veronica agents prior to admission  ? Continues on PTA rivaroxaban      CAD with HFpEF  Hypertension  Hyperlipidemia  * Last echo on 5/5/2022 showed EF of 55 to 60% with normal left ventricular cavity size.   * PTA lisinopril decreased from 40 to 20 mg daily this admission due to hypotension  * RRT activated 1/15 for chest pain. EKG negative for ischemia. Serial troponins negative.  ? Continues on lisinopril 20 mg daily  ? Continues on PTA atorvastatin 40 mg daily  ? If recurrent pain suggestive of CAD, consider NM stress test given underlying known CAD.       Urinary incontinence  Urinary retention, Resolved  * Espino catheter initially placed this admission given worsening decub and contamination with  urine. Removed 1/11.   ? Has been utilizing external catheter since 1/17  ? Continues on PTA tamsulosin     Dementia with behavioral disturbance, intermittent delirium  Schizoaffective disorder   * PTA regimen: venlafaxine 150 mg daily, topiramate 100 mg daily  ? Continues on PTA meds  ? Delirium precautions in place     Orders Placed This Encounter      Combination Diet Moderate Consistent Carb (60 g CHO per Meal) Diet; Mechanical/Dental Soft Diet      DVT Prophylaxis: On anticoagulation.  Code Status: No CPR- Do NOT Intubate  Disposition: Expected discharge pending safe discharge plan in place.    Discussed with patient, charge RN  >35 minutes spent by me on the date of service doing chart review, history, exam, documentation & further activities per the note.      Debora Westbrook MD        Interval History:      Patient appears comfortable.  Lying in bed.  Awake, able to answer most questions.  Admitting to pain under control.  Denies any chest pain or palpitations.  Tolerating oral diet.  Reports of bilateral lower extremities are tender  Per report ambulating with lift.         Physical Exam:        Physical Exam   Temp:  [98  F (36.7  C)-99.5  F (37.5  C)] 98.2  F (36.8  C)  Pulse:  [78-86] 82  Resp:  [18] 18  BP: (102-147)/(47-66) 127/66  SpO2:  [96 %-97 %] 97 %    Intake/Output Summary (Last 24 hours) at 2/13/2023 1729  Last data filed at 2/13/2023 1426  Gross per 24 hour   Intake 240 ml   Output 1600 ml   Net -1360 ml     PHYSICAL EXAM  GENERAL: Patient is in no distress. Alert and oriented.  HEART: Regular rate and rhythm. S1S2.   LUNGS: Clear to auscultation bilaterally. No expiratory wheeze.  Respirations unlabored  ABDOMEN: Soft, no abdominal tenderness, bowel sounds heard   NEURO: Moving all extremities.  EXTREMITIES: 1+ pedal edema.   SKIN: Warm, dry. No rash   PSYCHIATRY Cooperative       Medications:          atorvastatin  40 mg Oral Daily     carboxymethylcellulose PF  1 drop Both Eyes 4x Daily      gabapentin  600 mg Oral TID     insulin aspart  3 Units Subcutaneous TID w/meals     insulin aspart  1-7 Units Subcutaneous TID AC     insulin aspart  1-5 Units Subcutaneous At Bedtime     insulin glargine  15 Units Subcutaneous At Bedtime     levothyroxine  175 mcg Oral QAM AC     lisinopril  20 mg Oral Daily     mirabegron  25 mg Oral At Bedtime     multivitamin, therapeutic  1 tablet Oral Daily     rivaroxaban ANTICOAGULANT  20 mg Oral Daily with breakfast     sennosides  8.6 mg Oral Every Other Day     sitagliptin  100 mg Oral Daily     tamsulosin  0.4 mg Oral At Bedtime     topiramate  100 mg Oral Daily     venlafaxine  150 mg Oral Daily     acetaminophen, albuterol, glucose **OR** dextrose **OR** glucagon, lidocaine 4%, lidocaine (viscous), lidocaine (buffered or not buffered), melatonin, naloxone **OR** naloxone **OR** naloxone **OR** naloxone, nitroGLYcerin, ondansetron **OR** ondansetron, oxyCODONE, prochlorperazine **OR** prochlorperazine **OR** prochlorperazine, senna-docusate **OR** senna-docusate, tiZANidine         Data:      All new lab and imaging data was reviewed.

## 2023-02-13 NOTE — PLAN OF CARE
Goal Outcome Evaluation:    Pt here with wounds, hyperglycemia. A&Ox4, but forgetful. Was not able to do a backside assessment, pt refused. Also refused re/po. States lower extremities are tender, declines pain meds at this time. See flowsheets for what was done. VSS on RA.  Mod Carb diet. BS checks. Takes pills whole. Up with lift. Denies pain. No IV access. Waiting on placement.

## 2023-02-13 NOTE — PLAN OF CARE
"A&OX4, forgetful at times, very pleasant, making jokes, smiling. Denies pain. BP (!) 147/66 (BP Location: Left arm)   Pulse 86   Temp 98  F (36.7  C) (Oral)   Resp 18   Ht 1.626 m (5' 4\")   Wt 77.1 kg (170 lb)   SpO2 97%   BMI 29.18 kg/m  on RA. No IV access, MD aware. Takes pills whole with water. Repositioned [id number]. Purewick in place. Large BM this AM. BG at , No Novolog given per order, 15 units Lantus given per order.       Goal Outcome Evaluation:  Problem: Plan of Care - These are the overarching goals to be used throughout the patient stay.    Goal: Absence of Hospital-Acquired Illness or Injury  Intervention: Identify and Manage Fall Risk  Recent Flowsheet Documentation  Taken 2/12/2023 2018 by Yoselin Vasquez RN  Safety Promotion/Fall Prevention: activity supervised     Problem: Risk for Delirium  Goal: Improved Behavioral Control  Intervention: Prevent and Manage Agitation  Recent Flowsheet Documentation  Taken 2/12/2023 2018 by Yoselin Vasquez RN  Environment Familiarity/Consistency: daily routine followed  Intervention: Minimize Safety Risk  Recent Flowsheet Documentation  Taken 2/12/2023 2018 by Yoselin Vasquez RN  Enhanced Safety Measures: bed alarm set  Goal: Improved Attention and Thought Clarity  Intervention: Maximize Cognitive Function  Recent Flowsheet Documentation  Taken 2/12/2023 2018 by Yoselin Vasquez, RN  Sensory Stimulation Regulation: care clustered  Reorientation Measures:    calendar in view    clock in view     Problem: Pain Chronic (Persistent)  Goal: Optimal Pain Control and Function  Intervention: Manage Persistent Pain  Recent Flowsheet Documentation  Taken 2/12/2023 2018 by Yoselin Vasquez, RN  Medication Review/Management: medications reviewed     Problem: Skin Injury Risk Increased  Goal: Skin Health and Integrity  Intervention: Optimize Skin Protection  Recent Flowsheet Documentation  Taken 2/12/2023 2018 by Yoselin Vasquez, RN  Activity Management: activity adjusted per " tolerance     Problem: Wound Healing Progression  Goal: Optimal Wound Healing  Intervention: Promote Wound Healing  Recent Flowsheet Documentation  Taken 2/12/2023 2018 by Yoselin Vasquez RN  Activity Management: activity adjusted per tolerance     Problem: COPD (Chronic Obstructive Pulmonary Disease) Comorbidity  Goal: Maintenance of COPD Symptom Control  Intervention: Maintain COPD-Symptom Control  Recent Flowsheet Documentation  Taken 2/12/2023 2018 by Yoselin Vasquez RN  Medication Review/Management: medications reviewed     Problem: Hypertension Comorbidity  Goal: Blood Pressure in Desired Range  Intervention: Maintain Blood Pressure Management  Recent Flowsheet Documentation  Taken 2/12/2023 2018 by Yoselin Vasquez RN  Medication Review/Management: medications reviewed     Problem: Pain Chronic (Persistent) (Comorbidity Management)  Goal: Acceptable Pain Control and Functional Ability  Intervention: Manage Persistent Pain  Recent Flowsheet Documentation  Taken 2/12/2023 2018 by Yoselin Vasquez RN  Medication Review/Management: medications reviewed     Problem: Fall Injury Risk  Goal: Absence of Fall and Fall-Related Injury  Intervention: Identify and Manage Contributors  Recent Flowsheet Documentation  Taken 2/12/2023 2018 by Yoselin Vasquez RN  Medication Review/Management: medications reviewed  Intervention: Promote Injury-Free Environment  Recent Flowsheet Documentation  Taken 2/12/2023 2018 by Yoselin Vasquez RN  Safety Promotion/Fall Prevention: activity supervised     Problem: Plan of Care - These are the overarching goals to be used throughout the patient stay.    Goal: Absence of Hospital-Acquired Illness or Injury  Intervention: Identify and Manage Fall Risk  Recent Flowsheet Documentation  Taken 2/12/2023 2018 by Yoselin Vasquez RN  Safety Promotion/Fall Prevention: activity supervised

## 2023-02-14 NOTE — PLAN OF CARE
Goal Outcome Evaluation:         Pt here with non healing wounds and hyperglycemia. A&Ox4, but forgetful. VSS on RA. Mod carb diet. BS checks. Takes pills whole. Up with lift. Denies pain. Was able to do wound cares this shift on back side as well as feet. Refused repo most of this shift but was re/po at 1450 when wound cares were completed. Pending placement, SW following.

## 2023-02-14 NOTE — PLAN OF CARE
Goal Outcome Evaluation:    Pt A&Ox4, VSS on room air, up with assist of 2 with lift. Pressure wounds to buttocks, R calf, and heel. Turn and repo. Denies pain, incontinent of bowel and bladder, purewick in place. Plan pending.

## 2023-02-14 NOTE — PROGRESS NOTES
[facility]  Hospitalist Progress Note   02/14/2023          Assessment and Plan:       Miley Tolbert is a 77 year old female with paroxysmal a-fib and prior DVT on chronic AC with rivaroaban, CHF, CAD, CKD, COPD, poorly controlled DM2, fecal/urinary incontinence, and chronic wounds admitted on 1/6/2023 for worsening sacral decubitus ulcer and need for placement due to inability of the family to provide adequate care for the patient. She is now waiting for long term care placement     Stage IV sacral wound   Stage II left calf and heel wounds  Initial concern for sepsis, Resolved  * Presented with worsening sacral decubitus ulcer that was reportedly foul smelling and soiled with urine/stool. Untreated wounds on left calf and heel also noted on admission. Afebrile with normal WBC at presentation.   * was empirically initiated on IV vancomycin and pip-tazo for presumed sacral wound infection though ultimately not felt to be infected.   * CT abd/pelvis and right thigh (1/11) ordered to evaluate for other potential sources of infection and to evaluate for hematoma given ongoing drop in hemoglobin. No source of infection or hematoma noted.  * Blood cultures remained negative.   * Received 2 doses of IV vancomycin and 5-day course of IV pip-tazo this admission  * Podiatry consulted this admission. ABIs normal. No surgical intervention recommended  ? Wound cares in place per WOCN     Chronic pain syndrome  *Per report opiate use prior to admission.   Discussed with patient regarding de-escalation of narcotics, now will switch to oxycodone 5 mg twice daily as needed for pain.    DM2 with peripheral neuropathy, long term insulin use  Hypoglycemia, Resolved  * A1c 10  * PTA regimen: sitagliptin 100 mg daily, Lantus 35 units, apart 10-14 units TID AC  * Insulin adjusted this admission due to intermittent hypoglycemia  ? Currently on Lantus 15 units at bedtime  ? Continues on PTA sitagliptin  ? Medium SSI  available   ? Continue 3 units aspart with meals on 02/08/23 for better BS control during the day.  ? Most blood sugar readings are at goal     Poor fitting dentures with associated pain  Epulis fissuratum  * Oral pain noted during admission. No oral lesions noted, but her dentures fit poorly.  Removed dentures and she has a flap of soft tissue on the upper anterior gum line. .Case discussed with oral surgery who explained this is extra tissue (epulis fissuratum) that often occurs in patients with poor fitting dentures. It should be excised and she should have dentures refitted.    ? RN to use dentures only while eating  ? Dental soft diet, viscous lidocaine PRN ordered.   ? Needs expedited oral surgery referral at discharge     Chronic anemia  * Baseline Hgb 10-11. Decreased to 8.9 on 1/20 with no s/sx of bleeding.   ? Iron studies normal.  ? Hemoglobin at around previous baseline.  Monitor     Paroxysmal atrial fibrillation  Hx of DVT  ? Not on AV veronica agents prior to admission  ? Continues on PTA rivaroxaban      CAD with HFpEF  Hypertension  Hyperlipidemia  * Last echo on 5/5/2022 showed EF of 55 to 60% with normal left ventricular cavity size.   * PTA lisinopril decreased from 40 to 20 mg daily this admission due to hypotension  * RRT activated 1/15 for chest pain. EKG negative for ischemia. Serial troponins negative.  ? Continues on lisinopril 20 mg daily  ? Continues on PTA atorvastatin 40 mg daily  ? If recurrent pain suggestive of CAD, consider NM stress test given underlying known CAD.       Urinary incontinence  Urinary retention, Resolved  * Espino catheter initially placed this admission given worsening decub and contamination with urine. Removed 1/11.   ? Has been utilizing external catheter since 1/17  ? Continues on PTA tamsulosin     Dementia with behavioral disturbance, intermittent delirium  Schizoaffective disorder   * PTA regimen: venlafaxine 150 mg daily, topiramate 100 mg daily  ? Continues on  PTA meds  ? Delirium precautions in place     Orders Placed This Encounter      Combination Diet Moderate Consistent Carb (60 g CHO per Meal) Diet; Mechanical/Dental Soft Diet      DVT Prophylaxis: On anticoagulation.  Code Status: No CPR- Do NOT Intubate  Disposition: Expected discharge pending safe discharge plan in place.    Discussed with patient, floor RN  >25 minutes spent by me on the date of service doing chart review, history, exam, documentation & further activities per the note.      Debora Westbrook MD        Interval History:      Patient appears comfortable.  Lying in bed.  Awake, able to answer most questions.  Denies any chest pain or palpitations.  Tolerating oral diet.  Reports of bilateral lower extremities are tender  Per report ambulating with lift.         Physical Exam:        Physical Exam   Temp:  [98.2  F (36.8  C)-98.4  F (36.9  C)] 98.2  F (36.8  C)  Pulse:  [77-83] 77  Resp:  [16-18] 16  BP: (124-137)/(54-66) 124/54  SpO2:  [95 %-97 %] 96 %    Intake/Output Summary (Last 24 hours) at 2/13/2023 1729  Last data filed at 2/13/2023 1426  Gross per 24 hour   Intake 240 ml   Output 1600 ml   Net -1360 ml     PHYSICAL EXAM  GENERAL: Patient is in no distress. Alert and oriented.  LUNGS: Respirations unlabored  NEURO: Moving all extremities.  EXTREMITIES: 1+ pedal edema.   SKIN: Warm, dry. No rash   PSYCHIATRY Cooperative       Medications:          atorvastatin  40 mg Oral Daily     carboxymethylcellulose PF  1 drop Both Eyes 4x Daily     gabapentin  600 mg Oral TID     insulin aspart  3 Units Subcutaneous TID w/meals     insulin aspart  1-7 Units Subcutaneous TID AC     insulin aspart  1-5 Units Subcutaneous At Bedtime     insulin glargine  15 Units Subcutaneous At Bedtime     levothyroxine  175 mcg Oral QAM AC     lisinopril  20 mg Oral Daily     mirabegron  25 mg Oral At Bedtime     multivitamin, therapeutic  1 tablet Oral Daily     rivaroxaban ANTICOAGULANT  20 mg Oral Daily with breakfast      sennosides  8.6 mg Oral Every Other Day     sitagliptin  100 mg Oral Daily     tamsulosin  0.4 mg Oral At Bedtime     topiramate  100 mg Oral Daily     venlafaxine  150 mg Oral Daily     acetaminophen, albuterol, glucose **OR** dextrose **OR** glucagon, lidocaine 4%, lidocaine (viscous), lidocaine (buffered or not buffered), melatonin, naloxone **OR** naloxone **OR** naloxone **OR** naloxone, nitroGLYcerin, ondansetron **OR** ondansetron, oxyCODONE, prochlorperazine **OR** prochlorperazine **OR** prochlorperazine, senna-docusate **OR** senna-docusate, tiZANidine         Data:      All new lab and imaging data was reviewed.

## 2023-02-14 NOTE — PROGRESS NOTES
Care Management Follow Up    Length of Stay (days): 38    Expected Discharge Date: 02/17/2023     Concerns to be Addressed:       Patient plan of care discussed at interdisciplinary rounds: Yes    Anticipated Discharge Disposition: Long Term Care     Anticipated Discharge Services: None  Anticipated Discharge DME: None    Patient/family educated on Medicare website which has current facility and service quality ratings: yes  Education Provided on the Discharge Plan:    Patient/Family in Agreement with the Plan: yes    Referrals Placed by CM/SW: Post Acute Facilities  Private pay costs discussed: Not applicable    Additional Information:  Multiple LTC referrals have been sent. Patient has [facility]. SW will continue to follow    Pending:   [facility]  [facility]  [facility] [facility]  [facility] [place]  [facility]  [facility]     [facility]:  [facility]   [facility]  [facility]  [facility] at [facility]  [facility]  [facility]   [facility]   [facility]  [facility]      [facility]

## 2023-02-15 NOTE — PLAN OF CARE
Goal Outcome Evaluation:         Orientation: A/O x4, forgetful   Aggression Stop Light: green  Vitals: VSS on RA  Mobility: total care, lift. Refuses repositioning in bed at times, educated but gets upset when you even try to educate her.  Pain Management: denies  Diet: Mod CHO/dental soft, good appetite- dentures only to be worn when eating  Bowel/Bladder: Incontinent, purewick in place/changed once on this shift.   Abnormal Lab/Assessments: [id number]  Drain/Device/Wound: No IV access- MD aware. R calf, L heel (KING), L buttock, sacral wounds .Refused rooke boots.Refuse wound care at times.  Educated it should be completed daily. Wound nurse came and did sacral dressing.  Consults: WOC, SW  D/C Day/Goals/Place: pending LTC placement. Will continue to monitor.

## 2023-02-15 NOTE — PROGRESS NOTES
[facility] Nurse Inpatient Assessment     Consulted for: Sacral wound    Patient History (according to provider note(s):         Miley Tolbert is a 77 year old female with history of hypertension, CHF, atrial fibrillation on Xarelto, history of for lower extremity DVT, coronary artery disease, chronic kidney disease stage III, COPD, hypothyroidism, poorly controlled type 2 diabetes with hyperglycemia, GERD, fecal and urinary incontinence who presents with progressively worsening sacral decubitus ulcer and inability of the family to provide care for the patient    Areas Assessed:      Areas visualized during today's visit: Perineal area, Sacrum/coccyx and Lower extremities     Wound location: Left Heel- not assessed 2/15 d/t pt's discomfort      Last photo: 2/1/23  Wound due to: Pressure Injury  Wound history/plan of care: [place] and family brought patient home with concerns of not being able to care for her. Mepilex in place on assessment today 2/1  Wound base: unable to clearly visualize - appears to be slow to rahul, purple with scaly, cracked epidermis on top     Palpation of the wound bed: resurfaced      Drainage: none      Description of drainage: none      Measurements (length x width x depth, in cm): not measured 2/1/23 d/t pt's report of pain in L knee     Tunneling: N/A     Undermining: N/A  Periwound skin:       Color: pink and brown/waxy yellow build up dead skin       Temperature: normal   Odor: none  Pain: when moving her hips   Pain interventions prior to dressing change: slow and gentle cares   Treatment goal: protect, offload   STATUS: resurfaced   Supplies ordered: at bedside     Wound location: Right Calf      Last photo: 2/15/23  Wound due to: suspect trauma vs pressure  Wound history/plan of care:   Wound base: tendon, moist red tissue     Palpation of the wound bed: normal     Drainage: small      Description of  drainage: bloody     Measurements (length x width x depth, in cm): 2.5 x 2 x 0.5 cm     Tunneling: N/A     Undermining: N/A  Periwound skin: intact, dry, scaly      Color: scar tissue, pale      Temperature: normal   Odor: none   Pain: pt occasionally vocalized pain with pressure to wound bed  Pain interventions prior to dressing change: slow and gentle cares  Treatment goal: Drainage control, Infection control/prevention, Decrease moisture, Protection and protect tendon  STATUS: stable   Supplies ordered: aquacel at bedside     Wound location: Coccyx/right buttock         Last photo: 2/15/23  Wound due to: Pressure Injury Stage 4 pressure injury Present on admission  Wound history/plan of care: pt reports she all of a sudden developed several wounds a few months ago. On assessment 2/15, sacral Mepilex in place on pt's sacrum not actually covering wounded skin. Pt is incontinent of bowel. Purewick in use. No packing found in coccyx wound. Wound base: red, moist, light pink tissue. Some pale, marble-like tissue at lower wound margin. Not able to visualize, but did palpate what is likely bone at inferior margin.  2/15: Packed one strip of Aquacel into coccyx wound bed.     Palpation of the wound bed: normal      Drainage: moderate     Description of drainage: serosanguinous and bloody     Measurements (length x width x depth, in cm):   Coccyx 2.5 x 3  x 2 cm and   Right buttock 2 x 1.5 x 0.1 cm   Tunneling: N/A     Undermining: from 12 to 6 to 3 cm   Periwound skin: macerated, Scar tissue and hyperpigmentation      Color: pale tissue due to scarring and superior to wound hyperpigmentation      Temperature: normal   Odor: none  Pain: intermittently during dressing change   Pain interventions prior to dressing change: slow and gentle cares  Treatment goal: Infection control/prevention, Increase granulation, decrease moisture  STATUS:stable  Supplies ordered: supplies at bedside      R upper thigh      Photo: 2/15  still  open, all other areas are epithelialized   0.5 x 0.7 x 0.1 cm  Scant serosanguinous drainage  Moist, pink dermis in wound bed.  Periwound area is macerated and scar tissue present.       Skin Injury Location: pannus- improved 2/15  Last photo: none taken  Skin injury due to: Intertrigo  Skin history and plan of care:   Pt with deep creases and few scattered open areas   Affected area:      Skin assessment: Superficial Erosion of epidermis     Measurements (length x width x depth, in cm) now red but intact        Temperature  normal      Drainage: none      Color: none      Odor: none  Pain: denies , none  Pain interventions prior to dressing change: N/A  Treatment goal: Heal  and Decrease moisture  STATUS: improving   Supplies ordered: none     Skin Injury Location: right breast - improved   Last photo: none taken   Skin injury due to: Intertrigo  Skin history and plan of care:  Pt with deep breast creases and 1 open area  Affected area:      Skin assessment: Superficial Erosion of epidermis     Measurements (length x width x depth, in cm) now pink      Color: pink     Temperature  normal      Drainage: scant .      Color: serous      Odor: none  Pain: denies , none  Pain interventions prior to dressing change: N/A  Treatment goal: Heal  and Decrease moisture  STATUS: improving now interdry ag is appropriate   Supplies ordered: at bedside       Treatment Plan:   Pannus and Right Breast: Daily  1. DO not use interdry (#376231) with any other creams or powder.  2. Wash skin gently. Pat dry, do not rub.  3. Cut the appropriate size of interdry (#042898) with clean scissors, allowing a minimum of 2 inches of the fabric exposed outside the skin fold for moisture evaporation.  4. Lay a single layer of fabric in the skin fold, placing one edge of the fabric into the base of the fold. Gently smooth the rest of the fabric over the skin, keeping it flat. Leave at least 2 inches of the fabric exposed outside the skin  fold.  5. Secure the fabric in one of several ways; with the skin fold, with a small amount of tape, or tucked under clothing.    When to Dispose: Each piece of InterDry may be used up to 5 days, depending on fabric soiling, odor, amount of moisture and general skin condition. May label with skin marker to date      Removal: Remove the fabric before bathing and reuse when finished. When removing the fabric from skin folds, gently separate the skin fold and lift away fabric.      Coccyx and right buttock wound - every other day and prn undermining with stool    Apply Vashe #[id number] moistened kerlix to wounds and periwound skin x 5 minutes   Remove gauze and do not rinse  Fill deep wound with Aquacel Ag #[id number] making sure to fill undermining first then fill the base  Apply Aquacel Ag to right buttock wound  Apply sacral foam dressing     Right Thigh wound: Daily and PRN  1. Cleanse with MicroKlenz or Vashe.   2. Apply layer of Triad paste to wound base. Do not apply Mepilex. Periwound is macerated indicating moisture trapping.     Right lateral lower leg - every 3 days and prn soak through   Apply vashe #[id number] moistened kerlix to wounds and periwound skin x 5 minutes   Remove and do not rise  Apply a small Aquacel Ag #[id number] to wound bed only   Apply 4x4 Optifoam gentle #[id number] due to discomfort with Mepilex foam     BLE and feet - every other day until all the dry build up is gone then discontinue    Wash them with danielle bath wipes   Apply kamala perineal cleanser liberally and rub it in   Then apply sween cream - avoid between toes    Orders: Reviewed and updated     RECOMMEND PRIMARY TEAM ORDER: None, at this time  Education provided: Importance of repositioning  Discussed plan of care with: Patient and Nurse  WOC nurse follow-up plan: weekly  Notify WOC if wound(s) deteriorate.  Nursing to notify the Provider(s) and re-consult the WOC Nurse if new skin concern.    DATA:     Current support surface: Standard  Low air  "loss (PETE pump, Isolibrium, Pulsate, skin guard, etc)  Containment of urine/stool: Incontinence Protocol, Incontinent pad in bed and Purewick external catheter   BMI: Body mass index is 29.18 kg/m .   Active diet order: Orders Placed This Encounter      Combination Diet Moderate Consistent Carb (60 g CHO per Meal) Diet; Mechanical/Dental Soft Diet     Output: I/O last 3 completed shifts:  In: 480 [P.O.:480]  Out: 400 [Urine:400]     Labs:   Recent Labs   Lab 02/13/23  0713   ALBUMIN 3.1*   HGB 11.1*   WBC 4.8     Pressure injury risk assessment:   Sensory Perception: 3-->slightly limited  Moisture: 3-->occasionally moist  Activity: 1-->bedfast  Mobility: 2-->very limited  Nutrition: 2-->probably inadequate  Friction and Shear: 1-->problem  Naif Score: 12    Marli Noble RN, CWCN  Contact via [employer] | [employer] at name or \"[facility] nurse\"  Dept. Office Number: [phone]    "

## 2023-02-15 NOTE — PROGRESS NOTES
Care Management Follow Up    Length of Stay (days): 39    Expected Discharge Date: 02/21/2023     Concerns to be Addressed:       Patient plan of care discussed at interdisciplinary rounds: Yes    Anticipated Discharge Disposition: Long Term Care     Anticipated Discharge Services: None  Anticipated Discharge DME: None    Patient/family educated on Medicare website which has current facility and service quality ratings: yes  Education Provided on the Discharge Plan:    Patient/Family in Agreement with the Plan: yes    Referrals Placed by CM/SW: Post Acute Facilities  Private pay costs discussed: Not applicable    Additional Information:  Multiple LTC referrals have been sent. Patient has [employer] [facility] MA. SW will continue to follow     Pending:   [facility]-  [facility]  [facility] [place]-  [facility] [place]-  [facility] at [facility]- [facility]- Declined      Declined:  [facility]  [facility]  [facility] at [facility]  [facility]  [facility]   [facility]  [facility] [place]   [facility] [place]  [facility]      [facility]

## 2023-02-15 NOTE — PLAN OF CARE
Goal Outcome Evaluation:    Pt is AO x4, forgetful at times. Pleasant and cooperative but refuses T/R at times. VSS on RA. Denies pain. Tolerating mod carb diet. BG checks ACHS. Purewick in place for incontinence, no BM. Pressure wounds to sacrum/buttocks, R calf and heel, dressings CDI. No IV access, MD aware. Discharge pending LTC placement.

## 2023-02-15 NOTE — PLAN OF CARE
Goal Outcome Evaluation:    Pt alert and oriented x 4. VSS on RA. Denies pain this shift. Tolerating mod carb/dental soft diet without N/V. A/2 with lift. Turn/reposition q2h. B/B incontinent, purewic in place with adequate output. X 1 BM this shift. BG checked. Dressing on sacral and R buttock changed. Discharge pending.

## 2023-02-15 NOTE — PROGRESS NOTES
[facility]  Hospitalist Progress Note   02/15/2023          Assessment and Plan:       Mliey Tolbert is a 77 year old female with paroxysmal a-fib and prior DVT on chronic AC with rivaroaban, CHF, CAD, CKD, COPD, poorly controlled DM2, fecal/urinary incontinence, and chronic wounds admitted on 1/6/2023 for worsening sacral decubitus ulcer and need for placement due to inability of the family to provide adequate care for the patient. She is now waiting for long term care placement     Stage IV sacral wound   Stage II left calf and heel wounds  Initial concern for sepsis, Resolved  * Presented with worsening sacral decubitus ulcer that was reportedly foul smelling and soiled with urine/stool. Untreated wounds on left calf and heel also noted on admission. Afebrile with normal WBC at presentation.   * Empirically initiated on IV vancomycin and pip-tazo for presumed sacral wound infection though ultimately not felt to be infected.   * CT abd/pelvis and right thigh (1/11) No source of infection or hematoma noted. Blood cultures remained negative.   * Received 2 doses of IV vancomycin and 5-day course of IV pip-tazo this admission  * Podiatry followed ABIs normal. No surgical intervention recommended  ? Wound cares per WOCN     Chronic pain syndrome  *Per report opiate use prior to admission.   Discussed with patient regarding de-escalation of narcotics, now de-escalated to oxycodone 5 mg twice daily as needed for pain.  Will further lower today to 2.5 mg twice daily.  Wean off narcotics in coming days  As needed Tylenol.   As needed Zanaflex.     DM2 with peripheral neuropathy, long term insulin use  Hypoglycemia, Resolved  * A1c 10  * PTA regimen: sitagliptin 100 mg daily, Lantus 35 units, apart 10-14 units TID AC  * Insulin adjusted this admission due to intermittent hypoglycemia  Continue insulin Lantus 15 units at bedtime.  Continue medium intensity sliding scale insulin.  Continue PTA  Sitagliptin.  Monitor blood sugars, optimize regimen     Poor fitting dentures with associated pain improved   Epulis fissuratum  * Oral pain noted during admission. No oral lesions noted, but her dentures fit poorly.  Removed dentures and she has a flap of soft tissue on the upper anterior gum line. Case was discussed with oral surgery who explained this is extra tissue (epulis fissuratum) that often occurs in patients with poor fitting dentures. It should be excised and she should have dentures refitted.    ? RN to use dentures only while eating  ? Dental soft diet, viscous lidocaine PRN ordered.   ? Needs expedited oral surgery referral at discharge     Paroxysmal atrial fibrillation  Hx of DVT  ? Not on AV veronica agents prior to admission  ? Continues on PTA rivaroxaban      CAD with HFpEF  Hypertension  Hyperlipidemia  * Last echo on 5/5/2022 showed EF of 55 to 60% with normal left ventricular cavity size.   * RRT activated 1/15 for chest pain. EKG negative for ischemia. Serial troponins negative.  * PTA lisinopril decreased from 40 to 20 mg daily this admission due to hypotension, continues on lisinopril 20 mg daily.  ? Continues on PTA atorvastatin 40 mg daily  ? If recurrent pain suggestive of CAD, consider NM stress test given underlying known CAD.     Chronic anemia  * Baseline Hgb 10-11. Decreased to 8.9 on 1/20 with no s/sx of bleeding.   ? Iron studies normal.  ? Hemoglobin at around previous baseline.  Monitor periodically.      Urinary incontinence  Urinary retention, Resolved  * Espino catheter initially placed this admission given worsening decub and contamination with urine. Removed 1/11.   Has been utilizing external catheter since 1/17  Continues on PTA tamsulosin     Dementia with behavioral disturbance, intermittent delirium  Schizoaffective disorder   Continue PTA regimen: venlafaxine 150 mg daily, topiramate 100 mg blair  Delirium precautions in place     Orders Placed This Encounter       Combination Diet Moderate Consistent Carb (60 g CHO per Meal) Diet; Mechanical/Dental Soft Diet      DVT Prophylaxis: On anticoagulation.  Code Status: No CPR- Do NOT Intubate  Disposition: Expected discharge pending safe discharge plan in place.    Discussed with patient, floor RN, DEANNA  >25 minutes spent by me on the date of service doing chart review, history, exam, documentation & further activities per the note.      Debora Westbrook MD        Interval History:      Patient appears comfortable.  Lying in bed.  Awake, able to answer most questions.  Denies any chest pain or palpitations.  Reports of bilateral lower extremities discomfort.  Minimal use of narcotics.  Per report ambulating with lift.  Tolerating oral diet.         Physical Exam:        Physical Exam   Temp:  [97.3  F (36.3  C)-97.6  F (36.4  C)] 97.6  F (36.4  C)  Pulse:  [80-83] 80  Resp:  [16] 16  BP: (127-135)/(56-60) 127/56  SpO2:  [96 %-98 %] 96 %    Intake/Output Summary (Last 24 hours) at 2/13/2023 1729  Last data filed at 2/13/2023 1426  Gross per 24 hour   Intake 240 ml   Output 1600 ml   Net -1360 ml     PHYSICAL EXAM  GENERAL: Patient is in no distress. Alert and oriented.  LUNGS: Respirations unlabored  NEURO: Moving all extremities.  EXTREMITIES: 1+ pedal edema.   SKIN: Warm, dry. No rash   PSYCHIATRY Cooperative       Medications:          atorvastatin  40 mg Oral Daily     carboxymethylcellulose PF  1 drop Both Eyes 4x Daily     gabapentin  600 mg Oral TID     insulin aspart  3 Units Subcutaneous TID w/meals     insulin aspart  1-7 Units Subcutaneous TID AC     insulin aspart  1-5 Units Subcutaneous At Bedtime     insulin glargine  15 Units Subcutaneous At Bedtime     levothyroxine  175 mcg Oral QAM AC     lisinopril  20 mg Oral Daily     mirabegron  25 mg Oral At Bedtime     multivitamin, therapeutic  1 tablet Oral Daily     rivaroxaban ANTICOAGULANT  20 mg Oral Daily with breakfast     sennosides  8.6 mg Oral Every Other Day      sitagliptin  100 mg Oral Daily     tamsulosin  0.4 mg Oral At Bedtime     topiramate  100 mg Oral Daily     venlafaxine  150 mg Oral Daily     acetaminophen, albuterol, glucose **OR** dextrose **OR** glucagon, lidocaine 4%, lidocaine (viscous), lidocaine (buffered or not buffered), melatonin, naloxone **OR** naloxone **OR** naloxone **OR** naloxone, nitroGLYcerin, ondansetron **OR** ondansetron, oxyCODONE, prochlorperazine **OR** prochlorperazine **OR** prochlorperazine, senna-docusate **OR** senna-docusate, tiZANidine         Data:      All new lab and imaging data was reviewed.

## 2023-02-16 NOTE — PROGRESS NOTES
[facility]    Hospitalist Progress Note    Date of Service (when I saw the patient): 02/16/2023  Admit date: 1/6/2023    Interval History   Full details of events over last 24 hours outlined below.   Remains afebrile hemodynamically stable with good oxygenation on room air.  No acute events noted by nursing notes.  Denies any SOB, CP, abdominal pain, N/V/D.  Using minimal oxycodone 0 to 5 mg/day for pain.     Assessment & Plan   Miley Tolbert is a 77 year old female with paroxysmal a-fib and prior DVT on chronic AC with rivaroaban, CHF, CAD, CKD, COPD, poorly controlled DM2, fecal/urinary incontinence, and chronic wounds admitted on 1/6/2023 for worsening sacral decubitus ulcer and need for placement due to inability of the family to provide adequate care for the patient. She is now waiting for long term care placement     Stage IV sacral wound   Stage II left calf and heel wounds  Initial concern for sepsis, Resolved  * Presented with worsening sacral decubitus ulcer that was reportedly foul smelling and soiled with urine/stool. Untreated wounds on left calf and heel also noted on admission. Afebrile with normal WBC at presentation.   * Empirically initiated on IV vancomycin and pip-tazo for presumed sacral wound infection though ultimately not felt to be infected.   * CT abd/pelvis and right thigh (1/11) No source of infection or hematoma noted. Blood cultures remained negative.   * Received 2 doses of IV vancomycin and 5-day course of IV pip-tazo this admission  * Podiatry followed ABIs normal. No surgical intervention recommended  ? Wound cares per WOCN     Chronic pain syndrome  *Per report opiate use prior to admission.   Discussed with patient regarding de-escalation of narcotics, now de-escalated to oxycodone 5 mg twice daily as needed for pain.  Will further lower today to 2.5 mg twice daily.  Wean off narcotics in coming days  As needed Tylenol.   As needed Zanaflex.       DM2 with peripheral neuropathy, long term insulin use  Hypoglycemia, Resolved  * A1c 10  * PTA regimen: sitagliptin 100 mg daily, Lantus 35 units, apart 10-14 units TID AC  * Insulin adjusted this admission due to intermittent hypoglycemia  Continue insulin Lantus 15 units at bedtime.  Continue medium intensity sliding scale insulin.  Continue PTA Sitagliptin.  Monitor blood sugars, optimize regimen    Recent Labs   Lab 02/16/23  0731 02/16/23  0149 02/15/23  2136 02/15/23  1621 02/15/23  1222 02/15/23  0723   * 181* 177* 186* 127* 91          Poor fitting dentures with associated pain improved   Epulis fissuratum  * Oral pain noted during admission. No oral lesions noted, but her dentures fit poorly.  Removed dentures and she has a flap of soft tissue on the upper anterior gum line. Case was discussed with oral surgery who explained this is extra tissue (epulis fissuratum) that often occurs in patients with poor fitting dentures. It should be excised and she should have dentures refitted.    ? RN to use dentures only while eating  ? Dental soft diet, viscous lidocaine PRN ordered.   ? Needs expedited oral surgery referral at discharge     Paroxysmal atrial fibrillation  Hx of DVT  ? Not on AV veronica agents prior to admission  ? Continues on PTA rivaroxaban      CAD with HFpEF  Hypertension  Hyperlipidemia  * Last echo on 5/5/2022 showed EF of 55 to 60% with normal left ventricular cavity size.   * RRT activated 1/15 for chest pain. EKG negative for ischemia. Serial troponins negative.  * PTA lisinopril decreased from 40 to 20 mg daily this admission due to hypotension, continues on lisinopril 20 mg daily.  ? Continues on PTA atorvastatin 40 mg daily  ? If recurrent pain suggestive of CAD, consider NM stress test given underlying known CAD.      Chronic anemia  * Baseline Hgb 10-11. Decreased to 8.9 on 1/20 with no s/sx of bleeding.   ? Iron studies normal.  ? Hemoglobin at around previous baseline.   Monitor periodically.      Urinary incontinence  Urinary retention, Resolved  * Espino catheter initially placed this admission given worsening decub and contamination with urine. Removed 1/11.   Has been utilizing external catheter since 1/17  Continues on PTA tamsulosin     Dementia with behavioral disturbance, intermittent delirium  Schizoaffective disorder   Continue PTA regimen: venlafaxine 150 mg daily, topiramate 100 mg blair  Delirium precautions in place       Clinically Significant Risk Factors              # Hypoalbuminemia: Lowest albumin = 2.4 g/dL at 1/8/2023  7:01 AM, will monitor as appropriate          # DMII: A1C = 10.0 % (Ref range: 0.0 - 5.6 %) within past 3 months             Diet: Orders Placed This Encounter      Combination Diet Moderate Consistent Carb (60 g CHO per Meal) Diet; Mechanical/Dental Soft Diet     IVF:None   Espino Catheter: Not present     DVT Prophylaxis: PCDs  Code Status: No CPR- Do NOT Intubate     Disposition: Expected discharge: Likely stable once placement is found.  Communication: Discussed with with RN on 02/16/23    Waleska Gar MD  Hospitalist Service  [facility]  Securely message with the Vocera Web Console (learn more here)  Text page via Dialective Paging/Directory    -Data reviewed today: I reviewed all new labs and imaging results over the last 24 hours. I personally reviewed no images or EKG's today.    Physical Exam   Temp: 98.2  F (36.8  C) Temp src: Axillary BP: 112/50 Pulse: 70   Resp: 16 SpO2: 100 % O2 Device: None (Room air)    Vitals:    01/06/23 2358 01/14/23 0735 01/21/23 1049   Weight: 70.5 kg (155 lb 6.4 oz) 84 kg (185 lb 1.6 oz) 77.1 kg (170 lb)     Vital Signs with Ranges  Temp:  [98.2  F (36.8  C)-99  F (37.2  C)] 98.2  F (36.8  C)  Pulse:  [70-82] 70  Resp:  [16] 16  BP: (112-120)/(50-65) 112/50  SpO2:  [95 %-100 %] 100 %  I/O last 3 completed shifts:  In: 1000 [P.O.:1000]  Out: 675 [Urine:675]    Today's  Exam  Constitutional:  NAD,   Neuropsyche: sleeping comfortably in bed  Respiratory: Breathing comfortably  Skin/Integumen: No acute rash or sign of bleeding.     Medications   All medications reviewed on 02/16/23       atorvastatin  40 mg Oral Daily     carboxymethylcellulose PF  1 drop Both Eyes 4x Daily     gabapentin  600 mg Oral TID     insulin aspart  1-7 Units Subcutaneous TID AC     insulin aspart  1-5 Units Subcutaneous At Bedtime     insulin glargine  15 Units Subcutaneous At Bedtime     levothyroxine  175 mcg Oral QAM AC     lisinopril  20 mg Oral Daily     mirabegron  25 mg Oral At Bedtime     multivitamin, therapeutic  1 tablet Oral Daily     rivaroxaban ANTICOAGULANT  20 mg Oral Daily with breakfast     sennosides  8.6 mg Oral Every Other Day     sitagliptin  100 mg Oral Daily     tamsulosin  0.4 mg Oral At Bedtime     topiramate  100 mg Oral Daily     venlafaxine  150 mg Oral Daily     PRN Meds: acetaminophen, albuterol, glucose **OR** dextrose **OR** glucagon, lidocaine 4%, lidocaine (viscous), lidocaine (buffered or not buffered), melatonin, naloxone **OR** naloxone **OR** naloxone **OR** naloxone, nitroGLYcerin, ondansetron **OR** ondansetron, oxyCODONE, prochlorperazine **OR** prochlorperazine **OR** prochlorperazine, senna-docusate **OR** senna-docusate, tiZANidine    Data   Recent Labs   Lab 02/16/23  0731 02/16/23  0149 02/15/23  2136 02/13/23  0812 02/13/23  0713 02/10/23  0807 02/10/23  0655   WBC  --   --   --   --  4.8  --   --    HGB  --   --   --   --  11.1*  --   --    MCV  --   --   --   --  84  --   --    PLT  --   --   --   --  224  --   --    NA  --   --   --   --  142  --   --    POTASSIUM  --   --   --   --  3.9  --  3.9   CHLORIDE  --   --   --   --  108*  --   --    CO2  --   --   --   --  22  --   --    BUN  --   --   --   --  24.5*  --   --    CR  --   --   --   --  0.71  --   --    ANIONGAP  --   --   --   --  12  --   --    MELISSA  --   --   --   --  9.0  --   --    *  181* 177*   < > 74   < >  --    ALBUMIN  --   --   --   --  3.1*  --   --    PROTTOTAL  --   --   --   --  6.3*  --   --    BILITOTAL  --   --   --   --  0.2  --   --    ALKPHOS  --   --   --   --  93  --   --    ALT  --   --   --   --  20  --   --    AST  --   --   --   --  20  --   --     < > = values in this interval not displayed.       No results found for this or any previous visit (from the past 24 hour(s)).

## 2023-02-16 NOTE — PLAN OF CARE
Goal Outcome Evaluation:    Pt is AO x4, forgetful at times. Pleasant and cooperative but refuses T/R at times. VSS on RA. Denies pain. Endorsed pruritus to scalp, dry skin noted. Pt denied further intervention. Tolerating mod carb diet. BG checks ACHS. Purewick in place for incontinence, soft brown BM this AM. Wound cares done to BLE/feet per POC. Pressure wounds to sacrum, buttock, calf and heel, dressings CDI. No IV access, MD aware. Discharge pending LTC placement. SW following.

## 2023-02-17 NOTE — PROGRESS NOTES
Care Management Follow Up    Length of Stay (days): 41    Expected Discharge Date: 02/23/2023     Concerns to be Addressed:       Patient plan of care discussed at interdisciplinary rounds: Yes    Anticipated Discharge Disposition: Long Term Care     Anticipated Discharge Services: None  Anticipated Discharge DME: None    Patient/family educated on Medicare website which has current facility and service quality ratings: yes  Education Provided on the Discharge Plan:    Patient/Family in Agreement with the Plan: yes    Referrals Placed by CM/SW: Post Acute Facilities  Private pay costs discussed: Not applicable    Additional Information:    SW still working on securing LTC placement    [facility]  [facility]  [facility]       Declined:  [facility]  [facility]  [facility]  [facility]  [facility]   [facility] [place]   [facility]  [facility]   [facility]  [facility] [place]  [facility]    [facility]

## 2023-02-17 NOTE — PLAN OF CARE
Goal Outcome Evaluation:  Orientation: Alert and oriented x4  Vitals/Tele: VSS on RA ex for htn  IV Access/drains: No IV-MD aware  Diet: Mod carb diet-dentures to be worn when eating  Mobility: A2 lift. Turn and reposition. Patient frequently refuses turns. Education given to patient about risks. Patient states that she is weight shifting every hour on her own.    GI/: Incontinent. Purewick in place.   Wound/Skin: Wounds present to sacrum, buttock, calf and heel. Patient refused skin assessment and skin cares.   Consults: WOC, SE  Discharge Plan: Pending placement  See Flow sheets for assessment

## 2023-02-17 NOTE — PROGRESS NOTES
[facility]    Hospitalist Progress Note    Date of Service (when I saw the patient): 02/17/2023  Admit date: 1/6/2023    Interval History   Full details of events over last 24 hours outlined below.   Remains afebrile hemodynamically stable with good oxygenation on room air.  No acute events noted by nursing notes.  Continues to resist  Wound cares.  I had a long talk about this with the RN and patient.  Patient thinks she only should just have wound cares every other day when in reality she requires daily wound cares. Donald feels that nursing staff are not seeing her and they are just focusing on her wounds.  I have observed her current RN working with patients and I am confident that she is respecting Donald and treating her as a whole person and not just focusing on the wounds. In any event, I relayed Donald's perspective.   Oxycodone use remains limited 0 to 5 mg/day.     Assessment & Plan   Miley Tolbert is a 77 year old female with paroxysmal a-fib and prior DVT on chronic AC with rivaroaban, CHF, CAD, CKD, COPD, poorly controlled DM2, fecal/urinary incontinence, and chronic wounds admitted on 1/6/2023 for worsening sacral decubitus ulcer and need for placement due to inability of the family to provide adequate care for the patient. She is now waiting for long term care placement     Stage IV sacral wound   Stage II left calf and heel wounds  Initial concern for sepsis, Resolved  * Presented with worsening sacral decubitus ulcer that was reportedly foul smelling and soiled with urine/stool. Untreated wounds on left calf and heel also noted on admission. Afebrile with normal WBC at presentation.   * Empirically initiated on IV vancomycin and pip-tazo for presumed sacral wound infection though ultimately not felt to be infected.   * CT abd/pelvis and right thigh (1/11) No source of infection or hematoma noted. Blood cultures remained negative.   * Received 2 doses of IV vancomycin and  5-day course of IV pip-tazo this admission  * Podiatry followed ABIs normal. No surgical intervention recommended  ? Wound cares per WOCN. See above discussion on 02/17/23     Chronic pain syndrome  *Per report opiate use prior to admission.   Discussed with patient regarding de-escalation of narcotics, now de-escalated to oxycodone 5 mg twice daily as needed for pain.  Will further lower today to 2.5 mg twice daily.  Wean off narcotics in coming days  As needed Tylenol.   As needed Zanaflex.      DM2 with peripheral neuropathy, long term insulin use  Hypoglycemia, Resolved  * A1c 10  * PTA regimen: sitagliptin 100 mg daily, Lantus 35 units, apart 10-14 units TID AC  * Insulin adjusted this admission due to intermittent hypoglycemia  Continue insulin Lantus 15 units at bedtime.  Continue medium intensity sliding scale insulin.  Continue PTA Sitagliptin.  Monitor blood sugars, optimize regimen    Recent Labs   Lab 02/17/23  1621 02/17/23  1210 02/17/23  0729 02/17/23  0143 02/16/23  2233 02/16/23  1750   * 150* 129* 191* 245* 261*          Poor fitting dentures with associated pain improved   Epulis fissuratum  * Oral pain noted during admission. No oral lesions noted, but her dentures fit poorly.  Removed dentures and she has a flap of soft tissue on the upper anterior gum line. Case was discussed with oral surgery who explained this is extra tissue (epulis fissuratum) that often occurs in patients with poor fitting dentures. It should be excised and she should have dentures refitted.    ? RN to use dentures only while eating  ? Dental soft diet, viscous lidocaine PRN ordered.   ? Needs expedited oral surgery referral at discharge     Paroxysmal atrial fibrillation  Hx of DVT  ? Not on AV veronica agents prior to admission  ? Continues on PTA rivaroxaban      CAD with HFpEF  Hypertension  Hyperlipidemia  * Last echo on 5/5/2022 showed EF of 55 to 60% with normal left ventricular cavity size.   * RRT activated  1/15 for chest pain. EKG negative for ischemia. Serial troponins negative.  * PTA lisinopril decreased from 40 to 20 mg daily this admission due to hypotension, continues on lisinopril 20 mg daily.  ? Continues on PTA atorvastatin 40 mg daily  ? If recurrent pain suggestive of CAD, consider NM stress test given underlying known CAD.      Chronic anemia  * Baseline Hgb 10-11. Decreased to 8.9 on 1/20 with no s/sx of bleeding.   ? Iron studies normal.  ? Hemoglobin at around previous baseline.  Monitor periodically.      Urinary incontinence  Urinary retention, Resolved  * Espino catheter initially placed this admission given worsening decub and contamination with urine. Removed 1/11.   Has been utilizing external catheter since 1/17  Continues on PTA tamsulosin     Dementia with behavioral disturbance, intermittent delirium  Schizoaffective disorder   Continue PTA regimen: venlafaxine 150 mg daily, topiramate 100 mg blair  Delirium precautions in place       Clinically Significant Risk Factors              # Hypoalbuminemia: Lowest albumin = 2.4 g/dL at 1/8/2023  7:01 AM, will monitor as appropriate          # DMII: A1C = 10.0 % (Ref range: 0.0 - 5.6 %) within past 3 months             Diet: Orders Placed This Encounter      Combination Diet Moderate Consistent Carb (60 g CHO per Meal) Diet; Mechanical/Dental Soft Diet     IVF:None   Espino Catheter: Not present     DVT Prophylaxis: PCDs  Code Status: No CPR- Do NOT Intubate     Disposition: Expected discharge: Likely stable once placement is found.  Communication: Discussed with with patient and RN on 02/17/23    Waleska Gar MD  Hospitalist Service  [facility]  Securely message with the Vocera Web Console (learn more here)  Text page via swiftQueue Paging/Directory    -Data reviewed today: I reviewed all new labs and imaging results over the last 24 hours. I personally reviewed no images or EKG's today.    Physical Exam   Temp: 98.9  F (37.2   C) Temp src: Oral BP: (!) 140/66 Pulse: 78   Resp: 16 SpO2: 98 % O2 Device: None (Room air)    Vitals:    01/06/23 2358 01/14/23 0735 01/21/23 1049   Weight: 70.5 kg (155 lb 6.4 oz) 84 kg (185 lb 1.6 oz) 77.1 kg (170 lb)     Vital Signs with Ranges  Temp:  [97.5  F (36.4  C)-99.5  F (37.5  C)] 98.9  F (37.2  C)  Pulse:  [76-83] 78  Resp:  [16-20] 16  BP: (103-145)/(44-66) 140/66  SpO2:  [95 %-98 %] 98 %  I/O last 3 completed shifts:  In: 920 [P.O.:920]  Out: 1600 [Urine:1600]    Today's Exam  Constitutional:  NAD,   Neuropsyche: alert, oriented,   Respiratory: Breathing comfortably  Skin/Integumen: No acute rash or sign of bleeding.     Medications   All medications reviewed on 02/17/23      atorvastatin  40 mg Oral Daily     carboxymethylcellulose PF  1 drop Both Eyes 4x Daily     gabapentin  600 mg Oral TID     insulin aspart  1-7 Units Subcutaneous TID AC     insulin aspart  1-5 Units Subcutaneous At Bedtime     insulin glargine  15 Units Subcutaneous At Bedtime     levothyroxine  175 mcg Oral QAM AC     lisinopril  20 mg Oral Daily     mirabegron  25 mg Oral At Bedtime     multivitamin, therapeutic  1 tablet Oral Daily     rivaroxaban ANTICOAGULANT  20 mg Oral Daily with breakfast     sennosides  8.6 mg Oral Every Other Day     sitagliptin  100 mg Oral Daily     tamsulosin  0.4 mg Oral At Bedtime     topiramate  100 mg Oral Daily     venlafaxine  150 mg Oral Daily     PRN Meds: acetaminophen, albuterol, glucose **OR** dextrose **OR** glucagon, lidocaine 4%, lidocaine (viscous), lidocaine (buffered or not buffered), melatonin, naloxone **OR** naloxone **OR** naloxone **OR** naloxone, nitroGLYcerin, ondansetron **OR** ondansetron, oxyCODONE, prochlorperazine **OR** prochlorperazine **OR** prochlorperazine, senna-docusate **OR** senna-docusate, tiZANidine    Data   Recent Labs   Lab 02/17/23  1621 02/17/23  1210 02/17/23  0729 02/13/23  0812 02/13/23  0713   WBC  --   --   --   --  4.8   HGB  --   --   --   --   11.1*   MCV  --   --   --   --  84   PLT  --   --   --   --  224   NA  --   --   --   --  142   POTASSIUM  --   --   --   --  3.9   CHLORIDE  --   --   --   --  108*   CO2  --   --   --   --  22   BUN  --   --   --   --  24.5*   CR  --   --   --   --  0.71   ANIONGAP  --   --   --   --  12   MELISSA  --   --   --   --  9.0   * 150* 129*   < > 74   ALBUMIN  --   --   --   --  3.1*   PROTTOTAL  --   --   --   --  6.3*   BILITOTAL  --   --   --   --  0.2   ALKPHOS  --   --   --   --  93   ALT  --   --   --   --  20   AST  --   --   --   --  20    < > = values in this interval not displayed.       No results found for this or any previous visit (from the past 24 hour(s)).

## 2023-02-17 NOTE — PROGRESS NOTES
"Pt A&O x4, forgetful. Lift with transfer. T&R q2h. Pt refused at times. VSS, RA. Pain with movement. BG monitor and covered with insulin. Purewick in place. Adequate I&O. BM x1. Pt refused wound care stated WOC already done yesterday. Explained to pt that some of the wounds need to be done every day, pt stated, \"I am ok.\" TCU replacement pending.   "

## 2023-02-18 NOTE — PLAN OF CARE
Goal Outcome Evaluation:       1900-0730:    A/Ox4. Particular with cares. VSS on RA. Denies SOB/N/V. C/o of mild chest/ upper abdomen pain, pt declined PRN pain med and stated that pain is on going for several days. Tender legs/feet to touch. Up A2 lift and refuses turn/repo many times, pulsate mattress. Incontinent B/B, no BM, purewick in place with adequate UOP, pt refuses for purewick change this shift. On BG checks per sliding scale. On mod carb diet, good appetite. Many wounds with mepi/dressings in place, but pt refuses wound cares and wants to be done on day shift, which is acknowledged by previous shift RN.  Refuses skin assessment. No IV. Discharge to LTC pending placement.

## 2023-02-18 NOTE — PROGRESS NOTES
[facility]    Hospitalist Progress Note    Date of Service (when I saw the patient): 02/18/2023  Admit date: 1/6/2023    Interval History   Full details of events over last 24 hours outlined below.   Patient remains afebrile hemodynamically stable good oxygenation.  She had some mildly elevated blood pressures overnight in the 150s.  Continues to use minimal oxycodone 0 to 5 mg 2.5 mg yet today  Blood sugars are trending up in the 190s to mid 200s.  Patient is calm and appreciative of our discussion yesterday about wound cares.  She is agreeable to daily wound cares and states that she feels she is getting good care from the nurses.      Assessment & Plan   Miley Tolbert is a 77 year old female with paroxysmal a-fib and prior DVT on chronic AC with rivaroaban, CHF, CAD, CKD, COPD, poorly controlled DM2, fecal/urinary incontinence, and chronic wounds admitted on 1/6/2023 for worsening sacral decubitus ulcer and need for placement due to inability of the family to provide adequate care for the patient. She is now waiting for long term care placement     Stage IV sacral wound   Stage II left calf and heel wounds  Initial concern for sepsis, Resolved  * Presented with worsening sacral decubitus ulcer that was reportedly foul smelling and soiled with urine/stool. Untreated wounds on left calf and heel also noted on admission. Afebrile with normal WBC at presentation.   * Empirically initiated on IV vancomycin and pip-tazo for presumed sacral wound infection though ultimately not felt to be infected.   * CT abd/pelvis and right thigh (1/11) No source of infection or hematoma noted. Blood cultures remained negative.   * Received 2 doses of IV vancomycin and 5-day course of IV pip-tazo this admission  * Podiatry followed ABIs normal. No surgical intervention recommended  ? Wound cares per WOCN. See above discussion on 02/17/23     Chronic pain syndrome  *Per report opiate use prior to  admission.   Discussed with patient regarding de-escalation of narcotics, now de-escalated to oxycodone 5 mg twice daily as needed for pain.  Will further lower today to 2.5 mg twice daily.  Wean off narcotics in coming days  As needed Tylenol.   As needed Zanaflex.      DM2 with peripheral neuropathy, long term insulin use  Hypoglycemia, Resolved  * A1c 10  * PTA regimen: sitagliptin 100 mg daily, Lantus 35 units, apart 10-14 units TID AC  * Insulin adjusted this admission due to intermittent hypoglycemia  Continue insulin Lantus 15 units at bedtime  Start novolog 2 units with meals on 02/18/23  + medium intensity sliding scale insulin.  Continue PTA Sitagliptin.  Monitor blood sugars, optimize regimen  Will discuss starting metformin on 2/19/23     Recent Labs   Lab 02/18/23  1213 02/18/23  0841 02/18/23  0208 02/17/23  2211 02/17/23  1621 02/17/23  1210   * 169* 179* 193* 207* 150*          Poor fitting dentures with associated pain improved   Epulis fissuratum  * Oral pain noted during admission. No oral lesions noted, but her dentures fit poorly.  Removed dentures and she has a flap of soft tissue on the upper anterior gum line. Case was discussed with oral surgery who explained this is extra tissue (epulis fissuratum) that often occurs in patients with poor fitting dentures. It should be excised and she should have dentures refitted.    ? RN to use dentures only while eating  ? Dental soft diet, viscous lidocaine PRN ordered.   ? Needs expedited oral surgery referral at discharge     Paroxysmal atrial fibrillation  Hx of DVT  ? Not on AV veronica agents prior to admission  ? Continues on PTA rivaroxaban      CAD with HFpEF  Hypertension  Hyperlipidemia  * Last echo on 5/5/2022 showed EF of 55 to 60% with normal left ventricular cavity size.   * RRT activated 1/15 for chest pain. EKG negative for ischemia. Serial troponins negative.  * PTA lisinopril decreased from 40 to 20 mg daily this admission due to  hypotension, continues on lisinopril 20 mg daily.  ? Continues on PTA atorvastatin 40 mg daily  ? If recurrent pain suggestive of CAD, consider NM stress test given underlying known CAD.      Chronic anemia  * Baseline Hgb 10-11. Decreased to 8.9 on 1/20 with no s/sx of bleeding.   ? Iron studies normal.  ? Hemoglobin at around previous baseline.  Monitor periodically.      Urinary incontinence  Urinary retention, Resolved  * Espino catheter initially placed this admission given worsening decub and contamination with urine. Removed 1/11.   Has been utilizing external catheter since 1/17  Continues on PTA tamsulosin     Dementia with behavioral disturbance, intermittent delirium  Schizoaffective disorder   Continue PTA regimen: venlafaxine 150 mg daily, topiramate 100 mg blair  Delirium precautions in place       Clinically Significant Risk Factors              # Hypoalbuminemia: Lowest albumin = 2.4 g/dL at 1/8/2023  7:01 AM, will monitor as appropriate          # DMII: A1C = 10.0 % (Ref range: 0.0 - 5.6 %) within past 3 months             Diet: Orders Placed This Encounter      Combination Diet Moderate Consistent Carb (60 g CHO per Meal) Diet; Mechanical/Dental Soft Diet     IVF:None   Espino Catheter: Not present     DVT Prophylaxis: DOAC  Code Status: No CPR- Do NOT Intubate     Disposition: Expected discharge: Likely stable once placement is found.  Communication: Discussed with with patient and RN on 02/18/23     Waleska Gar MD  Hospitalist Service  [facility]  Securely message with the Vocera Web Console (learn more here)  Text page via [employer] Paging/Directory    -Data reviewed today: I reviewed all new labs and imaging results over the last 24 hours. I personally reviewed no images or EKG's today.    Physical Exam   Temp: 98.5  F (36.9  C) Temp src: Oral BP: 134/54 Pulse: 74   Resp: 16 SpO2: 96 % O2 Device: None (Room air)    Vitals:    01/06/23 2358 01/14/23 0735 01/21/23 1049    Weight: 70.5 kg (155 lb 6.4 oz) 84 kg (185 lb 1.6 oz) 77.1 kg (170 lb)     Vital Signs with Ranges  Temp:  [98.5  F (36.9  C)-98.9  F (37.2  C)] 98.5  F (36.9  C)  Pulse:  [72-77] 74  Resp:  [16-18] 16  BP: (126-155)/(54-78) 134/54  SpO2:  [96 %-98 %] 96 %  I/O last 3 completed shifts:  In: -   Out: 1200 [Urine:1200]    Today's Exam  Constitutional:  NAD,   Neuropsyche: alert, oriented,   Respiratory: Breathing comfortably  Skin/Integumen: No acute rash or sign of bleeding.     Medications   All medications reviewed on 02/18/23      atorvastatin  40 mg Oral Daily     carboxymethylcellulose PF  1 drop Both Eyes 4x Daily     gabapentin  600 mg Oral TID     insulin aspart  2 Units Subcutaneous TID w/meals     insulin aspart  1-7 Units Subcutaneous TID AC     insulin aspart  1-5 Units Subcutaneous At Bedtime     insulin glargine  15 Units Subcutaneous At Bedtime     levothyroxine  175 mcg Oral QAM AC     lisinopril  20 mg Oral Daily     mirabegron  25 mg Oral At Bedtime     multivitamin, therapeutic  1 tablet Oral Daily     rivaroxaban ANTICOAGULANT  20 mg Oral Daily with breakfast     sennosides  8.6 mg Oral Every Other Day     sitagliptin  100 mg Oral Daily     tamsulosin  0.4 mg Oral At Bedtime     topiramate  100 mg Oral Daily     venlafaxine  150 mg Oral Daily     PRN Meds: acetaminophen, albuterol, glucose **OR** dextrose **OR** glucagon, lidocaine 4%, lidocaine (viscous), lidocaine (buffered or not buffered), melatonin, naloxone **OR** naloxone **OR** naloxone **OR** naloxone, nitroGLYcerin, ondansetron **OR** ondansetron, oxyCODONE, prochlorperazine **OR** prochlorperazine **OR** prochlorperazine, senna-docusate **OR** senna-docusate, tiZANidine    Data   Recent Labs   Lab 02/18/23  1213 02/18/23  1130 02/18/23  0841 02/18/23  0208 02/13/23  0812 02/13/23  0713   WBC  --   --   --   --   --  4.8   HGB  --   --   --   --   --  11.1*   MCV  --   --   --   --   --  84   PLT  --   --   --   --   --  224   NA  --    --   --   --   --  142   POTASSIUM  --   --   --   --   --  3.9   CHLORIDE  --   --   --   --   --  108*   CO2  --   --   --   --   --  22   BUN  --   --   --   --   --  24.5*   CR  --  0.62  --   --   --  0.71   ANIONGAP  --   --   --   --   --  12   MELISSA  --   --   --   --   --  9.0   *  --  169* 179*   < > 74   ALBUMIN  --   --   --   --   --  3.1*   PROTTOTAL  --   --   --   --   --  6.3*   BILITOTAL  --   --   --   --   --  0.2   ALKPHOS  --   --   --   --   --  93   ALT  --   --   --   --   --  20   AST  --   --   --   --   --  20    < > = values in this interval not displayed.       No results found for this or any previous visit (from the past 24 hour(s)).

## 2023-02-18 NOTE — PLAN OF CARE
"Goal Outcome Evaluation:     A&O. VSS on RA. C/o generalized pain, controlled with scheduled meds. LS clear. BS active, small BM today. Incontinent of urine, external catheter in use. Mod carb diet, good intake today. Pt refused daily wound cares despite education, MD aware and pt stating she \"only needs to do them every other day\" and will let RN complete them tomorrow. Also refusing turn/repo q2hrs at times, pulsate mattress in use. Discharge to LTC pending placement.   "

## 2023-02-19 NOTE — PLAN OF CARE
Goal Outcome Evaluation:    A&Ox4, particular with cares. VSS on RA. Denies pain, except with movement. Wound cares completed today, gave PRN oxycodone x1 to premedicate. LS clear. BS active, incontinent medium BM today. Incontinent of urine, purewick in use. Mod carb diet, good appetite. Pulsate mattress in use, turn/repo q2hrs as pt allows, refused out of bed this shift. Discharge to LTC pending placement.

## 2023-02-19 NOTE — PROGRESS NOTES
[facility]    Hospitalist Progress Note    Date of Service (when I saw the patient): 02/19/2023  Admit date: 1/6/2023    Interval History   Full details of events over last 24 hours outlined below.   Donald remains afebrile with normal blood pressures pulse and oxygenation on room air.  Per nursing notes she continues to refuse cares.  Otherwise no acute events overnight.  Discussed complying with cares by nursing.  Patient and says she is not laying on her wounds that she does not need them to reposition her.  I explained they are trying to help her and to prevent further wounds.     Assessment & Plan   Miley Tolbert is a 77 year old female with paroxysmal a-fib and prior DVT on chronic AC with rivaroaban, CHF, CAD, CKD, COPD, poorly controlled DM2, fecal/urinary incontinence, and chronic wounds admitted on 1/6/2023 for worsening sacral decubitus ulcer and need for placement due to inability of the family to provide adequate care for the patient. She is now waiting for long term care placement     Stage IV sacral wound   Stage II left calf and heel wounds  Initial concern for sepsis, Resolved  * Presented with worsening sacral decubitus ulcer that was reportedly foul smelling and soiled with urine/stool. Untreated wounds on left calf and heel also noted on admission. Afebrile with normal WBC at presentation.   * Empirically initiated on IV vancomycin and pip-tazo for presumed sacral wound infection though ultimately not felt to be infected.   * CT abd/pelvis and right thigh (1/11) No source of infection or hematoma noted. Blood cultures remained negative.   * Received 2 doses of IV vancomycin and 5-day course of IV pip-tazo this admission  * Podiatry followed ABIs normal. No surgical intervention recommended  ? Wound cares per WOCN. See above discussion on 02/17/23     Chronic pain syndrome  *Per report opiate use prior to admission.   Discussed with patient regarding de-escalation of  narcotics, now de-escalated to oxycodone 5 mg twice daily as needed for pain.  Will further lower today to 2.5 mg twice daily.  Wean off narcotics in coming days  As needed Tylenol.   As needed Zanaflex.      DM2 with peripheral neuropathy, long term insulin use  Hypoglycemia, Resolved  * A1c 10  * PTA regimen: sitagliptin 100 mg daily, Lantus 35 units, apart 10-14 units TID AC  * Insulin adjusted this admission due to intermittent hypoglycemia  Continue insulin Lantus 15 units at bedtime  Start novolog 2 units with meals on 02/18/23  + medium intensity sliding scale insulin.  Continue PTA Sitagliptin.  Monitor blood sugars, optimize regimen  Will discuss starting metformin on 02/20/23     Recent Labs   Lab 02/19/23  1733 02/19/23  1227 02/19/23  0805 02/19/23  0140 02/18/23  2104 02/18/23  1724   * 142* 116* 251* 222* 224*          Poor fitting dentures with associated pain improved   Epulis fissuratum  * Oral pain noted during admission. No oral lesions noted, but her dentures fit poorly.  Removed dentures and she has a flap of soft tissue on the upper anterior gum line. Case was discussed with oral surgery who explained this is extra tissue (epulis fissuratum) that often occurs in patients with poor fitting dentures. It should be excised and she should have dentures refitted.    ? RN to use dentures only while eating  ? Dental soft diet, viscous lidocaine PRN ordered.   ? Needs expedited oral surgery referral at discharge     Paroxysmal atrial fibrillation  Hx of DVT  ? Not on AV veronica agents prior to admission  ? Continues on PTA rivaroxaban      CAD with HFpEF  Hypertension  Hyperlipidemia  * Last echo on 5/5/2022 showed EF of 55 to 60% with normal left ventricular cavity size.   * RRT activated 1/15 for chest pain. EKG negative for ischemia. Serial troponins negative.  * PTA lisinopril decreased from 40 to 20 mg daily this admission due to hypotension, continues on lisinopril 20 mg daily.  ? Continues  on PTA atorvastatin 40 mg daily  ? If recurrent pain suggestive of CAD, consider NM stress test given underlying known CAD.      Chronic anemia  * Baseline Hgb 10-11. Decreased to 8.9 on 1/20 with no s/sx of bleeding.   ? Iron studies normal.  ? Hemoglobin at around previous baseline.  Monitor periodically.      Urinary incontinence  Urinary retention, Resolved  * Espino catheter initially placed this admission given worsening decub and contamination with urine. Removed 1/11.   Has been utilizing external catheter since 1/17  Continues on PTA tamsulosin     Dementia with behavioral disturbance, intermittent delirium  Schizoaffective disorder   Continue PTA regimen: venlafaxine 150 mg daily, topiramate 100 mg blair  Delirium precautions in place       Clinically Significant Risk Factors              # Hypoalbuminemia: Lowest albumin = 2.4 g/dL at 1/8/2023  7:01 AM, will monitor as appropriate          # DMII: A1C = 10.0 % (Ref range: 0.0 - 5.6 %) within past 3 months             Diet: Orders Placed This Encounter      Combination Diet Moderate Consistent Carb (60 g CHO per Meal) Diet; Mechanical/Dental Soft Diet     IVF:None   Espino Catheter: Not present     DVT Prophylaxis: DOAC  Code Status: No CPR- Do NOT Intubate     Disposition: Expected discharge: Likely stable once placement is found.  Communication: Discussed with patient on 02/19/23    Waleska Gar MD  Hospitalist Service  [facility]  Securely message with the [employer]oc[employer] Web Console (learn more here)  Text page via [employer] Paging/Directory    -Data reviewed today: I reviewed all new labs and imaging results over the last 24 hours. I personally reviewed no images or EKG's today.    Physical Exam   Temp: 98.4  F (36.9  C) Temp src: Oral BP: 138/70 Pulse: 71   Resp: 16 SpO2: 98 % O2 Device: None (Room air)    Vitals:    01/06/23 2358 01/14/23 0735 01/21/23 1049   Weight: 70.5 kg (155 lb 6.4 oz) 84 kg (185 lb 1.6 oz) 77.1 kg (170 lb)      Vital Signs with Ranges  Temp:  [98.4  F (36.9  C)-98.5  F (36.9  C)] 98.4  F (36.9  C)  Pulse:  [71-74] 71  Resp:  [16] 16  BP: (134-138)/(54-70) 138/70  SpO2:  [96 %-98 %] 98 %  I/O last 3 completed shifts:  In: 240 [P.O.:240]  Out: 1250 [Urine:1250]    Today's Exam  Constitutional:  NAD,   Neuropsyche: alert, oriented,   Respiratory: Breathing comfortably  Skin/Integumen: No acute rash or sign of bleeding.     Medications   All medications reviewed on 02/19/23      atorvastatin  40 mg Oral Daily     carboxymethylcellulose PF  1 drop Both Eyes 4x Daily     gabapentin  600 mg Oral TID     insulin aspart  2 Units Subcutaneous TID w/meals     insulin aspart  1-7 Units Subcutaneous TID AC     insulin aspart  1-5 Units Subcutaneous At Bedtime     insulin glargine  15 Units Subcutaneous At Bedtime     levothyroxine  175 mcg Oral QAM AC     lisinopril  20 mg Oral Daily     mirabegron  25 mg Oral At Bedtime     multivitamin, therapeutic  1 tablet Oral Daily     rivaroxaban ANTICOAGULANT  20 mg Oral Daily with breakfast     sennosides  8.6 mg Oral Every Other Day     sitagliptin  100 mg Oral Daily     tamsulosin  0.4 mg Oral At Bedtime     topiramate  100 mg Oral Daily     venlafaxine  150 mg Oral Daily     PRN Meds: acetaminophen, albuterol, glucose **OR** dextrose **OR** glucagon, lidocaine 4%, lidocaine (viscous), lidocaine (buffered or not buffered), melatonin, naloxone **OR** naloxone **OR** naloxone **OR** naloxone, nitroGLYcerin, ondansetron **OR** ondansetron, oxyCODONE, prochlorperazine **OR** prochlorperazine **OR** prochlorperazine, senna-docusate **OR** senna-docusate, tiZANidine    Data   Recent Labs   Lab 02/19/23  0805 02/19/23  0140 02/18/23  2104 02/18/23  1213 02/18/23  1130 02/13/23  0812 02/13/23  0713   WBC  --   --   --   --   --   --  4.8   HGB  --   --   --   --   --   --  11.1*   MCV  --   --   --   --   --   --  84   PLT  --   --   --   --   --   --  224   NA  --   --   --   --   --   --  142    POTASSIUM  --   --   --   --   --   --  3.9   CHLORIDE  --   --   --   --   --   --  108*   CO2  --   --   --   --   --   --  22   BUN  --   --   --   --   --   --  24.5*   CR  --   --   --   --  0.62  --  0.71   ANIONGAP  --   --   --   --   --   --  12   MELISSA  --   --   --   --   --   --  9.0   * 251* 222*   < >  --    < > 74   ALBUMIN  --   --   --   --   --   --  3.1*   PROTTOTAL  --   --   --   --   --   --  6.3*   BILITOTAL  --   --   --   --   --   --  0.2   ALKPHOS  --   --   --   --   --   --  93   ALT  --   --   --   --   --   --  20   AST  --   --   --   --   --   --  20    < > = values in this interval not displayed.       No results found for this or any previous visit (from the past 24 hour(s)).

## 2023-02-19 NOTE — PLAN OF CARE
"Goal Outcome Evaluation:    1900-0730:     A/Ox4. Particular with cares. VSS on RA. Denies SOB/N/V. Up A2 lift and refuses turn/repo many times, states she is able to \"shift her weight\". On a pulsate mattress. Incontinent B/B, no BM, purewick in place with adequate UOP, pt refuses for purewick change this shift. On BG checks per sliding scale. On mod carb diet, good appetite. Many wounds with mepi/dressings in place, but pt refuses wound cares and wants to be done on day shift, completed yesterday. Refuses skin assessment. No IV. Discharge to LTC pending placement.  "

## 2023-02-20 NOTE — PLAN OF CARE
Goal Outcome Evaluation:       1900-0730:     A/Ox4. Particular with cares. VSS on RA. Denies SOB/N/V. C/o of legs pain 5/10, pt declined PRN pain med. Tender legs/feet to touch. Up A2 lift and refuses turn/repo many times, pulsate mattress. Incontinent B/B, no BM, purewick in place with adequate UOP, 1 BM this shift. On BG checks per sliding scale. On mod carb diet, good appetite. Many wounds with mepi/dressings in place, but pt refuses wound cares. Refuses skin assessment. No IV. Discharge to LTC pending placement.

## 2023-02-20 NOTE — PLAN OF CARE
Goal Outcome Evaluation:    A&Ox4. VSS on RA. Denies pain. LS clear. BS active, no BM this shift. External catheter in use for incontinence. Mod carb diet, sliding scale insulin and basal insulin given per orders, good intake. Turn/repo q2hrs. Pulsate mattress in use, not out of bed this shift. Discharge to LTC pending placement.

## 2023-02-21 NOTE — PLAN OF CARE
1900 - 8097    A&Ox4. VSS on RA. C/o 7/10 leg pain, PRN oxy given - effective. Denies nausea. Up A2 w/lift. T/R q 2 hrs. Mod carb, mech/dental soft diet - good appetite. BG checks, no corrections needed. No PIV access - MD aware. Incontinent of B/B, purewick in place with good UOP, no BM overnight. Wound cares to BLE completed this shift. Mepilex to coccyx - CDI. WOC and SW following. Discharge pending TCU placement.

## 2023-02-21 NOTE — PROGRESS NOTES
Date: February 21, 2023  Shift: [id number]  Name: Miley Tolbert  Age: 77 year old  YOB: 1945  Date of Admission: 01/6/2023  Reason for Admission: Hyperglycemia [R73.9]  Generalized weakness [R53.1]  Pressure injury of skin of sacral region, unspecified injury stage [L89.159]     Cognitive/Mentation: A/Ox4, pt has been calm, cooperative, and pleasant during shift - she had tendency to refuse turns, wound cares, etc.  Neuros/CMS: BLE kun, dusky, scaling/peeling, weak pulses, and tender. Pt reported bilateral hand numbness which she states is not new    VS: VSS on RA  Cardiac: WDL  GI: WDL  : Incontinent, purewick in place - adequate output  Pulmonary: LS clear  Pain: Reported mild pain this am, refused intervention     Lines/Drains: No IV access, MD aware  Skin: Wounds under breast, pannus, on RLE, coccyx, and butt. WOC orders in place  Activity: Bed bound, T/R Q2    Diet: Mod carb, mechanical soft. Dentures in place for eating only     Discharge: Pending LTC placement

## 2023-02-21 NOTE — PROGRESS NOTES
"[facility]    Hospitalist Progress Note    Date of Service (when I saw the patient): 02/20/2023  Admit date: 1/6/2023    Interval History   Full details of events over last 24 hours outlined below.   Donald vitals remained stable.  She was more agreeable to wound care today.  No acute issues.  She states her daughter visited and she is very happy about that.  He is hoping her daughter will bring her home so she does not have to go to TCU.  We discussed starting metformin and she responds \"that should be okay.\"  She notes she has a lot of allergies to many medications but she would like to try it.  We discussed starting it slow and possible side effects including diarrhea.    Assessment & Plan   Miley Tolbert is a 77 year old female with paroxysmal a-fib and prior DVT on chronic AC with rivaroaban, CHF, CAD, CKD, COPD, poorly controlled DM2, fecal/urinary incontinence, and chronic wounds admitted on 1/6/2023 for worsening sacral decubitus ulcer and need for placement due to inability of the family to provide adequate care for the patient. She is now waiting for long term care placement     Stage IV sacral wound   Stage II left calf and heel wounds  Initial concern for sepsis, Resolved  * Presented with worsening sacral decubitus ulcer that was reportedly foul smelling and soiled with urine/stool. Untreated wounds on left calf and heel also noted on admission. Afebrile with normal WBC at presentation.   * Empirically initiated on IV vancomycin and pip-tazo for presumed sacral wound infection though ultimately not felt to be infected.   * CT abd/pelvis and right thigh (1/11) No source of infection or hematoma noted. Blood cultures remained negative.   * Received 2 doses of IV vancomycin and 5-day course of IV pip-tazo this admission  * Podiatry followed ABIs normal. No surgical intervention recommended  ? Wound cares per [facility]. See above discussion on 02/17/23     Chronic pain " syndrome  *Per report opiate use prior to admission.   Discussed with patient regarding de-escalation of narcotics, now de-escalated to oxycodone 5 mg twice daily as needed for pain.  Will further lower today to 2.5 mg twice daily.  Wean off narcotics in coming days  As needed Tylenol.   As needed Zanaflex.      DM2 with peripheral neuropathy, long term insulin use  Hypoglycemia, Resolved  * A1c 10  * PTA regimen: sitagliptin 100 mg daily, Lantus 35 units, apart 10-14 units TID AC  * Insulin adjusted this admission due to intermittent hypoglycemia  Continue insulin Lantus 15 units at bedtime  Start novolog 2 units with meals on 02/18/23  + medium intensity sliding scale insulin.  Continue PTA Sitagliptin.  Monitor blood sugars, optimize regimen  Started metformin at 500 mg daily on 02/20/23 after discussing with patient. Titrate up on dose weekly as tolerated     Recent Labs   Lab 02/20/23  1727 02/20/23  1217 02/20/23  0752 02/20/23  0206 02/19/23  2220 02/19/23  1733   * 169* 154* 192* 165* 197*          Poor fitting dentures with associated pain improved   Epulis fissuratum  * Oral pain noted during admission. No oral lesions noted, but her dentures fit poorly.  Removed dentures and she has a flap of soft tissue on the upper anterior gum line. Case was discussed with oral surgery who explained this is extra tissue (epulis fissuratum) that often occurs in patients with poor fitting dentures. It should be excised and she should have dentures refitted.    ? RN to use dentures only while eating  ? Dental soft diet, viscous lidocaine PRN ordered.   ? Needs expedited oral surgery referral at discharge     Paroxysmal atrial fibrillation  Hx of DVT  ? Not on AV veronica agents prior to admission  ? Continues on PTA rivaroxaban      CAD with HFpEF  Hypertension  Hyperlipidemia  * Last echo on 5/5/2022 showed EF of 55 to 60% with normal left ventricular cavity size.   * RRT activated 1/15 for chest pain. EKG negative  for ischemia. Serial troponins negative.  * PTA lisinopril decreased from 40 to 20 mg daily this admission due to hypotension, continues on lisinopril 20 mg daily.  ? Continues on PTA atorvastatin 40 mg daily  ? If recurrent pain suggestive of CAD, consider NM stress test given underlying known CAD.      Chronic anemia  * Baseline Hgb 10-11. Decreased to 8.9 on 1/20 with no s/sx of bleeding.   ? Iron studies normal.  ? Hemoglobin at around previous baseline.  Monitor periodically.      Urinary incontinence  Urinary retention, Resolved  * Espino catheter initially placed this admission given worsening decub and contamination with urine. Removed 1/11.   Has been utilizing external catheter since 1/17  Continues on PTA tamsulosin     Dementia with behavioral disturbance, intermittent delirium  Schizoaffective disorder   Continue PTA regimen: venlafaxine 150 mg daily, topiramate 100 mg blair  Delirium precautions in place       Clinically Significant Risk Factors              # Hypoalbuminemia: Lowest albumin = 2.4 g/dL at 1/8/2023  7:01 AM, will monitor as appropriate          # DMII: A1C = 10.0 % (Ref range: 0.0 - 5.6 %) within past 3 months             Diet: Orders Placed This Encounter      Combination Diet Moderate Consistent Carb (60 g CHO per Meal) Diet; Mechanical/Dental Soft Diet     IVF:None   Espino Catheter: Not present     DVT Prophylaxis: DOAC  Code Status: No CPR- Do NOT Intubate     Disposition: Expected discharge: Likely stable once placement is found.  Communication: Discussed with patient and RN on 02/20/23     Waleska Gar MD  Hospitalist Service  [facility]  Securely message with the [employer]oc[employer] Web Console (learn more here)  Text page via [employer] Paging/Directory    -Data reviewed today: I reviewed all new labs and imaging results over the last 24 hours. I personally reviewed no images or EKG's today.    Physical Exam   Temp: 98.3  F (36.8  C) Temp src: Oral BP: (!) 144/73  Pulse: 89   Resp: 17 SpO2: 97 % O2 Device: None (Room air)    Vitals:    01/06/23 2358 01/14/23 0735 01/21/23 1049   Weight: 70.5 kg (155 lb 6.4 oz) 84 kg (185 lb 1.6 oz) 77.1 kg (170 lb)     Vital Signs with Ranges  Temp:  [98.1  F (36.7  C)-98.9  F (37.2  C)] 98.3  F (36.8  C)  Pulse:  [67-89] 89  Resp:  [17-18] 17  BP: (136-144)/(50-73) 144/73  SpO2:  [95 %-97 %] 97 %  I/O last 3 completed shifts:  In: -   Out: 1950 [Urine:1950]    Today's Exam  Constitutional:  NAD,   Neuropsyche: alert, oriented,   Respiratory: Breathing comfortably  Skin/Integumen: No acute rash or sign of bleeding.     Medications   All medications reviewed on 02/20/23      atorvastatin  40 mg Oral Daily     carboxymethylcellulose PF  1 drop Both Eyes 4x Daily     gabapentin  600 mg Oral TID     insulin aspart  2 Units Subcutaneous TID w/meals     insulin aspart  1-7 Units Subcutaneous TID AC     insulin aspart  1-5 Units Subcutaneous At Bedtime     insulin glargine  15 Units Subcutaneous At Bedtime     levothyroxine  175 mcg Oral QAM AC     lisinopril  20 mg Oral Daily     mirabegron  25 mg Oral At Bedtime     multivitamin, therapeutic  1 tablet Oral Daily     rivaroxaban ANTICOAGULANT  20 mg Oral Daily with breakfast     sennosides  8.6 mg Oral Every Other Day     sitagliptin  100 mg Oral Daily     tamsulosin  0.4 mg Oral At Bedtime     topiramate  100 mg Oral Daily     venlafaxine  150 mg Oral Daily     PRN Meds: acetaminophen, albuterol, glucose **OR** dextrose **OR** glucagon, lidocaine 4%, lidocaine (viscous), lidocaine (buffered or not buffered), melatonin, naloxone **OR** naloxone **OR** naloxone **OR** naloxone, nitroGLYcerin, ondansetron **OR** ondansetron, oxyCODONE, prochlorperazine **OR** prochlorperazine **OR** prochlorperazine, senna-docusate **OR** senna-docusate, tiZANidine    Data   Recent Labs   Lab 02/20/23  1727 02/20/23  1217 02/20/23  0752 02/18/23  1213 02/18/23  1130   CR  --   --   --   --  0.62   * 169* 154*    < >  --     < > = values in this interval not displayed.       No results found for this or any previous visit (from the past 24 hour(s)).

## 2023-02-21 NOTE — PROGRESS NOTES
[facility]    Medicine Progress Note - Hospitalist Service    Date of Admission:  1/6/2023    Assessment & Plan   Miley Tolbert is a 77 year old female with paroxysmal a-fib and prior DVT on chronic AC with rivaroaban, CHF, CAD, CKD, COPD, poorly controlled DM2, fecal/urinary incontinence, and chronic wounds admitted on 1/6/2023 for worsening sacral decubitus ulcer and need for placement due to inability of the family to provide adequate care for the patient. She is now waiting for long term care placement     Stage IV sacral wound   Stage II left calf and heel wounds  Initial concern for sepsis, Resolved  * Presented with worsening sacral decubitus ulcer that was reportedly foul smelling and soiled with urine/stool. Untreated wounds on left calf and heel also noted on admission. Afebrile with normal WBC at presentation.   * Empirically initiated on IV vancomycin and pip-tazo for presumed sacral wound infection though ultimately not felt to be infected.   * CT abd/pelvis and right thigh (1/11) No source of infection or hematoma noted. Blood cultures remained negative.   * Received 2 doses of IV vancomycin and 5-day course of IV pip-tazo this admission  * Podiatry followed ABIs normal. No surgical intervention recommended  ? Wound cares per [facility].      Chronic pain syndrome  *Per report opiate use prior to admission.   Discussed with patient regarding de-escalation of narcotics, now de-escalated to oxycodone 5 mg twice daily as needed for pain.  further lowered today to 2.5 mg twice daily.  Wean off narcotics in coming days  As needed Tylenol.   As needed Zanaflex.      DM2 with peripheral neuropathy, long term insulin use  Hypoglycemia, Resolved  * A1c 10  * PTA regimen: sitagliptin 100 mg daily, Lantus 35 units, apart 10-14 units TID AC  * Insulin adjusted this admission due to intermittent hypoglycemia  Continue insulin Lantus 15 units at bedtime  Start novolog 2 units with meals on  02/18/23  + medium intensity sliding scale insulin.  Continue PTA Sitagliptin.  Monitor blood sugars, optimize regimen  Started metformin at 500 mg daily on 02/20/23 after discussing with patient. Titrate up on dose weekly as tolerated      Recent Labs   Lab 02/21/23  1214 02/21/23  0736 02/21/23  0202 02/20/23  2201 02/20/23  1727 02/20/23  1217   * 134* 145* 172* 211* 169*     Poor fitting dentures with associated pain improved   Epulis fissuratum  * Oral pain noted during admission. No oral lesions noted, but her dentures fit poorly.  Removed dentures and she has a flap of soft tissue on the upper anterior gum line. Case was discussed with oral surgery who explained this is extra tissue (epulis fissuratum) that often occurs in patients with poor fitting dentures. It should be excised and she should have dentures refitted.    ? RN to use dentures only while eating  ? Dental soft diet, viscous lidocaine PRN ordered.   ? Needs oral surgery referral at discharge     Paroxysmal atrial fibrillation  Hx of DVT  ? Not on AV veronica agents prior to admission  ? Continues on PTA rivaroxaban      CAD with HFpEF  Hypertension  Hyperlipidemia  * Last echo on 5/5/2022 showed EF of 55 to 60% with normal left ventricular cavity size.   * RRT activated 1/15 for chest pain. EKG negative for ischemia. Serial troponins negative.  * PTA lisinopril decreased from 40 to 20 mg daily this admission due to hypotension, continues on lisinopril 20 mg daily.  ? Continues on PTA atorvastatin 40 mg daily  ? If recurrent pain suggestive of CAD, consider NM stress test given underlying known CAD.      Chronic anemia  * Baseline Hgb 10-11. Decreased to 8.9 on 1/20 with no s/sx of bleeding.   ? Iron studies normal.  ? Hemoglobin at around previous baseline.  Monitor periodically.      Urinary incontinence  Urinary retention, Resolved  * Espino catheter initially placed this admission given worsening decub and contamination with urine. Removed  "1/11.   Has been utilizing external catheter since 1/17  Continues on PTA tamsulosin     Dementia with behavioral disturbance, intermittent delirium  Schizoaffective disorder   Continue PTA regimen: venlafaxine 150 mg daily, topiramate 100 mg blair  Delirium precautions in place       Diet: Combination Diet Moderate Consistent Carb (60 g CHO per Meal) Diet; Mechanical/Dental Soft Diet    DVT Prophylaxis: DOAC  Espino Catheter: Not present  Lines: None     Cardiac Monitoring: None  Code Status: No CPR- Do NOT Intubate      Clinically Significant Risk Factors              # Hypoalbuminemia: Lowest albumin = 2.4 g/dL at 1/8/2023  7:01 AM, will monitor as appropriate          # DMII: A1C = 10.0 % (Ref range: 0.0 - 5.6 %) within past 6 months           Disposition Plan      Expected Discharge Date: 02/28/2023    Discharge Delays: Placement - LTC  *Medically Ready for Discharge    Discharge Comments: placement; was living with daughter; has multiple wounds.   SW/CC-- needs placement.  LTC          Yoel Boyer MD  Hospitalist Service  [facility]  Securely message with [email or web address] (more info)  Text page via [employer] Paging/Directory   ______________________________________________________________________    Interval History   No new complaints my first interaction with the patient.  Denies any complaints    Physical Exam   /68 (BP Location: Left arm)   Pulse 65   Temp 98.1  F (36.7  C) (Oral)   Resp 17   Ht 1.626 m (5' 4\")   Wt 77.1 kg (170 lb)   SpO2 96%   BMI 29.18 kg/m    Gen- pleasant lying in bed  HEENT- NAD, PARUL  Neck- supple, no JVD elevation, no thyromegaly  CVS- I+II+ no m/r/g  RS- CTAB  Abdo- soft, no tenderness . No g/r/r  Ext- chronic skin changes  DSG Rt hand      Medical Decision Making       50 MINUTES SPENT BY ME on the date of service doing chart review, history, exam, documentation & further activities per the note.      Data   ------------------------- PAST 24 HR DATA " REVIEWED -----------------------------------------------        Imaging results reviewed over the past 24 hrs:   No results found for this or any previous visit (from the past 24 hour(s)).   BMPRecent Labs   Lab 02/21/23  1214 02/21/23  0736 02/21/23  0202 02/20/23  2201 02/18/23  1213 02/18/23  1130   CR  --   --   --   --   --  0.62   * 134* 145* 172*   < >  --     < > = values in this interval not displayed.     CBCNo lab results found in last 7 days.  INRNo lab results found in last 7 days.  LFTsNo lab results found in last 7 days.   PANCNo lab results found in last 7 days.

## 2023-02-22 NOTE — PLAN OF CARE
Goal Outcome Evaluation:   Alert and oriented x4. Up with 2 and lift. Turn and reposition every 2 hours.  Wound cares offered-patient refusing, stating that she only does them every other day. No IV access-MD aware. Pain managed with oxycodone x1. , 113. Purewick in place for incontinence. BLE kun/scaly, tender at times. Denies SOB, chest pain.  Dentures in place for eating only. One large BM overnight.

## 2023-02-22 NOTE — PROGRESS NOTES
Care Management Follow Up    Length of Stay (days): 46    Expected Discharge Date: 03/01/2023     Concerns to be Addressed:       Patient plan of care discussed at interdisciplinary rounds: Yes    Anticipated Discharge Disposition: Long Term Care     Anticipated Discharge Services: None  Anticipated Discharge DME: None    Patient/family educated on Medicare website which has current facility and service quality ratings: yes  Education Provided on the Discharge Plan:    Patient/Family in Agreement with the Plan: yes    Referrals Placed by CM/SW: Post Acute Facilities  Private pay costs discussed: Not applicable    Additional Information:  SW still working on securing LTC placement. SW will need to send out more referrals for patient.      Pending:   [facility]     Declined:  [facility]  [facility]  [facility]  [facility]  [facility]   [facility]   [facility]  [facility]  [facility]  [facility] [place]  [facility]        [facility]

## 2023-02-22 NOTE — PROGRESS NOTES
[facility] Nurse Inpatient Assessment     Consulted for: Sacral wound    Patient History (according to provider note(s):      Miley Tolbert is a 77 year old female with history of hypertension, CHF, atrial fibrillation on Xarelto, history of for lower extremity DVT, coronary artery disease, chronic kidney disease stage III, COPD, hypothyroidism, poorly controlled type 2 diabetes with hyperglycemia, GERD, fecal and urinary incontinence who presents with progressively worsening sacral decubitus ulcer and inability of the family to provide care for the patient    Areas Assessed:      Areas visualized during today's visit: Perineal area, Sacrum/coccyx and Lower extremities     Wound location: Left Heel      Last photo: 2/22/23  Wound due to: Pressure Injury  Wound history/plan of care: [place] and family brought patient home with concerns of not being able to care for her. Mepilex in place on assessment today 2/1     Palpation of the wound bed: resurfaced      Drainage: none      Description of drainage: none         Tunneling: N/A     Undermining: N/A  Periwound skin:       Color: pink and brown/waxy yellow build up dead skin       Temperature: normal   Odor: none  Pain: with palpation  Pain interventions prior to dressing change: slow and gentle cares, oral oxy  Treatment goal: protect, offload   STATUS: resurfaced   Supplies ordered: at bedside     Wound location: Right Calf      Last photo: 2/22/23  Wound due to: suspect trauma vs pressure  Wound history/plan of care: unknown  Wound base: tendon, moist red tissue     Palpation of the wound bed: normal     Drainage: small      Description of drainage: bloody     Measurements (length x width x depth, in cm): 2.5 x 2 x 0.5 cm     Tunneling: N/A     Undermining: N/A  Periwound skin: intact, dry, scaly      Color: scar tissue, pale      Temperature: normal   Odor: none   Pain: pt occasionally  vocalized pain with pressure to wound bed  Pain interventions prior to dressing change: slow and gentle cares, po oxycodone  Treatment goal: Drainage control, Infection control/prevention, Decrease moisture, Protection and protect tendon  STATUS: stable   Supplies ordered: aquacel at bedside     Wound location: Coccyx/right buttock         Last photo: 2/22/23  Wound due to: Pressure Injury Stage 4 pressure injury Present on admission  Wound history/plan of care: pt reports she all of a sudden developed several wounds a few months ago. On assessment 2/15, sacral Mepilex in place on pt's sacrum not actually covering wounded skin. Pt is incontinent of bowel. Purewick in use. No packing found in coccyx wound. Wound base: red, moist, light pink tissue. Some pale, marble-like tissue at lower wound margin. Not able to visualize, but did palpate what is likely bone at inferior margin.  2/15: Packed one strip of Aquacel into coccyx wound bed.  2/22: Packed two strips of Aquacel AG into wound bed. No packing in place when writer assessed patient today.      Palpation of the wound bed: firm      Drainage: moderate     Description of drainage: serosanguinous and bloody     Measurements (length x width x depth, in cm):   Coccyx 3 x 2.3 x 1.5 cm and   Right buttock 2 x 1.5 x 0.1 cm   Tunneling: N/A     Undermining: from 12 to 6 to 3 cm   Periwound skin: macerated, Scar tissue and hyperpigmentation      Color: pale tissue due to scarring and superior to wound hyperpigmentation      Temperature: normal   Odor: mild   Pain: intermittently during dressing change   Pain interventions prior to dressing change: slow and gentle cares, po oxycodone  Treatment goal: Infection control/prevention, Increase granulation, decrease moisture  STATUS:stable  Supplies ordered: supplies at bedside      R upper thigh    2/15/23      2/22/23    Multiple, scattered wounds spanning upper, posterior thighs   Scant serosanguinous drainage  Moist, pink  dermis in wound bed.  Periwound area is macerated and scar tissue present.     Skin Injury Location: pannus- improved 2/15  Last photo: none taken  Skin injury due to: Intertrigo  Skin history and plan of care:   Pt with deep creases and few scattered open areas   Affected area:      Skin assessment: Superficial Erosion of epidermis     Measurements (length x width x depth, in cm) now red but intact        Temperature  normal      Drainage: none      Color: none      Odor: none  Pain: denies , none  Pain interventions prior to dressing change: N/A  Treatment goal: Heal  and Decrease moisture  STATUS: improving   Supplies ordered: none     Skin Injury Location: right breast - improved   Last photo: none taken   Skin injury due to: Intertrigo  Skin history and plan of care:  Pt with deep breast creases and 1 open area  Affected area:      Skin assessment: Superficial Erosion of epidermis     Measurements (length x width x depth, in cm) now pink      Color: pink     Temperature  normal      Drainage: scant .      Color: serous      Odor: none  Pain: denies , none  Pain interventions prior to dressing change: N/A  Treatment goal: Heal  and Decrease moisture  STATUS: improving now interdry ag is appropriate   Supplies ordered: at bedside       Treatment Plan:   Pannus and Right Breast: Daily  1. DO not use interdry (#349301) with any other creams or powder.  2. Wash skin gently. Pat dry, do not rub.  3. Cut the appropriate size of interdry (#116892) with clean scissors, allowing a minimum of 2 inches of the fabric exposed outside the skin fold for moisture evaporation.  4. Lay a single layer of fabric in the skin fold, placing one edge of the fabric into the base of the fold. Gently smooth the rest of the fabric over the skin, keeping it flat. Leave at least 2 inches of the fabric exposed outside the skin fold.  5. Secure the fabric in one of several ways; with the skin fold, with a small amount of tape, or tucked under  clothing.    When to Dispose: Each piece of InterDry may be used up to 5 days, depending on fabric soiling, odor, amount of moisture and general skin condition. May label with skin marker to date      Removal: Remove the fabric before bathing and reuse when finished. When removing the fabric from skin folds, gently separate the skin fold and lift away fabric.     Coccyx and right buttock wound - every other day and prn undermining with stool    1.Apply Vashe #[id number] moistened kerlix to wounds and periwound skin x 5 minutes   2. Remove gauze and do not rinse wound bed  3. Fill deep wound with Aquacel Ag (#341892) making sure to fill undermining first then fill the base  4. Apply Aquacel Ag to right buttock wound. Cut Aquacel to be 1 cm larger than wound bed.  5. Wipe periwound area with Cavilon No Sting (#[id number]). Allow to dry.  5. Apply sacral Mepilex.    Right Thigh/bilateral thigh wound: Daily and PRN  1. Cleanse with MicroKlenz or Vashe.   2. Apply layer of Triad paste to wound base. Do not apply Mepilex. Periwound is macerated indicating moisture trapping.     Right lateral lower leg - every 3 days and prn soak through   1. Apply Vashe #[id number] moistened kerlix to wounds and periwound skin x 5 minutes   2. Remove and do not rise wound bed  3. Apply a small Aquacel Ag #[id number] to wound bed only   4. Apply 4x4 Optifoam gentle #[id number] due to discomfort with Mepilex foam     BLE and feet - every other day until all the dry build up is gone then discontinue   1. Wash feet with danielle bath wipes   2. Apply kamala perineal cleanser liberally and rub it in   3. Then apply sween cream - avoid between toes    Orders: Reviewed and updated     RECOMMEND PRIMARY TEAM ORDER: None, at this time  Education provided: Importance of repositioning  Discussed plan of care with: Patient and Nurse  WOC nurse follow-up plan: weekly  Notify WOC if wound(s) deteriorate.  Nursing to notify the Provider(s) and re-consult the WOC Nurse if new  "skin concern.    DATA:     Current support surface: Standard  Low air loss (PETE pump, Isolibrium, Pulsate, skin guard, etc)  Containment of urine/stool: Incontinence Protocol, Incontinent pad in bed and Purewick external catheter   BMI: Body mass index is 29.18 kg/m .   Active diet order: Orders Placed This Encounter      Combination Diet Moderate Consistent Carb (60 g CHO per Meal) Diet; Mechanical/Dental Soft Diet     Output: I/O last 3 completed shifts:  In: -   Out: 700 [Urine:700]     Labs:   Recent Labs   Lab 02/22/23  0725   HGB 11.8   WBC 6.8     Pressure injury risk assessment:   Sensory Perception: 3-->slightly limited  Moisture: 3-->occasionally moist  Activity: 1-->bedfast  Mobility: 2-->very limited  Nutrition: 3-->adequate  Friction and Shear: 1-->problem  Naif Score: 13    Marli Noble RN, CWCN  Contact via [employer] at name or \"[facility] nurse\"  Dept. Office Number: [phone]    "

## 2023-02-22 NOTE — PROGRESS NOTES
[facility]    Medicine Progress Note - Hospitalist Service    Date of Admission:  1/6/2023    Assessment & Plan   Miley Tolbert is a 77 year old female with paroxysmal a-fib and prior DVT on chronic AC with rivaroaban, CHF, CAD, CKD, COPD, poorly controlled DM2, fecal/urinary incontinence, and chronic wounds admitted on 1/6/2023 for worsening sacral decubitus ulcer and need for placement due to inability of the family to provide adequate care for the patient. She is now waiting for long term care placement     A/W LTAC    Stage IV sacral wound   Stage II left calf and heel wounds  Initial concern for sepsis, Resolved  * Presented with worsening sacral decubitus ulcer that was reportedly foul smelling and soiled with urine/stool. Untreated wounds on left calf and heel also noted on admission. Afebrile with normal WBC at presentation.   * Empirically initiated on IV vancomycin and pip-tazo for presumed sacral wound infection though ultimately not felt to be infected.   * CT abd/pelvis and right thigh (1/11) No source of infection or hematoma noted. Blood cultures remained negative.   * Received 2 doses of IV vancomycin and 5-day course of IV pip-tazo this admission  * Podiatry followed ABIs normal. No surgical intervention recommended  ? Wound cares per WOCN.      Chronic pain syndrome  *Per report opiate use prior to admission.   Discussed with patient regarding de-escalation of narcotics, now de-escalated to oxycodone 5 mg twice daily as needed for pain.  further lowered today to 2.5 mg twice daily.  Wean off narcotics in coming days  As needed Tylenol.   As needed Zanaflex.      DM2 with peripheral neuropathy, long term insulin use  Hypoglycemia, Resolved  * A1c 10  * PTA regimen: sitagliptin 100 mg daily, Lantus 35 units, apart 10-14 units TID AC  * Insulin adjusted this admission due to intermittent hypoglycemia  Continue insulin Lantus 15 units at bedtime  Start novolog 2 units  with meals on 02/18/23  + medium intensity sliding scale insulin.  Continue PTA Sitagliptin.  Monitor blood sugars, optimize regimen  Started metformin at 500 mg daily on 02/20/23 after discussing with patient. Titrate up on dose weekly as tolerated      Recent Labs   Lab 02/22/23  1152 02/22/23  0818 02/22/23  0725 02/22/23  0200 02/21/23  2151 02/21/23  1730   * 112* 116* 113* 169* 132*     Poor fitting dentures with associated pain improved   Epulis fissuratum  * Oral pain noted during admission. No oral lesions noted, but her dentures fit poorly.  Removed dentures and she has a flap of soft tissue on the upper anterior gum line. Case was discussed with oral surgery who explained this is extra tissue (epulis fissuratum) that often occurs in patients with poor fitting dentures. It should be excised and she should have dentures refitted.    ? RN to use dentures only while eating  ? Dental soft diet, viscous lidocaine PRN ordered.   ? Needs oral surgery referral at discharge     Paroxysmal atrial fibrillation  Hx of DVT  ? Not on AV veronica agents prior to admission  ? Continues on PTA rivaroxaban      CAD with HFpEF  Hypertension  Hyperlipidemia  * Last echo on 5/5/2022 showed EF of 55 to 60% with normal left ventricular cavity size.   * RRT activated 1/15 for chest pain. EKG negative for ischemia. Serial troponins negative.  * PTA lisinopril decreased from 40 to 20 mg daily this admission due to hypotension, continues on lisinopril 20 mg daily.  ? Continues on PTA atorvastatin 40 mg daily  ? If recurrent pain suggestive of CAD, consider NM stress test given underlying known CAD.      Chronic anemia  * Baseline Hgb 10-11. Decreased to 8.9 on 1/20 with no s/sx of bleeding.   ? Iron studies normal.  ? Hemoglobin at around previous baseline.  Monitor periodically.      Urinary incontinence  Urinary retention, Resolved  * Espino catheter initially placed this admission given worsening decub and contamination with  "urine. Removed 1/11.   Has been utilizing external catheter since 1/17  Continues on PTA tamsulosin     Dementia with behavioral disturbance, intermittent delirium  Schizoaffective disorder   Continue PTA regimen: venlafaxine 150 mg daily, topiramate 100 mg blair  Delirium precautions in place       Diet: Combination Diet Moderate Consistent Carb (60 g CHO per Meal) Diet; Mechanical/Dental Soft Diet    DVT Prophylaxis: DOAC  Espino Catheter: Not present  Lines: None     Cardiac Monitoring: None  Code Status: No CPR- Do NOT Intubate      Clinically Significant Risk Factors              # Hypoalbuminemia: Lowest albumin = 2.4 g/dL at 1/8/2023  7:01 AM, will monitor as appropriate          # DMII: A1C = 10.0 % (Ref range: 0.0 - 5.6 %) within past 6 months           Disposition Plan      Expected Discharge Date: 03/01/2023    Discharge Delays: Placement - LTC  *Medically Ready for Discharge    Discharge Comments: placement; was living with daughter; has multiple wounds.   SW/CC-- needs placement.  LTC          Yoel Boyer MD  Hospitalist Service  [facility]  Securely message with [employer] (more info)  Text page via [employer] Paging/Directory   ______________________________________________________________________    Interval History   No new complaints .    Physical Exam   BP (!) 147/67 (BP Location: Left arm)   Pulse 69   Temp 98.2  F (36.8  C) (Oral)   Resp 18   Ht 1.626 m (5' 4\")   Wt 77.1 kg (170 lb)   SpO2 97%   BMI 29.18 kg/m    Gen- pleasant lying in bed  HEENT- NAD, PARUL  Neck- supple, no JVD elevation, no thyromegaly  CVS- I+II+ no m/r/g  RS- CTAB  Abdo- soft, no tenderness . No g/r/r  Ext- chronic skin changes  DSG Rt hand    Medical Decision Making       40 MINUTES SPENT BY ME on the date of service doing chart review, history, exam, documentation & further activities per the note.      Data   ------------------------- PAST 24 HR DATA REVIEWED " -----------------------------------------------    I have personally reviewed the following data over the past 24 hrs:    6.8  \   11.8   / 197     141 107 22.7 /  175 (H)   3.6 23 0.61 \       Imaging results reviewed over the past 24 hrs:   No results found for this or any previous visit (from the past 24 hour(s)).   BMP  Recent Labs   Lab 02/22/23  1152 02/22/23  0818 02/22/23  0725 02/22/23  0200 02/18/23  1213 02/18/23  1130   NA  --   --  141  --   --   --    POTASSIUM  --   --  3.6  --   --   --    CHLORIDE  --   --  107  --   --   --    MELISSA  --   --  9.4  --   --   --    CO2  --   --  23  --   --   --    BUN  --   --  22.7  --   --   --    CR  --   --  0.61  --   --  0.62   * 112* 116* 113*   < >  --     < > = values in this interval not displayed.     CBC  Recent Labs   Lab 02/22/23  0725   WBC 6.8   RBC 4.65   HGB 11.8   HCT 38.2   MCV 82   MCH 25.4*   MCHC 30.9*   RDW 14.2        INRNo lab results found in last 7 days.  LFTsNo lab results found in last 7 days.   PANCNo lab results found in last 7 days.

## 2023-02-22 NOTE — PROGRESS NOTES
Shift Summary [id number]    Admitting Diagnosis: Hyperglycemia [R73.9]  Generalized weakness [R53.1]  Pressure injury of skin of sacral region, unspecified injury stage [L89.159]   Vitals WNL.   Pain moderate pain associated with wound care. Tenderness in Ble. PRN Oxy 2.5mg given before wound care by WOC.   A&Ox4, confused.   Voiding : incontinence. Use external catheter.   Mobility :lift for transfers. Turn and repo but refused to get turned and repositioned.   Tele NA.   CMS : edema in BLE. Denied n/t during shift. Tenderness in BLE.   Lung Sounds clear upper lobes, difficult to determine in lower lobes. .  on room air.   GI : bowel incontinence.   Dressing : multiple wounds on BLE, sacrum. WOC following.     Orders Placed This Encounter      Combination Diet Moderate Consistent Carb (60 g CHO per Meal) Diet; Mechanical/Dental Soft Diet       Plan:     Awaiting placement.

## 2023-02-23 NOTE — PROGRESS NOTES
[facility]    Medicine Progress Note - Hospitalist Service    Date of Admission:  1/6/2023    Assessment & Plan   Miley Tolbert is a 77 year old female with paroxysmal a-fib and prior DVT on chronic AC with rivaroaban, CHF, CAD, CKD, COPD, poorly controlled DM2, fecal/urinary incontinence, and chronic wounds admitted on 1/6/2023 for worsening sacral decubitus ulcer and need for placement due to inability of the family to provide adequate care for the patient. She is now waiting for long term care placement     A/W LTAC versus home with increased support    Stage IV sacral wound   Stage II left calf and heel wounds  Initial concern for sepsis, Resolved  * Presented with worsening sacral decubitus ulcer that was reportedly foul smelling and soiled with urine/stool. Untreated wounds on left calf and heel also noted on admission. Afebrile with normal WBC at presentation.   * Empirically initiated on IV vancomycin and pip-tazo for presumed sacral wound infection though ultimately not felt to be infected.   * CT abd/pelvis and right thigh (1/11) No source of infection or hematoma noted. Blood cultures remained negative.   * Received 2 doses of IV vancomycin and 5-day course of IV pip-tazo this admission  * Podiatry followed ABIs normal. No surgical intervention recommended  ? Wound cares per WOCN.      Chronic pain syndrome  *Per report opiate use prior to admission.   Discussed with patient regarding de-escalation of narcotics, now de-escalated to oxycodone 5 mg twice daily as needed for pain.  further lowered today to 2.5 mg twice daily.  Wean off narcotics in coming days  As needed Tylenol.   As needed Zanaflex.      DM2 with peripheral neuropathy, long term insulin use  Hypoglycemia, Resolved  * A1c 10  * PTA regimen: sitagliptin 100 mg daily, Lantus 35 units, apart 10-14 units TID AC  * Insulin adjusted this admission due to intermittent hypoglycemia  Continue insulin Lantus 15 units  at bedtime  Start novolog 2 units with meals on 02/18/23  + medium intensity sliding scale insulin.  Continue PTA Sitagliptin.  Monitor blood sugars, optimize regimen  Started metformin at 500 mg daily on 02/20/23 after discussing with patient. Titrate up on dose weekly as tolerated      Recent Labs   Lab 02/23/23  0827 02/22/23  2243 02/22/23  1707 02/22/23  1152 02/22/23  0818 02/22/23  0725   * 147* 158* 175* 112* 116*     Poor fitting dentures with associated pain improved   Epulis fissuratum  * Oral pain noted during admission. No oral lesions noted, but her dentures fit poorly.  Removed dentures and she has a flap of soft tissue on the upper anterior gum line. Case was discussed with oral surgery who explained this is extra tissue (epulis fissuratum) that often occurs in patients with poor fitting dentures. It should be excised and she should have dentures refitted.    ? RN to use dentures only while eating  ? Dental soft diet, viscous lidocaine PRN ordered.   ? Needs oral surgery referral at discharge     Paroxysmal atrial fibrillation  Hx of DVT  ? Not on AV veronica agents prior to admission  ? Continues on PTA rivaroxaban      CAD with HFpEF  Hypertension  Hyperlipidemia  * Last echo on 5/5/2022 showed EF of 55 to 60% with normal left ventricular cavity size.   * RRT activated 1/15 for chest pain. EKG negative for ischemia. Serial troponins negative.  * PTA lisinopril decreased from 40 to 20 mg daily this admission due to hypotension, continues on lisinopril 20 mg daily.  ? Continues on PTA atorvastatin 40 mg daily  ? If recurrent pain suggestive of CAD, consider NM stress test given underlying known CAD.      Chronic anemia  * Baseline Hgb 10-11. Decreased to 8.9 on 1/20 with no s/sx of bleeding.   ? Iron studies normal.  ? Hemoglobin at around previous baseline.  Monitor periodically.      Urinary incontinence  Urinary retention, Resolved  * Espino catheter initially placed this admission given  "worsening decub and contamination with urine. Removed 1/11.   Has been utilizing external catheter since 1/17  Continues on PTA tamsulosin     Dementia with behavioral disturbance, intermittent delirium  Schizoaffective disorder   Continue PTA regimen: venlafaxine 150 mg daily, topiramate 100 mg blair  Delirium precautions in place     Diet: Combination Diet Moderate Consistent Carb (60 g CHO per Meal) Diet; Mechanical/Dental Soft Diet    DVT Prophylaxis: DOAC  Espino Catheter: Not present  Lines: None     Cardiac Monitoring: None  Code Status: No CPR- Do NOT Intubate      Clinically Significant Risk Factors              # Hypoalbuminemia: Lowest albumin = 2.4 g/dL at 1/8/2023  7:01 AM, will monitor as appropriate          # DMII: A1C = 10.0 % (Ref range: 0.0 - 5.6 %) within past 6 months         Disposition Plan      Expected Discharge Date: 03/01/2023    Discharge Delays: Placement - LTC  *Medically Ready for Discharge    Discharge Comments: placement; was living with daughter; has multiple wounds.   SW/CC-- needs placement.  LTC          Yoel Boyer MD  Hospitalist Service  [facility]  Securely message with [employer] (more info)  Text page via [facility] Paging/Directory   ______________________________________________________________________    Interval History   No new complaints .    Hoping to go home with increased services.    Physical Exam   BP 98/58 (BP Location: Left arm)   Pulse 79   Temp 98.3  F (36.8  C) (Oral)   Resp 20   Ht 1.626 m (5' 4\")   Wt 77.1 kg (170 lb)   SpO2 98%   BMI 29.18 kg/m    Gen- pleasant lying in bed  CVS- I+II+ no m/r/g  RS- CTAB  Abdo- soft, no tenderness . No g/r/r  Ext- chronic skin changes  DSG Rt hand    Medical Decision Making       40 MINUTES SPENT BY ME on the date of service doing chart review, history, exam, documentation & further activities per the note.      Data   ------------------------- PAST 24 HR DATA REVIEWED " -----------------------------------------------        Imaging results reviewed over the past 24 hrs:   No results found for this or any previous visit (from the past 24 hour(s)).   BMP  Recent Labs   Lab 02/23/23  0827 02/22/23  2243 02/22/23  1707 02/22/23  1152 02/22/23  0818 02/22/23  0725 02/18/23  1213 02/18/23  1130   NA  --   --   --   --   --  141  --   --    POTASSIUM  --   --   --   --   --  3.6  --   --    CHLORIDE  --   --   --   --   --  107  --   --    MELISSA  --   --   --   --   --  9.4  --   --    CO2  --   --   --   --   --  23  --   --    BUN  --   --   --   --   --  22.7  --   --    CR  --   --   --   --   --  0.61  --  0.62   * 147* 158* 175*   < > 116*   < >  --     < > = values in this interval not displayed.     CBC  Recent Labs   Lab 02/22/23  0725   WBC 6.8   RBC 4.65   HGB 11.8   HCT 38.2   MCV 82   MCH 25.4*   MCHC 30.9*   RDW 14.2        INRNo lab results found in last 7 days.  LFTsNo lab results found in last 7 days.   PANCNo lab results found in last 7 days.

## 2023-02-23 NOTE — PROGRESS NOTES
Care Management Follow Up    Length of Stay (days): 47    Expected Discharge Date: 03/01/2023     Concerns to be Addressed:       Patient plan of care discussed at interdisciplinary rounds: Yes    Anticipated Discharge Disposition: Long Term Care     Anticipated Discharge Services: None  Anticipated Discharge DME: None    Patient/family educated on Medicare website which has current facility and service quality ratings: yes  Education Provided on the Discharge Plan:    Patient/Family in Agreement with the Plan: yes    Referrals Placed by CM/SW: Post Acute Facilities  Private pay costs discussed: Not applicable    Additional Information:  Writer received a call from Jorge, Care Coordinator for [facility]. Jorge states if the barrier for LTC is her insurance, he is going to try to change patient over to [facility] so she has a better variety of places she can look into for LTC. Jorge will follow up with  once he finds out if they can do that       SUSANNA Main

## 2023-02-23 NOTE — PROVIDER NOTIFICATION
MD Notification    Notified Person: MD    Notified Person Name: Yoel Boyer    Notification Date/Time: 2/23/23 @ 3:20 PM    Notification Interaction: Raegan    Purpose of Notification: Hi, can pt have more options for pain? She seems to be struggling with wound cares and turn/repositions    Orders Received:    Comments:

## 2023-02-23 NOTE — PLAN OF CARE
Goal Outcome Evaluation:    [id number]  A/Ox4, VSS on RA ex HTN. Up A2 w/ lift. T/R Q2H. PRN oxy given 1x to premedicate before turn. Incontinent B/B - using purewick. No bm this shift. No IV access. Mod carb diet w/ BG checks, sliding scale as needed. Multiple wounds, dressings changed by WOC this afternoon.  Discharge pending LTC placement

## 2023-02-23 NOTE — PLAN OF CARE
Goal Outcome Evaluation:    [id number]    Pt mildly hypotensive, otherwise AVSS on room air. A+Ox4. Ax2 w/ ceiling lift, turn/reposition q2h. Intermittently refusing cares. Screams out in pain and cries with any slight movement in bed. Pain somewhat managed with PRN oxy (given x1). Refusing PRN tylenol and Zanaflex. No IV access (MD aware). Multiple wounds scattered throughout body. Pt refused wound cares and rooke boots. Legs elevated on pillows. Tolerating moderate CHO (60 g) mechanical/dental soft diet. Blood sugar checks prior to meals and corrected via Novolog sliding scale. Incontinent of B/B. Utilizing purewick w/ good UOP. No BM noted this shift. SW following for discharge needs. Plan to discharge to LTC once placement established. Will continue with plan of care.

## 2023-02-23 NOTE — PLAN OF CARE
Goal Outcome Evaluation:    A&Ox4. VSS on RA. Up with lift, T/R q2hr, refusing at times. Reports 8/10 pain, unable to give pain medication- refusing tylenol and zanaflex, oxycodone available BID. Incontinent, purewick in place. Mod carb, dental soft diet. Encourage fluids. Many skin wounds, see WOC orders. Discharge pending placement.

## 2023-02-24 NOTE — PLAN OF CARE
Cognition/Mentation: A/O x4    VS: stable, RA    Respiratory: LS clear    GI/: incontinent, purewick in place    Activity: A2 lift, T/R overnight; patient hesitant to be repositioned but eventually cooperative with turns    Pain: PRN oxycodone given x1 for pain to wounds    Skin: patient refused full skin assessment    Disposition: discharge pending LTC placement

## 2023-02-24 NOTE — PLAN OF CARE
Goal Outcome Evaluation:    [id number]  A/Ox4, VSS on RA ex HTN. Up A2 w/ lift. T/R Q2H. PRN oxy given before turns and wound care. Wound cares completed sacrum, R calf, heels, and buttocks per orders - bleeding from sacrum and calf. Pt frequently tries to refuse turns and dressings changes. Incontinent B/B - using purewick. No bm this shift. No IV access. Mod carb diet with BG checks. Discharge pending LTC placement

## 2023-02-24 NOTE — PROGRESS NOTES
[facility]    Medicine Progress Note - Hospitalist Service    Date of Admission:  1/6/2023    Assessment & Plan   Miley Tolbert is a 77 year old female with paroxysmal a-fib and prior DVT on chronic AC with rivaroaban, CHF, CAD, CKD, COPD, poorly controlled DM2, fecal/urinary incontinence, and chronic wounds admitted on 1/6/2023 for worsening sacral decubitus ulcer and need for placement due to inability of the family to provide adequate care for the patient. She is now waiting for long term care placement     A/W LTAC versus home with increased support    Stage IV sacral wound   Stage II left calf and heel wounds  Initial concern for sepsis, Resolved  * Presented with worsening sacral decubitus ulcer that was reportedly foul smelling and soiled with urine/stool. Untreated wounds on left calf and heel also noted on admission. Afebrile with normal WBC at presentation.   * Empirically initiated on IV vancomycin and pip-tazo for presumed sacral wound infection though ultimately not felt to be infected.   * CT abd/pelvis and right thigh (1/11) No source of infection or hematoma noted. Blood cultures remained negative.   * Received 2 doses of IV vancomycin and 5-day course of IV pip-tazo this admission  * Podiatry followed ABIs normal. No surgical intervention recommended  ? Wound cares per WOCN.      Chronic pain syndrome  *Per report opiate use prior to admission.   Discussed with patient regarding de-escalation of narcotics, now de-escalated to oxycodone 5 mg twice daily as needed for pain.  further lowered today to 2.5 mg twice daily but had to change it Q6hr prn on 2/23 due to uncontrolled pain  As needed Tylenol.   As needed Zanaflex.      DM2 with peripheral neuropathy, long term insulin use  Hypoglycemia, Resolved  * A1c 10  * PTA regimen: sitagliptin 100 mg daily, Lantus 35 units, apart 10-14 units TID AC  * Insulin adjusted this admission due to intermittent  hypoglycemia  Continue insulin Lantus 15 units at bedtime  Start novolog 2 units with meals on 02/18/23  + medium intensity sliding scale insulin.  Continue PTA Sitagliptin.  Monitor blood sugars, optimize regimen  Started metformin at 500 mg daily on 02/20/23 after discussing with patient. Titrate up on dose weekly as tolerated      Recent Labs   Lab 02/24/23  0727 02/24/23  0209 02/23/23  2136 02/23/23  1857 02/23/23  1256 02/23/23  0827   * 139* 214* 181* 164* 155*     Poor fitting dentures with associated pain improved   Epulis fissuratum  * Oral pain noted during admission. No oral lesions noted, but her dentures fit poorly.  Removed dentures and she has a flap of soft tissue on the upper anterior gum line. Case was discussed with oral surgery who explained this is extra tissue (epulis fissuratum) that often occurs in patients with poor fitting dentures. It should be excised and she should have dentures refitted.    ? RN to use dentures only while eating  ? Dental soft diet, viscous lidocaine PRN ordered.   ? Needs oral surgery referral at discharge     Paroxysmal atrial fibrillation  Hx of DVT  ? Not on AV veronica agents prior to admission  ? Continues on PTA rivaroxaban      CAD with HFpEF  Hypertension  Hyperlipidemia  * Last echo on 5/5/2022 showed EF of 55 to 60% with normal left ventricular cavity size.   * RRT activated 1/15 for chest pain. EKG negative for ischemia. Serial troponins negative.  * PTA lisinopril decreased from 40 to 20 mg daily this admission due to hypotension, continues on lisinopril 20 mg daily.  ? Continues on PTA atorvastatin 40 mg daily  ? If recurrent pain suggestive of CAD, consider NM stress test given underlying known CAD.      Chronic anemia  * Baseline Hgb 10-11. Decreased to 8.9 on 1/20 with no s/sx of bleeding.   ? Iron studies normal.  ? Hemoglobin at around previous baseline.  Monitor periodically.      Urinary incontinence  Urinary retention, Resolved  * Espino  "catheter initially placed this admission given worsening decub and contamination with urine. Removed 1/11.   Has been utilizing external catheter since 1/17  Continues on PTA tamsulosin     Dementia with behavioral disturbance, intermittent delirium  Schizoaffective disorder   Continue PTA regimen: venlafaxine 150 mg daily, topiramate 100 mg blair  Delirium precautions in place     Diet: Combination Diet Moderate Consistent Carb (60 g CHO per Meal) Diet; Mechanical/Dental Soft Diet  Snacks/Supplements Adult: Other; L - vanilla Ensure MAX (RD); With Meals    DVT Prophylaxis: DOAC  Espino Catheter: Not present  Lines: None     Cardiac Monitoring: None  Code Status: No CPR- Do NOT Intubate      Clinically Significant Risk Factors              # Hypoalbuminemia: Lowest albumin = 2.4 g/dL at 1/8/2023  7:01 AM, will monitor as appropriate          # DMII: A1C = 10.0 % (Ref range: 0.0 - 5.6 %) within past 6 months         Disposition Plan      Expected Discharge Date: 03/01/2023    Discharge Delays: Placement - LTC  *Medically Ready for Discharge    Discharge Comments: placement; was living with daughter; has multiple wounds.   SW/CC-- needs placement.  LTC          Yoel Boyer MD  Hospitalist Service  [facility]  Securely message with [email or web address] (more info)  Text page via [employer] Paging/Directory   ______________________________________________________________________    Interval History   No new complaints .    Hoping to go home with increased services.    Physical Exam   /45   Pulse 72   Temp 97.1  F (36.2  C) (Axillary)   Resp 16   Ht 1.626 m (5' 4\")   Wt 77.1 kg (170 lb)   SpO2 100%   BMI 29.18 kg/m    Gen- pleasant lying in bed  CVS- I+II+ no m/r/g  RS- CTAB  Abdo- soft, no tenderness . No g/r/r  Ext- chronic skin changes  DSG Rt hand    Medical Decision Making       40 MINUTES SPENT BY ME on the date of service doing chart review, history, exam, documentation & further activities " per the note.      Data   ------------------------- PAST 24 HR DATA REVIEWED -----------------------------------------------        Imaging results reviewed over the past 24 hrs:   No results found for this or any previous visit (from the past 24 hour(s)).   BMP  Recent Labs   Lab 02/24/23  0727 02/24/23  0209 02/23/23  2136 02/23/23  1857 02/22/23  0818 02/22/23  0725 02/18/23  1213 02/18/23  1130   NA  --   --   --   --   --  141  --   --    POTASSIUM  --   --   --   --   --  3.6  --   --    CHLORIDE  --   --   --   --   --  107  --   --    MELISSA  --   --   --   --   --  9.4  --   --    CO2  --   --   --   --   --  23  --   --    BUN  --   --   --   --   --  22.7  --   --    CR  --   --   --   --   --  0.61  --  0.62   * 139* 214* 181*   < > 116*   < >  --     < > = values in this interval not displayed.     CBC  Recent Labs   Lab 02/22/23  0725   WBC 6.8   RBC 4.65   HGB 11.8   HCT 38.2   MCV 82   MCH 25.4*   MCHC 30.9*   RDW 14.2        INRNo lab results found in last 7 days.  LFTsNo lab results found in last 7 days.   PANCNo lab results found in last 7 days.

## 2023-02-25 NOTE — PLAN OF CARE
[id number]  A/Ox4, forgetful. VSS on RA. PRN oxy x2. Up A2 w/ lift. T/R Q2H, needs lots of education and reinforcement. Pt will explained how she likes to be turned and what she can do on her own. Wound cares completed on sacrum, buttocks, BLE, and pannus/breasts. R calf wound with small amount of drainage, marked. Incontinent B/B - external cath in place. No bm this shift. No IV access. Mod carb diet with BG checks- 119, 152, 188. WOC/SW following. Discharge pending LTC placement.

## 2023-02-25 NOTE — PLAN OF CARE
1900 - 0730    A&Ox4, forgetful at times. VSS on RA. C/o 7/10 leg pain, PRN oxy given x1. N/V. Mod carb, mech/soft diet. BG checks. Up A2 w/lift. T/R q 2 hrs as pt allows. No IV access - MD aware. Incontinent of B/B, purewick in place with good OP, no BM overnight. Wound cares to BLE and feet completed this shift. Mepilex to coccyx changed - CDI. Mepilex to R leg marked, no changes overnight. WOC and SW following. Discharge pending LTC placement.

## 2023-02-26 NOTE — PROGRESS NOTES
[facility]    Medicine Progress Note - Hospitalist Service    Date of Admission:  1/6/2023    Assessment & Plan   Miley Tolbert is a 77 year old female with paroxysmal a-fib and prior DVT on chronic AC with rivaroaban, CHF, CAD, CKD, COPD, poorly controlled DM2, fecal/urinary incontinence, and chronic wounds admitted on 1/6/2023 for worsening sacral decubitus ulcer and need for placement due to inability of the family to provide adequate care for the patient. She is now waiting for long term care placement     A/W LTAC versus home with increased support    Stage IV sacral wound   Stage II left calf and heel wounds  Initial concern for sepsis, Resolved  * Presented with worsening sacral decubitus ulcer that was reportedly foul smelling and soiled with urine/stool. Untreated wounds on left calf and heel also noted on admission. Afebrile with normal WBC at presentation.   * Empirically initiated on IV vancomycin and pip-tazo for presumed sacral wound infection though ultimately not felt to be infected.   * CT abd/pelvis and right thigh (1/11) No source of infection or hematoma noted. Blood cultures remained negative.   * Received 2 doses of IV vancomycin and 5-day course of IV pip-tazo this admission  * Podiatry followed ABIs normal. No surgical intervention recommended  ? Wound cares per WOCN.      Chronic pain syndrome  *Per report opiate use prior to admission.   Discussed with patient regarding de-escalation of narcotics, now de-escalated to oxycodone 5 mg twice daily as needed for pain.  further lowered today to 2.5 mg twice daily but had to change it Q6hr prn on 2/23 due to uncontrolled pain  As needed Tylenol.   As needed Zanaflex.      DM2 with peripheral neuropathy, long term insulin use  Hypoglycemia, Resolved  * A1c 10  * PTA regimen: sitagliptin 100 mg daily, Lantus 35 units, apart 10-14 units TID AC  * Insulin adjusted this admission due to intermittent  hypoglycemia  Continue insulin Lantus 15 units at bedtime  Start novolog 2 units with meals on 02/18/23  + medium intensity sliding scale insulin.  Continue PTA Sitagliptin.  Monitor blood sugars, optimize regimen  Started metformin at 500 mg daily on 02/20/23 after discussing with patient. Titrate up on dose weekly as tolerated      Recent Labs   Lab 02/25/23  1656 02/25/23  1137 02/25/23  0754 02/25/23  0154 02/24/23  2222 02/24/23  1715   * 196* 152* 156* 190* 188*     Poor fitting dentures with associated pain improved   Epulis fissuratum  * Oral pain noted during admission. No oral lesions noted, but her dentures fit poorly.  Removed dentures and she has a flap of soft tissue on the upper anterior gum line. Case was discussed with oral surgery who explained this is extra tissue (epulis fissuratum) that often occurs in patients with poor fitting dentures. It should be excised and she should have dentures refitted.    ? RN to use dentures only while eating  ? Dental soft diet, viscous lidocaine PRN ordered.   ? Needs oral surgery referral at discharge     Paroxysmal atrial fibrillation  Hx of DVT  ? Not on AV veronica agents prior to admission  ? Continues on PTA rivaroxaban      CAD with HFpEF  Hypertension  Hyperlipidemia  * Last echo on 5/5/2022 showed EF of 55 to 60% with normal left ventricular cavity size.   * RRT activated 1/15 for chest pain. EKG negative for ischemia. Serial troponins negative.  * PTA lisinopril decreased from 40 to 20 mg daily this admission due to hypotension, continues on lisinopril 20 mg daily.  ? Continues on PTA atorvastatin 40 mg daily  ? If recurrent pain suggestive of CAD, consider NM stress test given underlying known CAD.      Chronic anemia  * Baseline Hgb 10-11. Decreased to 8.9 on 1/20 with no s/sx of bleeding.   ? Iron studies normal.  ? Hemoglobin at around previous baseline.  Monitor periodically.      Urinary incontinence  Urinary retention, Resolved  * Espino  "catheter initially placed this admission given worsening decub and contamination with urine. Removed 1/11.   Has been utilizing external catheter since 1/17  Continues on PTA tamsulosin     Dementia with behavioral disturbance, intermittent delirium  Schizoaffective disorder   Continue PTA regimen: venlafaxine 150 mg daily, topiramate 100 mg blair  Delirium precautions in place     Diet: Combination Diet Moderate Consistent Carb (60 g CHO per Meal) Diet; Mechanical/Dental Soft Diet  Snacks/Supplements Adult: Other; L - vanilla Ensure MAX (RD); With Meals    DVT Prophylaxis: DOAC  Espino Catheter: Not present  Lines: None     Cardiac Monitoring: None  Code Status: No CPR- Do NOT Intubate      Clinically Significant Risk Factors              # Hypoalbuminemia: Lowest albumin = 2.4 g/dL at 1/8/2023  7:01 AM, will monitor as appropriate          # DMII: A1C = 10.0 % (Ref range: 0.0 - 5.6 %) within past 6 months         Disposition Plan      Expected Discharge Date: 03/01/2023    Discharge Delays: Placement - LTC  *Medically Ready for Discharge    Discharge Comments: placement; was living with daughter; has multiple wounds.   SW/CC-- needs placement.  LTC          Kofi Copeland DO  Hospitalist Service  [facility]  Securely message with [employer] (more info)  Text page via [employer] Paging/Directory   ______________________________________________________________________    Interval History   No new complaints .    Hoping to go home with increased services.    Physical Exam   /45 (BP Location: Left arm)   Pulse 84   Temp 98.3  F (36.8  C) (Oral)   Resp 16   Ht 1.626 m (5' 4\")   Wt 77.1 kg (170 lb)   SpO2 94%   BMI 29.18 kg/m    Gen- pleasant lying in bed  CVS- I+II+ no m/r/g  RS- CTAB  Abdo- soft, no tenderness . No g/r/r  Ext- chronic skin changes  DSG Rt hand    Medical Decision Making       40 MINUTES SPENT BY ME on the date of service doing chart review, history, exam, " documentation & further activities per the note.      Data   ------------------------- PAST 24 HR DATA REVIEWED -----------------------------------------------        Imaging results reviewed over the past 24 hrs:   No results found for this or any previous visit (from the past 24 hour(s)).   BMP  Recent Labs   Lab 02/25/23  1656 02/25/23  1137 02/25/23  0754 02/25/23  0154 02/22/23  0818 02/22/23  0725   NA  --   --   --   --   --  141   POTASSIUM  --   --   --   --   --  3.6   CHLORIDE  --   --   --   --   --  107   MELISSA  --   --   --   --   --  9.4   CO2  --   --   --   --   --  23   BUN  --   --   --   --   --  22.7   CR  --   --   --   --   --  0.61   * 196* 152* 156*   < > 116*    < > = values in this interval not displayed.     CBC  Recent Labs   Lab 02/22/23  0725   WBC 6.8   RBC 4.65   HGB 11.8   HCT 38.2   MCV 82   MCH 25.4*   MCHC 30.9*   RDW 14.2        INRNo lab results found in last 7 days.  LFTsNo lab results found in last 7 days.   PANCNo lab results found in last 7 days.

## 2023-02-26 NOTE — PROGRESS NOTES
[facility]    Medicine Progress Note - Hospitalist Service    Date of Admission:  1/6/2023    Assessment & Plan   Miley Tolbert is a 77 year old female with paroxysmal a-fib and prior DVT on chronic AC with rivaroaban, CHF, CAD, CKD, COPD, poorly controlled DM2, fecal/urinary incontinence, and chronic wounds admitted on 1/6/2023 for worsening sacral decubitus ulcer and need for placement due to inability of the family to provide adequate care for the patient. She is now waiting for long term care placement     A/W LTAC versus home with increased support    Stage IV sacral wound   Stage II left calf and heel wounds  Initial concern for sepsis, Resolved  * Presented with worsening sacral decubitus ulcer that was reportedly foul smelling and soiled with urine/stool. Untreated wounds on left calf and heel also noted on admission. Afebrile with normal WBC at presentation.   * Empirically initiated on IV vancomycin and pip-tazo for presumed sacral wound infection though ultimately not felt to be infected.   * CT abd/pelvis and right thigh (1/11) No source of infection or hematoma noted. Blood cultures remained negative.   * Received 2 doses of IV vancomycin and 5-day course of IV pip-tazo this admission  * Podiatry followed ABIs normal. No surgical intervention recommended  ? Wound cares per WOCN.      Chronic pain syndrome  *Per report opiate use prior to admission.   Discussed with patient regarding de-escalation of narcotics, now de-escalated to oxycodone 5 mg twice daily as needed for pain.  further lowered today to 2.5 mg twice daily but had to change it Q6hr prn on 2/23 due to uncontrolled pain  As needed Tylenol.   As needed Zanaflex.      DM2 with peripheral neuropathy, long term insulin use  Hypoglycemia, Resolved  * A1c 10  * PTA regimen: sitagliptin 100 mg daily, Lantus 35 units, apart 10-14 units TID AC  * Insulin adjusted this admission due to intermittent  hypoglycemia  Continue insulin Lantus 15 units at bedtime  Start novolog 2 units with meals on 02/18/23  + medium intensity sliding scale insulin.  Continue PTA Sitagliptin.  Monitor blood sugars, optimize regimen  Started metformin at 500 mg daily on 02/20/23 after discussing with patient. Titrate up on dose weekly as tolerated      Recent Labs   Lab 02/26/23  1644 02/26/23  1211 02/26/23  0748 02/26/23  0144 02/25/23  2146 02/25/23  1656   * 183* 123* 158* 158* 262*     Poor fitting dentures with associated pain improved   Epulis fissuratum  * Oral pain noted during admission. No oral lesions noted, but her dentures fit poorly.  Removed dentures and she has a flap of soft tissue on the upper anterior gum line. Case was discussed with oral surgery who explained this is extra tissue (epulis fissuratum) that often occurs in patients with poor fitting dentures. It should be excised and she should have dentures refitted.    ? RN to use dentures only while eating  ? Dental soft diet, viscous lidocaine PRN ordered.   ? Needs oral surgery referral at discharge     Paroxysmal atrial fibrillation  Hx of DVT  ? Not on AV veronica agents prior to admission  ? Continues on PTA rivaroxaban      CAD with HFpEF  Hypertension  Hyperlipidemia  * Last echo on 5/5/2022 showed EF of 55 to 60% with normal left ventricular cavity size.   * RRT activated 1/15 for chest pain. EKG negative for ischemia. Serial troponins negative.  * PTA lisinopril decreased from 40 to 20 mg daily this admission due to hypotension, continues on lisinopril 20 mg daily.  ? Continues on PTA atorvastatin 40 mg daily  ? If recurrent pain suggestive of CAD, consider NM stress test given underlying known CAD.      Chronic anemia  * Baseline Hgb 10-11. Decreased to 8.9 on 1/20 with no s/sx of bleeding.   ? Iron studies normal.  ? Hemoglobin at around previous baseline.  Monitor periodically.      Urinary incontinence  Urinary retention, Resolved  * Espino  "catheter initially placed this admission given worsening decub and contamination with urine. Removed 1/11.   Has been utilizing external catheter since 1/17  Continues on PTA tamsulosin     Dementia with behavioral disturbance, intermittent delirium  Schizoaffective disorder   Continue PTA regimen: venlafaxine 150 mg daily, topiramate 100 mg blair  Delirium precautions in place     Diet: Combination Diet Moderate Consistent Carb (60 g CHO per Meal) Diet; Mechanical/Dental Soft Diet  Snacks/Supplements Adult: Other; L - vanilla Ensure MAX (RD); With Meals    DVT Prophylaxis: DOAC  Espino Catheter: Not present  Lines: None     Cardiac Monitoring: None  Code Status: No CPR- Do NOT Intubate      Clinically Significant Risk Factors              # Hypoalbuminemia: Lowest albumin = 2.4 g/dL at 1/8/2023  7:01 AM, will monitor as appropriate          # DMII: A1C = 10.0 % (Ref range: 0.0 - 5.6 %) within past 6 months         Disposition Plan      Expected Discharge Date: 03/01/2023    Discharge Delays: Placement - LTC  *Medically Ready for Discharge    Discharge Comments: placement; was living with daughter; has multiple wounds.   SW/CC-- needs placement.  LTC          Kofi Copeland DO  Hospitalist Service  [facility]  Securely message with [email or web address] (more info)  Text page via [email or web address] Paging/Directory   ______________________________________________________________________    Interval History   No new complaints .    Hoping to go home with increased services.    Physical Exam   /66 (BP Location: Left arm)   Pulse 81   Temp 98.8  F (37.1  C) (Oral)   Resp 16   Ht 1.626 m (5' 4\")   Wt 77.1 kg (170 lb)   SpO2 95%   BMI 29.18 kg/m    Gen- pleasant lying in bed  CVS- I+II+ no m/r/g  RS- CTAB  Abdo- soft, no tenderness . No g/r/r  Ext- chronic skin changes  DSG Rt hand    Medical Decision Making       40 MINUTES SPENT BY ME on the date of service doing chart review, history, exam, " documentation & further activities per the note.      Data   ------------------------- PAST 24 HR DATA REVIEWED -----------------------------------------------        Imaging results reviewed over the past 24 hrs:   No results found for this or any previous visit (from the past 24 hour(s)).   BMP  Recent Labs   Lab 02/26/23  1644 02/26/23  1211 02/26/23  0748 02/26/23  0144 02/22/23  0818 02/22/23  0725   NA  --   --   --   --   --  141   POTASSIUM  --   --   --   --   --  3.6   CHLORIDE  --   --   --   --   --  107   MELISSA  --   --   --   --   --  9.4   CO2  --   --   --   --   --  23   BUN  --   --   --   --   --  22.7   CR  --   --   --   --   --  0.61   * 183* 123* 158*   < > 116*    < > = values in this interval not displayed.     CBC  Recent Labs   Lab 02/22/23  0725   WBC 6.8   RBC 4.65   HGB 11.8   HCT 38.2   MCV 82   MCH 25.4*   MCHC 30.9*   RDW 14.2        INRNo lab results found in last 7 days.  LFTsNo lab results found in last 7 days.   PANCNo lab results found in last 7 days.

## 2023-02-26 NOTE — PLAN OF CARE
1900 - 0330    A&Ox4, forgetful at times. VSS on RA. C/o 7/10 leg pain, declined pain meds. Denies nausea. No IV access. Up A2 w/lift. T/R q 2 hrs as pt allows. Mod carb, mech/soft diet - good appetite. BG checks. Incontinent of B/B, purewick in place, no BM overnight. Dressing to coccyx changed, CDI. Dressing to R calf - CDI. Wound cares to BLE completed. WOC and SW following. Discharge pending placement plan.

## 2023-02-26 NOTE — PLAN OF CARE
[id number]    A&Ox4. VSS on RA. PRN oxy x1 prior to dressing changes. Mod carb, mech/soft diet. BG checks-152, 196, 262. Up A2 w/lift. T/R q 2 hrs; on pulsate. Incontinent of B/B, purewic in place, no BM this shift. Wound cares to BLE, pannus, breasts, and R calf completed this shift. Mepilex to coccyx changed by night shift nurse this AM- CDI. No IV access - MD aware. WOC/SW following. Discharge pending LTC placement.    Spironolactone Pregnancy And Lactation Text: This medication can cause feminization of the male fetus and should be avoided during pregnancy. The active metabolite is also found in breast milk.

## 2023-02-27 NOTE — PLAN OF CARE
A/Ox4, forgetful at times. VSS on RA, tmax 99.1. Continued complaint of 7/10 leg pain but declines any pain meds. Denies N/V. Mod carb, mech/soft diet - good appetite. BG checks, insulin given per sliding scale. Up A2 w/ lift, T/R q2hr. Refused to get OOB. Incont B/B, purewick in place, no BM this shift. Wound cares to coccyx completed & changed, CDI. Dressing to R calf CDI. WOC & SW following. Discharge plan pending placement.

## 2023-02-27 NOTE — PLAN OF CARE
Date & Time: 7640-3789 23  Orientation: A&Ox4, forgetful  Activity Level: Assist 2 lift, turn/repo  Fall Risk: Yes  Behavior & Aggression: Green  Pain Management: decreased with rest/cluster cares  ABNL Lab/B, 180  Diet: Mod carb, mech/soft  Bowel/Bladder: incontinent, purewick in place   Drains/Devices: No IV acess MD aware  Skin: mepi in place on coccyx CDI (wound care ), foot cares completed, R calf CDI (Wound care )  Anticipated  DC Date: Pending placements

## 2023-02-27 NOTE — PROGRESS NOTES
[facility]    Medicine Progress Note - Hospitalist Service    Date of Admission:  1/6/2023    Assessment & Plan   Miley Tolbert is a 77 year old female with paroxysmal a-fib and prior DVT on chronic AC with rivaroaban, CHF, CAD, CKD, COPD, poorly controlled DM2, fecal/urinary incontinence, and chronic wounds admitted on 1/6/2023 for worsening sacral decubitus ulcer and need for placement due to inability of the family to provide adequate care for the patient. She is now waiting for long term care placement     A/W LTAC versus home with increased support    Stage IV sacral wound   Stage II left calf and heel wounds  Initial concern for sepsis, Resolved  * Presented with worsening sacral decubitus ulcer that was reportedly foul smelling and soiled with urine/stool. Untreated wounds on left calf and heel also noted on admission. Afebrile with normal WBC at presentation.   * Empirically initiated on IV vancomycin and pip-tazo for presumed sacral wound infection though ultimately not felt to be infected.   * CT abd/pelvis and right thigh (1/11) No source of infection or hematoma noted. Blood cultures remained negative.   * Received 2 doses of IV vancomycin and 5-day course of IV pip-tazo this admission  * Podiatry followed ABIs normal. No surgical intervention recommended  ? Wound cares per WOCN.      Chronic pain syndrome  *Per report opiate use prior to admission.   Discussed with patient regarding de-escalation of narcotics, now de-escalated to oxycodone 5 mg twice daily as needed for pain.  further lowered today to 2.5 mg twice daily but had to change it Q6hr prn on 2/23 due to uncontrolled pain  As needed Tylenol.   As needed Zanaflex.      DM2 with peripheral neuropathy, long term insulin use  Hypoglycemia, Resolved  * A1c 10  * PTA regimen: sitagliptin 100 mg daily, Lantus 35 units, apart 10-14 units TID AC  * Insulin adjusted this admission due to intermittent  hypoglycemia  Continue insulin Lantus 15 units at bedtime  Start novolog 2 units with meals on 02/18/23  + medium intensity sliding scale insulin.  Continue PTA Sitagliptin.  Monitor blood sugars, optimize regimen  Started metformin at 500 mg daily on 02/20/23 after discussing with patient. Titrate up on dose weekly as tolerated      Recent Labs   Lab 02/27/23  0814 02/27/23  0200 02/26/23  2103 02/26/23  1644 02/26/23  1211 02/26/23  0748   * 177* 216* 195* 183* 123*     Poor fitting dentures with associated pain improved   Epulis fissuratum  * Oral pain noted during admission. No oral lesions noted, but her dentures fit poorly.  Removed dentures and she has a flap of soft tissue on the upper anterior gum line. Case was discussed with oral surgery who explained this is extra tissue (epulis fissuratum) that often occurs in patients with poor fitting dentures. It should be excised and she should have dentures refitted.    ? RN to use dentures only while eating  ? Dental soft diet, viscous lidocaine PRN ordered.   ? Needs oral surgery referral at discharge     Paroxysmal atrial fibrillation  Hx of DVT  ? Not on AV veronica agents prior to admission  ? Continues on PTA rivaroxaban      CAD with HFpEF  Hypertension  Hyperlipidemia  * Last echo on 5/5/2022 showed EF of 55 to 60% with normal left ventricular cavity size.   * RRT activated 1/15 for chest pain. EKG negative for ischemia. Serial troponins negative.  * PTA lisinopril decreased from 40 to 20 mg daily this admission due to hypotension, continues on lisinopril 20 mg daily.  ? Continues on PTA atorvastatin 40 mg daily  ? If recurrent pain suggestive of CAD, consider NM stress test given underlying known CAD.      Chronic anemia  * Baseline Hgb 10-11. Decreased to 8.9 on 1/20 with no s/sx of bleeding.   ? Iron studies normal.  ? Hemoglobin at around previous baseline.  Monitor periodically.      Urinary incontinence  Urinary retention, Resolved  * Espino  "catheter initially placed this admission given worsening decub and contamination with urine. Removed 1/11.   Has been utilizing external catheter since 1/17  Continues on PTA tamsulosin     Dementia with behavioral disturbance, intermittent delirium  Schizoaffective disorder   Continue PTA regimen: venlafaxine 150 mg daily, topiramate 100 mg blair  Delirium precautions in place    Goals of care  Patient states she is tired of continuing with trying to take care of her wounds, and her daughter John is interested in hospice care  Donald notes she is ready for what god has planned for her  Plan  - hospice consult     Diet: Combination Diet Moderate Consistent Carb (60 g CHO per Meal) Diet; Mechanical/Dental Soft Diet  Snacks/Supplements Adult: Other; L - vanilla Ensure MAX (RD); With Meals    DVT Prophylaxis: DOAC  Espino Catheter: Not present  Lines: None     Cardiac Monitoring: None  Code Status: No CPR- Do NOT Intubate      Clinically Significant Risk Factors              # Hypoalbuminemia: Lowest albumin = 2.4 g/dL at 1/8/2023  7:01 AM, will monitor as appropriate          # DMII: A1C = 10.0 % (Ref range: 0.0 - 5.6 %) within past 6 months         Disposition Plan      Expected Discharge Date: 03/01/2023    Discharge Delays: Placement - LTC  *Medically Ready for Discharge    Discharge Comments: placement; was living with daughter; has multiple wounds.   SW/CC-- needs placement.  LTC          Kofi Copeland DO  Hospitalist Service  [facility]  Securely message with [employer] (more info)  Text page via [employer] Paging/Directory   ______________________________________________________________________    Interval History   No new complaints .  Brought up hospice, and both her and her daughter would like to revisit this as an option    Physical Exam   BP (!) 147/61 (BP Location: Left arm)   Pulse 76   Temp 98.5  F (36.9  C) (Oral)   Resp 18   Ht 1.626 m (5' 4\")   Wt 77.1 kg (170 lb)   SpO2 " 96%   BMI 29.18 kg/m    Gen- pleasant lying in bed  CVS- I+II+ no m/r/g  RS- CTAB  Abdo- soft, no tenderness . No g/r/r  Ext- chronic skin changes  DSG Rt hand    Medical Decision Making       40 MINUTES SPENT BY ME on the date of service doing chart review, history, exam, documentation & further activities per the note.      Data   ------------------------- PAST 24 HR DATA REVIEWED -----------------------------------------------        Imaging results reviewed over the past 24 hrs:   No results found for this or any previous visit (from the past 24 hour(s)).   BMP  Recent Labs   Lab 02/27/23  0814 02/27/23  0200 02/26/23  2103 02/26/23  1644 02/22/23  0818 02/22/23  0725   NA  --   --   --   --   --  141   POTASSIUM  --   --   --   --   --  3.6   CHLORIDE  --   --   --   --   --  107   MELISSA  --   --   --   --   --  9.4   CO2  --   --   --   --   --  23   BUN  --   --   --   --   --  22.7   CR  --   --   --   --   --  0.61   * 177* 216* 195*   < > 116*    < > = values in this interval not displayed.     CBC  Recent Labs   Lab 02/22/23  0725   WBC 6.8   RBC 4.65   HGB 11.8   HCT 38.2   MCV 82   MCH 25.4*   MCHC 30.9*   RDW 14.2        INRNo lab results found in last 7 days.  LFTsNo lab results found in last 7 days.   PANCNo lab results found in last 7 days.

## 2023-02-27 NOTE — PLAN OF CARE
9648 - 6300    A&Ox4, forgetful. VSS on RA. C/o 5/10 leg pain - managed with PRN oxy. Denies N/V. Mod carb, mech/soft diet. BG checks, corrected per orders. Up A2 w/lift. T/R q 2 hrs. No IV access. Incontinent of B/B, no BM overnight, purewick in place. Mepilex to coccyx and R calf - CDI. Wound cares to BLE completed. SW and WOC following. Discharge pending placement plan.

## 2023-02-28 NOTE — PLAN OF CARE
Goal Outcome Evaluation:    A/O x 4 forgetful. VSS on RA. Denies pain this shift. T/R q2h but pt refused more than once in this shift. Tolerating mod carb/soft diet.  B/B incontinent, purewic in place with adequate output.  BG checked. Sacral mepilex and R leg mepilex changed. Discharge plan pending.

## 2023-02-28 NOTE — PLAN OF CARE
Goal Outcome Evaluation:    Pt is alert and oriented x 4, no iv access, turn and repo every two hours. Incontinent of bowel and bladder, pure wick in place, BG checks. Discharge pending placement.

## 2023-02-28 NOTE — PROGRESS NOTES
[facility]    Medicine Progress Note - Hospitalist Service    Date of Admission:  1/6/2023    Assessment & Plan   Miley Tolbert is a 77 year old female with paroxysmal a-fib and prior DVT on chronic AC with rivaroaban, CHF, CAD, CKD, COPD, poorly controlled DM2, fecal/urinary incontinence, and chronic wounds admitted on 1/6/2023 for worsening sacral decubitus ulcer and need for placement due to inability of the family to provide adequate care for the patient. She is now waiting for long term care placement     A/W hospice evaluation    Stage IV sacral wound   Stage II left calf and heel wounds  Initial concern for sepsis, Resolved  * Presented with worsening sacral decubitus ulcer that was reportedly foul smelling and soiled with urine/stool. Untreated wounds on left calf and heel also noted on admission. Afebrile with normal WBC at presentation.   * Empirically initiated on IV vancomycin and pip-tazo for presumed sacral wound infection though ultimately not felt to be infected.   * CT abd/pelvis and right thigh (1/11) No source of infection or hematoma noted. Blood cultures remained negative.   * Received 2 doses of IV vancomycin and 5-day course of IV pip-tazo this admission  * Podiatry followed ABIs normal. No surgical intervention recommended  ? Wound cares per WOCN.      Chronic pain syndrome  *Per report opiate use prior to admission.   Discussed with patient regarding de-escalation of narcotics, now de-escalated to oxycodone 5 mg twice daily as needed for pain.  further lowered today to 2.5 mg twice daily but had to change it Q6hr prn on 2/23 due to uncontrolled pain  As needed Tylenol.   As needed Zanaflex.      DM2 with peripheral neuropathy, long term insulin use  Hypoglycemia, Resolved  * A1c 10  * PTA regimen: sitagliptin 100 mg daily, Lantus 35 units, apart 10-14 units TID AC  * Insulin adjusted this admission due to intermittent hypoglycemia  Continue insulin Lantus 15  units at bedtime  Start novolog 2 units with meals on 02/18/23  + medium intensity sliding scale insulin.  Continue PTA Sitagliptin.  Monitor blood sugars, optimize regimen  Started metformin at 500 mg daily on 02/20/23 after discussing with patient since plans are now for hospice would avoid uptitration at this time     Recent Labs   Lab 02/28/23  0614 02/27/23  2150 02/27/23  1656 02/27/23  1213 02/27/23  0814 02/27/23  0200   * 290* 159* 180* 137* 177*     Poor fitting dentures with associated pain improved   Epulis fissuratum  * Oral pain noted during admission. No oral lesions noted, but her dentures fit poorly.  Removed dentures and she has a flap of soft tissue on the upper anterior gum line. Case was discussed with oral surgery who explained this is extra tissue (epulis fissuratum) that often occurs in patients with poor fitting dentures. It should be excised and she should have dentures refitted.    ? RN to use dentures only while eating  ? Dental soft diet, viscous lidocaine PRN ordered.   ? Needs oral surgery referral at discharge     Paroxysmal atrial fibrillation  Hx of DVT  ? Not on AV veronica agents prior to admission  ? Continues on PTA rivaroxaban      CAD with HFpEF  Hypertension  Hyperlipidemia  * Last echo on 5/5/2022 showed EF of 55 to 60% with normal left ventricular cavity size.   * RRT activated 1/15 for chest pain. EKG negative for ischemia. Serial troponins negative.  * PTA lisinopril decreased from 40 to 20 mg daily this admission due to hypotension, continues on lisinopril 20 mg daily.  ? Continues on PTA atorvastatin 40 mg daily  ? If recurrent pain suggestive of CAD, consider NM stress test given underlying known CAD.      Chronic anemia  * Baseline Hgb 10-11. Decreased to 8.9 on 1/20 with no s/sx of bleeding.   ? Iron studies normal.  ? Hemoglobin at around previous baseline.  Monitor periodically.      Urinary incontinence  Urinary retention, Resolved  * Espino catheter initially  "placed this admission given worsening decub and contamination with urine. Removed 1/11.   Has been utilizing external catheter since 1/17  Continues on PTA tamsulosin     Dementia with behavioral disturbance, intermittent delirium  Schizoaffective disorder   Continue PTA regimen: venlafaxine 150 mg daily, topiramate 100 mg blair  Delirium precautions in place    Goals of care  Patient states she is tired of continuing with trying to take care of her wounds, and her daughter John is interested in hospice care  Donald notes she is ready for what god has planned for her  Plan  - hospice consult     Diet: Combination Diet Moderate Consistent Carb (60 g CHO per Meal) Diet; Mechanical/Dental Soft Diet  Snacks/Supplements Adult: Other; L - vanilla Ensure MAX (RD); With Meals    DVT Prophylaxis: DOAC  Espino Catheter: Not present  Lines: None     Cardiac Monitoring: None  Code Status: No CPR- Do NOT Intubate      Clinically Significant Risk Factors              # Hypoalbuminemia: Lowest albumin = 2.4 g/dL at 1/8/2023  7:01 AM, will monitor as appropriate          # DMII: A1C = 10.0 % (Ref range: 0.0 - 5.6 %) within past 6 months         Disposition Plan      Expected Discharge Date: 03/03/2023    Discharge Delays: Placement - LTC  *Medically Ready for Discharge    Discharge Comments: placement; was living with daughter; has multiple wounds.   SW/CC-- needs placement.  LTC          Kofi Copeland DO  Hospitalist Service  [facility]  Securely message with [employer] (more info)  Text page via [employer] Paging/Directory   ______________________________________________________________________    Interval History   No new complaints .  She is very interested in hospice    Physical Exam   /61 (BP Location: Left arm)   Pulse 79   Temp 97.7  F (36.5  C) (Oral)   Resp 16   Ht 1.626 m (5' 4\")   Wt 77.1 kg (170 lb)   SpO2 96%   BMI 29.18 kg/m    Gen- pleasant lying in bed  CVS- I+II+ no m/r/g  RS- " CTAB  Abdo- soft, no tenderness . No g/r/r  Ext- chronic skin changes  DSG Rt hand    Medical Decision Making       40 MINUTES SPENT BY ME on the date of service doing chart review, history, exam, documentation & further activities per the note.      Data   ------------------------- PAST 24 HR DATA REVIEWED -----------------------------------------------    I have personally reviewed the following data over the past 24 hrs:    N/A  \   N/A   / N/A     N/A N/A N/A /  141 (H)   N/A N/A 0.59 \       Imaging results reviewed over the past 24 hrs:   No results found for this or any previous visit (from the past 24 hour(s)).   BMP  Recent Labs   Lab 02/28/23  0641 02/28/23  0614 02/27/23  2150 02/27/23  1656 02/27/23  1213 02/22/23  0818 02/22/23  0725   NA  --   --   --   --   --   --  141   POTASSIUM  --   --   --   --   --   --  3.6   CHLORIDE  --   --   --   --   --   --  107   MELISSA  --   --   --   --   --   --  9.4   CO2  --   --   --   --   --   --  23   BUN  --   --   --   --   --   --  22.7   CR 0.59  --   --   --   --   --  0.61   GLC  --  141* 290* 159* 180*   < > 116*    < > = values in this interval not displayed.     CBC  Recent Labs   Lab 02/22/23  0725   WBC 6.8   RBC 4.65   HGB 11.8   HCT 38.2   MCV 82   MCH 25.4*   MCHC 30.9*   RDW 14.2        INRNo lab results found in last 7 days.  LFTsNo lab results found in last 7 days.   PANCNo lab results found in last 7 days.

## 2023-03-01 NOTE — PROGRESS NOTES
Care Management Follow Up    Length of Stay (days): 52    Expected Discharge Date: 03/03/2023     Concerns to be Addressed:     Discharge Planning  Patient plan of care discussed at interdisciplinary rounds: Yes    Anticipated Discharge Disposition: Long Term Care with Hospice     Anticipated Discharge Services: Hospice  Anticipated Discharge DME: None    Patient/family educated on Medicare website which has current facility and service quality ratings: yes  Education Provided on the Discharge Plan:  yes  Patient/Family in Agreement with the Plan: yes    Referrals Placed by CM/SW: Post Acute Facilities  Private pay costs discussed: Not applicable    Additional Information:  Spoke with patient regarding discharge plans.  Patient states she made the decision to transition to hospice.  Explained to patient that we are working on her insurance and where she can go to receive her hospice care.  Inquired as to whether she would like me to call her daughter.  Patient states that she has spoken with her daughter and she says we do not need to call her daughter until we have a plan.  Call placed to Jogre, patient's care coordinator, [phone].  Jorge states that patient has a [employer]+ plan which is a MA plan so Jorge states that we should have no difficulty finding placement for patient.  Jorge states that facilities likely don't want to accept patient as it reimburses at a lower rate, but it is a MA plan.  Inquired a to whether patient can be changed to MA and Jorge states she is checking to see if patient can be changed to a [facility] plan.  He will update me tomorrow on this.      Will continue to follow.      SEAN Zamarripa, [facility]    [phone]  [facility]

## 2023-03-01 NOTE — PROGRESS NOTES
[facility]    Medicine Progress Note - Hospitalist Service    Date of Admission:  1/6/2023    Assessment & Plan   Miley Tolbert is a 77 year old female with paroxysmal a-fib and prior DVT on chronic AC with rivaroaban, CHF, CAD, CKD, COPD, poorly controlled DM2, fecal/urinary incontinence, and chronic wounds admitted on 1/6/2023 for worsening sacral decubitus ulcer and need for placement due to inability of the family to provide adequate care for the patient. She is now waiting for long term care placement.    Stage IV sacral wound   Stage II left calf and heel wounds  Initial concern for sepsis, Resolved  * Presented with worsening sacral decubitus ulcer that was reportedly foul smelling and soiled with urine/stool. Untreated wounds on left calf and heel also noted on admission. Afebrile with normal WBC at presentation.   * Empirically initiated on IV vancomycin and pip-tazo for presumed sacral wound infection though ultimately not felt to be infected.   * CT abd/pelvis and right thigh (1/11) No source of infection or hematoma noted. Blood cultures remained negative.   * Received 2 doses of IV vancomycin and 5-day course of IV pip-tazo this admission  * Podiatry followed ABIs normal. No surgical intervention recommended.  - Wound cares per WOCN.   - Patient transitioning to hospice as noted below.     Chronic pain syndrome  *Per report opiate use prior to admission.   Discussed with patient regarding de-escalation of narcotics, now de-escalated to oxycodone 5 mg twice daily as needed for pain.  further lowered today to 2.5 mg twice daily but had to change it Q6hr prn on 2/23 due to uncontrolled pain  - As needed Tylenol.   - As needed Zanaflex.      DM2 with peripheral neuropathy, long term insulin use  Hypoglycemia, Resolved  * A1c 10.0 on 1/6/23.  * PTA regimen: sitagliptin 100 mg daily, Lantus 35 units, apart 10-14 units TID AC.  * Insulin adjusted this admission due to  intermittent hypoglycemia.  - Continue insulin Lantus 15 units at bedtime.  - Continue prandial novolog 2 units with meals  + medium intensity sliding scale insulin.  - Continue PTA Sitagliptin.  - Monitor blood sugars, optimize regimen.  - Started metformin at 500 mg daily on 02/20/23 after discussing with patient since plans are now for hospice would avoid uptitration at this time.    Poor fitting dentures with associated pain improved   Epulis fissuratum  * Oral pain noted during admission. No oral lesions noted, but her dentures fit poorly.  Removed dentures and she has a flap of soft tissue on the upper anterior gum line. Case was discussed with oral surgery who explained this is extra tissue (epulis fissuratum) that often occurs in patients with poor fitting dentures. It should be excised and she should have dentures refitted.    - RN to use dentures only while eating.  - Dental soft diet, viscous lidocaine PRN ordered.   - Needs oral surgery referral at discharge.     Paroxysmal atrial fibrillation  Hx of DVT  - Not on AV veronica agents prior to admission.  - Continues on PTA rivaroxaban.      CAD with HFpEF  Hypertension  Hyperlipidemia  * Last echo on 5/5/2022 showed EF of 55 to 60% with normal left ventricular cavity size.   * RRT activated 1/15 for chest pain. EKG negative for ischemia. Serial troponins negative.  * PTA lisinopril decreased from 40 to 20 mg daily this admission due to hypotension, continues on lisinopril 20 mg daily.  - Continues on PTA atorvastatin 40 mg daily.     Chronic anemia  * Baseline Hgb 10-11. Decreased to 8.9 on 1/20 with no s/sx of bleeding.   - Iron studies normal.  - Hemoglobin at around previous baseline.  Monitor periodically.      Urinary incontinence  Urinary retention, Resolved  * Espino catheter initially placed this admission given worsening decub and contamination with urine. Removed 1/11.   Has been utilizing external catheter since 1/17  - Continues on PTA  tamsulosin.     Dementia with behavioral disturbance, intermittent delirium  Schizoaffective disorder   - Continue PTA regimen: venlafaxine 150 mg daily, topiramate 100 mg daily.  - Delirium precautions in place.    Goals of care  Patient states she is tired of continuing with trying to take care of her wounds, and her daughter. John is interested in hospice care.  Donald notes she is ready for what god has planned for her.  - Hospice consulted.  - Continue current medications for now.  - Care transitions consulted regarding discharge planning with hospice.       Diet: Combination Diet Moderate Consistent Carb (60 g CHO per Meal) Diet; Mechanical/Dental Soft Diet  Snacks/Supplements Adult: Other; L - vanilla Ensure MAX (RD); With Meals    DVT Prophylaxis: DOAC  Espino Catheter: Not present  Lines: None     Cardiac Monitoring: None  Code Status: No CPR- Do NOT Intubate      Clinically Significant Risk Factors              # Hypoalbuminemia: Lowest albumin = 2.4 g/dL at 1/8/2023  7:01 AM, will monitor as appropriate          # DMII: A1C = 10.0 % (Ref range: 0.0 - 5.6 %) within past 6 months           Disposition Plan     Expected Discharge Date: 03/03/2023    Discharge Delays: Placement - LTC  *Medically Ready for Discharge    Discharge Comments: placement; was living with daughter; has multiple wounds.   SW/CC-- needs placement.  LTC          Howie Schneider MD  Hospitalist Service  [facility]  Securely message with [employer] (more info)  Text page via [facility] Paging/Directory   ______________________________________________________________________    Interval History   Miley Tolbert was seen this morning.  She feels okay.  Symptoms controlled on current medications.  She continues to states she is ready for hospice.  Discussed possibility of discontinuing some of her medications as she transitions to hospice, however she would like to continue her current meds for now.    Physical  Exam   Vital Signs: Temp: 98.8  F (37.1  C) Temp src: Oral BP: 110/64 Pulse: 72   Resp: 16 SpO2: 96 % O2 Device: None (Room air)    Weight: 170 lbs 0 oz    Constitutional: awake, alert, cooperative, no apparent distress, laying in the hospital bed  Respiratory: no increased work of breathing, clear to auscultation bilaterally, no crackles or wheezing  Cardiovascular: regular rate and rhythm, normal S1 and S2, no murmur noted  GI: normal bowel sounds, soft, non-distended, non-tender  Skin: warm, dry  Neurologic: awake, alert    Medical Decision Making       35 MINUTES SPENT BY ME on the date of service doing chart review, history, exam, documentation & further activities per the note.      Data   NOTE: Data reviewed over the past 24 hrs contributes toward MDM complexity

## 2023-03-01 NOTE — PLAN OF CARE
A/Ox4, forgetful. VSS on RA. C/o leg pain but refuses any pain medication. Denies N/V. Mod carb, mech/soft diet. BG checks w/ meals, insulin given per sliding scale orders. A2 w/ lift, T/R Q2hr; occasionally refuses turns. Incont B/B, no BM, purewick in place. Pt refused all wound cares this shift. No IV access. SW & WOC following. Likely discharge w/ hospice.

## 2023-03-01 NOTE — PLAN OF CARE
[id number]    A/Ox4, forgetful at times. VSS on RA. C/O leg pain, PRN oxy given x1. Mod carb, mech/soft diet, dentures only to be worn when eating. BG checks ACHS. A2 w/ lift, T/R q2hr. Incont B/B, BM x1 this shift, purewick in place w/ good UOP. Wounds on R calf, L heel, L buttock, and sacral wounds. Wound cares completed. No IV access. SW/WOC following. Discharge w/ hospice after placement is found.

## 2023-03-02 NOTE — PROGRESS NOTES
[facility] Nurse Inpatient Assessment     Consulted for: Sacral wound, Right leg    Patient History (according to provider note(s):      Miley Tolbert is a 77 year old female with history of hypertension, CHF, atrial fibrillation on Xarelto, history of for lower extremity DVT, coronary artery disease, chronic kidney disease stage III, COPD, hypothyroidism, poorly controlled type 2 diabetes with hyperglycemia, GERD, fecal and urinary incontinence who presents with progressively worsening sacral decubitus ulcer and inability of the family to provide care for the patient    Areas Assessed:      Areas visualized during today's visit: Perineal area, Sacrum/coccyx and Lower extremities     Wound location: Right Calf      Last photo: 3/2/23  Wound due to: suspect trauma vs pressure  Wound history/plan of care: unknown  Wound base: tendon, moist red tissue     Palpation of the wound bed: normal     Drainage: small      Description of drainage: bloody     Measurements (length x width x depth, in cm): 2.5 x 2 x 0.3 cm     Tunneling: N/A     Undermining: N/A  Periwound skin: intact, dry, scaly      Color: scar tissue, pale      Temperature: normal   Odor: none   Pain: pt occasionally vocalized pain with pressure to wound bed  Pain interventions prior to dressing change: slow and gentle cares, po oxycodone  Treatment goal: Drainage control, Infection control/prevention, Decrease moisture, Protection and protect tendon  STATUS: stable   Supplies ordered: aquacel at bedside     Wound location: Coccyx/right buttock         Last photo: 3/2/23  Wound due to: Pressure Injury Stage 4 pressure injury Present on admission  Wound history/plan of care: pt reports she all of a sudden developed several wounds a few months ago. On assessment 2/15, sacral Mepilex in place on pt's sacrum not actually covering wounded skin. Pt is incontinent of bowel. Purewick in use. No packing found in coccyx wound. Wound  base: red, moist, light pink tissue. Some pale, marble-like tissue at lower wound margin. Not able to visualize, but did palpate what is likely bone at inferior margin.     Palpation of the wound bed: firm      Drainage: moderate     Description of drainage: serosanguinous and bloody     Measurements (length x width x depth, in cm):   Coccyx 2.8 x 2.1 x 1.5 cm and   Right buttock 1 x 1 x 0.1 cm   Tunneling: N/A     Undermining: from 12 to 6 to 3 cm   Periwound skin: macerated, Scar tissue and hyperpigmentation      Color: pale tissue due to scarring and superior to wound hyperpigmentation      Temperature: normal   Odor: mild   Pain: intermittently during dressing change   Pain interventions prior to dressing change: slow and gentle cares, po oxycodone  Treatment goal: Infection control/prevention, Increase granulation, decrease moisture  STATUS:stable  Supplies ordered: supplies at bedside      R upper thigh    2/15/23      2/22/23    Assessment on 3/2 scattered hyperpigmented epidermis, but no evidence of wounds at this time.    Skin Injury Location: pannus- no open skin or rash on assessment 3/2. No s/s of infection  Continue interdry preventatively    Skin Injury Location: right breast no open skin or rash on assessment 3/2. No s/s of infection  Continue interdry preventatively     Treatment Plan:   Pannus and Right Breast: Daily  1. DO not use interdry (#900831) with any other creams or powder.  2. Wash skin gently. Pat dry, do not rub.  3. Cut the appropriate size of interdry (#714696) with clean scissors, allowing a minimum of 2 inches of the fabric exposed outside the skin fold for moisture evaporation.  4. Lay a single layer of fabric in the skin fold, placing one edge of the fabric into the base of the fold. Gently smooth the rest of the fabric over the skin, keeping it flat. Leave at least 2 inches of the fabric exposed outside the skin fold.  5. Secure the fabric in one of several ways; with the skin  fold, with a small amount of tape, or tucked under clothing.    When to Dispose: Each piece of InterDry may be used up to 5 days, depending on fabric soiling, odor, amount of moisture and general skin condition. May label with skin marker to date      Removal: Remove the fabric before bathing and reuse when finished. When removing the fabric from skin folds, gently separate the skin fold and lift away fabric.     Coccyx and right buttock wound - every other day and prn with incontinence    1.Apply Vashe #030810 moistened kerlix to wounds and periwound skin x 5 minutes   2. Remove gauze and do not rinse wound bed  3. Fill deep wound with Aquacel Ag (#[id number]) making sure to fill undermining first then fill the base  4. Apply Aquacel Ag to right buttock wound. Cut Aquacel to be 1 cm larger than wound bed.  5. Wipe periwound area with Cavilon No Sting (#237912). Allow to dry.  5. Apply sacral Mepilex.      Right lateral lower leg - every 3 days and prn soak through   1. Apply Vashe #093312 moistened kerlix to wounds and periwound skin x 5 minutes   2. Remove and do not rise wound bed  3. Apply a small Aquacel Ag #[id number] to wound bed only   4. Apply 4x4 Optifoam gentle #[id number] due to discomfort with Mepilex foam     BLE and feet - every other day until all the dry build up is gone then discontinue   1. Wash feet with danielle bath wipes   2. Apply kamala perineal cleanser liberally and rub it in   3. Then apply sween cream - avoid between toes    Orders: Reviewed and updated     RECOMMEND PRIMARY TEAM ORDER: None, at this time  Education provided: Importance of repositioning  Discussed plan of care with: Patient and Nurse  WOC nurse follow-up plan: weekly  Notify WOC if wound(s) deteriorate.  Nursing to notify the Provider(s) and re-consult the WOC Nurse if new skin concern.    DATA:     Current support surface: Standard  Low air loss (PETE pump, Isolibrium, Pulsate, skin guard, etc)  Containment of urine/stool: Incontinence  Protocol, Incontinent pad in bed and Purewick external catheter   BMI: Body mass index is 29.18 kg/m .   Active diet order: Orders Placed This Encounter      Combination Diet Moderate Consistent Carb (60 g CHO per Meal) Diet; Mechanical/Dental Soft Diet     Output: I/O last 3 completed shifts:  In: 360 [P.O.:360]  Out: 1300 [Urine:1300]     Labs:   No lab results found in last 7 days.    Invalid input(s): GLUCOMBO  Pressure injury risk assessment:   Sensory Perception: 3-->slightly limited  Moisture: 3-->occasionally moist  Activity: 1-->bedfast  Mobility: 2-->very limited  Nutrition: 3-->adequate  Friction and Shear: 1-->problem  Naif Score: 13    Frances FITZPATRICKOCN   Dept. Vocera- Contact [facility] Nurse (Shantel) via  Vocera   Dept. Office Number: [phone]

## 2023-03-02 NOTE — PROGRESS NOTES
[facility]    Medicine Progress Note - Hospitalist Service    Date of Admission:  1/6/2023    Assessment & Plan   Miley Tolbert is a 77 year old female with paroxysmal a-fib and prior DVT on chronic AC with rivaroaban, CHF, CAD, CKD, COPD, poorly controlled DM2, fecal/urinary incontinence, and chronic wounds admitted on 1/6/2023 for worsening sacral decubitus ulcer and need for placement due to inability of the family to provide adequate care for the patient. She is now waiting for long term care placement.    Stage IV sacral wound   Stage II left calf and heel wounds  Initial concern for sepsis, Resolved  * Presented with worsening sacral decubitus ulcer that was reportedly foul smelling and soiled with urine/stool. Untreated wounds on left calf and heel also noted on admission. Afebrile with normal WBC at presentation.   * Empirically initiated on IV vancomycin and pip-tazo for presumed sacral wound infection though ultimately not felt to be infected.   * CT abd/pelvis and right thigh (1/11) No source of infection or hematoma noted. Blood cultures remained negative.   * Received 2 doses of IV vancomycin and 5-day course of IV pip-tazo this admission  * Podiatry followed ABIs normal. No surgical intervention recommended.  - Wound cares per WOCN.   - Patient transitioning to hospice as noted below.     Chronic pain syndrome  *Per report opiate use prior to admission.   Discussed with patient regarding de-escalation of narcotics, now de-escalated to oxycodone 5 mg twice daily as needed for pain.  further lowered today to 2.5 mg twice daily but had to change it Q6hr prn on 2/23 due to uncontrolled pain  - As needed Tylenol.   - As needed Zanaflex.      DM2 with peripheral neuropathy, long term insulin use  Hypoglycemia, Resolved  * A1c 10.0 on 1/6/23.  * PTA regimen: sitagliptin 100 mg daily, Lantus 35 units, apart 10-14 units TID AC.  * Insulin adjusted this admission due to  intermittent hypoglycemia.  - Continue insulin Lantus 15 units at bedtime.  - Continue prandial novolog 2 units with meals  + medium intensity sliding scale insulin.  - Continue PTA Sitagliptin.  - Monitor blood sugars, optimize regimen.  - Started metformin at 500 mg daily on 02/20/23 after discussing with patient since plans are now for hospice would avoid uptitration at this time.    Poor fitting dentures with associated pain improved   Epulis fissuratum  * Oral pain noted during admission. No oral lesions noted, but her dentures fit poorly.  Removed dentures and she has a flap of soft tissue on the upper anterior gum line. Case was discussed with oral surgery who explained this is extra tissue (epulis fissuratum) that often occurs in patients with poor fitting dentures. It should be excised and she should have dentures refitted.    - RN to use dentures only while eating.  - Dental soft diet, viscous lidocaine PRN ordered.   - Needs oral surgery referral at discharge.     Paroxysmal atrial fibrillation  Hx of DVT  - Not on AV veronica agents prior to admission.  - Continues on PTA rivaroxaban.      CAD with HFpEF  Hypertension  Hyperlipidemia  * Last echo on 5/5/2022 showed EF of 55 to 60% with normal left ventricular cavity size.   * RRT activated 1/15 for chest pain. EKG negative for ischemia. Serial troponins negative.  * PTA lisinopril decreased from 40 to 20 mg daily this admission due to hypotension, continues on lisinopril 20 mg daily.  - Continues on PTA atorvastatin 40 mg daily.     Chronic anemia  * Baseline Hgb 10-11. Decreased to 8.9 on 1/20 with no s/sx of bleeding.   - Iron studies normal.  - Hemoglobin at around previous baseline.  Monitor periodically.      Urinary incontinence  Urinary retention, Resolved  * Espino catheter initially placed this admission given worsening decub and contamination with urine. Removed 1/11.   Has been utilizing external catheter since 1/17  - Continues on PTA  tamsulosin.     Dementia with behavioral disturbance, intermittent delirium  Schizoaffective disorder   - Continue PTA regimen: venlafaxine 150 mg daily, topiramate 100 mg daily.  - Delirium precautions in place.    Goals of care  Patient states she is tired of continuing with trying to take care of her wounds, and her daughter. John is interested in hospice care.  Donald notes she is ready for what god has planned for her.  - Hospice consulted.  - Continue current medications for now.  - Care transitions consulted regarding discharge planning with hospice.       Diet: Combination Diet Moderate Consistent Carb (60 g CHO per Meal) Diet; Mechanical/Dental Soft Diet  Snacks/Supplements Adult: Other; L - vanilla Ensure MAX (RD); With Meals    DVT Prophylaxis: DOAC  Espino Catheter: Not present  Lines: None     Cardiac Monitoring: None  Code Status: No CPR- Do NOT Intubate      Clinically Significant Risk Factors              # Hypoalbuminemia: Lowest albumin = 2.4 g/dL at 1/8/2023  7:01 AM, will monitor as appropriate          # DMII: A1C = 10.0 % (Ref range: 0.0 - 5.6 %) within past 6 months           Disposition Plan      Expected Discharge Date: 03/06/2023    Discharge Delays: Placement - LTC  *Medically Ready for Discharge    Discharge Comments: placement; was living with daughter; has multiple wounds.   SW/CC-- needs placement.  LTC          Howie Schneider MD  Hospitalist Service  [facility]  Securely message with [employer] (more info)  Text page via [employer] Paging/Directory   ______________________________________________________________________    Interval History   Miley Tolbert was briefly seen this afternoon. She was sleeping and I did not try to wake her up. Discussed with nursing, no new complaints/concerns.    Physical Exam   Vital Signs: Temp: 98.9  F (37.2  C) Temp src: Oral BP: 123/62 Pulse: 82   Resp: 16 SpO2: 97 % O2 Device: None (Room air)    Weight: 170 lbs 0  oz    Constitutional: sleeping in the hospital bed, no acute distress    Medical Decision Making       **CLEAR ALL SELECTIONS**      Data   NOTE: Data reviewed over the past 24 hrs contributes toward MDM complexity

## 2023-03-02 NOTE — PLAN OF CARE
A/Ox4, forgetful at times. VSS on RA. C/o leg pain, PRN oxy given x1. Denies nausea or SOB. Mod carb, mech/soft diet. BG checks w/ meals, insulin given per sliding scale. A2 w/ lift, T/R q2h. Incont B/B, no BM this shift. Purewick in place w/ good UOP. Pt refused assessment of sacrum/coccyx d/t wound cares being done this morning; unable to complete full skin assessment. Wound cares to R calf refused. No IV access. SW/WOC following. Plan to discharge w/ hospice.

## 2023-03-02 NOTE — PROGRESS NOTES
Care Management Follow Up    Length of Stay (days): 53    Expected Discharge Date: 03/03/2023     Concerns to be Addressed:     Discharge ploanning  Patient plan of care discussed at interdisciplinary rounds: Yes    Anticipated Discharge Disposition: Long Term Care with hospice   Anticipated Discharge Services: None  Anticipated Discharge DME: None    Patient/family educated on Medicare website which has current facility and service quality ratings: no  Education Provided on the Discharge Plan:  yes  Patient/Family in Agreement with the Plan: yes    Referrals Placed by CM/SW: Post Acute Facilities  Private pay costs discussed: Not applicable    Additional Information:  Received a call back from Jorge, patient's [facility] Care Coordinator.  [facility] and it should be valid beginning today, March, however Jorge states that with the processing time he feels that we should be able to safely look for long term care beds on Friday.  Jorge states that the [facility] number should remain the same, the group number will just change.  Call placed to Jessica at [facility] to see if she would review the referral to see if they can accommodate patient's needs, although they do not have an available bed currently.  Resent the referral, via the discharge navigator.  Awaiting a return call.    Will continue to follow.      SEAN Zamarripa, [facility]    [phone]

## 2023-03-02 NOTE — PLAN OF CARE
[id number]    A/Ox4, forgetful at times. VSS on RA. C/O pain in legs, didn't want pain meds. A2 lift. T/R q2hr. Mod carb, mech/soft diet. BG checks ACHS, insulin given per sliding scale. Incont B/B, no BM this shift. Purewick in place w/ good UOP. Refused full skin assessment of sacrum/coccyx, wound cares to BLE, wound cares to right thigh completed. No IV access. SW/WOC following. Discharge to hospice pending.

## 2023-03-03 NOTE — PLAN OF CARE
Shift Note:     A/O x 4, VSS at RA, frequently refuse turning and repositioning, breath sound is clear, bowel sound normoactive, heart sounds normal. Wound dressing is clean, dry and intact. Urine is clear yellow with adequate urinary output, no BM during the shift, patient was kept dry and monitored for pain during the shift. Assist of 2 with lift, combination diet moderate CHO, mechanical/ dental soft diet. For possible discharge Next week Monday.

## 2023-03-03 NOTE — PROGRESS NOTES
"SPIRITUAL HEALTH SERVICES Progress Note  [facility] [address]    Referral Source: Follow-up    Supportive visit with Donald at bedside. Donald reported, \"I've had some drawbacks recently.\" Donald shared, \"The Lord came to me and said He [id number] heal me,\" as she reflected, \"I trust and have deandre in God's infinite love.\"    Plan: I will continue to follow. [facility] remains available.    Giselle Vargas MDiv  Chaplain Resident  Pager: [phone]     [facility] available 24/7 for emergent requests/referrals, either by having the on-call  paged or by entering an ASAP/STAT consult in Epic (this will also page the on-call ).  "

## 2023-03-03 NOTE — PROGRESS NOTES
Care Management Follow Up    Length of Stay (days): 55    Expected Discharge Date: 03/06/2023     Concerns to be Addressed:  Discharge planning     Patient plan of care discussed at interdisciplinary rounds: Yes    Anticipated Discharge Disposition: Long Term Care     Anticipated Discharge Services: None  Anticipated Discharge DME: None    Patient/family educated on Medicare website which has current facility and service quality ratings: yes  Education Provided on the Discharge Plan:  no  Patient/Family in Agreement with the Plan: yes    Referrals Placed by CM/SW: Post Acute Facilities  Private pay costs discussed: Not applicable    Additional Information:  Call placed to [facility] to follow up on the bed availability.  Per Jessica, they have no available beds.  Call placed to Misty, the [facility] liaison, to inquire as to whether they would consider patient in any of their facilities now that she is hospice.  Also explained that she is [facility],  Misty is asking that we send a new referral.  Referral sent, via the discharge navigator.    Will continue to follow.      SEAN Zamarripa, [facility]    [phone]  [facility]

## 2023-03-03 NOTE — PLAN OF CARE
A/Ox4, forgetful at times. VSS on RA. C/O pain in legs, refused pain medications. Mod carb, mech/soft diet. BG checks; insulin given per sliding scale orders. Up A2 w/ lift, not OOB. T/R q2hr. Incont B/B, no BM this shift. Purewick in place w/ good UOP. Refused full skin assessment of sacrum/coccyx but allowed WOC to complete all wound cares this shift. No IV access. SW/WOC following. Discharge to hospice pending; potential for Monday/early next week.

## 2023-03-03 NOTE — PROGRESS NOTES
[facility]    Medicine Progress Note - Hospitalist Service    Date of Admission:  1/6/2023    Assessment & Plan   Miley Tolbert is a 77 year old female with paroxysmal a-fib and prior DVT on chronic AC with rivaroaban, CHF, CAD, CKD, COPD, poorly controlled DM2, fecal/urinary incontinence, and chronic wounds admitted on 1/6/2023 for worsening sacral decubitus ulcer and need for placement due to inability of the family to provide adequate care for the patient. She is now waiting for long term care placement.    Stage IV sacral wound   Stage II left calf and heel wounds  Initial concern for sepsis, Resolved  * Presented with worsening sacral decubitus ulcer that was reportedly foul smelling and soiled with urine/stool. Untreated wounds on left calf and heel also noted on admission. Afebrile with normal WBC at presentation.   * Empirically initiated on IV vancomycin and pip-tazo for presumed sacral wound infection though ultimately not felt to be infected.   * CT abd/pelvis and right thigh (1/11) No source of infection or hematoma noted. Blood cultures remained negative.   * Received 2 doses of IV vancomycin and 5-day course of IV pip-tazo this admission  * Podiatry followed ABIs normal. No surgical intervention recommended.  - Wound cares per WOCN.   - Patient transitioning to hospice as noted below.     Chronic pain syndrome  *Per report opiate use prior to admission.   Discussed with patient regarding de-escalation of narcotics, now de-escalated to oxycodone 5 mg twice daily as needed for pain.  further lowered today to 2.5 mg twice daily but had to change it Q6hr prn on 2/23 due to uncontrolled pain  - As needed Tylenol.   - As needed Zanaflex.      DM2 with peripheral neuropathy, long term insulin use  Hypoglycemia, Resolved  * A1c 10.0 on 1/6/23.  * PTA regimen: sitagliptin 100 mg daily, Lantus 35 units, apart 10-14 units TID AC.  * Insulin adjusted this admission due to  intermittent hypoglycemia.  - Continue insulin Lantus 15 units at bedtime.  - Continue prandial novolog 2 units with meals  + medium intensity sliding scale insulin.  - Continue PTA Sitagliptin.  - Monitor blood sugars, optimize regimen.  - Started metformin at 500 mg daily on 02/20/23 after discussing with patient since plans are now for hospice would avoid uptitration at this time.    Poor fitting dentures with associated pain improved   Epulis fissuratum  * Oral pain noted during admission. No oral lesions noted, but her dentures fit poorly.  Removed dentures and she has a flap of soft tissue on the upper anterior gum line. Case was discussed with oral surgery who explained this is extra tissue (epulis fissuratum) that often occurs in patients with poor fitting dentures. It should be excised and she should have dentures refitted.    - RN to use dentures only while eating.  - Dental soft diet, viscous lidocaine PRN ordered.   - Needs oral surgery referral at discharge.     Paroxysmal atrial fibrillation  Hx of DVT  - Not on AV veronica agents prior to admission.  - Continues on PTA rivaroxaban.      CAD with HFpEF  Hypertension  Hyperlipidemia  * Last echo on 5/5/2022 showed EF of 55 to 60% with normal left ventricular cavity size.   * RRT activated 1/15 for chest pain. EKG negative for ischemia. Serial troponins negative.  * PTA lisinopril decreased from 40 to 20 mg daily this admission due to hypotension, continues on lisinopril 20 mg daily.  - Continues on PTA atorvastatin 40 mg daily.     Chronic anemia  * Baseline Hgb 10-11. Decreased to 8.9 on 1/20 with no s/sx of bleeding.   - Iron studies normal.  - Hemoglobin at around previous baseline.  Monitor periodically.      Urinary incontinence  Urinary retention, Resolved  * Espino catheter initially placed this admission given worsening decub and contamination with urine. Removed 1/11.   Has been utilizing external catheter since 1/17  - Continues on PTA  tamsulosin.     Dementia with behavioral disturbance, intermittent delirium  Schizoaffective disorder   - Continue PTA regimen: venlafaxine 150 mg daily, topiramate 100 mg daily.  - Delirium precautions in place.    Goals of care  Patient states she is tired of continuing with trying to take care of her wounds, and her daughter. John is interested in hospice care.  Donald notes she is ready for what god has planned for her.  - Hospice consulted.  - Continue current medications for now per patient preference.  - Care transitions consulted regarding discharge planning with hospice.       Diet: Combination Diet Moderate Consistent Carb (60 g CHO per Meal) Diet; Mechanical/Dental Soft Diet  Snacks/Supplements Adult: Other; L - vanilla Ensure MAX (RD); With Meals    DVT Prophylaxis: DOAC  Espino Catheter: Not present  Lines: None     Cardiac Monitoring: None  Code Status: No CPR- Do NOT Intubate      Clinically Significant Risk Factors              # Hypoalbuminemia: Lowest albumin = 2.4 g/dL at 1/8/2023  7:01 AM, will monitor as appropriate          # DMII: A1C = 10.0 % (Ref range: 0.0 - 5.6 %) within past 6 months           Disposition Plan      Expected Discharge Date: 03/06/2023    Discharge Delays: Placement - LTC  *Medically Ready for Discharge    Discharge Comments: placement; was living with daughter; has multiple wounds.   SW/CC-- needs placement.  LTC          Howie Schneider MD  Hospitalist Service  [facility]  Securely message with [employer] (more info)  Text page via [employer] Paging/Directory   ______________________________________________________________________    Interval History   Miley Tolbert was seen this morning. Wants to do wound care every other day rather than daily, states it is too painful. No other complaints/concerns.    Physical Exam   Vital Signs: Temp: 98.7  F (37.1  C) Temp src: Oral BP: 119/55 Pulse: 75   Resp: 18 SpO2: 96 % O2 Device: None (Room air)     Weight: 170 lbs 0 oz    Constitutional: awake, alert, cooperative, no apparent distress, laying in the hospital bed, answers questions appropriately    Medical Decision Making       25 MINUTES SPENT BY ME on the date of service doing chart review, history, exam, documentation & further activities per the note.      Data   NOTE: Data reviewed over the past 24 hrs contributes toward MDM complexity

## 2023-03-03 NOTE — PROGRESS NOTES
Reason for admission: Worsening sacral decubitus ulcer and need for placement   Mental Status: x4  Activity: Ax2 CL. Needs turn and repo  Diet: Regular. Tray set up. Feeding herself.  Pain: Controlled  Urination: Inc B/B uses purewick  Tele/Restraints/Iso: NA  LDA: none  Expected D/C Date: Pending LTC placement w/ hospice  Other Info: Performed sponge bath. Wound care done today. Turning and repositioning done but occasionally pt refuses. Had A BM today. Voiding adequately. Hx of paroxysmal a-fib and prior DVT on chronic AC with rivaroaban, CHF, CAD, CKD, COPD, poorly controlled DM2, fecal/urinary incontinence, and chronic wounds.

## 2023-03-04 NOTE — PROGRESS NOTES
[facility] [place]    Medicine Progress Note - Hospitalist Service    Date of Admission:  1/6/2023    Assessment & Plan   Miley Tolbert is a 77 year old female with paroxysmal a-fib and prior DVT on chronic AC with rivaroaban, CHF, CAD, CKD, COPD, poorly controlled DM2, fecal/urinary incontinence, and chronic wounds admitted on 1/6/2023 for worsening sacral decubitus ulcer and need for placement due to inability of the family to provide adequate care for the patient. She is now waiting for long term care placement.    Stage IV sacral wound   Stage II left calf and heel wounds  Initial concern for sepsis, Resolved  * Presented with worsening sacral decubitus ulcer that was reportedly foul smelling and soiled with urine/stool. Untreated wounds on left calf and heel also noted on admission. Afebrile with normal WBC at presentation.   * Empirically initiated on IV vancomycin and pip-tazo for presumed sacral wound infection though ultimately not felt to be infected.   * CT abd/pelvis and right thigh (1/11) No source of infection or hematoma noted. Blood cultures remained negative.   * Received 2 doses of IV vancomycin and 5-day course of IV pip-tazo this admission  * Podiatry followed ABIs normal. No surgical intervention recommended.  - Wound cares per WOCN.   - Patient transitioning to hospice as noted below.    Chronic pain syndrome  *Per report opiate use prior to admission.   Discussed with patient regarding de-escalation of narcotics, now de-escalated to oxycodone 5 mg twice daily as needed for pain.  further lowered today to 2.5 mg twice daily but had to change it Q6hr prn on 2/23 due to uncontrolled pain  - As needed Tylenol.   - As needed Zanaflex.     Diabetes mellitus, type 2 with peripheral neuropathy, long term insulin use  Hypoglycemia, Resolved  * A1c 10.0 on 1/6/23.  * PTA regimen: sitagliptin 100 mg daily, Lantus 35 units, apart 10-14 units TID AC.  * Insulin adjusted this  admission due to intermittent hypoglycemia.  - Continue insulin Lantus 15 units at bedtime.  - Continue prandial novolog 2 units with meals  + medium intensity sliding scale insulin.  - Continue PTA Sitagliptin.  - Monitor blood sugars, optimize regimen.  - Started metformin at 500 mg daily on 02/20/23 after discussing with patient since plans are now for hospice would avoid uptitration at this time.    Poor fitting dentures with associated pain improved   Epulis fissuratum  * Oral pain noted during admission. No oral lesions noted, but her dentures fit poorly.  Removed dentures and she has a flap of soft tissue on the upper anterior gum line. Case was discussed with oral surgery who explained this is extra tissue (epulis fissuratum) that often occurs in patients with poor fitting dentures. It should be excised and she should have dentures refitted.    - RN to use dentures only while eating.  - Dental soft diet, viscous lidocaine PRN ordered.   - Needs oral surgery referral at discharge.     Paroxysmal atrial fibrillation  Hx of DVT  - Not on AV veronica agents prior to admission.  - Continues on PTA rivaroxaban.      CAD with HFpEF  Hypertension  Hyperlipidemia  * Last echo on 5/5/2022 showed EF of 55 to 60% with normal left ventricular cavity size.   * RRT activated 1/15 for chest pain. EKG negative for ischemia. Serial troponins negative.  * PTA lisinopril decreased from 40 to 20 mg daily this admission due to hypotension, continues on lisinopril 20 mg daily.  - Continues on PTA atorvastatin 40 mg daily.     Chronic anemia  * Baseline Hgb 10-11. Decreased to 8.9 on 1/20 with no s/sx of bleeding.   - Iron studies normal.  - Hemoglobin at around previous baseline.  Monitor periodically.      Urinary incontinence  Urinary retention, Resolved  * Espnio catheter initially placed this admission given worsening decub and contamination with urine. Removed 1/11.   Has been utilizing external catheter since 1/17.  - Continues  on PTA tamsulosin.     Dementia with behavioral disturbance, intermittent delirium  Schizoaffective disorder   - Continue PTA regimen: venlafaxine 150 mg daily, topiramate 100 mg daily.  - Delirium precautions in place.    Goals of care  Patient states she is tired of continuing with trying to take care of her wounds, and her daughter. John is interested in hospice care.  Donald notes she is ready for what god has planned for her.  - Hospice consulted.  - Continue current medications for now per patient preference.  - Care transitions consulted regarding discharge planning with hospice.       Diet: Combination Diet Moderate Consistent Carb (60 g CHO per Meal) Diet; Mechanical/Dental Soft Diet  Snacks/Supplements Adult: Other; L - vanilla Ensure MAX (RD); With Meals    DVT Prophylaxis: DOAC  Espino Catheter: Not present  Lines: None     Cardiac Monitoring: None  Code Status: No CPR- Do NOT Intubate      Clinically Significant Risk Factors              # Hypoalbuminemia: Lowest albumin = 2.4 g/dL at 1/8/2023  7:01 AM, will monitor as appropriate          # DMII: A1C = 10.0 % (Ref range: 0.0 - 5.6 %) within past 6 months           Disposition Plan      Expected Discharge Date: 03/06/2023    Discharge Delays: Placement - LTC  *Medically Ready for Discharge    Discharge Comments: placement; was living with daughter; has multiple wounds.   SW/CC-- needs placement.  LTC          Howie Schneider MD  Hospitalist Service  [facility]  Securely message with [employer] (more info)  Text page via [employer] Paging/Directory   ______________________________________________________________________    Interval History   Miley Tolbert was seen this morning. She feels OK, having a good day. No new complaints/concerns.    Physical Exam   Vital Signs: Temp: 97.9  F (36.6  C) Temp src: Oral BP: 121/55 Pulse: 68   Resp: 16 SpO2: 97 % O2 Device: None (Room air)    Weight: 170 lbs 0 oz    Constitutional: awake,  alert, cooperative, no apparent distress, laying in the hospital bed    Medical Decision Making       **CLEAR ALL SELECTIONS**      Data   NOTE: Data reviewed over the past 24 hrs contributes toward MDM complexity

## 2023-03-04 NOTE — PROGRESS NOTES
[id number]    Reason for admission: Worsening sacral decubitus ulcer and need for placement   Mental Status: x4  Activity: Ax2 CL. Needs turn and repo  Diet: Regular. Thin liquids, Takes Pills okay, whole all at once.  Pain: Rated at 7, Gave PRN Oxycodone.  Urination: Inc B/B uses purewick  Tele/Restraints/Iso: NA  LDA: none  Expected D/C Date: Pending LTC placement w/ hospice  Other Info: Blood Sugar 165.Turning and repositioning done but occasionally pt refuses. Had A BM today. Voiding adequately. Hx of paroxysmal a-fib and prior DVT on chronic AC with rivaroaban, CHF, CAD, CKD, COPD, poorly controlled DM2, fecal/urinary incontinence, and chronic wounds.

## 2023-03-04 NOTE — PLAN OF CARE
[id number]  A&Ox4 confused at times. VSS on RA. T/R Q2H, pt refusing occasionally. Unable to complete wound care due to pt refusing. Mepi changed. Purewick in place. 1 BM during the shift. Ax2/lift. CHO mech soft diet. BG checks w/ meals. Discharge pending placement.

## 2023-03-05 NOTE — PLAN OF CARE
Goal Outcome Evaluation:    Shift Summary [id number]    Admitting Diagnosis: Hyperglycemia.  Generalized weakness,  Pressure injury of skin of sacral region, unspecified injury stage.    Orientation: A&Ox4 forgetful and confused at times   Vitals/Tele: VSS on RA   IV Access/drains: No IV access, MD aware   Diet: Mod carb, refused lunch   Mobility: A2 lift, T/R Q2hr  GI/: Incontinent of B&B, purewick in place, no BM on this shift.  Wound/Skin: Wound on Coccyx -Dressing changed on the previous shift DCI, Patient refusing full skin assessment.  shift. Pt refused to have L leg dressing changed. Redness to abdominal folds w/inter dry in place. T/R Q2hrs.  Consults: WOC and Social work   Discharge Plan: Pending for Placement. Will continue to monitor.

## 2023-03-05 NOTE — PLAN OF CARE
Goal Outcome Evaluation:        Orientation: A&Ox4 forgetful and confused at times   Vitals/Tele: VSS on RA   IV Access/drains: No IV access, MD aware   Diet: Mod carb   Mobility: A2 lift, turn& repo q2hr  GI/: Incontinent of B&B, purewick in place BM this shift.   Wound/Skin: Wound on Coccyx -Dressing changed this shift. Pt refused to have leg dressing changed. Redness to abdominal folds.  Consults: WOC and Social work   Discharge Plan: Pending placement     See Flow sheets for assessment

## 2023-03-05 NOTE — PLAN OF CARE
Date & Time: 3/4/23 3493-0204  Orientation/Cognitive Concerns: A/O x4. Forgetful.   Abnl VS/O2: VSS on RA  Tele: N/A  Pain Management: denies when not moving  Abnl Labs/BG: BG 72, 128, 158  Behavior/Aggression Tool Color: green  Mobility: Lift  Diet: Regular  Bowel/Bladder: purewick, incontinent  Skin: Sacral, buttocks, and RLE wounds. Redness to abdominal folds  Drains/Devices: none  Test/Procedures: N/A  Anticipated DC date: pending placement

## 2023-03-05 NOTE — PROGRESS NOTES
[facility]    Medicine Progress Note - Hospitalist Service    Date of Admission:  1/6/2023    Assessment & Plan   Miley Tolbert is a 77 year old female with paroxysmal a-fib and prior DVT on chronic AC with rivaroaban, CHF, CAD, CKD, COPD, poorly controlled DM2, fecal/urinary incontinence, and chronic wounds admitted on 1/6/2023 for worsening sacral decubitus ulcer and need for placement due to inability of the family to provide adequate care for the patient. She is now waiting for long term care placement.    Stage IV sacral wound   Stage II left calf and heel wounds  Initial concern for sepsis, Resolved  * Presented with worsening sacral decubitus ulcer that was reportedly foul smelling and soiled with urine/stool. Untreated wounds on left calf and heel also noted on admission. Afebrile with normal WBC at presentation.   * Empirically initiated on IV vancomycin and pip-tazo for presumed sacral wound infection though ultimately not felt to be infected.   * CT abd/pelvis and right thigh (1/11) No source of infection or hematoma noted. Blood cultures remained negative.   * Received 2 doses of IV vancomycin and 5-day course of IV pip-tazo this admission  * Podiatry followed ABIs normal. No surgical intervention recommended.  - Wound cares per WOCN.   - Patient transitioning to hospice as noted below.    Chronic pain syndrome  *Per report opiate use prior to admission.   Discussed with patient regarding de-escalation of narcotics, now de-escalated to oxycodone 5 mg twice daily as needed for pain.  further lowered today to 2.5 mg twice daily but had to change it Q6hr prn on 2/23 due to uncontrolled pain  - As needed Tylenol.   - As needed Zanaflex.     Diabetes mellitus, type 2 with peripheral neuropathy, long term insulin use  Hypoglycemia, Resolved  * A1c 10.0 on 1/6/23.  * PTA regimen: sitagliptin 100 mg daily, Lantus 35 units, apart 10-14 units TID AC.  * Insulin adjusted this  admission due to intermittent hypoglycemia.  - Continue insulin Lantus 15 units at bedtime.  - Continue prandial novolog 2 units with meals  + medium intensity sliding scale insulin.  - Continue PTA Sitagliptin.  - Monitor blood sugars, optimize regimen.  - Started metformin at 500 mg daily on 02/20/23 after discussing with patient since plans are now for hospice would avoid uptitration at this time.    Poor fitting dentures with associated pain improved   Epulis fissuratum  * Oral pain noted during admission. No oral lesions noted, but her dentures fit poorly.  Removed dentures and she has a flap of soft tissue on the upper anterior gum line. Case was discussed with oral surgery who explained this is extra tissue (epulis fissuratum) that often occurs in patients with poor fitting dentures. It should be excised and she should have dentures refitted.    - RN to use dentures only while eating.  - Dental soft diet, viscous lidocaine PRN ordered.   - Needs oral surgery referral at discharge.     Paroxysmal atrial fibrillation  Hx of DVT  - Not on AV veronica agents prior to admission.  - Continues on PTA rivaroxaban.      CAD with HFpEF  Hypertension  Hyperlipidemia  * Last echo on 5/5/2022 showed EF of 55 to 60% with normal left ventricular cavity size.   * RRT activated 1/15 for chest pain. EKG negative for ischemia. Serial troponins negative.  * PTA lisinopril decreased from 40 to 20 mg daily this admission due to hypotension, continues on lisinopril 20 mg daily.  - Continues on PTA atorvastatin 40 mg daily.     Chronic anemia  * Baseline Hgb 10-11. Decreased to 8.9 on 1/20 with no s/sx of bleeding.   - Iron studies normal.  - Hemoglobin at around previous baseline.  Monitor periodically.      Urinary incontinence  Urinary retention, Resolved  * Espino catheter initially placed this admission given worsening decub and contamination with urine. Removed 1/11.   Has been utilizing external catheter since 1/17.  - Continues  on PTA tamsulosin.     Dementia with behavioral disturbance, intermittent delirium  Schizoaffective disorder   - Continue PTA regimen: venlafaxine 150 mg daily, topiramate 100 mg daily.  - Delirium precautions in place.    Insomnia  - Increased PRN dose of melatonin.    Goals of care  Patient states she is tired of continuing with trying to take care of her wounds, and her daughter. John is interested in hospice care.  Donald notes she is ready for what god has planned for her.  - Hospice consulted.  - Continue current medications for now per patient preference.  - Care transitions consulted regarding discharge planning with hospice.       Diet: Combination Diet Moderate Consistent Carb (60 g CHO per Meal) Diet; Mechanical/Dental Soft Diet  Snacks/Supplements Adult: Other; L - vanilla Ensure MAX (RD); With Meals    DVT Prophylaxis: DOAC  Espino Catheter: Not present  Lines: None     Cardiac Monitoring: None  Code Status: No CPR- Do NOT Intubate      Clinically Significant Risk Factors              # Hypoalbuminemia: Lowest albumin = 2.4 g/dL at 1/8/2023  7:01 AM, will monitor as appropriate          # DMII: A1C = 10.0 % (Ref range: 0.0 - 5.6 %) within past 6 months           Disposition Plan     Expected Discharge Date: 03/06/2023    Discharge Delays: Placement - LTC  *Medically Ready for Discharge    Discharge Comments: placement; was living with daughter; has multiple wounds.   SW/CC-- needs placement.  LTC          Howie Schneider MD  Hospitalist Service  [facility]  Securely message with [employer] (more info)  Text page via [facility] Paging/Directory   ______________________________________________________________________    Interval History   Miley Tolbert was seen this morning. She feels OK. Not sleeping well at night, would like something to help her sleep better at night. No other complaints/concerns.     Physical Exam   Vital Signs: Temp: 98.5  F (36.9  C) Temp src: Axillary  BP: 121/50 Pulse: 86   Resp: 16 SpO2: 97 % O2 Device: None (Room air)    Weight: 170 lbs 0 oz    Constitutional: awake, alert, cooperative, no apparent distress, laying in the hospital bed  Respiratory: no increased work of breathing, clear to auscultation bilaterally, no crackles or wheezing  Cardiovascular: regular rate and rhythm, normal S1 and S2, no murmur noted  GI: normal bowel sounds, soft, non-distended, non-tender    Medical Decision Making       25 MINUTES SPENT BY ME on the date of service doing chart review, history, exam, documentation & further activities per the note.      Data   NOTE: Data reviewed over the past 24 hrs contributes toward MDM complexity

## 2023-03-06 NOTE — PLAN OF CARE
Goal Outcome Evaluation:    Orientation: a&ox4 forgetful at times  Vitals/Tele: vss on ra  IV Access/drains: no MD JOHANNA aware. Purewick in place  Diet: mod carb   Mobility: a2 lift   GI/: Purewick in place  Wound/Skin: Redness to abdominal folds w/inter dry in place. Coccyx wound mepi in place,left leg wound  Consults: WOC and social work  Discharge Plan: pending placement      See Flow sheets for assessment

## 2023-03-06 NOTE — PROGRESS NOTES
[facility]    Medicine Progress Note - Hospitalist Service    Date of Admission:  1/6/2023    Assessment & Plan   Miley Tolbert is a 77 year old female with paroxysmal a-fib and prior DVT on chronic AC with rivaroaban, CHF, CAD, CKD, COPD, poorly controlled DM2, fecal/urinary incontinence, and chronic wounds admitted on 1/6/2023 for worsening sacral decubitus ulcer and need for placement due to inability of the family to provide adequate care for the patient. She is now waiting for long term care placement.    Stage IV sacral wound   Stage II left calf and heel wounds  Initial concern for sepsis, Resolved  * Presented with worsening sacral decubitus ulcer that was reportedly foul smelling and soiled with urine/stool. Untreated wounds on left calf and heel also noted on admission. Afebrile with normal WBC at presentation.   * Empirically initiated on IV vancomycin and pip-tazo for presumed sacral wound infection though ultimately not felt to be infected.   * CT abd/pelvis and right thigh (1/11) No source of infection or hematoma noted. Blood cultures remained negative.   * Received 2 doses of IV vancomycin and 5-day course of IV pip-tazo this admission  * Podiatry followed ABIs normal. No surgical intervention recommended.  - Wound cares per WOCN.   - Patient transitioning to hospice as noted below.    Chronic pain syndrome  *Per report opiate use prior to admission.   Discussed with patient regarding de-escalation of narcotics, now de-escalated to oxycodone 5 mg twice daily as needed for pain.  further lowered today to 2.5 mg twice daily but had to change it Q6hr prn on 2/23 due to uncontrolled pain  - As needed Tylenol.   - As needed Zanaflex.     Diabetes mellitus, type 2 with peripheral neuropathy, long term insulin use  Hypoglycemia, Resolved  * A1c 10.0 on 1/6/23.  * PTA regimen: sitagliptin 100 mg daily, Lantus 35 units, apart 10-14 units TID AC.  * Insulin adjusted this  admission due to intermittent hypoglycemia.  - Continue insulin Lantus 15 units at bedtime.  - Continue prandial novolog 2 units with meals  + medium intensity sliding scale insulin.  - Continue PTA Sitagliptin.  - Monitor blood sugars, optimize regimen.  - Started metformin at 500 mg daily on 02/20/23 after discussing with patient since plans are now for hospice would avoid uptitration at this time.    Poor fitting dentures with associated pain improved   Epulis fissuratum  * Oral pain noted during admission. No oral lesions noted, but her dentures fit poorly.  Removed dentures and she has a flap of soft tissue on the upper anterior gum line. Case was discussed with oral surgery who explained this is extra tissue (epulis fissuratum) that often occurs in patients with poor fitting dentures. It should be excised and she should have dentures refitted.    - RN to use dentures only while eating.  - Dental soft diet, viscous lidocaine PRN ordered.   - Needs oral surgery referral at discharge.     Paroxysmal atrial fibrillation  Hx of DVT  Not on AV veronica agents prior to admission.  - Continues on PTA rivaroxaban.      CAD with HFpEF  Hypertension  Hyperlipidemia  * Last echo on 5/5/2022 showed EF of 55 to 60% with normal left ventricular cavity size.   * RRT activated 1/15 for chest pain. EKG negative for ischemia. Serial troponins negative.  * PTA lisinopril decreased from 40 to 20 mg daily this admission due to hypotension, continues on lisinopril 20 mg daily.  - Continues on PTA atorvastatin 40 mg daily.     Chronic anemia  * Baseline Hgb 10-11. Decreased to 8.9 on 1/20 with no s/sx of bleeding.   - Iron studies normal.  - Hemoglobin at around previous baseline.  Monitor periodically.      Urinary incontinence  Urinary retention, Resolved  * Espino catheter initially placed this admission given worsening decub and contamination with urine. Removed 1/11.   Has been utilizing external catheter since 1/17.  - Continues on  PTA tamsulosin.     Dementia with behavioral disturbance, intermittent delirium  Schizoaffective disorder   - Continue PTA regimen: venlafaxine 150 mg daily, topiramate 100 mg daily.  - Delirium precautions in place.    Insomnia  - Increased PRN dose of melatonin.    Goals of care  Per chart review, patient expressed she is tired of continuing with trying to take care of her wounds, and her daughter John is interested in hospice care. She states is ready for what god has planned for her.  - Hospice consulted.  - Continue current medications for now per patient preference.  - Care transitions consulted regarding discharge planning with hospice.       Diet: Combination Diet Moderate Consistent Carb (60 g CHO per Meal) Diet; Mechanical/Dental Soft Diet  Snacks/Supplements Adult: Other; L - vanilla Ensure MAX (RD); With Meals    DVT Prophylaxis: DOAC  Espino Catheter: Not present  Lines: None     Cardiac Monitoring: None  Code Status: No CPR- Do NOT Intubate      Clinically Significant Risk Factors              # Hypoalbuminemia: Lowest albumin = 2.4 g/dL at 1/8/2023  7:01 AM, will monitor as appropriate          # DMII: A1C = 10.0 % (Ref range: 0.0 - 5.6 %) within past 6 months           Disposition Plan      Expected Discharge Date: 03/07/2023    Discharge Delays: Placement - LTC  *Medically Ready for Discharge    Discharge Comments: placement; was living with daughter; has multiple wounds.   SW/CC-- needs placement.  LTC          Katya Bello MD  Hospitalist Service  [facility]  Securely message with [employer] (more info)  Text page via [employer] Paging/Directory   ______________________________________________________________________    Interval History     Chart reviewed, discussed with RN and evaluated patient. No acute issues reported.  No other complaints/concerns.     Physical Exam   Vital Signs: Temp: 98.2  F (36.8  C) Temp src: Oral BP: 110/47 Pulse: 78   Resp: 16 SpO2: 97 % O2  Device: None (Room air)    Weight: 170 lbs 0 oz    Constitutional: awake, alert, cooperative, no apparent distress, laying in the hospital bed  Respiratory: no increased work of breathing, clear to auscultation bilaterally, no crackles or wheezing  Cardiovascular: regular rate and rhythm, normal S1 and S2, no murmur noted  GI: normal bowel sounds, soft, non-distended, non-tender    Medical Decision Making       25 MINUTES SPENT BY ME on the date of service doing chart review, history, exam, documentation & further activities per the note.      Data   NOTE: Data reviewed over the past 24 hrs contributes toward MDM complexity

## 2023-03-06 NOTE — PLAN OF CARE
Primary Diagnosis: Admitted on 1/6 for wounds & LTC placement, family unable to care for the pt at home.  Orientation: A/O x4, forgetful  Mobility: A2 lift, T/R Q2 hrs  Pain Management: PRN Oxy x2  Diet: Mod CHO/dental soft, good appetite  Bowel/Bladder: Purewick in place, adequate output. No BM.  Abnormal Lab/Assessments: , 137, 171  Drain/Device/Wound: No IV access  Skin: L heel (KING) Buttock dressing CDI- dressing change due 3/7. R calf wound dressing CDI- dressing change due 3/6. Pt refused dressing changes this shift.  D/C Day/Goals/Place: pending LTC placement. Will continue to monitor.

## 2023-03-06 NOTE — PLAN OF CARE
Date & Time: 3/5/23 2720-8030  Orientation/Cognitive Concerns: A/O x4. Forgetful.   Abnl VS/O2: VSS on RA  Tele: N/A  Pain Management: denies when not moving  Abnl Labs/BG:   Behavior/Aggression Tool Color: green  Mobility: Lift  Diet: Regular  Bowel/Bladder: purewick, incontinent  Skin: Sacral, buttocks, and RLE wounds. Redness to abdominal folds  Drains/Devices: none  Test/Procedures: N/A  Anticipated DC date: pending placement    Attempted to turn and change pt at 1845, pt refused saying she does not feel wet and is still eating. Pad was checked and is wet. Education provided. Pt very upset saying that we are not suppose to be bothering her while she's eating.

## 2023-03-07 NOTE — PLAN OF CARE
2300 - 1430    A&Ox4, forgetful. VSS on RA. Denies pain and nausea. Up A2 w/lift. T/R q 2 hrs. Mod carb, mech/dental soft diet. BG checks. Incontinent of B/B, no BM overnight, purewick in place with good UOP. No IV access. Mepilex to coccyx and R leg - CDI. WOC and SW following. Discharge pending placement.

## 2023-03-07 NOTE — PROGRESS NOTES
Reason for admission: Worsening sacral decubitus ulcer and need for placement   Mental Status: x4  Activity: Ax2 CL.   Diet: Regular. Tray set up. Feeding herself.  Pain: Controlled  Urination: Inc B/B uses purewick  Tele/Restraints/Iso: NA  LDA: none  Expected D/C Date:Tomorrow to [facility] via [place]cher transport at 12:00 noon.  Other Info: Wound care done today. Turning and repositioning done q2. No BM today. Voiding adequately. Hx of paroxysmal a-fib and prior DVT on chronic AC with rivaroaban, CHF, CAD, CKD, COPD, poorly controlled DM2, fecal/urinary incontinence, and chronic wounds.

## 2023-03-07 NOTE — PROGRESS NOTES
[facility]    Medicine Progress Note - Hospitalist Service    Date of Admission:  1/6/2023    Assessment & Plan   Miley Tolbert is a 77 year old female with paroxysmal a-fib and prior DVT on chronic AC with rivaroaban, CHF, CAD, CKD, COPD, poorly controlled DM2, fecal/urinary incontinence, and chronic wounds admitted on 1/6/2023 for worsening sacral decubitus ulcer and need for placement due to inability of the family to provide adequate care for the patient. She is now waiting for long term care placement.    Stage IV sacral wound   Stage II left calf and heel wounds  Initial concern for sepsis, Resolved  * Presented with worsening sacral decubitus ulcer that was reportedly foul smelling and soiled with urine/stool. Untreated wounds on left calf and heel also noted on admission. Afebrile with normal WBC at presentation.   * Empirically initiated on IV vancomycin and pip-tazo for presumed sacral wound infection though ultimately not felt to be infected.   * CT abd/pelvis and right thigh (1/11) No source of infection or hematoma noted. Blood cultures remained negative.   * Received 2 doses of IV vancomycin and 5-day course of IV pip-tazo this admission  * Podiatry followed ABIs normal. No surgical intervention recommended.  - Wound cares per WOCN.   - Patient transitioning to hospice as noted below.    Chronic pain syndrome  *Per report opiate use prior to admission.   Discussed with patient regarding de-escalation of narcotics, now de-escalated to oxycodone 5 mg twice daily as needed for pain.  further lowered today to 2.5 mg twice daily but had to change it Q6hr prn on 2/23 due to uncontrolled pain  - As needed Tylenol.   - As needed Zanaflex.     Diabetes mellitus, type 2 with peripheral neuropathy, long term insulin use  Hypoglycemia, Resolved  * A1c 10.0 on 1/6/23.  * PTA regimen: sitagliptin 100 mg daily, Lantus 35 units, apart 10-14 units TID AC.  * Insulin adjusted this  admission due to intermittent hypoglycemia.  - Continue insulin Lantus 15 units at bedtime.  - Continue prandial novolog 2 units with meals  + medium intensity sliding scale insulin.  - Continue PTA Sitagliptin.  - Monitor blood sugars, optimize regimen.  - Started metformin at 500 mg daily on 02/20/23 after discussing with patient since plans are now for hospice would avoid uptitration at this time.    Poor fitting dentures with associated pain improved   Epulis fissuratum  * Oral pain noted during admission. No oral lesions noted, but her dentures fit poorly.  Removed dentures and she has a flap of soft tissue on the upper anterior gum line. Case was discussed with oral surgery who explained this is extra tissue (epulis fissuratum) that often occurs in patients with poor fitting dentures. It should be excised and she should have dentures refitted.    - RN to use dentures only while eating.  - Dental soft diet, viscous lidocaine PRN ordered.   - Needs oral surgery referral at discharge.     Paroxysmal atrial fibrillation  Hx of DVT  Not on AV veronica agents prior to admission.  - Continues on PTA rivaroxaban.      CAD with HFpEF  Hypertension  Hyperlipidemia  * Last echo on 5/5/2022 showed EF of 55 to 60% with normal left ventricular cavity size.   * RRT activated 1/15 for chest pain. EKG negative for ischemia. Serial troponins negative.  * PTA lisinopril decreased from 40 to 20 mg daily this admission due to hypotension, continues on lisinopril 20 mg daily.  - Continues on PTA atorvastatin 40 mg daily.     Chronic anemia  * Baseline Hgb 10-11. Decreased to 8.9 on 1/20 with no s/sx of bleeding.   - Iron studies normal.  - Hemoglobin at around previous baseline.  Monitor periodically.      Urinary incontinence  Urinary retention, Resolved  * Espino catheter initially placed this admission given worsening decub and contamination with urine. Removed 1/11.   Has been utilizing external catheter since 1/17.  - Continues on  PTA tamsulosin.     Dementia with behavioral disturbance, intermittent delirium  Schizoaffective disorder   - Continue PTA regimen: venlafaxine 150 mg daily, topiramate 100 mg daily.  - Delirium precautions in place.    Insomnia  - Increased PRN dose of melatonin.    Goals of care  Per chart review, patient expressed she is tired of continuing with trying to take care of her wounds, and her daughter John is interested in hospice care. She states is ready for what god has planned for her.  - Hospice consulted.  - Continue current medications for now per patient preference.  - Care transitions consulted regarding discharge planning with hospice.       Diet: Combination Diet Moderate Consistent Carb (60 g CHO per Meal) Diet; Mechanical/Dental Soft Diet  Snacks/Supplements Adult: Other; L - vanilla Ensure MAX (RD); With Meals    DVT Prophylaxis: DOAC  Espino Catheter: Not present  Lines: None     Cardiac Monitoring: None  Code Status: No CPR- Do NOT Intubate      Clinically Significant Risk Factors              # Hypoalbuminemia: Lowest albumin = 2.4 g/dL at 1/8/2023  7:01 AM, will monitor as appropriate          # DMII: A1C = 10.0 % (Ref range: 0.0 - 5.6 %) within past 6 months           Disposition Plan     Expected Discharge Date: 03/07/2023    Discharge Delays: Placement - LTC  *Medically Ready for Discharge    Discharge Comments: placement; was living with daughter; has multiple wounds.   SW/CC-- needs placement.  LTC          Katya Bello MD  Hospitalist Service  [facility]  Securely message with [employer] (more info)  Text page via [employer] Paging/Directory   ______________________________________________________________________    Interval History     Chart reviewed, discussed with RN and evaluated patient. No acute issues reported.  No other complaints/concerns.     Physical Exam   Vital Signs: Temp: 98.5  F (36.9  C) Temp src: Oral BP: 117/56 Pulse: 68   Resp: 17 SpO2: 97 % O2  Device: None (Room air)    Weight: 170 lbs 0 oz    Constitutional: awake, alert, cooperative, no apparent distress, laying in the hospital bed  Respiratory: no increased work of breathing, clear to auscultation bilaterally, no crackles or wheezing  Cardiovascular: regular rate and rhythm, normal S1 and S2, no murmur noted  GI: normal bowel sounds, soft, non-distended, non-tender    Medical Decision Making       25 MINUTES SPENT BY ME on the date of service doing chart review, history, exam, documentation & further activities per the note.      Data   NOTE: Data reviewed over the past 24 hrs contributes toward MDM complexity

## 2023-03-07 NOTE — PROGRESS NOTES
Care Management Follow Up    Length of Stay (days): 59    Expected Discharge Date: 03/08/2023     Concerns to be Addressed:  Discharge planning     Patient plan of care discussed at interdisciplinary rounds: Yes    Anticipated Discharge Disposition: Long Term Care with hospice     Anticipated Discharge Services: None  Anticipated Discharge DME: None    Patient/family educated on Medicare website which has current facility and service quality ratings: no  Education Provided on the Discharge Plan:  yes  Patient/Family in Agreement with the Plan: yes    Referrals Placed by CM/SW: Post Acute Facilities, hospice  Private pay costs discussed: Not applicable    Additional Information:  Received a message back from [facility].  They have a bed available for tomorrow.  It is a shared room that is available.  Call placed to update patient's daughter and she is in agreement.  Also asked patient's daughter if she has been in contact with [employer] to cancel the insurance coverage.  Patient's daughter states that she has spoken with [employer] either on Saturday or Sunday to terminate the coverage, she did a docusign over the weekend.  Explained that we have a bed at [facility] and that I was just working out the details with the timing of the transport and the hospice agency.  Patient's daughter is in agreement.  Referral made to [facility].  Per Emanuel they can accept the referral and will see patient and daughter at [facility] tomorrow around 13:00.  Call placed to  [facility] to arrange for stretcher transport at 12:00 tomorrow as patient is unable to safely sit in a chair and support her trunk. Patient also has wounds and is unable to tolerate sitting in a chair.  Faxed the facesheet and PCS to [facility].  Patient will need a 3 day supply of hospice meds to go with her.  Emanuel states he is follow up with patient's daughter to sign consents.  Updated [facility] and patient's MD.    Will continue to follow.      SEAN Zamarripa,  [facility]    [phone]  [facility]

## 2023-03-08 NOTE — PROGRESS NOTES
Care Management Discharge Note    Discharge Date: 03/08/2023       Discharge Disposition: Long Term Care    Discharge Services: None    Discharge DME: None    Discharge Transportation: agency    Private pay costs discussed: transportation costs    PAS Confirmation Code: [id number]  Patient/family educated on Medicare website which has current facility and service quality ratings: yes    Education Provided on the Discharge Plan:    Persons Notified of Discharge Plans: Family, Facility, MD, Nursing staff, [facility]  Patient/Family in Agreement with the Plan: yes    Handoff Referral Completed: Yes    Additional Information:  Patient is discharging today at 1200 via stretcher to [facility] with a sign on to [facility] around 1300. Writer completed PAS and placed on chart. Spoke with DEANNA Gant, she spoke with the MD and he is working on orders. VM left with Jessica regarding discharge time. Writer called patient's daughter and she is aware of transport time and is agreeable. Message received from Jessica, confirming discharge. Spoke with Emanuel with [facility] is agreeable for discharge. Discharge orders received and faxed to Hospice agency and [facility]. Needs 3 day supply of meds. Staff aware    PAS-RR    D: Per DHS regulation, SW completed and submitted PAS-RR to [place] [employer] via the [employer].  PAS-RR confirmation # is : [id number]    I: SW spoke with patient and they are aware a PAS-RR has been submitted.  DEANNA reviewed with patient that they may be contacted for a follow up appointment within 10 days of hospital discharge if their SNF stay is < 30 days.  Contact information for [employer] was also provided.    A: patient verbalized understanding.    P: Further questions may be directed to [employer] at #[phone], option #4 for PAS-RR staff.      Wendi Luke, SEAN, LGSW   Social Work   [facility]

## 2023-03-08 NOTE — PLAN OF CARE
1900 - 6730    A&Ox4, forgetful. VSS on RA ex HTN. C/o 7/10 back pain - managed with PRN oxy x1. Denies N/V. Tolerating mod carb, mech/soft diet. BG checks, no corrections needed. Up A2 w/lift. T/R q 2 hrs. Incontinent of B/B, purewick in place with good UOP, 1 BM overnight. No IV access. Mepilex to coccyx and R leg - CDI. SW and WOC following. Discharge to [facility] today at 1200 via stretcher ride.

## 2023-03-08 NOTE — PLAN OF CARE
Goal Outcome Evaluation:   Discharge Note    Patient discharged to TCU via EMS/BLS accompanied by staffs.  No IV access.   Prescriptions sent with patient to discharge facility .   Belongings reviewed and sent with patient.   Home medications returned to patient: NA  Equipment sent with: N/A.   patient verbalizes understanding of discharge instructions. AVS given to patient.

## 2023-03-08 NOTE — DISCHARGE SUMMARY
[facility]  Hospitalist Discharge Summary      Date of Admission:  1/6/2023  Date of Discharge:  3/8/2023  Discharging Provider: Katya Bello MD  Discharge Service: Hospitalist Service    Discharge Diagnoses     Please refer to the hospital course below    Follow-ups Needed After Discharge   Follow-up Appointments     Follow Up and recommended labs and tests      Follow up with Hospice as scheduled               Unresulted Labs Ordered in the Past 30 Days of this Admission     No orders found from 12/7/2022 to 1/7/2023.      These results will be followed up by none    Discharge Disposition   Discharged to [facility] with a sign on to [facility]   Condition at discharge: [facility] Course   Miley Tolbert is a 77 year old female with paroxysmal a-fib and prior DVT on chronic AC with rivaroaban, CHF, CAD, CKD, COPD, poorly controlled DM2, fecal/urinary incontinence, and chronic wounds admitted on 1/6/2023 for worsening sacral decubitus ulcer and need for placement due to inability of the family to provide adequate care for the patient. She is now waiting for long term care placement.    Stage IV sacral wound   Stage II left calf and heel wounds  Initial concern for sepsis, Resolved  * Presented with worsening sacral decubitus ulcer that was reportedly foul smelling and soiled with urine/stool. Untreated wounds on left calf and heel also noted on admission. Afebrile with normal WBC at presentation.   * Empirically initiated on IV vancomycin and pip-tazo for presumed sacral wound infection though ultimately not felt to be infected.   * CT abd/pelvis and right thigh (1/11) No source of infection or hematoma noted. Blood cultures remained negative.   * Received 2 doses of IV vancomycin and 5-day course of IV pip-tazo this admission  * Podiatry followed ABIs normal. No surgical intervention recommended.  - Wound cares per [facility]OCN.   - Patient transitioning  to hospice as noted below.    Chronic pain syndrome  *Per report opiate use prior to admission.   Discussed with patient regarding de-escalation of narcotics, now de-escalated to oxycodone 5 mg twice daily as needed for pain.  now that she is going with hospice, continue 5 mg p.o. every 6 hours as needed and can titrate up for pain control.  - As needed Tylenol.   - As needed Zanaflex.     Diabetes mellitus, type 2 with peripheral neuropathy, long term insulin use  Hypoglycemia, Resolved  * A1c 10.0 on 1/6/23.  * PTA regimen: sitagliptin 100 mg daily, Lantus 35 units, apart 10-14 units TID AC.  * Insulin adjusted this admission due to intermittent hypoglycemia.  - Continue insulin Lantus 18 units at bedtime and avoid frequent short acting insulin given hospice at discharge  - Continue PTA Sitagliptin.  - Started metformin at 500 mg daily on 02/20/23 after discussing with patient since plans are now for hospice would avoid uptitration at this time.    Poor fitting dentures with associated pain improved   Epulis fissuratum  * Oral pain noted during admission. No oral lesions noted, but her dentures fit poorly.  Removed dentures and she has a flap of soft tissue on the upper anterior gum line. Case was discussed with oral surgery who explained this is extra tissue (epulis fissuratum) that often occurs in patients with poor fitting dentures. It should be excised and she should have dentures refitted.    - RN to use dentures only while eating.  - Dental soft diet, viscous lidocaine PRN   - Needs oral surgery referral at discharge.     Paroxysmal atrial fibrillation  Hx of DVT  Not on AV veronica agents prior to admission.  - Continues on PTA rivaroxaban.      CAD with HFpEF  Hypertension  Hyperlipidemia  * Last echo on 5/5/2022 showed EF of 55 to 60% with normal left ventricular cavity size.   * RRT activated 1/15 for chest pain. EKG negative for ischemia. Serial troponins negative.  * PTA lisinopril decreased from 40 to 20  mg daily this admission due to hypotension, continues on lisinopril 20 mg daily.  - Continues on PTA atorvastatin 40 mg daily.     Chronic anemia  * Baseline Hgb 10-11. Decreased to 8.9 on 1/20 with no s/sx of bleeding.   - Iron studies normal.      Urinary incontinence  Urinary retention, Resolved  * Espino catheter initially placed this admission given worsening decub and contamination with urine. Removed 1/11.   Has been utilizing external catheter since 1/17.  - Continues on PTA tamsulosin.     Dementia with behavioral disturbance, intermittent delirium  Schizoaffective disorder   - Continue PTA regimen: venlafaxine 150 mg daily, topiramate 100 mg daily.  - Delirium precautions in place.    Insomnia  - Increased PRN dose of melatonin.    Goals of care  Per chart review, patient expressed she is tired of continuing with trying to take care of her wounds, and her daughter John is interested in hospice care. She states is ready for what god has planned for her.  - Hospice consulted and will meet with patient after discharge and will continue to adjust medication after discharge  - Continue current medications for now per patient preference.       Consultations This Hospital Stay   WOUND OSTOMY CONTINENCE NURSE  IP CONSULT  CARE MANAGEMENT / SOCIAL WORK IP CONSULT  PHARMACY TO DOSE VANCO  VASCULAR ACCESS ADULT IP CONSULT  PODIATRY IP CONSULT  VASCULAR ACCESS ADULT IP CONSULT  VASCULAR ACCESS ADULT IP CONSULT  VASCULAR ACCESS ADULT IP CONSULT  CARE MANAGEMENT / SOCIAL WORK IP CONSULT  ORAL SURGERY IP CONSULT  ORAL SURGERY IP CONSULT  CARE MANAGEMENT / SOCIAL WORK IP CONSULT  VASCULAR ACCESS ADULT IP CONSULT  VASCULAR ACCESS ADULT IP CONSULT  VASCULAR ACCESS ADULT IP CONSULT  SURGERY GENERAL IP CONSULT  CARE MANAGEMENT / SOCIAL WORK IP CONSULT    Code Status   No CPR- Do NOT Intubate    Time Spent on this Encounter   Katya URIBE MD, personally saw the patient today and spent greater than 30 minutes  discharging this patient.       Katya Bello MD  [id number] [facility]  [address]  [place] [address]  Phone: [phone]  ______________________________________________________________________    Physical Exam   Vital Signs: Temp: 97.9  F (36.6  C) Temp src: Oral BP: 110/47 Pulse: 75   Resp: 18 SpO2: 98 % O2 Device: None (Room air)    Weight: 170 lbs 0 oz    Constitutional: awake, alert, cooperative, no apparent distress, laying in the hospital bed  Respiratory: no increased work of breathing, clear to auscultation bilaterally, no crackles or wheezing  Cardiovascular: regular rate and rhythm, normal S1 and S2, no murmur noted  GI: normal bowel sounds, soft, non-distended, non-tender       Primary Care Physician   Jade Bautista    Discharge Orders      General info for SNF    Length of Stay Estimate: Long Term Care  Condition at Discharge: Stable  Level of care:board and care  Rehabilitation Potential: Poor  Admission H&P remains valid and up-to-date: Yes  Recent Chemotherapy: N/A  Use Nursing Home Standing Orders: Yes     Mantoux instructions    Give two-step Mantoux (PPD) Per Facility Policy Yes     Follow Up and recommended labs and tests    Follow up with Hospice as scheduled     Reason for your hospital stay    Chronic non healing sacral wounds     Wound care (specify)    Site:   sacral wound  Instructions:  per WOC     Activity - Up with nursing assistance     No CPR- Do NOT Intubate     Fall precautions     Diet    Follow this diet upon discharge: Orders Placed This Encounter      Snacks/Supplements Adult: Other; L - vanilla Ensure MAX (RD); With Meals      Combination Diet Moderate Consistent Carb (60 g CHO per Meal) Diet; Mechanical/Dental Soft Diet       Significant Results and Procedures   Most Recent 3 CBC's:Recent Labs   Lab Test 02/22/23  0725 02/13/23  0713 02/03/23  0604 01/20/23  0722   WBC 6.8 4.8  --  6.0   HGB 11.8 11.1* 10.6* 8.9*   MCV 82 84  --  86     224  --  252     Most Recent 3 BMP's:Recent Labs   Lab Test 03/08/23  0734 03/08/23 0214 03/07/23 2211 02/28/23  1230 02/28/23  0641 02/22/23  0818 02/22/23  0725 02/18/23  1213 02/18/23  1130 02/13/23  0812 02/13/23  0713 02/10/23  0807 02/10/23  0655 02/02/23  0829 02/02/23  0753   NA  --   --   --   --   --   --  141  --   --   --  142  --   --   --  140   POTASSIUM  --   --   --   --   --   --  3.6  --   --   --  3.9  --  3.9   < > 3.9  3.9   CHLORIDE  --   --   --   --   --   --  107  --   --   --  108*  --   --   --  110*   CO2  --   --   --   --   --   --  23  --   --   --  22  --   --   --  22   BUN  --   --   --   --   --   --  22.7  --   --   --  24.5*  --   --   --  19.3   CR  --   --   --   --  0.59  --  0.61  --  0.62  --  0.71  --   --   --  0.70   ANIONGAP  --   --   --   --   --   --  11  --   --   --  12  --   --   --  8   MELISSA  --   --   --   --   --   --  9.4  --   --   --  9.0  --   --   --  9.0   GLC 93 122* 181*   < >  --    < > 116*   < >  --    < > 74   < >  --    < > 140*    < > = values in this interval not displayed.     Most Recent 2 LFT's:Recent Labs   Lab Test 02/13/23 0713 01/08/23  0701   AST 20 12   ALT 20 9   ALKPHOS 93 100   BILITOTAL 0.2 0.7     Most Recent 3 BNP's:No lab results found.  Most Recent 6 glucoses:Recent Labs   Lab Test 03/08/23  0734 03/08/23 0214 03/07/23 2211 03/07/23  1708 03/07/23  1302 03/07/23  0802   GLC 93 122* 181* 190* 115* 112*     Most Recent Urinalysis:Recent Labs   Lab Test 01/07/23  0656   COLOR Yellow   APPEARANCE Clear   URINEGLC 70*   URINEBILI Negative   URINEKETONE Negative   SG 1.024   UBLD Moderate*   URINEPH 5.5   PROTEIN 10*   NITRITE Negative   LEUKEST Trace*   RBCU 12*   WBCU 3   ,   Results for orders placed or performed during the hospital encounter of 01/06/23   XR Calcaneus Left G/E 2 Views    Narrative    EXAM: XR CALCANEUS LEFT G/E 2 VIEWS  LOCATION: [facility]  DATE/TIME: 1/9/2023 6:36  PM    INDICATION: chronic wound, eval for bony erosion. Heel pain.  COMPARISON: None.      Impression    IMPRESSION: No fracture. No erosive or destructive bony changes. Osteopenia.   XR Chest 2 Views    Narrative    EXAM: XR CHEST 2 VIEWS  LOCATION: [facility]  DATE/TIME: 1/9/2023 6:39 PM    INDICATION: hypotensive, exploring source infection  COMPARISON: None.      Impression    IMPRESSION: There is a right upper extremity PICC line catheter with its tip in the mid, right axillary vein.    Lungs are clear. Heart and pulmonary vascularity are normal. No signs of pneumonia.       US VENUS Doppler No Exercise    Narrative    IR VENUS US VENUS DOPPLER NO EXERCISE, 1-2 LEVELS, BILAT   1/10/2023 11:22  AM     HISTORY: Left medial heel ulcer, significant pain to leg. Eval for PAD    COMPARISON: None.    FINDINGS:  Right VENUS:   DP: 0.99  PT: 1.1.    Left VENUS:   DP: 1.17   PT: 1.21.    Waveforms: Triphasic in the distal tibial arteries      Impression    IMPRESSION: Ankle-brachial indices are within normal limits.    VENUS CRITERIA:  >0.95 Normal  0.90 - 0.94 Mild  0.5 - 0.89 Moderate  0.2 - 0.49 Severe  <0.2 Critical    FABIOLA BOTELLO MD         SYSTEM ID:  [id number]   XR Knee Left 1/2 Views    Narrative    EXAM: XR KNEE LEFT 1/2 VIEWS  LOCATION: [facility]  DATE/TIME: 1/11/2023 4:14 AM    INDICATION: Lt knee cap pain.  COMPARISON: None.      Impression    IMPRESSION: Moderate to severe tricompartmental osteoarthritic changes of the knee. No fracture or joint effusion. Somewhat high riding patella, uncertain if positional. Atherosclerotic vascular calcifications.   CTA Abdomen Pelvis with Contrast    Narrative    EXAM: CT ABDOMEN AND PELVIS WITHOUT AND WITH CONTRAST  LOCATION: [facility]  DATE/TIME: 01/11/2023, 4:07 AM    INDICATION: Sepsis. Evaluate for intra-abdominal bleeding.  COMPARISON: None.    TECHNIQUE: CT abdomen and pelvis prior to the  administration of intravenous contrast. CT angiogram abdomen and pelvis following the injection of IV contrast during arterial and renal excretory phases. 2D and 3D MIP reconstructions were performed by the   CT technologist. Dose reduction techniques were used.  CONTRAST: 95 mL Isovue 370.    FINDINGS:    GASTROINTESTINAL TRACT: No dilatation of the small or large bowel. A few colonic diverticula are present without evidence of diverticulitis. A small to moderate amount of intermediate attenuation fluid, measuring approximately 40 Hounsfield units, is   present throughout the colonic lumen.    LOWER CHEST: Coronary artery calcification.    HEPATOBILIARY: The gallbladder is not visualized.    SPLEEN: Unremarkable.    PANCREAS: Moderate diffuse pancreatic atrophy.    ADRENAL GLANDS: Unremarkable.    KIDNEYS/BLADDER: A few less than 0.5 cm nonobstructing calculi in the left kidney. 1.3 cm intermediate attenuation exophytic lesion extending from the posterior aspect of the inferior pole of the left kidney (series 10 image 39). A balloon tip catheter   is present in the urinary bladder lumen.    LYMPH NODES: Unremarkable.    PELVIC ORGANS: A few calcified uterine fibroids, measuring up to 3 cm in diameter.    MUSCULOSKELETAL: No acute findings.    VASCULATURE: Atherosclerotic calcification in the aorta. The abdominal aorta is normal in caliber without dissection. A filter is present in the inferior vena cava. Stents are present within bilateral common iliac veins.    OTHER: Subcutaneous edema in the inferior pelvis and visualized portions of bilateral thighs.      Impression    IMPRESSION:   1.  A small to moderate amount of intermediate attenuation fluid is present throughout the colonic lumen. This is nonspecific, but could represent blood products or relate to gastroenteritis. There is no CT evidence of active gastrointestinal bleeding or   other active bleeding in the abdomen or pelvis.  2.  No other acute  abnormality identified in the abdomen or pelvis.   3.  1.3 cm indeterminate lesion in the inferior pole of the left kidney. This could represent a complex cyst or a solid lesion. Recommend comparison with prior imaging studies, if available. If not available, a renal mass protocol MR or CT could be   considered in six months for reevaluation.       CT Femur Thigh Right w Contrast    Narrative    EXAM: CT FEMUR THIGH RIGHT WITH CONTRAST  LOCATION: [facility]  DATE/TIME: 01/11/2023, 4:08 AM    INDICATION: Pain. Sepsis. Deformity of the thigh. Evaluate for hematoma and infection.  COMPARISON: CTA abdomen and pelvis with IV contrast 01/11/2023.  TECHNIQUE: IV contrast. Axial, sagittal and coronal thin-section reconstruction. Dose reduction techniques were used.   CONTRAST: 95 mL Isovue 370 IV.    FINDINGS:     BONES:  -Anatomic alignment of the right femur without fracture, dislocation, lytic or destructive process. Degenerative changes right hip and knee joints, narrowing worse involving the knee joint. Similar degenerative changes are also seen involving the left   hip and knee joints. Degenerative changes involving the joints of the pelvis. Demineralization of the visualized bones.    SOFT TISSUES:  -Postmenopausal heterogeneous uterus with multiple calcified fibroids. Espino catheter within the urinary bladder. Common iliac vascular stents. Phleboliths. Rectosigmoid diverticulosis without acute inflammation. Diffuse body wall edema. No discrete   fluid collection.      Impression    IMPRESSION:  1.  Degenerative changes right hip and knee joints, narrowing worse involving the right knee joint. No fracture, dislocation or x-ray evidence of avascular necrosis. Demineralization of the visualized bones. Diffuse soft tissue swelling. No discrete   fluid collection.    2.  Common iliac vascular stents. Espino catheter within the urinary bladder. Multiple calcified fibroids. Distal colonic  diverticulosis.             Discharge Medications   Current Discharge Medication List      START taking these medications    Details   metFORMIN (GLUCOPHAGE XR) 500 MG 24 hr tablet Take 1 tablet (500 mg) by mouth daily (with dinner)  Qty: 30 tablet, Refills: 0    Associated Diagnoses: Hyperglycemia      oxyCODONE (ROXICODONE) 5 MG tablet Take 1 tablet (5 mg) by mouth every 4 hours as needed for moderate to severe pain  Qty: 16 tablet, Refills: 0    Associated Diagnoses: Pressure injury of skin of sacral region, unspecified injury stage         CONTINUE these medications which have CHANGED    Details   acetaminophen (TYLENOL) 325 MG tablet Take 2 tablets (650 mg) by mouth every 6 hours as needed for mild pain, fever or headaches    Associated Diagnoses: Pain      insulin glargine (LANTUS PEN) 100 UNIT/ML pen Inject 18 Units Subcutaneous At Bedtime  Qty: 15 mL, Refills: 0    Comments: If Lantus is not covered by insurance, may substitute Basaglar or Semglee or other insulin glargine product per insurance preference at same dose and frequency.    Associated Diagnoses: Hyperglycemia      lisinopril (ZESTRIL) 40 MG tablet Take 0.5 tablets (20 mg) by mouth daily  Qty: 30 tablet, Refills: 0    Associated Diagnoses: Essential hypertension      tiZANidine (ZANAFLEX) 2 MG tablet Take 0.5 tablets (1 mg) by mouth 2 times daily as needed for muscle spasms  Qty: 30 tablet, Refills: 0    Associated Diagnoses: Spasm of muscle         CONTINUE these medications which have NOT CHANGED    Details   albuterol (PROAIR HFA/PROVENTIL HFA/VENTOLIN HFA) 108 (90 Base) MCG/ACT inhaler Inhale 1-2 puffs into the lungs every 6 hours as needed for shortness of breath, wheezing or cough      atorvastatin (LIPITOR) 40 MG tablet Take 40 mg by mouth At Bedtime      calcium carbonate (OS-MELISSA) 1500 (600 Ca) MG tablet Take 600 mg by mouth 2 times daily      carboxymethylcellulose (REFRESH PLUS) 0.5 % SOLN ophthalmic solution Place 1 drop into both eyes  4 times daily as needed for dry eyes      gabapentin (NEURONTIN) 300 MG capsule Take 2 capsules by mouth 3 times daily      levothyroxine (SYNTHROID/LEVOTHROID) 175 MCG tablet Take 175 mcg by mouth daily      mirabegron (MYRBETRIQ) 25 MG 24 hr tablet Take 25 mg by mouth At Bedtime      rivaroxaban ANTICOAGULANT (XARELTO) 20 MG TABS tablet Take 20 mg by mouth daily (with breakfast)      senna (SENOKOT) 8.6 MG tablet Take 1 tablet by mouth every other day      sitagliptin (JANUVIA) 100 MG tablet Take 100 mg by mouth daily      tamsulosin (FLOMAX) 0.4 MG capsule Take 0.4 mg by mouth At Bedtime      topiramate (TOPAMAX) 100 MG tablet Take 100 mg by mouth daily For seizures      venlafaxine (EFFEXOR XR) 150 MG 24 hr capsule Take 150 mg by mouth daily         STOP taking these medications       insulin aspart (NOVOLOG FLEXPEN) 100 UNIT/ML pen Comments:   Reason for Stopping:         VITAMIN D PO Comments:   Reason for Stopping:             Allergies   Allergies   Allergen Reactions     Celecoxib      Clonazepam      Insulin Lispro      Iodinated Contrast Media [Diagnostic X-Ray Materials]      Iodine      Phenobarbital      Phenytoin

## 2023-03-22 NOTE — LETTER
3/22/2023        RE: Miley Tolbert  [facility]  [address]  [facility] [address]        Miley Tolbert is a 77 year old female seen March 22, 2023 at [facility] where she has resided for one month (admit 3/2023) seen for initial visit.   Pt had [facility] hospitalization 1/6-3/8 for stage IV sacral wounds, and left calf and heel wounds.    Other concerns including chronic pain syndrome, poorly controlled DM2 with peripheral neuropathy, poorly fitting dentures, PAF, h/o DVT, CAD, HFpEF, chronic anemia, urinary incontinence, dementia with behavioral disturbance and schizoaffective disorder.    Prolonged hospitalization secondary to placement issues.   During that time, pt was seen by Palliative care and she and her daughter elected to enroll in Hospice.     Today pt is seen in her room lying abed.  Seen during wound care, works hard to direct how she is positioned, able to assist with turning in bed.   Has pain and yells out on occasion, especially during wound care.  Talked with daughter John by phone, reviewed prior medications.      Pt has been delusional and unaware of her deficits, think she can live by herself.   She has consistently said she was born with male and female parts, that she served in [place], that she is a , etc: all confabulations, per daughter.   Pt was at home with PCA services, HHC, Ginny and maximum services, but failing and daughter will not be able to take pt home again.   Pt has been institutionalized most of her life, and was most recently in a psychiatric LTC facility in [place] after she had been hospitalized inpatient Psychiatry x3 secondary to hallucinations.      Past Medical History:   Diagnosis Date     A-fib (H)      Chronic kidney disease, stage III (moderate) (H)      COPD (chronic obstructive pulmonary disease) (H)      Dementia (H)      Diabetes (H)      Dysphagia      Failure to thrive in adult      HTN (hypertension)   "    Hyperlipidemia LDL goal <100      Hypothyroidism      Major depression      Personal history of DVT (deep vein thrombosis)      Pressure sore      Schizophrenia (H)      Sepsis (H)      SH:   Moved to [place] from [place], lived in a NH /psychiatric institution there.     Lived with daughter John until recent hospitalization   Daughter reports pt was institutionalized most of her life.   , has 6 children, all estranged except John     Review Of Systems  Skin: open wounds left leg and sacrum   Eyes: impaired vision  Ears/Nose/Throat: negative   Respiratory: No shortness of breath, dyspnea on exertion, cough, or hemoptysis  Cardiovascular: negative  Gastrointestinal: negative, eats 100% of her meals     Genitourinary: incontinence, functional.  Has Espino catheter secondary to sacral wound.   Musculoskeletal: mostly bedbound, Ginny lift for transfers     Neurologic: as above     Psychiatric: anxiety, delusional thinking as part of lifetime mental illness       Hematologic/Lymphatic/Immunologic: negative  Endocrine: diabetes      GENERAL APPEARANCE: alert and no distress  /74   Pulse 78   Temp 97.9  F (36.6  C)   Resp 18   Ht 1.626 m (5' 4\")   Wt 77.1 kg (170 lb)   SpO2 96%   BMI 29.18 kg/m     HEENT: normocephalic, no lesion or abnormalities  NECK: no adenopathy, no asymmetry, masses, or scars and thyroid normal to palpation  RESP: lungs clear to auscultation - no rales, rhonchi or wheezes  CV: regular rate and rhythm, normal S1 S2@70  ABDOMEN:  soft, nontender, no HSM or masses and bowel sounds normal  Clear yellow urine in Espino bag  MS: able to assist with UEs when rolling side to side in bed.     No ext edema     SKIN: sacrum with a 5-6 open wound, irregular borders, base is clean, granulating  Left shin with 2-3 superficial open area, clean and heling   NEURO: +tardive dyskinesia, lip-smacking  PSYCH: affect a little irritable   LYMPHATICS: No cervical,  or supraclavicular nodes    Last " Comprehensive Metabolic Panel:  Lab Results   Component Value Date     02/22/2023    POTASSIUM 3.6 02/22/2023    CHLORIDE 107 02/22/2023    CO2 23 02/22/2023    ANIONGAP 11 02/22/2023     (H) 03/08/2023    BUN 22.7 02/22/2023    CR 0.59 02/28/2023    GFRESTIMATED >90 02/28/2023    MELISSA 9.4 02/22/2023     Lab Results   Component Value Date    AST 20 02/13/2023      ALBUMIN 3.1 02/13/2023      ALKPHOS 93 02/13/2023     Lab Results   Component Value Date    WBC 6.8 02/22/2023      HGB 11.8 02/22/2023      MCV 82 02/22/2023       02/22/2023     Lab Results   Component Value Date    A1C 10.0 01/06/2023    A1C 5.8 12/03/2021      CT ABDOMEN AND PELVIS WITHOUT AND WITH CONTRAST 01/11/2023  INDICATION: Sepsis. Evaluate for intra-abdominal bleeding.  GASTROINTESTINAL TRACT: No dilatation of the small or large bowel. A few colonic diverticula are present without evidence of diverticulitis. A small to moderate amount of intermediate attenuation fluid, measuring approximately 40 Hounsfield units, is present throughout the colonic lumen.  LOWER CHEST: Coronary artery calcification.  HEPATOBILIARY: The gallbladder is not visualized.  PANCREAS: Moderate diffuse pancreatic atrophy.  KIDNEYS/BLADDER: A few less than 0.5 cm nonobstructing calculi in the left kidney. 1.3 cm intermediate attenuation exophytic lesion extending from the posterior aspect of the inferior pole of the left kidney (series 10 image 39). A balloon tip catheter is present in the urinary bladder lumen.  PELVIC ORGANS: A few calcified uterine fibroids, measuring up to 3 cm in diameter.  VASCULATURE: Atherosclerotic calcification in the aorta. The abdominal aorta is normal in caliber without dissection. A filter is present in the inferior vena cava. Stents are present within bilateral common iliac veins.  OTHER: Subcutaneous edema in the inferior pelvis and visualized portions of bilateral thighs.                                                      IMPRESSION:   1.  A small to moderate amount of intermediate attenuation fluid is present throughout the colonic lumen. This is nonspecific, but could represent blood products or relate to gastroenteritis. There is no CT evidence of active gastrointestinal bleeding or other active bleeding in the abdomen or pelvis.  2.  No other acute abnormality identified in the abdomen or pelvis.   3.  1.3 cm indeterminate lesion in the inferior pole of the left kidney. This could represent a complex cyst or a solid lesion. Recommend comparison with prior imaging studies, if available. If not available, a renal mass protocol MR or CT could be considered in six months for reevaluation.     Echo May 2022:   The estimated left ventricular ejection fraction is 55-60 %.   Normal left ventricular cavity size.   Normal estimated left ventricular ejection fraction .   No obvious, large wall motion abnormality noted   Tricuspid regurgitation jet is not adequate for estimation of PA systolic pressure.   Based on IVC geometry, the right atrial pressure is probably normal.       IMP/PLAN:   (L89.154) Decubitus ulcer of sacral region, stage 4 (H)   Comment: present since 8/2021   When at home pt frequently refused turning and wound care     In LTC becomes agitated with dressing changes; premedication has helped and wound is healing       Plan: Pressure reduction, nutrition, wound care with calcium alginate and Mepilex covering     Espino catheter to keep dry.     (E11.69,  Z79.4) Type 2 diabetes mellitus with other specified complication, with long-term current use of insulin (H)  (E66.01) Morbid obesity (H)  Comment:   Lab Results   Component Value Date    A1C 10.0 01/06/2023     Plan: metformin 500 mg/day   Lantus 24 units/day   Novolog 4 units tid ac  Sitagliptin 100 mg/day   BGM   She is on an ACEI and statin     (E11.42) Diabetic polyneuropathy associated with type 2 diabetes mellitus (H)  Comment: ongoing pain   Plan: gabapentin 600 mg  "tid     (I48.91) Atrial fibrillation, unspecified type (H)  Comment: also has h/o recurrent DVT s/p IVC thrombectomy   Pulse Readings from Last 4 Encounters:   04/03/23 72   03/27/23 78   03/22/23 78   03/08/23 75      Plan: rivaroxaban 20 mg/day     (F25.9) Schizoaffective disorder, unspecified type (H)  (F41.8) Depression with anxiety  Comment: not currently on anti-pyschotics  Plan: venlafaxine 150 mg/day     (G40.909) Seizure disorder (H) \"pseudoseizures and conversion, normal EEG 11/2007\"  Comment: by history, no sz activity since admission   Plan: topiramate 100 mg/day     (F03.90) Dementia (H)  Comment: difficult to sort out cognitive dysfunction from her confabulations and delusions     Plan: LTC support for med admin, meals, activity and cares     (G24.01) Tardive dyskinesia  Comment: long term use of anti-psychotics    Plan: monitor; consider tx if interferes with function       (E03.9) Hypothyroidism, unspecified type  Comment:   TSH   Date Value Ref Range Status   08/09/2021 1.07 0.30 - 5.00 uIU/mL Final   09/15/2007 1.44 0.4 - 5.0 mU/L Final      Plan: levothyroxine 175 mcg/day     (Z51.5) Hospice care patient  Comment: on [facility]   Plan: PRNs for comfort care     Talked with daughter John about plan of care     Dasia Carr MD            Sincerely,        Dasia Carr MD      "

## 2023-03-22 NOTE — PROGRESS NOTES
Miley Tolbert is a 77 year old female seen March 22, 2023 at [facility] where she has resided for one month (admit 3/2023) seen for initial visit.   Pt had [facility] hospitalization 1/6-3/8 for stage IV sacral wounds, and left calf and heel wounds.    Other concerns including chronic pain syndrome, poorly controlled DM2 with peripheral neuropathy, poorly fitting dentures, PAF, h/o DVT, CAD, HFpEF, chronic anemia, urinary incontinence, dementia with behavioral disturbance and schizoaffective disorder.    Prolonged hospitalization secondary to placement issues.   During that time, pt was seen by Palliative care and she and her daughter elected to enroll in Hospice.     Today pt is seen in her room lying abed.  Seen during wound care, works hard to direct how she is positioned, able to assist with turning in bed.   Has pain and yells out on occasion, especially during wound care.  Talked with daughter John by phone, reviewed prior medications.      Pt has been delusional and unaware of her deficits, think she can live by herself.   She has consistently said she was born with male and female parts, that she served in [place], that she is a , etc: all confabulations, per daughter.   Pt was at home with PCA services, HHC, Ginny and maximum services, but failing and daughter will not be able to take pt home again.   Pt has been institutionalized most of her life, and was most recently in a psychiatric LTC facility in [place] after she had been hospitalized inpatient Psychiatry x3 secondary to hallucinations.      Past Medical History:   Diagnosis Date     A-fib (H)      Chronic kidney disease, stage III (moderate) (H)      COPD (chronic obstructive pulmonary disease) (H)      Dementia (H)      Diabetes (H)      Dysphagia      Failure to thrive in adult      HTN (hypertension)      Hyperlipidemia LDL goal <100      Hypothyroidism      Major depression      Personal history of DVT (deep vein  "thrombosis)      Pressure sore      Schizophrenia (H)      Sepsis (H)      SH:   Moved to [place] from [place], lived in a NH /psychiatric institution there.     Lived with daughter John until recent hospitalization   Daughter reports pt was institutionalized most of her life.   , has 6 children, all estranged except John     Review Of Systems  Skin: open wounds left leg and sacrum   Eyes: impaired vision  Ears/Nose/Throat: negative   Respiratory: No shortness of breath, dyspnea on exertion, cough, or hemoptysis  Cardiovascular: negative  Gastrointestinal: negative, eats 100% of her meals     Genitourinary: incontinence, functional.  Has Espino catheter secondary to sacral wound.   Musculoskeletal: mostly bedbound, Ginny lift for transfers     Neurologic: as above     Psychiatric: anxiety, delusional thinking as part of lifetime mental illness       Hematologic/Lymphatic/Immunologic: negative  Endocrine: diabetes      GENERAL APPEARANCE: alert and no distress  /74   Pulse 78   Temp 97.9  F (36.6  C)   Resp 18   Ht 1.626 m (5' 4\")   Wt 77.1 kg (170 lb)   SpO2 96%   BMI 29.18 kg/m     HEENT: normocephalic, no lesion or abnormalities  NECK: no adenopathy, no asymmetry, masses, or scars and thyroid normal to palpation  RESP: lungs clear to auscultation - no rales, rhonchi or wheezes  CV: regular rate and rhythm, normal S1 S2@70  ABDOMEN:  soft, nontender, no HSM or masses and bowel sounds normal  Clear yellow urine in Espino bag  MS: able to assist with UEs when rolling side to side in bed.     No ext edema     SKIN: sacrum with a 5-6 open wound, irregular borders, base is clean, granulating  Left shin with 2-3 superficial open area, clean and heling   NEURO: +tardive dyskinesia, lip-smacking  PSYCH: affect a little irritable   LYMPHATICS: No cervical,  or supraclavicular nodes    Last Comprehensive Metabolic Panel:  Lab Results   Component Value Date     02/22/2023    POTASSIUM 3.6 02/22/2023 "    CHLORIDE 107 02/22/2023    CO2 23 02/22/2023    ANIONGAP 11 02/22/2023     (H) 03/08/2023    BUN 22.7 02/22/2023    CR 0.59 02/28/2023    GFRESTIMATED >90 02/28/2023    MELISSA 9.4 02/22/2023     Lab Results   Component Value Date    AST 20 02/13/2023      ALBUMIN 3.1 02/13/2023      ALKPHOS 93 02/13/2023     Lab Results   Component Value Date    WBC 6.8 02/22/2023      HGB 11.8 02/22/2023      MCV 82 02/22/2023       02/22/2023     Lab Results   Component Value Date    A1C 10.0 01/06/2023    A1C 5.8 12/03/2021      CT ABDOMEN AND PELVIS WITHOUT AND WITH CONTRAST 01/11/2023  INDICATION: Sepsis. Evaluate for intra-abdominal bleeding.  GASTROINTESTINAL TRACT: No dilatation of the small or large bowel. A few colonic diverticula are present without evidence of diverticulitis. A small to moderate amount of intermediate attenuation fluid, measuring approximately 40 Hounsfield units, is present throughout the colonic lumen.  LOWER CHEST: Coronary artery calcification.  HEPATOBILIARY: The gallbladder is not visualized.  PANCREAS: Moderate diffuse pancreatic atrophy.  KIDNEYS/BLADDER: A few less than 0.5 cm nonobstructing calculi in the left kidney. 1.3 cm intermediate attenuation exophytic lesion extending from the posterior aspect of the inferior pole of the left kidney (series 10 image 39). A balloon tip catheter is present in the urinary bladder lumen.  PELVIC ORGANS: A few calcified uterine fibroids, measuring up to 3 cm in diameter.  VASCULATURE: Atherosclerotic calcification in the aorta. The abdominal aorta is normal in caliber without dissection. A filter is present in the inferior vena cava. Stents are present within bilateral common iliac veins.  OTHER: Subcutaneous edema in the inferior pelvis and visualized portions of bilateral thighs.                                                     IMPRESSION:   1.  A small to moderate amount of intermediate attenuation fluid is present throughout the colonic  lumen. This is nonspecific, but could represent blood products or relate to gastroenteritis. There is no CT evidence of active gastrointestinal bleeding or other active bleeding in the abdomen or pelvis.  2.  No other acute abnormality identified in the abdomen or pelvis.   3.  1.3 cm indeterminate lesion in the inferior pole of the left kidney. This could represent a complex cyst or a solid lesion. Recommend comparison with prior imaging studies, if available. If not available, a renal mass protocol MR or CT could be considered in six months for reevaluation.     Echo May 2022:   The estimated left ventricular ejection fraction is 55-60 %.   Normal left ventricular cavity size.   Normal estimated left ventricular ejection fraction .   No obvious, large wall motion abnormality noted   Tricuspid regurgitation jet is not adequate for estimation of PA systolic pressure.   Based on IVC geometry, the right atrial pressure is probably normal.       IMP/PLAN:   (L89.154) Decubitus ulcer of sacral region, stage 4 (H)   Comment: present since 8/2021   When at home pt frequently refused turning and wound care     In LTC becomes agitated with dressing changes; premedication has helped and wound is healing       Plan: Pressure reduction, nutrition, wound care with calcium alginate and Mepilex covering     Espino catheter to keep dry.     (E11.69,  Z79.4) Type 2 diabetes mellitus with other specified complication, with long-term current use of insulin (H)  (E66.01) Morbid obesity (H)  Comment:   Lab Results   Component Value Date    A1C 10.0 01/06/2023     Plan: metformin 500 mg/day   Lantus 24 units/day   Novolog 4 units tid ac  Sitagliptin 100 mg/day   BGM   She is on an ACEI and statin     (E11.42) Diabetic polyneuropathy associated with type 2 diabetes mellitus (H)  Comment: ongoing pain   Plan: gabapentin 600 mg tid     (I48.91) Atrial fibrillation, unspecified type (H)  Comment: also has h/o recurrent DVT s/p IVC  "thrombectomy   Pulse Readings from Last 4 Encounters:   04/03/23 72   03/27/23 78   03/22/23 78   03/08/23 75      Plan: rivaroxaban 20 mg/day     (F25.9) Schizoaffective disorder, unspecified type (H)  (F41.8) Depression with anxiety  Comment: not currently on anti-pyschotics  Plan: venlafaxine 150 mg/day     (G40.909) Seizure disorder (H) \"pseudoseizures and conversion, normal EEG 11/2007\"  Comment: by history, no sz activity since admission   Plan: topiramate 100 mg/day     (F03.90) Dementia (H)  Comment: difficult to sort out cognitive dysfunction from her confabulations and delusions     Plan: LTC support for med admin, meals, activity and cares     (G24.01) Tardive dyskinesia  Comment: long term use of anti-psychotics    Plan: monitor; consider tx if interferes with function       (E03.9) Hypothyroidism, unspecified type  Comment:   TSH   Date Value Ref Range Status   08/09/2021 1.07 0.30 - 5.00 uIU/mL Final   09/15/2007 1.44 0.4 - 5.0 mU/L Final      Plan: levothyroxine 175 mcg/day     (Z51.5) Hospice care patient  Comment: on [facility]   Plan: PRNs for comfort care     Talked with daughter John about plan of care     Dasia Carr MD      "

## 2023-03-27 PROBLEM — E03.9 HYPOTHYROIDISM, UNSPECIFIED TYPE: Status: ACTIVE | Noted: 2023-01-01

## 2023-03-27 PROBLEM — J44.9 CHRONIC OBSTRUCTIVE PULMONARY DISEASE, UNSPECIFIED COPD TYPE (H): Status: ACTIVE | Noted: 2023-01-01

## 2023-03-27 PROBLEM — F41.8 DEPRESSION WITH ANXIETY: Status: ACTIVE | Noted: 2023-01-01

## 2023-03-27 PROBLEM — E66.01 MORBID OBESITY (H): Status: ACTIVE | Noted: 2023-01-01

## 2023-03-27 PROBLEM — E78.5 HYPERLIPIDEMIA, UNSPECIFIED HYPERLIPIDEMIA TYPE: Status: ACTIVE | Noted: 2023-01-01

## 2023-03-27 PROBLEM — I48.91 ATRIAL FIBRILLATION, UNSPECIFIED TYPE (H): Status: ACTIVE | Noted: 2023-01-01

## 2023-03-27 PROBLEM — E11.69 TYPE 2 DIABETES MELLITUS WITH OTHER SPECIFIED COMPLICATION, WITH LONG-TERM CURRENT USE OF INSULIN (H): Status: ACTIVE | Noted: 2023-01-01

## 2023-03-27 PROBLEM — N18.31 STAGE 3A CHRONIC KIDNEY DISEASE (H): Status: ACTIVE | Noted: 2023-01-01

## 2023-03-27 PROBLEM — F25.9 SCHIZOAFFECTIVE DISORDER, UNSPECIFIED TYPE (H): Status: ACTIVE | Noted: 2023-01-01

## 2023-03-27 PROBLEM — Z79.4 TYPE 2 DIABETES MELLITUS WITH OTHER SPECIFIED COMPLICATION, WITH LONG-TERM CURRENT USE OF INSULIN (H): Status: ACTIVE | Noted: 2023-01-01

## 2023-03-27 PROBLEM — R32 URINARY INCONTINENCE, UNSPECIFIED TYPE: Status: ACTIVE | Noted: 2023-01-01

## 2023-03-27 PROBLEM — Z51.5 HOSPICE CARE PATIENT: Status: ACTIVE | Noted: 2023-01-01

## 2023-03-27 PROBLEM — Z86.718 PERSONAL HISTORY OF DVT (DEEP VEIN THROMBOSIS): Status: ACTIVE | Noted: 2023-01-01

## 2023-03-27 PROBLEM — K21.9 GASTROESOPHAGEAL REFLUX DISEASE, UNSPECIFIED WHETHER ESOPHAGITIS PRESENT: Status: ACTIVE | Noted: 2023-01-01

## 2023-03-27 PROBLEM — G40.909 SEIZURE DISORDER (H): Status: ACTIVE | Noted: 2023-01-01

## 2023-03-27 PROBLEM — F03.90 DEMENTIA, UNSPECIFIED DEMENTIA SEVERITY, UNSPECIFIED DEMENTIA TYPE, UNSPECIFIED WHETHER BEHAVIORAL, PSYCHOTIC, OR MOOD DISTURBANCE OR ANXIETY (H): Status: ACTIVE | Noted: 2023-01-01

## 2023-03-27 NOTE — PATIENT INSTRUCTIONS
Miley Tolbert  1945  ORDERS:  - Discontinue ativan after 14 days  - Discontinue mirabegron   - Discontinue Flomax   - Discontinue Lantus  - Lantus insulin 100 units/mL, 24 units subcutaneous q HS dx DMTII  - Discontinue Novolog  - Novolog 100 units/mL 4 units subcutaneous TID with meals hold for BG < 110 dx DMTII  Electronically signed by:   ELIU Ayers CNP  03/27/23 3:51 PM

## 2023-03-27 NOTE — PROGRESS NOTES
[facility] GERIATRICS    Chief Complaint   Patient presents with     RECHECK     hospice     HPI:  Miley Tolbert is a 77 year old  (1945) with PMH significant for HTN, CHF, afib on Xarelto, hx of LE DVT, CAD,  HFpEF, CKD stage III, COPD, hypothyroidism, DMTII, GERD, dementia, schizoaffective disorder who is being seen today for an episodic care visit at: [facility] () [[id number]].     Hospitalized at [facility] [place] to 4/4/2022 with fever and altered mental status meeting sepsis criteria work-up unrevealing.  Treated with multiple antibiotics, ultimately responsive to meropenem.  Etiology of infection remained unclear at time of hospital discharge.  [facility].    Hospitalized at [facility] [place] 2022 shortly after daughter took resident home from skilled nursing facility.  Realized she was unable to be care needs at home without additional support.  Discharged to home again,this time with [facility] services Home health RN/OT.  Gayla changed Risperdal to perphenazine during this hospitalization.     Resident moved to [facility] on 3/8/23 on hospice after prolonged admission at [facility]. Admitted to [facility] on 1/6/23 due to worsening stage IV sacral pressure ulcer and stage II LE ulcer, and inability of family to meet care needs at home. Resident was living at home with daughter and home health services prior to this this admission.  Initial concern for sepsis, empirically treated with IV vancomycin and Zosyn for presumed sacral wound infection.  CT abdomen and pelvis/thigh showed no source of infection or hematoma.  Blood cultures remained negative.  Podiatry consulted, ABIs normal.  Wound ostomy continence nurse followed throughout admission.  Espino catheter placed on admission, but transitioned to PureWick for remainder of hospitalization.  Oral pain noted during admission due to to poorly fitting dentures.  Rapid response on 1/15/23 for chest pain,  "EKG negative for ischemia, troponin negative.  Lisinopril dose decreased due to hypotension.  Patient expressed wish to pursue comfort care, hospice consulted, current medications continued per patient preference.  Discharged to [facility] long-term care.    Today's concern is:   Follow-up today to assess comfort in hospice patient.  Joint visit with [facility] nurse.  Initial visit with Dr. White last week, resumed prandial insulin in addition to Lantus.  Ensure supplement in place TID.  Eating % of meals.    Recent blood sugars:   7am 12pm 5pm   3/24 322 340 363   3/25 481 527 402  3/26 291 320 312  3/17 520 480    No SOB.  No chest pain.  No abd pain.  No nausea or vomiting.  No constipation or loose stools.  Having regular bowel movements per bowel record.  Incontinent of urine. Espino leaking today, only placed catheter with 5 mL balloon.   Refusing repositioning at times per nurse manager. Particular about staff approach.     Allergies, and PMH/PSH reviewed in EPIC today.    MED REC REQUIRED  Post Medication Reconciliation Status:  Discharge medications reconciled and changed, see notes/orders    Social hx:    6 children - all estranged except for John who lives in [place].  Moved from [place] where she lived in psych residential facility to be with daughter.  Per daughter resident was institutionalized most of her life.  Never severed in , but will say she was in [place].      REVIEW OF SYSTEMS:  4 point ROS including Respiratory, CV, GI and , other than that noted in the HPI,  is negative    Objective:   /74   Pulse 78   Temp 97.9  F (36.6  C)   Resp 18   Ht 1.626 m (5' 4\")   Wt 77.1 kg (170 lb)   SpO2 96%   BMI 29.18 kg/m    GENERAL APPEARANCE: NAD, laying in bed hospital gown  EYES:  Sclera clear and conjunctiva normal, no discharge   RESP:  Non-labored breathing, no respiratory distress, Lung sounds clear, patient is on room air.  CV:  " Rate and rhythm regular, no murmur, no rub or gallop.  VASCULAR: No edema bilateral lower extremities. Feet warm and even temp.   ABDOMEN: Obese abd, normal bowel sounds, soft, nontender, no grimacing or guarding with palpation.   M/S:   Gait and station bedbound.   SKIN: About 2 cm x 2 cm x 2 cm open area over coccyx, wound bed 100% red, moist tissue, periwound with hypopigmentation - see photo below. Superficial shear injury to right lower lateral leg.  PSYCH: Awake and alert, speech clear,  insight and judgement impaired, irritable at times with wound care, able to communicate needs clearly.    Coccyx      Recent labs in [facility] reviewed by me today.   CBC RESULTS: Recent Labs   Lab Test 02/22/23  0725 02/13/23  0713   WBC 6.8 4.8   RBC 4.65 4.38   HGB 11.8 11.1*   HCT 38.2 36.9   MCV 82 84   MCH 25.4* 25.3*   MCHC 30.9* 30.1*   RDW 14.2 14.7    224     Last Basic Metabolic Panel:  Recent Labs   Lab Test 03/08/23  1136 03/08/23  1126 02/28/23  1230 02/28/23  0641 02/22/23  0818 02/22/23  0725 02/13/23  0812 02/13/23  0713   NA  --   --   --   --   --  141  --  142   POTASSIUM  --   --   --   --   --  3.6  --  3.9   CHLORIDE  --   --   --   --   --  107  --  108*   MELISSA  --   --   --   --   --  9.4  --  9.0   CO2  --   --   --   --   --  23 --  22   BUN  --   --   --   --   --  22.7  --  24.5*   CR  --   --   --  0.59  --  0.61   < > 0.71   * 115*   < >  --    < > 116*   < > 74    < > = values in this interval not displayed.     GFR Estimate   Date Value Ref Range Status   02/28/2023 >90 >60 mL/min/1.73m2 Final     Liver Function Studies -   Recent Labs   Lab Test 02/13/23  0713 01/08/23  0701   PROTTOTAL 6.3* 6.0*   ALBUMIN 3.1* 2.4*   BILITOTAL 0.2 0.7   ALKPHOS 93 100   AST 20 12   ALT 20 9     Lab Results   Component Value Date    A1C 10.0 01/06/2023    A1C 5.8 12/03/2021     Recent Labs   Lab Test 12/03/21  0539   CHOL 112   HDL 29*   LDL 62   TRIG 103       Assessment/Plan:  (E11.69,  Z79.4) Type  "2 diabetes mellitus with other specified complication, with long-term current use of insulin (H)  (primary encounter diagnosis)  (G62.9) Neuropathy  Comment: Deteriorated  On insulin since 1990s.  -500, which is above goal BG of 200-300 given hospice status  Plan:   - Increase Lantus insulin 100 units/mL, 24 units subcutaneous q HS   - Increase Novolog 100 units/mL 4 units subcutaneous TID with meals  - Continue Sitagliptin 100 mg daily  - Continue metformin 500 mg daily (started during last hospitalization)  - Continue ACE-I and statin per pt preference   - Continue gabapentin 600 mg TID for neuropathy     (L89.154) Decubitus ulcer of sacral region, stage 4 (H)  (Z51.1) Hospice Care Patient - [facility]  (R62.7) Failure to Thrive in Adult   (G89.29) Chronic pain  Comment: Present since August 2021, worse after months of family providing care in home, resident declined to be changed when incontinent and repositioned.  No apparent infection, wound bed 100% pink/moist tissue.  Plan:   - Pack coccyx wound with calcium alginate, cover with heart shaped Mepilex and change daily and PRN  - Mepilex and medihoney to R lower leg   - Pressure reduction mattress  - Turn and reposition q 2 hours  - Espino in place to reduce moisture that contributes to skin damage   - Appreciate hospice supports, comfort medications in place in the case of rapid decline: morphine, ativan, levsin, prochlorperazine  - Continue Ensure   - Continue scheduled morphine 5 mg q 6 hours and q 3 hours PRN  - Continue PRN tizanidine 1 mg BID PRN for muscle spasm     (F25.9) Schizoaffective disorder, unspecified type (H)  (G40.909) Seizure disorder (H) \"pseudoseizures and conversion, normal EEG 11/2007\"  (F41.8) Depression with anxiety  Comment: Chronic for many years  Unclear when/why antipsychotic stopped - last on Abilify, risperidone, and perphenazine  Plan:   - Continue Effexor   - PRN ativan x 14 days  - Continue topiramate 100 mg daily - unclear " seizure hx possible pseudoseizures or conversion disorder, normal EEG 2007    (F03.90) Dementia (H)  Comment: Per chart review  Unclear history in setting of significant/chronic mental health disorder  [place]  Plan:   - Will benefit from supportive care in LTC setting  - Daughter John assists with decision making    (I48.91) Atrial fibrillation, unspecified type (H)  (Z86.718) Personal history of DVT (deep vein thrombosis)  Comment: Chronic   [facility] [place] no documented afib on ECG.  Hx of DVT June 2007 and July 2008 s/p thrombectomy of IVC and ilicac vein  Plan:   - Continue rivaroxaban   - Monitor for s/s of bleeding     (I50.32) Chronic heart failure with preserved ejection fraction (HFpEF) (H)  (I10) Essential hypertension, benign  (I25.10) ASCVD (arteriosclerotic cardiovascular disease)  Comment: Chronic  Echo 5/522 EF 55-60%  BP Readings from Last 3 Encounters:   03/27/23 131/74   03/22/23 131/74   03/08/23 110/47   Plan:   - Continue lisinopril 20 mg daily   - Routine weights and VS  - No labs given hospice status    (E66.01) Morbid obesity (H)  Comment: Chronic   Body mass index is 29.18 kg/m .   With DMTII and cardiovascular disease  Plan:   - Now on hospice, routine weights    (N18.31) Stage 3a chronic kidney disease (H)  Comment: Chronic in setting of HTN and DMTII  Recent Cr 0.6-0.7 and GFR 85-90 mL/min  Plan:   - No routine labs as on hospice  - Renally dose medications and avoid nephrotoxins    (E78.5) Hyperlipidemia  Comment: Chronic  Plan:   - Continue atorvastatin 40 mg daily - would consider stopping in future as not adding to immediate comfort  - No routine labs given hospice status    (J44.9) COPD (H)  Comment: Chronic, no symptoms  Plan:   - PRN Albuterol in place  - If symptoms consider nebulized medications    (E03.9) Hypothyroidism  Comment: Chronic  TSH   Date Value Ref Range Status   08/09/2021 1.07 0.30 - 5.00 uIU/mL Final   09/15/2007 1.44 0.4 - 5.0 mU/L Final   Plan:   -  Continue levothyroxine 175 mcg daily  - No routine labs as on hospice    (K21.9) GERD  Comment: Chronic, no symptoms today  Plan:   - Monitor on routine exam  - Prochlorperazine PRN for nausea    (R32) Urinary incontinence, unspecified type  (N32.81) OAB (overactive bladder)  (Z97.9) Espino in place   Comment: Chronic   Dicussed risk vs benefit of Espino on admission, felt benefit to wound healing greater than infection risk given comfort focus  Plan:   - Replace Espino as leaking today, increased balloon size from 5 mL to 10 mL  - Discontinue Flomax  - Discontinue mirabegron     (K59.00) Constipation   Comment: Chronic, on opioids  Plan:  - One tab senna every other day     Orders:  See patient instructions    Total time spent with patient visit at the skilled nursing facility was 68 minutes including patient visit and review of past records and coordination of care with hospice team.    10:00 - 10:10 AM (10 minutes) Patient visit, history and physical completed.    3:25 PM - 4:23 PM (58 minutes) Chart review, medication reconciliation, orders written and sent to facility     Electronically signed by: ELIU Ayers CNP

## 2023-04-03 NOTE — PROGRESS NOTES
[facility] GERIATRICS  Chief Complaint   Patient presents with     USP Regulatory     [place] Medical Record Number:  [id number]  Place of Service where encounter took place:  [facility] [[id number]]    HPI:    Miley Tolbert  is 77 year old (1945) with PMH significant for HTN, CHF, afib on Xarelto, hx of LE DVT, CAD,  HFpEF, CKD stage III, COPD, hypothyroidism, DMTII, GERD, dementia, schizoaffective disorder, who is being seen today for a federally mandated E/M visit.     Hospitalized at [facility] from 3/26 to 4/4/2022 with fever and altered mental status meeting sepsis criteria work-up unrevealing.  Treated with multiple antibiotics, ultimately responsive to meropenem.  Etiology of infection remained unclear at time of hospital discharge.  [facility].    Hospitalized at [facility] in May 2022 shortly after daughter took resident home from skilled nursing facility.  Realized she was unable to be care needs at home without additional support.  Discharged to home again,this time with [facility] services Home health RN/OT.  Gayla changed Risperdal to perphenazine during this hospitalization.     Resident moved to [facility] on 3/8/23 on hospice after prolonged admission at [facility]. Admitted to [facility] on 1/6/23 due to worsening stage IV sacral pressure ulcer and stage II LE ulcer, and inability of family to meet care needs at home. Resident was living at home with daughter and home health services prior to this this admission.  Initial concern for sepsis, empirically treated with IV vancomycin and Zosyn for presumed sacral wound infection.  CT abdomen and pelvis/thigh showed no source of infection or hematoma.  Blood cultures remained negative.  Podiatry consulted, ABIs normal.  Wound ostomy continence nurse followed throughout admission.  Espino catheter placed on admission, but transitioned to PureWick for remainder of hospitalization.  Oral pain  noted during admission due to to poorly fitting dentures.  Rapid response on 1/15/23 for chest pain, EKG negative for ischemia, troponin negative.  Lisinopril dose decreased due to hypotension.  Patient expressed wish to pursue comfort care, hospice consulted, current medications continued per patient preference.  Discharged to [facility] long-term care.    Today's concerns are:  Follow-up today for federally mandated regulatory visit and assessment of multiple chronic health issues as outlined above.     Reports her biggest concern that staff leave her alone between 10-11 am so she can watch her favorite TV program.    No SOB.  No chest pain.  No abd pain.  No nausea or vomiting.  No constipation or loose stools.  Having regular BMs per bowel record, q 1-2 days.  Last BM 4/3.   Espino in place, no issues since replaced last week due to leaking.  Pain in left hip and knee chronically, reports this started at another facility after staff moved her with draw sheet.  X-rays on [facility] admission negative for fracture, + for arthritis, reviewed below.  No recent change in mood or behaviors - irritable at times, but able to make needs known, clear about needs and how she would like care preformed.    Recent blood sugars:   7 am 12 pm 5 pm  HS  3/31 400 [phone]  4/1 [phone] 300  4/2 [phone] 410  4/3 374    ALLERGIES:Celecoxib, Clonazepam, Insulin lispro, Iodinated contrast media [diagnostic x-ray materials], Iodine, Phenobarbital, and Phenytoin  PAST MEDICAL HISTORY:   Past Medical History:   Diagnosis Date     A-fib (H)      Chronic kidney disease, stage III (moderate) (H)      COPD (chronic obstructive pulmonary disease) (H)      Dementia (H)      Diabetes (H)      Dysphagia      Failure to thrive in adult      HTN (hypertension)      Hyperlipidemia LDL goal <100      Hypothyroidism      Major depression      Personal history of DVT (deep vein thrombosis)      Pressure sore      Schizophrenia (H)       Sepsis (H)      PAST SURGICAL HISTORY:   has no past surgical history on file.   Per Care Everywhere --       FAMILY HISTORY: family history is not on file.   Per Care Everywhere --       SOCIAL HISTORY:       6 children - all estranged except for John who lives in [place].  Moved from [place] where she lived in psych residential facility to be with daughter.  Per daughter resident was institutionalized most of her life.  Never severed in , but will say she was in [place].        MEDICATIONS:  MED REC REQUIRED  Post Medication Reconciliation Status:  Patient was not discharged from an inpatient facility or TCU but medications were reconciled per facility EMR.       Review of your medicines          Accurate as of April 3, 2023 11:59 PM. If you have any questions, ask your nurse or doctor.            CONTINUE these medicines which have NOT CHANGED      Dose / Directions   acetaminophen 325 MG tablet  Commonly known as: TYLENOL  Used for: Pain      Dose: 650 mg  Take 2 tablets (650 mg) by mouth every 6 hours as needed for mild pain, fever or headaches  Refills: 0     albuterol 108 (90 Base) MCG/ACT inhaler  Commonly known as: PROAIR HFA/PROVENTIL HFA/VENTOLIN HFA      Dose: 1-2 puff  Inhale 1-2 puffs into the lungs every 6 hours as needed for shortness of breath, wheezing or cough  Refills: 0     atorvastatin 40 MG tablet  Commonly known as: LIPITOR      Dose: 40 mg  Take 40 mg by mouth At Bedtime  Refills: 0     carboxymethylcellulose 0.5 % Soln ophthalmic solution  Commonly known as: REFRESH PLUS      Dose: 1 drop  Place 1 drop into both eyes 4 times daily as needed for dry eyes  Refills: 0     gabapentin 300 MG capsule  Commonly known as: NEURONTIN      Dose: 2 capsule  Take 2 capsules by mouth 3 times daily  Refills: 0     hyoscyamine 0.125 MG sublingual tablet  Commonly known as: LEVSIN/SL      Dose: 0.125 mg  Place 0.125 mg under the tongue every 4 hours as needed for  cramping  Refills: 0     insulin aspart 100 UNIT/ML pen  Commonly known as: NovoLOG PEN      Dose: 2 Units  Inject 2 Units Subcutaneous  Quantity: 15 mL  Refills: 0     insulin glargine 100 UNIT/ML pen  Commonly known as: LANTUS PEN      Dose: 20 Units  Inject 20 Units Subcutaneous At Bedtime  Quantity: 15 mL  Refills: 0     levothyroxine 175 MCG tablet  Commonly known as: SYNTHROID/LEVOTHROID      Dose: 175 mcg  Take 175 mcg by mouth daily  Refills: 0     lisinopril 40 MG tablet  Commonly known as: ZESTRIL  Used for: Essential hypertension      Dose: 20 mg  Take 0.5 tablets (20 mg) by mouth daily  Quantity: 30 tablet  Refills: 0     LORazepam 0.5 MG tablet  Commonly known as: ATIVAN      Dose: 0.5 mg  Take 0.5 mg by mouth every 4 hours as needed for anxiety  Refills: 0     metFORMIN 500 MG 24 hr tablet  Commonly known as: GLUCOPHAGE XR  Used for: Hyperglycemia      Dose: 500 mg  Take 1 tablet (500 mg) by mouth daily (with dinner)  Quantity: 30 tablet  Refills: 0     morphine 5 MG solu-tab      Take 1 tablet (5 mg) by mouth every 6 hours. May also take 1 tablet (5 mg) every 3 hours as needed for shortness of breath or moderate to severe pain.  Refills: 0     prochlorperazine 10 MG tablet  Commonly known as: COMPAZINE      Dose: 10 mg  Take 10 mg by mouth every 6 hours as needed for nausea or vomiting  Refills: 0     rivaroxaban ANTICOAGULANT 20 MG Tabs tablet  Commonly known as: XARELTO      Dose: 20 mg  Take 20 mg by mouth daily (with breakfast)  Refills: 0     senna 8.6 MG tablet  Commonly known as: SENOKOT      Dose: 1 tablet  Take 1 tablet by mouth every other day  Refills: 0     sitagliptin 100 MG tablet  Commonly known as: JANUVIA      Dose: 100 mg  Take 100 mg by mouth daily  Refills: 0     tiZANidine 2 MG tablet  Commonly known as: ZANAFLEX  Used for: Spasm of muscle      Dose: 1 mg  Take 0.5 tablets (1 mg) by mouth 2 times daily as needed for muscle spasms  Quantity: 30 tablet  Refills: 0     topiramate 100  "MG tablet  Commonly known as: TOPAMAX      Dose: 100 mg  Take 100 mg by mouth daily For seizures  Refills: 0     venlafaxine 150 MG 24 hr capsule  Commonly known as: EFFEXOR XR      Dose: 150 mg  Take 150 mg by mouth daily  Refills: 0        STOP taking    mirabegron 25 MG 24 hr tablet  Commonly known as: MYRBETRIQ  Stopped by: ELIU Ayers CNP        tamsulosin 0.4 MG capsule  Commonly known as: FLOMAX  Stopped by: ELIU Ayers CNP             Case Management:  I have reviewed the care plan and MDS and do agree with the plan. Patient's desire to return to the community is present, but is not able due to care needs . Information reviewed:  Medications, vital signs, orders, and nursing notes.  - BIMS 15/15  - PHQ-9 4/27     ROS: History as per HPI.    Vitals:  /72   Pulse 72   Temp 97.6  F (36.4  C)   Resp 18   Ht 1.626 m (5' 4\")   Wt 77.1 kg (170 lb)   SpO2 95%   BMI 29.18 kg/m    Body mass index is 29.18 kg/m .  Exam:  GENERAL APPEARANCE: NAD, laying in bed hospital gown  EYES:  Sclera clear and conjunctiva normal, no discharge   RESP:  Non-labored breathing, no respiratory distress, Lung sounds clear, patient is on room air.  CV:  Rate and rhythm regular, no murmur, no rub or gallop.  VASCULAR: No edema bilateral lower extremities. Feet warm and even temp.   ABDOMEN: Obese abd, normal bowel sounds, soft, nontender, no grimacing or guarding with palpation.   M/S:   Gait and station bedbound. Some swelling over left hip.  SKIN: Unable to assess wounds, will not turn in bed and does not want legs touched.  PSYCH: Awake and alert, speech clear,  insight and judgement impaired, able to communicate needs clearly.    Lab/Diagnostic data:   Patient is on hospice/palliative care and labs are not recommended     Left Hip X-ray      Left Femur X-ray      ASSESSMENT/PLAN  (E11.69,  Z79.4) Type 2 diabetes mellitus with other specified complication, with long-term current use of insulin (H)  (primary " "encounter diagnosis)  (G62.9) Neuropathy  Comment: Deteriorated  On insulin since 1990s.  -400, which is above goal BG of 200-300 given hospice status  Plan:   - Increase Lantus insulin 100 units/mL, 30 units subcutaneous q HS   - Increase Novolog 100 units/mL 6 units subcutaneous TID with meals  - Continue Sitagliptin 100 mg daily  - Continue metformin 500 mg daily (started during last hospitalization)  - Continue ACE-I and statin per pt preference   - Continue gabapentin 600 mg TID for neuropathy     (L89.154) Decubitus ulcer of sacral region, stage 4 (H)  (Z51.1) Hospice Care Patient - Moments  (R62.7) Failure to Thrive in Adult   (G89.29) Chronic pain  Comment: Chronic.  Present since August 2021, worse after months of family providing care in home, resident declined to be changed when incontinent and repositioned.  No apparent infection, wound bed 100% pink/moist tissue.  Plan:   - Pack coccyx wound with calcium alginate, cover with heart shaped Mepilex and change daily and PRN  - Mepilex and medihoney to R lower leg   - Pressure reduction mattress  - Turn and reposition q 2 hours  - Espino in place to reduce moisture that contributes to skin damage   - Appreciate hospice supports, comfort medications in place in the case of rapid decline: morphine, ativan, levsin, prochlorperazine  - Continue Ensure   - Continue scheduled morphine 5 mg q 6 hours and q 3 hours PRN for pain  - Continue PRN tizanidine 1 mg BID PRN for muscle spasm     (F25.9) Schizoaffective disorder, unspecified type (H)  (G40.909) Seizure disorder (H) \"pseudoseizures and conversion, normal EEG 11/2007\"  (F41.8) Depression with anxiety  Comment: Chronic for many years  Unclear when/why antipsychotic stopped - last on Abilify, risperidone, and perphenazine.  No recent behaviors, hallucinations or psychosis.  Plan:   - Continue Effexor   - PRN ativan x 14 days  - Continue topiramate 100 mg daily - unclear seizure hx possible pseudoseizures or " conversion disorder, normal EEG 2007  - May need to add back antipsychotic     (F03.90) Dementia (H)  Comment: Chronic  Per chart review and family  Unclear history in setting of significant/chronic mental health disorder  [place] 15/15  Plan:   - Will benefit from supportive care in LTC setting  - Daughter John assists with decision making    (I48.91) Atrial fibrillation, unspecified type (H)  (Z86.718) Personal history of DVT (deep vein thrombosis)  Comment: Chronic   Per [facility] notes no documented afib on ECG.  Hx of DVT June 2007 and July 2008 s/p thrombectomy of IVC and ilicac vein  Plan:   - Continue rivaroxaban 20 mg daily   - Monitor for s/s of bleeding     (I50.32) Chronic heart failure with preserved ejection fraction (HFpEF) (H)  (I10) Essential hypertension, benign  (I25.10) ASCVD (arteriosclerotic cardiovascular disease)  Comment: Chronic  Echo 5/522 EF 55-60%  BP Readings from Last 3 Encounters:   04/03/23 126/72   03/27/23 131/74   03/22/23 131/74   Plan:   - Continue lisinopril 20 mg daily   - Routine weights and VS  - No labs given hospice status    (E66.01) Morbid obesity (H)  Comment: Chronic   Body mass index is 29.18 kg/m .  With DMTII and cardiovascular disease  Plan:   - Now on hospice, routine weights, expect weight loss with decline    (N18.31) Stage 3a chronic kidney disease (H)  Comment: Chronic in setting of HTN and DMTII  Recent Cr 0.6-0.7 and GFR 85-90 mL/min  Plan:   - No routine labs as on hospice  - Renally dose medications and avoid nephrotoxins    (E78.5) Hyperlipidemia  Comment: Chronic  Plan:   - Continue atorvastatin 40 mg daily per patient wishes - would consider stopping in future as not adding to immediate comfort  - No routine labs given hospice status    (J44.9) COPD (H)  Comment: Chronic, no symptoms  Plan:   - PRN Albuterol in place  - If symptoms consider nebulized medications    (E03.9) Hypothyroidism  Comment: Chronic  TSH   Date Value Ref Range Status   08/09/2021 1.07  0.30 - 5.00 uIU/mL Final   09/15/2007 1.44 0.4 - 5.0 mU/L Final   Plan:   - Continue levothyroxine 175 mcg daily  - No routine labs as on hospice    (K21.9) GERD  Comment: Chronic, no symptoms today  Plan:   - Monitor on routine exam  - Prochlorperazine PRN for nausea    (Z97.9) Espino in place   Comment: Chronic   Dicussed risk vs benefit of Espino on admission, felt benefit to wound healing greater than infection risk given comfort focus.  Stopped Flomax and mirabegron on admission  Plan:   - Routine Espino care by staff  - Change Espino monthly     (K59.00) Constipation   Comment: Chronic, on opioids, having BMs per bowel record, q 1-2 days.  Last BM 4/3.   Plan:  - Increase senna to once daily.  - Staff to monitor bowel habits    (M19.90) Arthritis  Comment: On x-ray in left hip and knee  Plan:  - Therapeutic trial of topical Voltaren    - If continued pain consider interval imaging given concern for injury in past  - Consider scheduling Tylenol  - Continue MSIR scheduled and PRN    Orders:  - Increase Lantus to 30 units q HS  - Increase Novolog to 6 units TID  - Increasre senna to one tab daily   - Add Volatren BID and BID PRN to left hip/knee    Electronically signed by:  ELIU Ayers CNP

## 2023-04-05 NOTE — PATIENT INSTRUCTIONS
Mileymeena Tolbert  1945  ORDERS:  - discontinue Lantus  - Lantus Insulin 100 units/mL, 30 units subcutaneously q HS dx DMTII  - discontinue Novolog    - Novolog Insulin 100 units/mL, 6 units subcutaneously TID with meals dx DMTII hold if BG < 120 or not eating  - Discontinue senna  - Senna 8.6 mg tablets, 1 tablet PO daily dx constipation   - Please place sign on door to leave resident alone between 10 am and 11 am so she can watch her favorite show  - diclofenac (VOLTAREN) 1 % topical gel; Apply 4 g topically 2 times daily. May also apply 4 g 2 times daily as needed for moderate pain. Apply to left hip and knee  dx Arthritis  Electronically signed by:   ELIU Ayers CNP  04/05/23 6:32 AM

## 2023-05-17 NOTE — LETTER
5/17/2023        RE: Miley Tolbert  [facility]  [address]  [facility] [address]        Miley Tolbert is a 77 year old female seen May 17, 2023 at [facility] where she has resided for 2 months (admit 3/2023) seen for regulatory visit and to follow up sacral decubitus.    Seen during wound care, works hard to direct how she is positioned, able to assist with turning in bed.   Has pain with wound care and yells out Also vocal if someone interrupts her favorite TV programs.      Pt had [facility] hospitalization 1/6-3/8 /2023 for stage IV sacral wounds, and left calf and heel wounds.  Other concerns including chronic pain syndrome, poorly controlled DM2 with peripheral neuropathy, poorly fitting dentures, PAF, h/o DVT, CAD, HFpEF, chronic anemia, urinary incontinence, dementia with behavioral disturbance and schizoaffective disorder.    Prolonged hospitalization secondary to placement issues.   During that time, pt was seen by Palliative care and she and her daughter elected to enroll in Hospice.     Pt has been delusional and unaware of her deficits, think she can live by herself.   She has consistently said she was born with male and female parts, that she served in [place], that she is a , etc: all confabulations, per daughter.   Pt was at home with PCA services, HHC, Ginny and maximum services, but failing and daughter will not be able to take pt home again.   Pt has been institutionalized most of her life, and was most recently in a psychiatric LTC facility in [place] after she had been hospitalized inpatient Psychiatry x3 secondary to hallucinations.      Past Medical History:   Diagnosis Date     A-fib (H)      Chronic kidney disease, stage III (moderate) (H)      COPD (chronic obstructive pulmonary disease) (H)      Dementia (H)      Diabetes (H)      Dysphagia      Failure to thrive in adult      HTN (hypertension)      Hyperlipidemia LDL goal <100       "Hypothyroidism      Major depression      Personal history of DVT (deep vein thrombosis)      Pressure sore      Schizophrenia (H)      Sepsis (H)      SH:   Moved to [place] from [place], lived in a NH /psychiatric institution there.   Pt has been institutionalized most of her life.  Lived with daughter John until recent hospitalization   , has 6 children, all estranged except John     Review Of Systems  Eats 100% of her meals, weight is stable     Genitourinary: incontinence, functional.  Has Espino catheter secondary to sacral wound.   Musculoskeletal: mostly bedbound, Ginny lift for transfers      Anxiety, delusional thinking as part of lifetime mental illness         EXAM: NAD, but does yell out with dressing changes  /72   Pulse 84   Temp 97.3  F (36.3  C)   Resp 18   Ht 1.626 m (5' 4\")   Wt 77.1 kg (170 lb)   SpO2 98%   BMI 29.18 kg/m     Neck supple without adenopathy  Lungs clear bilaterally with fair air movement   Heart RRR s1s2 distant   Abd obese, soft, NT, no distention or guarding, +BS  Clear yellow urine in Espino bag  MS: able to assist with UEs when rolling side to side in bed.     No ext edema     SKIN: sacrum with a 5-6 open wound, irregular borders, base is fairly clean, granulating to some extent  Left shin with 2-3 superficial open area, clean and healing   NEURO: +tardive dyskinesia, lip-smacking, variable on exam  Motor strength in LEs 3-/5   PSYCH: affect a little irritable, does not like to be rushed     Lab Results   Component Value Date     02/22/2023    POTASSIUM 3.6 02/22/2023    CHLORIDE 107 02/22/2023    CO2 23 02/22/2023    ANIONGAP 11 02/22/2023     (H) 03/08/2023    BUN 22.7 02/22/2023    CR 0.59 02/28/2023    GFRESTIMATED >90 02/28/2023    MELISSA 9.4 02/22/2023     Lab Results   Component Value Date    AST 20 02/13/2023      ALBUMIN 3.1 02/13/2023      ALKPHOS 93 02/13/2023     Lab Results   Component Value Date    WBC 6.8 02/22/2023      HGB 11.8 " 02/22/2023      MCV 82 02/22/2023       02/22/2023      CT ABDOMEN AND PELVIS WITHOUT AND WITH CONTRAST 01/11/2023  INDICATION: Sepsis. Evaluate for intra-abdominal bleeding.  GASTROINTESTINAL TRACT: No dilatation of the small or large bowel. A few colonic diverticula are present without evidence of diverticulitis. A small to moderate amount of intermediate attenuation fluid, measuring approximately 40 Hounsfield units, is present throughout the colonic lumen.  LOWER CHEST: Coronary artery calcification.  HEPATOBILIARY: The gallbladder is not visualized.  PANCREAS: Moderate diffuse pancreatic atrophy.  KIDNEYS/BLADDER: A few less than 0.5 cm nonobstructing calculi in the left kidney. 1.3 cm intermediate attenuation exophytic lesion extending from the posterior aspect of the inferior pole of the left kidney (series 10 image 39). A balloon tip catheter is present in the urinary bladder lumen.  PELVIC ORGANS: A few calcified uterine fibroids, measuring up to 3 cm in diameter.  VASCULATURE: Atherosclerotic calcification in the aorta. The abdominal aorta is normal in caliber without dissection. A filter is present in the inferior vena cava. Stents are present within bilateral common iliac veins.  OTHER: Subcutaneous edema in the inferior pelvis and visualized portions of bilateral thighs.                                                     IMPRESSION:   1.  A small to moderate amount of intermediate attenuation fluid is present throughout the colonic lumen. This is nonspecific, but could represent blood products or relate to gastroenteritis. There is no CT evidence of active gastrointestinal bleeding or other active bleeding in the abdomen or pelvis.  2.  No other acute abnormality identified in the abdomen or pelvis.   3.  1.3 cm indeterminate lesion in the inferior pole of the left kidney. This could represent a complex cyst or a solid lesion. Recommend comparison with prior imaging studies, if available. If not  available, a renal mass protocol MR or CT could be considered in six months for reevaluation.     Echo May 2022:   The estimated left ventricular ejection fraction is 55-60 %.   Normal left ventricular cavity size.   Normal estimated left ventricular ejection fraction .   No obvious, large wall motion abnormality noted   Tricuspid regurgitation jet is not adequate for estimation of PA systolic pressure.   Based on IVC geometry, the right atrial pressure is probably normal.       IMP/PLAN:   (L89.154) Decubitus ulcer of sacral region, stage 4 (H)   Comment: present since 8/2021   Does not seem to be changing much in interval since last visit     Plan: Pressure reduction with air mattress and repositioning   Nutrition to include supplements  Continue wound care with calcium alginate and Mepilex covering   MS 5mg q6 hours for related pain    Espino catheter to keep this area dry.     (E11.69,  Z79.4) Type 2 diabetes mellitus with other specified complication, with long-term current use of insulin (H)  (E66.01) Morbid obesity (H)  Comment: insulin requirements have increased   Lab Results   Component Value Date    A1C 10.0 01/06/2023     Plan: metformin 500 mg/day   Lantus 30 units/day   Novolog 6 units tid ac  Sitagliptin 100 mg/day   BGM   She is on an ACEI and statin     (E11.42) Diabetic polyneuropathy associated with type 2 diabetes mellitus (H)  Comment: ongoing pain   Plan: gabapentin 600 mg tid     (I48.91) Atrial fibrillation, unspecified type (H)  Comment: also has h/o recurrent DVT s/p IVC thrombectomy   Pulse Readings from Last 4 Encounters:   05/16/23 84   04/03/23 72   03/27/23 78   03/22/23 78      Plan: rivaroxaban 20 mg/day for stroke prophylaxis    (F25.9) Schizoaffective disorder, unspecified type (H)  (G24.01) Tardive dyskinesia  (F41.8) Depression with anxiety  Comment: not currently on anti-pyschotics, does have tardive dyskinesia  Plan: venlafaxine 150 mg/day     (G40.909) Seizure disorder (H)  "\"pseudoseizures and conversion, normal EEG 11/2007\"  Comment: by history, no sz activity since admission   Plan: topiramate 100 mg/day     (F03.90) Dementia (H)  Comment: difficult to sort out cognitive dysfunction from her confabulations and delusions     Plan: LTC support for med admin, meals, activity and cares     (E03.9) Hypothyroidism, unspecified type  Comment:   TSH   Date Value Ref Range Status   08/09/2021 1.07 0.30 - 5.00 uIU/mL Final   09/15/2007 1.44 0.4 - 5.0 mU/L Final      Plan: levothyroxine 175 mcg/day     (Z51.5) Hospice care patient  Comment: [facility]   Plan: PRNs for comfort care      Dasia Carr MD            Sincerely,        Dasia Carr MD      "

## 2023-05-17 NOTE — PROGRESS NOTES
Miley Tolbert is a 77 year old female seen May 17, 2023 at [facility] where she has resided for 2 months (admit 3/2023) seen for regulatory visit and to follow up sacral decubitus.    Seen during wound care, works hard to direct how she is positioned, able to assist with turning in bed.   Has pain with wound care and yells out Also vocal if someone interrupts her favorite TV programs.      Pt had [facility] hospitalization 1/6-3/8 /2023 for stage IV sacral wounds, and left calf and heel wounds.  Other concerns including chronic pain syndrome, poorly controlled DM2 with peripheral neuropathy, poorly fitting dentures, PAF, h/o DVT, CAD, HFpEF, chronic anemia, urinary incontinence, dementia with behavioral disturbance and schizoaffective disorder.    Prolonged hospitalization secondary to placement issues.   During that time, pt was seen by Palliative care and she and her daughter elected to enroll in Hospice.     Pt has been delusional and unaware of her deficits, think she can live by herself.   She has consistently said she was born with male and female parts, that she served in [place], that she is a , etc: all confabulations, per daughter.   Pt was at home with PCA services, HHC, Ginny and maximum services, but failing and daughter will not be able to take pt home again.   Pt has been institutionalized most of her life, and was most recently in a psychiatric LTC facility in [place] after she had been hospitalized inpatient Psychiatry x3 secondary to hallucinations.      Past Medical History:   Diagnosis Date     A-fib (H)      Chronic kidney disease, stage III (moderate) (H)      COPD (chronic obstructive pulmonary disease) (H)      Dementia (H)      Diabetes (H)      Dysphagia      Failure to thrive in adult      HTN (hypertension)      Hyperlipidemia LDL goal <100      Hypothyroidism      Major depression      Personal history of DVT (deep vein thrombosis)      Pressure sore      Schizophrenia  "(H)      Sepsis (H)      SH:   Moved to [place] from [place], lived in a NH /psychiatric institution there.   Pt has been institutionalized most of her life.  Lived with daughter John until recent hospitalization   , has 6 children, all estranged except John     Review Of Systems  Eats 100% of her meals, weight is stable     Genitourinary: incontinence, functional.  Has Espino catheter secondary to sacral wound.   Musculoskeletal: mostly bedbound, Ginny lift for transfers      Anxiety, delusional thinking as part of lifetime mental illness         EXAM: NAD, but does yell out with dressing changes  /72   Pulse 84   Temp 97.3  F (36.3  C)   Resp 18   Ht 1.626 m (5' 4\")   Wt 77.1 kg (170 lb)   SpO2 98%   BMI 29.18 kg/m     Neck supple without adenopathy  Lungs clear bilaterally with fair air movement   Heart RRR s1s2 distant   Abd obese, soft, NT, no distention or guarding, +BS  Clear yellow urine in Espino bag  MS: able to assist with UEs when rolling side to side in bed.     No ext edema     SKIN: sacrum with a 5-6 open wound, irregular borders, base is fairly clean, granulating to some extent  Left shin with 2-3 superficial open area, clean and healing   NEURO: +tardive dyskinesia, lip-smacking, variable on exam  Motor strength in LEs 3-/5   PSYCH: affect a little irritable, does not like to be rushed     Lab Results   Component Value Date     02/22/2023    POTASSIUM 3.6 02/22/2023    CHLORIDE 107 02/22/2023    CO2 23 02/22/2023    ANIONGAP 11 02/22/2023     (H) 03/08/2023    BUN 22.7 02/22/2023    CR 0.59 02/28/2023    GFRESTIMATED >90 02/28/2023    MELISSA 9.4 02/22/2023     Lab Results   Component Value Date    AST 20 02/13/2023      ALBUMIN 3.1 02/13/2023      ALKPHOS 93 02/13/2023     Lab Results   Component Value Date    WBC 6.8 02/22/2023      HGB 11.8 02/22/2023      MCV 82 02/22/2023       02/22/2023      CT ABDOMEN AND PELVIS WITHOUT AND WITH CONTRAST " 01/11/2023  INDICATION: Sepsis. Evaluate for intra-abdominal bleeding.  GASTROINTESTINAL TRACT: No dilatation of the small or large bowel. A few colonic diverticula are present without evidence of diverticulitis. A small to moderate amount of intermediate attenuation fluid, measuring approximately 40 Hounsfield units, is present throughout the colonic lumen.  LOWER CHEST: Coronary artery calcification.  HEPATOBILIARY: The gallbladder is not visualized.  PANCREAS: Moderate diffuse pancreatic atrophy.  KIDNEYS/BLADDER: A few less than 0.5 cm nonobstructing calculi in the left kidney. 1.3 cm intermediate attenuation exophytic lesion extending from the posterior aspect of the inferior pole of the left kidney (series 10 image 39). A balloon tip catheter is present in the urinary bladder lumen.  PELVIC ORGANS: A few calcified uterine fibroids, measuring up to 3 cm in diameter.  VASCULATURE: Atherosclerotic calcification in the aorta. The abdominal aorta is normal in caliber without dissection. A filter is present in the inferior vena cava. Stents are present within bilateral common iliac veins.  OTHER: Subcutaneous edema in the inferior pelvis and visualized portions of bilateral thighs.                                                     IMPRESSION:   1.  A small to moderate amount of intermediate attenuation fluid is present throughout the colonic lumen. This is nonspecific, but could represent blood products or relate to gastroenteritis. There is no CT evidence of active gastrointestinal bleeding or other active bleeding in the abdomen or pelvis.  2.  No other acute abnormality identified in the abdomen or pelvis.   3.  1.3 cm indeterminate lesion in the inferior pole of the left kidney. This could represent a complex cyst or a solid lesion. Recommend comparison with prior imaging studies, if available. If not available, a renal mass protocol MR or CT could be considered in six months for reevaluation.     Echo May  "2022:   The estimated left ventricular ejection fraction is 55-60 %.   Normal left ventricular cavity size.   Normal estimated left ventricular ejection fraction .   No obvious, large wall motion abnormality noted   Tricuspid regurgitation jet is not adequate for estimation of PA systolic pressure.   Based on IVC geometry, the right atrial pressure is probably normal.       IMP/PLAN:   (L89.154) Decubitus ulcer of sacral region, stage 4 (H)   Comment: present since 8/2021   Does not seem to be changing much in interval since last visit     Plan: Pressure reduction with air mattress and repositioning   Nutrition to include supplements  Continue wound care with calcium alginate and Mepilex covering   MS 5mg q6 hours for related pain    Espino catheter to keep this area dry.     (E11.69,  Z79.4) Type 2 diabetes mellitus with other specified complication, with long-term current use of insulin (H)  (E66.01) Morbid obesity (H)  Comment: insulin requirements have increased   Lab Results   Component Value Date    A1C 10.0 01/06/2023     Plan: metformin 500 mg/day   Lantus 30 units/day   Novolog 6 units tid ac  Sitagliptin 100 mg/day   BGM   She is on an ACEI and statin     (E11.42) Diabetic polyneuropathy associated with type 2 diabetes mellitus (H)  Comment: ongoing pain   Plan: gabapentin 600 mg tid     (I48.91) Atrial fibrillation, unspecified type (H)  Comment: also has h/o recurrent DVT s/p IVC thrombectomy   Pulse Readings from Last 4 Encounters:   05/16/23 84   04/03/23 72   03/27/23 78   03/22/23 78      Plan: rivaroxaban 20 mg/day for stroke prophylaxis    (F25.9) Schizoaffective disorder, unspecified type (H)  (G24.01) Tardive dyskinesia  (F41.8) Depression with anxiety  Comment: not currently on anti-pyschotics, does have tardive dyskinesia  Plan: venlafaxine 150 mg/day     (G40.909) Seizure disorder (H) \"pseudoseizures and conversion, normal EEG 11/2007\"  Comment: by history, no sz activity since admission "   Plan: topiramate 100 mg/day     (F03.90) Dementia (H)  Comment: difficult to sort out cognitive dysfunction from her confabulations and delusions     Plan: LTC support for med admin, meals, activity and cares     (E03.9) Hypothyroidism, unspecified type  Comment:   TSH   Date Value Ref Range Status   08/09/2021 1.07 0.30 - 5.00 uIU/mL Final   09/15/2007 1.44 0.4 - 5.0 mU/L Final      Plan: levothyroxine 175 mcg/day     (Z51.5) Hospice care patient  Comment: on [facility]   Plan: PRNs for comfort care      Dasia Carr MD

## 2023-05-28 NOTE — PLAN OF CARE
Goal Outcome Evaluation:         Pt oriented x 3, disoriented to situation, irrational. At start of shift, pt saturated in urine, purewick in place but not functioning, medium soft BM. Pt consistently refuses cares but eventually allowed for linens to be changed with persistent education. Pt refuses legs to be touched despite dressing changes needed, able to change sacral dressing this morning due to dressing saturated, refused for writer to remove rooke bootes to assess right calf and left heel wounds. BG check overnight 145. Pt appeared to sleep between cares. PRN PO Oxycodone x 1, refusing tylenol and Zanaflex despite crying out with any slight movement. K protocol, recheck this morning. Midline with sluggish blood return. Discharge pending placement.                       The defibrillation pads were placed in the anterior/posterior position.

## 2023-06-09 NOTE — PROGRESS NOTES
"[facility] GERIATRICS    Chief Complaint   Patient presents with     Nursing Home Acute     wound check     HPI:  Miley Tolbert is a 77 year old  (1945) with PMH significant for HTN, CHF, afib on Xarelto, hx of LE DVT, CAD,  HFpEF, CKD stage III, COPD, hypothyroidism, DMTII, GERD, dementia, schizoaffective disorder, who is being seen today for an episodic care visit at: [facility] () [[id number]].     Today's concern is:   Follow-up today requested by nursing due to bleeding coccyx wound and to review blood sugars.    Resident is on hospice.    Spoke to nurse,  Unfortunately dressing was already changed today.  No increased bleeding.  Getting calcium alginate q 2 days.    New wound to right back.  Skin tear.  Dressed with foam dressing.    No SOB or chest pain.  No change in bowel habits.  Espino in place to manage moisture and prevent further wound breakdown.   Bed bound.    Recent blood sugars reviewed:   7am 12pm 5pm HS  6/5 168 243 260 330  6/6 153 279 287 386  6/7 336 293 341 295  6/8 273 333 278 342  6/9 158 202    Allergies, and PMH/PSH reviewed in [employer] today.    MED REC REQUIRED  Post Medication Reconciliation Status:  Patient was not discharged from an inpatient facility or TCU but medications were reconciled per facility EMR.    REVIEW OF SYSTEMS:  Limited secondary to cognitive impairment but today pt reports history as per HPI.    Objective:   /58   Pulse 81   Temp 97.3  F (36.3  C)   Resp 18   Ht 1.626 m (5' 4\")   Wt 77.1 kg (170 lb)   SpO2 96%   BMI 29.18 kg/m    GENERAL APPEARANCE: NAD, laying in bed hospital gown  EYES:  Sclera clear and conjunctiva normal, no discharge   RESP:  Non-labored breathing, no respiratory distress, Lung sounds clear, patient is on room air.  CV:  Rate and rhythm regular, no murmur, no rub or gallop.  VASCULAR: No edema bilateral lower extremities. Feet warm and even temp.   ABDOMEN: Obese abd, normal bowel sounds, soft, " nontender, no grimacing or guarding with palpation.   M/S:   Gait and station bedbound. Some swelling over left hip.  SKIN: Unable to assess wounds, will not turn in bed. Able to see foam dressing to right back with great effort CDI.  PSYCH: Awake and alert, speech clear,  insight and judgement impaired, able to communicate needs clearly.    Recent labs in [facility] reviewed by me today.    Lab Results   Component Value Date    A1C 10.0 01/06/2023    A1C 5.8 12/03/2021    A1C 5.7 09/17/2021     Assessment/Plan:  (E11.69,  Z79.4) Type 2 diabetes mellitus with other specified complication, with long-term current use of insulin (H)  (primary encounter diagnosis)  Comment: Deteriorated  On insulin since 1990s.  BGs 100s to 300s.  Plan:   - Increase Lantus insulin 100 units/mL, to 37 units subcutaneous q HS   - Continue Novolog 100 units/mL 6 units subcutaneous TID with meals  - Continue Sitagliptin 100 mg daily  - Continue metformin 500 mg daily (started during last hospitalization)  - Continue ACE-I and statin per pt preference   - Continue gabapentin 600 mg TID for neuropathy     (L89.154) Decubitus ulcer of sacral region, stage 4 (H)  (Z51.1) [facility]osp[facility] Care Patient [facility]  Comment: Chronic.  Present since August 2021, worse after months of family providing care in home, resident declined to be changed when incontinent and repositioned.  Plan:   - Pack coccyx wound with calcium alginate, cover with heart shaped Mepilex and change q 2 days and PRN  - Mepilex and medihoney to R lower leg   - Foam dressing to right back daily  - Pressure reduction mattress  - Turn and reposition q 2 hours  - Espino in place to reduce moisture that contributes to skin damage   - Appreciate hospice supports, comfort medications in place in the case of rapid decline: morphine, levsin,  - Continue Ensure   - Continue scheduled morphine 10 mg q 6 hours and q 3 hours PRN for pain  - Continue PRN tizanidine 1 mg BID PRN for muscle spasm      Orders:  - Increase Lantus to 37 units q HS     Electronically signed by: ELIU Ayers CNP

## 2023-06-14 NOTE — TELEPHONE ENCOUNTER
Miley Tolbert  1945  ORDERS:  - Discontinue Lantus  - Lantus 100 units/mL, 37 units subcutaneous q HS dx DMTII  Electronically signed by:   ELIU Ayers CNP  06/14/23 1:33 PM

## 2023-06-15 NOTE — TELEPHONE ENCOUNTER
Central Prior Authorization Team   Phone: [phone]      PA Initiation    Medication: INSULIN ASPART FLEXPEN 100 UNIT/ML SC SOPN  Insurance Company: [employer] - Phone [phone] Fax [phone]  Pharmacy Filling the Rx: [facility] - [facility] [address] [place] [address]  Filling Pharmacy Phone: [phone]  Filling Pharmacy Fax:    Start Date: 6/15/2023       Banner Transposition Flap Text: The defect edges were debeveled with a #15 scalpel blade.  Given the location of the defect and the proximity to free margins a Banner transposition flap was deemed most appropriate.  Using a sterile surgical marker, an appropriate flap drawn around the defect. The area thus outlined was incised deep to adipose tissue with a #15 scalpel blade.  The skin margins were undermined to an appropriate distance in all directions utilizing iris scissors.

## 2023-06-15 NOTE — TELEPHONE ENCOUNTER
Prior Authorization Approval    Medication: INSULIN ASPART FLEXPEN 100 UNIT/ML SC SOPN  Authorization Effective Date: 5/16/2023  Authorization Expiration Date: 6/14/2024  Insurance Company: [employer] - Phone [phone] Fax [phone]  Which Pharmacy is filling the prescription: [facility] - [place], [place] - [address] [place] [address]  Pharmacy Notified: Yes  Patient Notified: Yes (pharmacy will deliver when ready)

## 2023-06-21 NOTE — TELEPHONE ENCOUNTER
PRIOR AUTH - CLAUDY    This is a request for a PRIOR AUTHORIZATION    * Please SUBMIT A PRIOR AUTHORIZATION (if appropriate) & UPDATE -[facility] once approved / denied. *    * Patient's Drug Insurance will not pay for this medication without a Prior Authorization in place. *    * Thank you! *

## 2023-06-22 NOTE — TELEPHONE ENCOUNTER
Central Prior Authorization Team   Phone: [phone]      PA Initiation    Medication: JANUVIA 100 MG PO TABS  Insurance Company: [email or web address] - Phone [phone] Fax [phone]  Pharmacy Filling the Rx: [facility] - [place], [place] - [address] [place] [address]  Filling Pharmacy Phone: [phone]  Filling Pharmacy Fax:    Start Date: 6/22/2023

## 2023-06-22 NOTE — TELEPHONE ENCOUNTER
Prior Authorization Approval    Medication: JANUVIA 100 MG PO TABS  Authorization Effective Date: 5/22/2023  Authorization Expiration Date: 6/22/2024  Insurance Company: [employer] - Phone [phone] Fax [phone]  Which Pharmacy is filling the prescription: [facility] - [address]  Pharmacy Notified: Yes  Patient Notified: Yes (pharmacy will deliver to patient's residence when ready)